# Patient Record
Sex: FEMALE | Race: WHITE | HISPANIC OR LATINO | Employment: FULL TIME | URBAN - METROPOLITAN AREA
[De-identification: names, ages, dates, MRNs, and addresses within clinical notes are randomized per-mention and may not be internally consistent; named-entity substitution may affect disease eponyms.]

---

## 2017-01-02 DIAGNOSIS — F41.9 ANXIETY: Primary | ICD-10-CM

## 2017-01-02 RX ORDER — CITALOPRAM HYDROBROMIDE 20 MG/1
20 TABLET ORAL DAILY
Qty: 90 TABLET | Refills: 0 | Status: SHIPPED | OUTPATIENT
Start: 2017-01-02 | End: 2017-04-05

## 2017-01-02 NOTE — TELEPHONE ENCOUNTER
One month approved, needs to be seen soon, letter sent to make appt  Prescription approved per [facility] Refill Protocol  Maria E Lake RN BS

## 2017-01-02 NOTE — Clinical Note
[facility]  [address] [address]  Phone: [phone]    01/02/2017    Zee Kyle  [address] [address]      Dear Zee:     We recently received a call from your pharmacy requesting a refill of your medication.    A review of your chart indicates that an appointment is required with your provider for 6 month follow up required. Please call the clinic at [phone] to schedule your appointment.    We have authorized one refill of your medication to allow time for you to schedule your appointment.    Taking care of your health is important to us, and ongoing visits with your provider are vital to your care.  We look forward to seeing you in the near future.          Sincerely,      Susan Haase, APRN CNP/Maria E SUAREZ RN

## 2017-01-02 NOTE — TELEPHONE ENCOUNTER
Citalopram 20 mg tab      Last Written Prescription Date: 07/01/16  Last Fill Quantity: 90, # refills: 1  Last Office Visit with G primary care provider:  07/01/16 Susan Haase        Last PHQ-9 score on record=   PHQ-9 SCORE 7/1/2016   Total Score 0

## 2017-04-05 ENCOUNTER — OFFICE VISIT (OUTPATIENT)
Dept: FAMILY MEDICINE | Facility: CLINIC | Age: 42
End: 2017-04-05
Payer: COMMERCIAL

## 2017-04-05 VITALS
HEIGHT: 67 IN | WEIGHT: 248 LBS | DIASTOLIC BLOOD PRESSURE: 80 MMHG | HEART RATE: 94 BPM | OXYGEN SATURATION: 96 % | TEMPERATURE: 98.1 F | RESPIRATION RATE: 16 BRPM | BODY MASS INDEX: 38.92 KG/M2 | SYSTOLIC BLOOD PRESSURE: 130 MMHG

## 2017-04-05 DIAGNOSIS — B00.9 HSV-2 INFECTION: ICD-10-CM

## 2017-04-05 DIAGNOSIS — E55.9 VITAMIN D DEFICIENCY: ICD-10-CM

## 2017-04-05 DIAGNOSIS — F41.9 ANXIETY: ICD-10-CM

## 2017-04-05 DIAGNOSIS — Z13.6 CARDIOVASCULAR SCREENING; LDL GOAL LESS THAN 160: ICD-10-CM

## 2017-04-05 DIAGNOSIS — F33.1 MAJOR DEPRESSIVE DISORDER, RECURRENT EPISODE, MODERATE (H): Primary | ICD-10-CM

## 2017-04-05 DIAGNOSIS — E03.9 HYPOTHYROIDISM, UNSPECIFIED TYPE: ICD-10-CM

## 2017-04-05 PROCEDURE — 99213 OFFICE O/P EST LOW 20 MIN: CPT | Performed by: NURSE PRACTITIONER

## 2017-04-05 RX ORDER — CITALOPRAM HYDROBROMIDE 20 MG/1
20 TABLET ORAL DAILY
Qty: 90 TABLET | Refills: 3 | Status: SHIPPED | OUTPATIENT
Start: 2017-04-05 | End: 2017-11-09

## 2017-04-05 RX ORDER — VALACYCLOVIR HYDROCHLORIDE 500 MG/1
500 TABLET, FILM COATED ORAL DAILY
Qty: 30 TABLET | Refills: 3 | Status: SHIPPED | OUTPATIENT
Start: 2017-04-05 | End: 2018-12-26

## 2017-04-05 ASSESSMENT — ANXIETY QUESTIONNAIRES
3. WORRYING TOO MUCH ABOUT DIFFERENT THINGS: NOT AT ALL
6. BECOMING EASILY ANNOYED OR IRRITABLE: NOT AT ALL
5. BEING SO RESTLESS THAT IT IS HARD TO SIT STILL: NOT AT ALL
GAD7 TOTAL SCORE: 2
1. FEELING NERVOUS, ANXIOUS, OR ON EDGE: NOT AT ALL
2. NOT BEING ABLE TO STOP OR CONTROL WORRYING: NOT AT ALL
7. FEELING AFRAID AS IF SOMETHING AWFUL MIGHT HAPPEN: NOT AT ALL

## 2017-04-05 ASSESSMENT — PATIENT HEALTH QUESTIONNAIRE - PHQ9: 5. POOR APPETITE OR OVEREATING: MORE THAN HALF THE DAYS

## 2017-04-05 NOTE — PROGRESS NOTES
"  SUBJECTIVE:                                                    Zee Kyle is a 41 year old female who presents to clinic today for the following health issues:    Depression and Anxiety Follow-Up    Status since last visit: No change    Other associated symptoms:None    Complicating factors:     Significant life event: No     Current substance abuse: None    PHQ-9 SCORE 12/28/2015 7/1/2016 4/5/2017   Total Score 0 0 2     HENRI-7 SCORE 11/24/2015 7/1/2016 4/5/2017   Total Score 16 0 2        PHQ-9  English      PHQ-9   Any Language     GAD7       Amount of exercise or physical activity: none    Problems taking medications regularly: No    Medication side effects: none    Diet: regular (no restrictions)    Zee as been taking 20MG citalopram daily since prescribed at last visit in 7/2016. 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 Medication is helping and enabling her to accomplish these things. Otherwise doing very well.   PHQ-9: 2  HENRI-7: 2    Dysuria -- She experienced sudden onset of dysuria w/ frequency 3 days ago. She was seen in urgent care and urine sample was negative for UTI. She decided to start taking valacyclovir for herpes virus \"flare up\" even though her symptoms felt different than when that has occurred in past. States that her symptoms have greatly improved since then. Denies vaginal discharge.    GYN -- Reports daily bladder leakage since giving birth to second child. Problem is severe and has worsened especially over the past year as she has gained back a lot of weight. She is planning to schedule OB appt soon to address issue.    Problem list and histories reviewed & adjusted, as indicated.  Additional history: as documented    Patient Active Problem List   Diagnosis     CARDIOVASCULAR SCREENING; LDL GOAL LESS THAN 160     Presbycusis     " Hypothyroidism     Major depressive disorder, recurrent episode, moderate (H)     Anxiety     Past Surgical History:   Procedure Laterality Date     PE TUBES       TONSILLECTOMY       TYMPANOPLASTY, RT/LT      Tympanoplasty RT/LT       Social History   Substance Use Topics     Smoking status: Former Smoker     Quit date: 2/10/2003     Smokeless tobacco: Never Used      Comment: quit 2002     Alcohol use 0.0 oz/week     0 Standard drinks or equivalent per week      Comment: occas     Family History   Problem Relation Age of Onset     Hypertension Father          Current Outpatient Prescriptions   Medication Sig Dispense Refill     citalopram (CELEXA) 20 MG tablet Take 1 tablet (20 mg) by mouth daily 90 tablet 3     valACYclovir (VALTREX) 500 MG tablet Take 1 tablet (500 mg) by mouth daily 30 tablet 3     norgestimate-ethinyl estradiol (ORTHO-CYCLEN, SPRINTEC) 0.25-35 MG-MCG per tablet Take 1 tablet by mouth daily 56 tablet 0     fluticasone (FLONASE) 50 MCG/ACT nasal spray Spray 1-2 sprays into both nostrils daily 16 g 3     CALCIUM PO        [DISCONTINUED] citalopram (CELEXA) 20 MG tablet Take 1 tablet (20 mg) by mouth daily 90 tablet 0     No Known Allergies    Reviewed and updated as needed this visit by clinical staff  Tobacco  Allergies  Med Hx  Surg Hx  Fam Hx  Soc Hx      Reviewed and updated as needed this visit by Provider    ROS:  Constitutional, HEENT, cardiovascular, pulmonary, GI, , musculoskeletal, neuro, skin, endocrine and psych systems are negative, except as otherwise noted.    This document serves as a record of the services and decisions personally performed and made by Susan Haase, CNP. It was created on her behalf by Sera López, a trained medical scribe. The creation of this document is based the provider's statements to the medical scribe.  Sera López April 5, 2017 3:21 PM   OBJECTIVE:                                                    /80 (BP Location: Left arm,  "Patient Position: Chair, Cuff Size: Adult Large)  Pulse 94  Temp 98.1  F (36.7  C) (Oral)  Resp 16  Ht 1.702 m (5' 7\")  Wt 112.5 kg (248 lb)  SpO2 96%  BMI 38.84 kg/m2  Body mass index is 38.84 kg/(m^2).  GENERAL: healthy, alert and no distress, obese  HENT: ear canals and TM's normal, nose and mouth without ulcers or lesions  NECK: no adenopathy, no asymmetry, masses, or scars and thyroid normal to palpation  RESP: lungs clear to auscultation - no rales, rhonchi or wheezes  CV: regular rate and rhythm, normal S1 S2, no S3 or S4, no murmur, click or rub, no peripheral edema   NEURO: Normal strength and tone, mentation intact and speech normal  PSYCH: mentation appears normal, affect normal/bright    Diagnostic Test Results: none     ASSESSMENT/PLAN:                                                    Zee was seen today for recheck medication and urinary problem.    Diagnoses and all orders for this visit:    Major depressive disorder, recurrent episode, moderate (H):  Well controlled.  -     DEPRESSION ACTION PLAN (DAP)  -     citalopram (CELEXA) 20 MG tablet; Take 1 tablet (20 mg) by mouth daily  -     CBC with platelets differential; Future  -     Comprehensive metabolic panel; Future    Anxiety  -     DEPRESSION ACTION PLAN (DAP)  -     citalopram (CELEXA) 20 MG tablet; Take 1 tablet (20 mg) by mouth daily    HSV-2 infection  -     valACYclovir (VALTREX) 500 MG tablet; Take 1 tablet (500 mg) by mouth daily    CARDIOVASCULAR SCREENING; LDL GOAL LESS THAN 160  -     Lipid panel reflex to direct LDL; Future    Hypothyroidism, unspecified type  -     TSH with free T4 reflex; Future    Other orders  -     Cancel: *UA reflex to Microscopic and Culture ([facility] (except [place] and [place])      Follow up in 1 year, sooner if symptoms worsen or do not improve.    The information in this document, created by the medical scribe for me, accurately reflects the services I personally " performed and the decisions made by me. I have reviewed and approved this document for accuracy.   Susan Haase, CNP Susan Haase, APRN CNP  [facility]

## 2017-04-05 NOTE — MR AVS SNAPSHOT
"              After Visit Summary   4/5/2017    Zee Kyle    MRN: [id number]           Patient Information     Date Of Birth          1975        Visit Information        Provider Department      4/5/2017 3:00 PM Haase, Susan Rachele, APRN CNP [facility]        Today's Diagnoses     HSV-2 infection    -  1    Anxiety           Follow-ups after your visit        Follow-up notes from your care team     Return in about 1 year (around 4/5/2018).      Who to contact     If you have questions or need follow up information about today's clinic visit or your schedule please contact [facility] directly at [phone].  Normal or non-critical lab and imaging results will be communicated to you by MyChart, letter or phone within 4 business days after the clinic has received the results. If you do not hear from us within 7 days, please contact the clinic through SmartRxhart or phone. If you have a critical or abnormal lab result, we will notify you by phone as soon as possible.  Submit refill requests through [employer] or call your pharmacy and they will forward the refill request to us. Please allow 3 business days for your refill to be completed.          Additional Information About Your Visit        MyChart Information     Plastio gives you secure access to your electronic health record. If you see a primary care provider, you can also send messages to your care team and make appointments. If you have questions, please call your primary care clinic.  If you do not have a primary care provider, please call [phone] and they will assist you.        Care EveryWhere ID     This is your Care EveryWhere ID. This could be used by other organizations to access your [facility] medical records  [id number]        Your Vitals Were     Pulse Temperature Respirations Height Pulse Oximetry BMI (Body Mass Index)    94 98.1  F (36.7  C) (Oral) 16 5' 7\" (1.702 m) 96% 38.84 kg/m2       Blood " Pressure from Last 3 Encounters:   04/05/17 130/80   07/01/16 110/62   05/13/16 110/72    Weight from Last 3 Encounters:   04/05/17 248 lb (112.5 kg)   07/01/16 183 lb (83 kg)   05/13/16 176 lb (79.8 kg)              We Performed the Following     DEPRESSION ACTION PLAN (DAP)          Today's Medication Changes          These changes are accurate as of: 4/5/17  3:29 PM.  If you have any questions, ask your nurse or doctor.               Start taking these medicines.        Dose/Directions    valACYclovir 500 MG tablet   Commonly known as:  VALTREX   Used for:  HSV-2 infection   Started by:  Haase, Susan Rachele, APRN CNP        Dose:  500 mg   Take 1 tablet (500 mg) by mouth daily   Quantity:  30 tablet   Refills:  3            Where to get your medicines      These medications were sent to [facility] [address] [place] [address] 42  AT [address] [facility] [address]     Phone:  [phone]     citalopram 20 MG tablet    valACYclovir 500 MG tablet                Primary Care Provider Office Phone # Fax #    Susan Rachele Haase, APRN [phone]       [facility] [id number] [place] [address]        Thank you!     Thank you for choosing [facility]  for your care. Our goal is always to provide you with excellent care. Hearing back from our patients is one way we can continue to improve our services. Please take a few minutes to complete the written survey that you may receive in the mail after your visit with us. Thank you!             Your Updated Medication List - Protect others around you: Learn how to safely use, store and throw away your medicines at www.disposemymeds.org.          This list is accurate as of: 4/5/17  3:29 PM.  Always use your most recent med list.                   Brand Name Dispense Instructions for use    CALCIUM PO          citalopram 20 MG tablet    celeXA    90  tablet    Take 1 tablet (20 mg) by mouth daily       fluticasone 50 MCG/ACT spray    FLONASE    16 g    Spray 1-2 sprays into both nostrils daily       norgestimate-ethinyl estradiol 0.25-35 MG-MCG per tablet    ORTHO-CYCLEN, SPRINTEC    56 tablet    Take 1 tablet by mouth daily       valACYclovir 500 MG tablet    VALTREX    30 tablet    Take 1 tablet (500 mg) by mouth daily

## 2017-04-05 NOTE — Clinical Note
Please abstract the following data from this visit with this patient into the appropriate field in Epic:  Pap smear done on this date: 10/1/2015 (approximately), by this group: [facility] Dr. Schumacher, results were normal.

## 2017-04-05 NOTE — NURSING NOTE
"Chief Complaint   Patient presents with     Recheck Medication     Urinary Problem       Initial /80 (BP Location: Left arm, Patient Position: Chair, Cuff Size: Adult Large)  Pulse 94  Temp 98.1  F (36.7  C) (Oral)  Resp 16  Ht 5' 7\" (1.702 m)  Wt 248 lb (112.5 kg)  SpO2 96%  BMI 38.84 kg/m2 Estimated body mass index is 38.84 kg/(m^2) as calculated from the following:    Height as of this encounter: 5' 7\" (1.702 m).    Weight as of this encounter: 248 lb (112.5 kg).  Medication Reconciliation: complete   Bobbi Bergman CMA      "

## 2017-04-06 RX ORDER — ERGOCALCIFEROL 1.25 MG/1
50000 CAPSULE, LIQUID FILLED ORAL
Qty: 12 CAPSULE | Refills: 0 | Status: SHIPPED | OUTPATIENT
Start: 2017-04-06 | End: 2017-06-23

## 2017-04-06 ASSESSMENT — ANXIETY QUESTIONNAIRES: GAD7 TOTAL SCORE: 2

## 2017-04-06 ASSESSMENT — PATIENT HEALTH QUESTIONNAIRE - PHQ9: SUM OF ALL RESPONSES TO PHQ QUESTIONS 1-9: 2

## 2017-06-30 DIAGNOSIS — E55.9 VITAMIN D DEFICIENCY: ICD-10-CM

## 2017-07-05 RX ORDER — ERGOCALCIFEROL 1.25 MG/1
CAPSULE, LIQUID FILLED ORAL
Qty: 12 CAPSULE | Refills: 0 | OUTPATIENT
Start: 2017-07-05

## 2017-11-09 ENCOUNTER — OFFICE VISIT (OUTPATIENT)
Dept: FAMILY MEDICINE | Facility: CLINIC | Age: 42
End: 2017-11-09
Payer: COMMERCIAL

## 2017-11-09 VITALS
TEMPERATURE: 97.8 F | RESPIRATION RATE: 14 BRPM | HEART RATE: 94 BPM | OXYGEN SATURATION: 98 % | BODY MASS INDEX: 38.06 KG/M2 | SYSTOLIC BLOOD PRESSURE: 120 MMHG | DIASTOLIC BLOOD PRESSURE: 76 MMHG | WEIGHT: 243 LBS

## 2017-11-09 DIAGNOSIS — F33.1 MAJOR DEPRESSIVE DISORDER, RECURRENT EPISODE, MODERATE (H): ICD-10-CM

## 2017-11-09 DIAGNOSIS — F41.9 ANXIETY: ICD-10-CM

## 2017-11-09 DIAGNOSIS — R22.31 LUMP ON FINGER, RIGHT: Primary | ICD-10-CM

## 2017-11-09 DIAGNOSIS — Z23 NEED FOR PROPHYLACTIC VACCINATION AND INOCULATION AGAINST INFLUENZA: ICD-10-CM

## 2017-11-09 DIAGNOSIS — R22.31 LUMP OF SKIN OF RIGHT UPPER EXTREMITY: ICD-10-CM

## 2017-11-09 PROCEDURE — 90471 IMMUNIZATION ADMIN: CPT | Performed by: NURSE PRACTITIONER

## 2017-11-09 PROCEDURE — 90686 IIV4 VACC NO PRSV 0.5 ML IM: CPT | Performed by: NURSE PRACTITIONER

## 2017-11-09 PROCEDURE — 99213 OFFICE O/P EST LOW 20 MIN: CPT | Mod: 25 | Performed by: NURSE PRACTITIONER

## 2017-11-09 RX ORDER — CITALOPRAM HYDROBROMIDE 20 MG/1
20 TABLET ORAL DAILY
Qty: 90 TABLET | Refills: 3 | Status: SHIPPED | OUTPATIENT
Start: 2017-11-09 | End: 2018-07-10 | Stop reason: ALTCHOICE

## 2017-11-09 ASSESSMENT — PATIENT HEALTH QUESTIONNAIRE - PHQ9
5. POOR APPETITE OR OVEREATING: SEVERAL DAYS
SUM OF ALL RESPONSES TO PHQ QUESTIONS 1-9: 1

## 2017-11-09 ASSESSMENT — ANXIETY QUESTIONNAIRES
3. WORRYING TOO MUCH ABOUT DIFFERENT THINGS: SEVERAL DAYS
6. BECOMING EASILY ANNOYED OR IRRITABLE: NOT AT ALL
2. NOT BEING ABLE TO STOP OR CONTROL WORRYING: SEVERAL DAYS
5. BEING SO RESTLESS THAT IT IS HARD TO SIT STILL: NOT AT ALL
GAD7 TOTAL SCORE: 4
IF YOU CHECKED OFF ANY PROBLEMS ON THIS QUESTIONNAIRE, HOW DIFFICULT HAVE THESE PROBLEMS MADE IT FOR YOU TO DO YOUR WORK, TAKE CARE OF THINGS AT HOME, OR GET ALONG WITH OTHER PEOPLE: NOT DIFFICULT AT ALL
1. FEELING NERVOUS, ANXIOUS, OR ON EDGE: SEVERAL DAYS
7. FEELING AFRAID AS IF SOMETHING AWFUL MIGHT HAPPEN: NOT AT ALL

## 2017-11-09 NOTE — PROGRESS NOTES

## 2017-11-09 NOTE — MR AVS SNAPSHOT
After Visit Summary   11/9/2017    Zee Kyle    MRN: [id number]           Patient Information     Date Of Birth          1975        Visit Information        Provider Department      11/9/2017 3:45 PM Haase, Susan Rachele, APRN CNP [facility]        Today's Diagnoses     Lump on finger, right    -  1    Lump of skin of right upper extremity           Follow-ups after your visit        Follow-up notes from your care team     Return in about 6 months (around 5/9/2018).      Future tests that were ordered for you today     Open Future Orders        Priority Expected Expires Ordered    US Extremity Non Vascular Right Routine  11/9/2018 11/9/2017            Who to contact     If you have questions or need follow up information about today's clinic visit or your schedule please contact [facility] directly at [phone].  Normal or non-critical lab and imaging results will be communicated to you by v2 Ratingshart, letter or phone within 4 business days after the clinic has received the results. If you do not hear from us within 7 days, please contact the clinic through v2 Ratingshart or phone. If you have a critical or abnormal lab result, we will notify you by phone as soon as possible.  Submit refill requests through [employer] or call your pharmacy and they will forward the refill request to us. Please allow 3 business days for your refill to be completed.          Additional Information About Your Visit        MyChart Information     [employer] gives you secure access to your electronic health record. If you see a primary care provider, you can also send messages to your care team and make appointments. If you have questions, please call your primary care clinic.  If you do not have a primary care provider, please call [phone] and they will assist you.        Care EveryWhere ID     This is your Care EveryWhere ID. This could be used by other organizations to access  your [place] medical records  [id number]        Your Vitals Were     Pulse Temperature Respirations Pulse Oximetry BMI (Body Mass Index)       94 97.8  F (36.6  C) (Oral) 14 98% 38.06 kg/m2        Blood Pressure from Last 3 Encounters:   11/09/17 120/76   04/05/17 130/80   07/01/16 110/62    Weight from Last 3 Encounters:   11/09/17 243 lb (110.2 kg)   04/05/17 248 lb (112.5 kg)   07/01/16 183 lb (83 kg)               Primary Care Provider Office Phone # Fax #    Susan Rachele Haase, APRN -[phone] [address] [facility] [address]        Equal Access to Services     DARYL CALZADA : Paul mooneyo [place], waaxda luqadaha, qaybta kaalmada adeegyada, kevan yao . So [facility] [phone].    ATENCIÓN: Si habla español, tiene a arndt disposición servicios gratuitos de asistencia lingüística. Llame al [phone].    We comply with applicable federal civil rights laws and [place] laws. We do not discriminate on the basis of race, color, national origin, age, disability, sex, sexual orientation, or gender identity.            Thank you!     Thank you for choosing [facility]  for your care. Our goal is always to provide you with excellent care. Hearing back from our patients is one way we can continue to improve our services. Please take a few minutes to complete the written survey that you may receive in the mail after your visit with us. Thank you!             Your Updated Medication List - Protect others around you: Learn how to safely use, store and throw away your medicines at www.disposemymeds.org.          This list is accurate as of: 11/9/17  4:11 PM.  Always use your most recent med list.                   Brand Name Dispense Instructions for use Diagnosis    CALCIUM PO           citalopram 20 MG tablet    celeXA    90 tablet    Take 1 tablet (20 mg) by mouth daily    Anxiety, Major depressive disorder, recurrent episode,  moderate (H)       fluticasone 50 MCG/ACT spray    FLONASE    16 g    Spray 1-2 sprays into both nostrils daily    Sensation of fullness in left ear       valACYclovir 500 MG tablet    VALTREX    30 tablet    Take 1 tablet (500 mg) by mouth daily    HSV-2 infection

## 2017-11-09 NOTE — PROGRESS NOTES
SUBJECTIVE:   Zee Kyle is a 42 year old female who presents to clinic today for the following health issues:    Derm problem      Duration: 3 months    Description  Location: R index finger and R elbow  Itching: no    Intensity:  No pain    Accompanying signs and symptoms: None    History (similar episodes/previous evaluation): None    Precipitating or alleviating factors:  New exposures:  None  Recent travel: no      Therapies tried and outcome: none    Zee has a mass on her right second finger and another by her right elbow. She felt both started about 3 months ago, thought she had a bug bite since both sites were itchy.  Since that time has not had any pruritis.  Also denies redness, warmth or drainage.      Anxiety/Depression:Taking citalopram 20 mg every day as prescribed.    PHQ9 score: 1  GAD7 score: 4      Problem list and histories reviewed & adjusted, as indicated.  Additional history: as documented    Reviewed and updated as needed this visit by clinical staff     Reviewed and updated as needed this visit by Provider       ROS:  Constitutional,  musculoskeletal, neuro, skin, and psych systems are negative, except as otherwise noted.    This document serves as a record of the services and decisions personally performed and made by Susan Haase, CNP. It was created on her behalf by Homero Rubi, a trained medical scribe. The creation of this document is based the provider's statements to the medical scribe.  Homero Rubi November 9, 2017 4:07 PM      OBJECTIVE:   /76 (BP Location: Right arm, Patient Position: Chair, Cuff Size: Adult Large)  Pulse 94  Temp 97.8  F (36.6  C) (Oral)  Resp 14  Wt 110.2 kg (243 lb)  SpO2 98%  BMI 38.06 kg/m2  Body mass index is 38.06 kg/(m^2).  GENERAL: healthy, alert and no distress  SKIN: right index finger with a mass of the  proximal phalanx , 1cm x 1 cm, nontender, mobile.   Right upper arm, proximal to right elbow,  2.5cm x 1cm non-symmetric, non  tender, mobile mass.   PSYCH: mentation appears normal, affect normal/bright    ASSESSMENT/PLAN:   Zee was seen today for derm problem and flu shot.    Diagnoses and all orders for this visit:    Lump on finger, right and Lump of skin of right upper extremity;  Likely a lipoma but will obtain US for further evaluation.  -     US Extremity Non Vascular Right; Future    Major depressive disorder, recurrent episode, moderate (H) and Anxiety:  Well controlled.   -     citalopram (CELEXA) 20 MG tablet; Take 1 tablet (20 mg) by mouth daily    Need for prophylactic vaccination and inoculation against influenza  -     FLU VAC, SPLIT VIRUS IM > 3 YO (QUADRIVALENT) [61947]    Follow up after ultrasound to discuss results and follow up plan.   The information in this document, created by the medical scribe for me, accurately reflects the services I personally performed and the decisions made by me. I have reviewed and approved this document for accuracy.   Susan Haase, CNP Susan Haase, APRN CNP  [facility]

## 2017-11-10 ASSESSMENT — ANXIETY QUESTIONNAIRES: GAD7 TOTAL SCORE: 4

## 2017-11-13 ENCOUNTER — HOSPITAL ENCOUNTER (OUTPATIENT)
Dept: ULTRASOUND IMAGING | Facility: CLINIC | Age: 42
Discharge: HOME | End: 2017-11-13
Attending: NURSE PRACTITIONER | Admitting: NURSE PRACTITIONER
Payer: COMMERCIAL

## 2017-11-13 DIAGNOSIS — R22.31 LUMP ON FINGER, RIGHT: ICD-10-CM

## 2017-11-13 DIAGNOSIS — R22.31 LUMP OF SKIN OF RIGHT UPPER EXTREMITY: ICD-10-CM

## 2017-11-13 PROCEDURE — 76882 US LMTD JT/FCL EVL NVASC XTR: CPT | Mod: RT

## 2017-11-15 NOTE — PROGRESS NOTES
Nathan Koch,  Both of the lumps are fluid filled, if they become red or painful please return for a clinic visit otherwise treatment is not necessary.  Please let me know if you have any questions.  Sincerely,  Susan Haase, CNP

## 2018-02-08 ENCOUNTER — HOSPITAL ENCOUNTER (OUTPATIENT)
Dept: MAMMOGRAPHY | Facility: CLINIC | Age: 43
Discharge: HOME | End: 2018-02-08
Attending: NURSE PRACTITIONER | Admitting: NURSE PRACTITIONER
Payer: COMMERCIAL

## 2018-02-08 ENCOUNTER — HOSPITAL ENCOUNTER (OUTPATIENT)
Dept: MAMMOGRAPHY | Facility: CLINIC | Age: 43
End: 2018-02-08
Attending: NURSE PRACTITIONER
Payer: COMMERCIAL

## 2018-02-08 DIAGNOSIS — N63.20 LEFT BREAST MASS: ICD-10-CM

## 2018-02-08 PROCEDURE — 76642 ULTRASOUND BREAST LIMITED: CPT | Mod: LT

## 2018-02-08 PROCEDURE — 77066 DX MAMMO INCL CAD BI: CPT

## 2018-07-02 DIAGNOSIS — F41.9 ANXIETY: ICD-10-CM

## 2018-07-02 DIAGNOSIS — F33.1 MAJOR DEPRESSIVE DISORDER, RECURRENT EPISODE, MODERATE (H): ICD-10-CM

## 2018-07-02 NOTE — TELEPHONE ENCOUNTER
"Requested Prescriptions   Pending Prescriptions Disp Refills     citalopram (CELEXA) 20 MG tablet [Pharmacy Med Name: CITALOPRAM 20MG TABLETS]  Last Written Prescription Date:  11/9/2017  Last Fill Quantity: 90 tablet,  # refills: 3   Last office visit: 11/9/2017 with prescribing provider:  Haase    90 tablet 0     Sig: TAKE 1 TABLET(20 MG) BY MOUTH DAILY    SSRIs Protocol Failed    7/2/2018 10:06 AM  PHQ-9 SCORE 7/1/2016 4/5/2017 11/9/2017   Total Score 0 2 1     HENRI-7 SCORE 7/1/2016 4/5/2017 11/9/2017   Total Score 0 2 4          Failed - PHQ-9 score less than 5 in past 6 months    Please review last PHQ-9 score.   PHQ-9 SCORE 7/1/2016 4/5/2017 11/9/2017   Total Score 0 2 1          Failed - Recent (6 mo) or future (30 days) visit within the authorizing provider's specialty    Patient had office visit in the last 6 months or has a visit in the next 30 days with authorizing provider or within the authorizing provider's specialty.  See \"Patient Info\" tab in inbasket, or \"Choose Columns\" in Meds & Orders section of the refill encounter.           Passed - Patient is age 18 or older       Passed - No active pregnancy on record       Passed - No positive pregnancy test in last 12 months          "

## 2018-07-03 RX ORDER — CITALOPRAM HYDROBROMIDE 20 MG/1
TABLET ORAL
Qty: 90 TABLET | Refills: 0
Start: 2018-07-03

## 2018-07-03 NOTE — TELEPHONE ENCOUNTER
Return in about 6 months (around 5/9/2018).     Call out to pt, scheduled appt  Has refill available    Maria E Lake RN, BS  Clinical Nurse Triage.

## 2018-07-05 ENCOUNTER — TRANSFERRED RECORDS (OUTPATIENT)
Dept: HEALTH INFORMATION MANAGEMENT | Facility: CLINIC | Age: 43
End: 2018-07-05

## 2018-07-10 ENCOUNTER — OFFICE VISIT (OUTPATIENT)
Dept: FAMILY MEDICINE | Facility: CLINIC | Age: 43
End: 2018-07-10
Payer: COMMERCIAL

## 2018-07-10 VITALS
HEIGHT: 67 IN | SYSTOLIC BLOOD PRESSURE: 120 MMHG | WEIGHT: 256 LBS | BODY MASS INDEX: 40.18 KG/M2 | DIASTOLIC BLOOD PRESSURE: 78 MMHG | RESPIRATION RATE: 14 BRPM | TEMPERATURE: 97.9 F | HEART RATE: 90 BPM | OXYGEN SATURATION: 99 %

## 2018-07-10 DIAGNOSIS — F33.1 MAJOR DEPRESSIVE DISORDER, RECURRENT EPISODE, MODERATE (H): Primary | ICD-10-CM

## 2018-07-10 DIAGNOSIS — E66.01 MORBID OBESITY (H): ICD-10-CM

## 2018-07-10 DIAGNOSIS — F41.9 ANXIETY: ICD-10-CM

## 2018-07-10 PROCEDURE — 99214 OFFICE O/P EST MOD 30 MIN: CPT | Performed by: NURSE PRACTITIONER

## 2018-07-10 RX ORDER — VENLAFAXINE HYDROCHLORIDE 37.5 MG/1
37.5 CAPSULE, EXTENDED RELEASE ORAL DAILY
Qty: 30 CAPSULE | Refills: 3 | Status: SHIPPED | OUTPATIENT
Start: 2018-07-10 | End: 2018-09-14

## 2018-07-10 ASSESSMENT — PATIENT HEALTH QUESTIONNAIRE - PHQ9
SUM OF ALL RESPONSES TO PHQ QUESTIONS 1-9: 12
10. IF YOU CHECKED OFF ANY PROBLEMS, HOW DIFFICULT HAVE THESE PROBLEMS MADE IT FOR YOU TO DO YOUR WORK, TAKE CARE OF THINGS AT HOME, OR GET ALONG WITH OTHER PEOPLE: NOT DIFFICULT AT ALL
SUM OF ALL RESPONSES TO PHQ QUESTIONS 1-9: 12

## 2018-07-10 ASSESSMENT — ANXIETY QUESTIONNAIRES
GAD7 TOTAL SCORE: 4
7. FEELING AFRAID AS IF SOMETHING AWFUL MIGHT HAPPEN: NOT AT ALL
GAD7 TOTAL SCORE: 4
1. FEELING NERVOUS, ANXIOUS, OR ON EDGE: NOT AT ALL
6. BECOMING EASILY ANNOYED OR IRRITABLE: SEVERAL DAYS
4. TROUBLE RELAXING: SEVERAL DAYS
GAD7 TOTAL SCORE: 4
3. WORRYING TOO MUCH ABOUT DIFFERENT THINGS: NOT AT ALL
2. NOT BEING ABLE TO STOP OR CONTROL WORRYING: NOT AT ALL
5. BEING SO RESTLESS THAT IT IS HARD TO SIT STILL: MORE THAN HALF THE DAYS
7. FEELING AFRAID AS IF SOMETHING AWFUL MIGHT HAPPEN: NOT AT ALL

## 2018-07-10 NOTE — MR AVS SNAPSHOT
After Visit Summary   7/10/2018    Zee Kyle    MRN: [id number]           Patient Information     Date Of Birth          1975        Visit Information        Provider Department      7/10/2018 10:15 AM Haase, Susan Rachele, APRN CNP [facility]        Today's Diagnoses     Mild major depression (H)    -  1    Morbid obesity (H)           Follow-ups after your visit        Additional Services     BARIATRIC ADULT REFERRAL       Your provider has referred you to: [place]: [facility] Shakeel Paz [phone];   [email or web address]    Please be aware that coverage of these services is subject to the terms and limitations of your health insurance plan.  Call member services at your health plan with any benefit or coverage questions.      Please bring the following with you to your appointment:      (1) List of current medications   (2) This referral request   (3) Any documents/labs given to you for this referral                  Follow-up notes from your care team     Return in about 6 months (around 1/10/2019) for Physical Exam.      Who to contact     If you have questions or need follow up information about today's clinic visit or your schedule please contact [facility] directly at [phone].  Normal or non-critical lab and imaging results will be communicated to you by MyChart, letter or phone within 4 business days after the clinic has received the results. If you do not hear from us within 7 days, please contact the clinic through MyChart or phone. If you have a critical or abnormal lab result, we will notify you by phone as soon as possible.  Submit refill requests through [employer] or call your pharmacy and they will forward the refill request to us. Please allow 3 business days for your refill to be completed.          Additional  "Information About Your Visit        [employer] Information     [employer] gives you secure access to your electronic health record. If you see a primary care provider, you can also send messages to your care team and make appointments. If you have questions, please call your primary care clinic.  If you do not have a primary care provider, please call [phone] and they will assist you.        Care EveryWhere ID     This is your Care EveryWhere ID. This could be used by other organizations to access your [place] medical records  [id number]        Your Vitals Were     Pulse Temperature Respirations Height Pulse Oximetry BMI (Body Mass Index)    90 97.9  F (36.6  C) (Oral) 14 5' 7\" (1.702 m) 99% 40.1 kg/m2       Blood Pressure from Last 3 Encounters:   07/10/18 120/78   11/09/17 120/76   04/05/17 130/80    Weight from Last 3 Encounters:   07/10/18 256 lb (116.1 kg)   11/09/17 243 lb (110.2 kg)   04/05/17 248 lb (112.5 kg)              We Performed the Following     BARIATRIC ADULT REFERRAL     DEPRESSION ACTION PLAN (DAP)          Today's Medication Changes          These changes are accurate as of 7/10/18 10:44 AM.  If you have any questions, ask your nurse or doctor.               Start taking these medicines.        Dose/Directions    venlafaxine 37.5 MG 24 hr capsule   Commonly known as:  EFFEXOR-XR   Used for:  Mild major depression (H)   Started by:  Haase, Susan Rachele, APRN CNP        Dose:  37.5 mg   Take 1 capsule (37.5 mg) by mouth daily   Quantity:  30 capsule   Refills:  3         Stop taking these medicines if you haven't already. Please contact your care team if you have questions.     CALCIUM PO   Stopped by:  Haase, Susan Rachele, APRN CNP           fluticasone 50 MCG/ACT spray   Commonly known as:  FLONASE   Stopped by:  Haase, Susan Rachele, APRN CNP                Where to get your medicines      These medications were sent to [facility] [address] [address] [address]     Phone:  [phone]     venlafaxine 37.5 MG 24 hr capsule                Primary Care Provider Office Phone # Fax #    Susan Rachele Haase, APRN [phone] [address]  [facility] [address]        Equal Access to Services     DARYL CALZADA : Hadii aad ku hadasho Soomaali, waaxda luqadaha, qaybta kaalmada adeegyada, waxay idiin hayaan adeeg kharash la'nachon ah. So [facility] [phone].    ATENCIÓN: Si josue randolph, tiene a arndt disposición servicios gratuitos de asistencia lingüística. Llame al [phone].    We comply with applicable federal civil rights laws and [place] laws. We do not discriminate on the basis of race, color, national origin, age, disability, sex, sexual orientation, or gender identity.            Thank you!     Thank you for choosing [facility]  for your care. Our goal is always to provide you with excellent care. Hearing back from our patients is one way we can continue to improve our services. Please take a few minutes to complete the written survey that you may receive in the mail after your visit with us. Thank you!             Your Updated Medication List - Protect others around you: Learn how to safely use, store and throw away your medicines at www.disposemymeds.org.          This list is accurate as of 7/10/18 10:44 AM.  Always use your most recent med list.                   Brand Name Dispense Instructions for use Diagnosis    citalopram 20 MG tablet    celeXA    90 tablet    Take 1 tablet (20 mg) by mouth daily    Anxiety, Major depressive disorder, recurrent episode, moderate (H)       valACYclovir 500 MG tablet    VALTREX    30 tablet    Take 1 tablet (500 mg) by mouth daily    HSV-2 infection       venlafaxine 37.5 MG 24 hr capsule    EFFEXOR-XR    30 capsule    Take 1 capsule (37.5 mg) by mouth daily    Mild major depression (H)

## 2018-07-10 NOTE — PROGRESS NOTES
SUBJECTIVE:   Zee Kyle is a 42 year old female who presents to clinic today for the following health issues:    History of Present Illness     Depression & Anxiety Follow-up:     Depression/Anxiety:  Depression & Anxiety    Status since last visit::  Improved    Other associated symptoms of depression and anxiety::  YES    Significant life event::  No    Current substance use::  None       Today's PHQ-9         PHQ-9 Total Score:     (P) 12   PHQ-9 Q9 Suicidal ideation:   (P) Not at all   Thoughts of suicide or self harm:      Self-harm Plan:        Self-harm Action:          Safety concerns for self or others:       HENRI-7 Total Score: (P) 4    Diet:  Regular (no restrictions)  Frequency of exercise:  None  Taking medications regularly:  Yes  Medication side effects:  None  Additional concerns today:  YES    Taking citalopram 20 mg every day as prescribed, does not feel that medication has helped depression or anxiety.  Today PHQ 9 score of 12, denies thoughts of harming self or others. HENRI 7 score of 4.  Feels that increase in anxiety is related to weight gain.      Obesity:  Has gained  80 lbs in the past 2 years, was at a low of 169 in 2015.  Feels terrible about weight gain, contributes to depression and lack of exercise.      Sleep apnea: using CPAP, has helped with daytime drowsiness.     Problem list and histories reviewed & adjusted, as indicated.  Additional history: as documented        Patient Active Problem List   Diagnosis     CARDIOVASCULAR SCREENING; LDL GOAL LESS THAN 160     Presbycusis     Hypothyroidism     Major depressive disorder, recurrent episode, moderate (H)     Anxiety     Past Surgical History:   Procedure Laterality Date     PE TUBES       TONSILLECTOMY       TYMPANOPLASTY, RT/LT      Tympanoplasty RT/LT       Social History   Substance Use Topics     Smoking status: Former Smoker     Quit date: 2/10/2003     Smokeless tobacco: Never Used      Comment: quit 2002     Alcohol  "use 0.0 oz/week     0 Standard drinks or equivalent per week      Comment: occas     Family History   Problem Relation Age of Onset     Hypertension Father          Current Outpatient Prescriptions   Medication Sig Dispense Refill     citalopram (CELEXA) 20 MG tablet Take 1 tablet (20 mg) by mouth daily 90 tablet 3     valACYclovir (VALTREX) 500 MG tablet Take 1 tablet (500 mg) by mouth daily 30 tablet 3       ROS:  CONSTITUTIONAL: NEGATIVE for fever, chills, change in weight  RESP: NEGATIVE for significant cough or SOB  CV: NEGATIVE for chest pain, palpitations or peripheral edema  PSYCHIATRIC: see HPI    OBJECTIVE:     /78 (BP Location: Right arm, Patient Position: Chair, Cuff Size: Adult Large)  Pulse 90  Temp 97.9  F (36.6  C) (Oral)  Resp 14  Ht 5' 7\" (1.702 m)  Wt 256 lb (116.1 kg)  SpO2 99%  BMI 40.1 kg/m2  Body mass index is 40.1 kg/(m^2).   GENERAL: healthy, alert and no distress  NECK: no adenopathy, no asymmetry, masses, or scars and thyroid normal to palpation  RESP: lungs clear to auscultation - no rales, rhonchi or wheezes  CV: regular rate and rhythm, normal S1 S2, no S3 or S4, no murmur, click or rub, no peripheral edema and   PSYCH: mentation appears normal, affect tearful at times.    ASSESSMENT:   See below    PLAN:   Diagnoses and all orders for this visit:    Major depressive disorder, recurrent episode, moderate (H) and anxiety:  Discontinue Celexa, will start on Effexor XR.Discontinue Celexa, will start on   Discussed risks and benefits of medication, need to take on a daily basis, will take at least 2-4 weeks to notice decrease in depression or anxiety, if symptoms of depression worsen stop taking medication and notify the clinic.  Is aware of emergency resources.  Encouraged increased in exercise and counseling.   -     DEPRESSION ACTION PLAN (DAP)  -     venlafaxine (EFFEXOR-XR) 37.5 MG 24 hr capsule; Take 1 capsule (37.5 mg) by mouth daily    Morbid obesity (H); is very " frustrated with weight gain, will refer to weight loss center to discuss weight loss options.   -     BARIATRIC ADULT REFERRAL    Follow up in 6 weeks for medication check, sooner as needed, may complete e visit or phone visit.      Susan Haase, APRN [facility]

## 2018-07-11 ASSESSMENT — PATIENT HEALTH QUESTIONNAIRE - PHQ9: SUM OF ALL RESPONSES TO PHQ QUESTIONS 1-9: 12

## 2018-07-11 ASSESSMENT — ANXIETY QUESTIONNAIRES: GAD7 TOTAL SCORE: 4

## 2018-07-18 ENCOUNTER — OFFICE VISIT (OUTPATIENT)
Dept: SURGERY | Facility: CLINIC | Age: 43
End: 2018-07-18
Payer: COMMERCIAL

## 2018-07-18 VITALS
HEART RATE: 101 BPM | OXYGEN SATURATION: 97 % | WEIGHT: 258.4 LBS | HEIGHT: 67 IN | DIASTOLIC BLOOD PRESSURE: 87 MMHG | TEMPERATURE: 98 F | BODY MASS INDEX: 40.56 KG/M2 | RESPIRATION RATE: 12 BRPM | SYSTOLIC BLOOD PRESSURE: 139 MMHG

## 2018-07-18 DIAGNOSIS — F33.1 MAJOR DEPRESSIVE DISORDER, RECURRENT EPISODE, MODERATE (H): ICD-10-CM

## 2018-07-18 DIAGNOSIS — G47.33 OSA (OBSTRUCTIVE SLEEP APNEA): ICD-10-CM

## 2018-07-18 DIAGNOSIS — R60.0 LOWER EXTREMITY EDEMA: ICD-10-CM

## 2018-07-18 DIAGNOSIS — E66.01 MORBID OBESITY WITH BMI OF 40.0-44.9, ADULT (H): Primary | ICD-10-CM

## 2018-07-18 DIAGNOSIS — L30.4 INTERTRIGO: ICD-10-CM

## 2018-07-18 PROCEDURE — 99203 OFFICE O/P NEW LOW 30 MIN: CPT | Performed by: PHYSICIAN ASSISTANT

## 2018-07-18 NOTE — MR AVS SNAPSHOT
After Visit Summary   7/18/2018    Zee Kyle    MRN: [id number]           Patient Information     Date Of Birth          1975        Visit Information        Provider Department      7/18/2018 9:00 AM Renzo Covarrubias PA-C [facility]      Today's Diagnoses     Morbid obesity with BMI of 40.0-44.9, adult (H)    -  1    CLARK (obstructive sleep apnea)        Major depressive disorder, recurrent episode, moderate (H)        Intertrigo        Lower extremity edema           Follow-ups after your visit        Who to contact     If you have questions or need follow up information about today's clinic visit or your schedule please contact [facility] directly at [phone].  Normal or non-critical lab and imaging results will be communicated to you by MyChart, letter or phone within 4 business days after the clinic has received the results. If you do not hear from us within 7 days, please contact the clinic through MyChart or phone. If you have a critical or abnormal lab result, we will notify you by phone as soon as possible.  Submit refill requests through [employer] or call your pharmacy and they will forward the refill request to us. Please allow 3 business days for your refill to be completed.          Additional Information About Your Visit        MyChart Information     [employer] gives you secure access to your electronic health record. If you see a primary care provider, you can also send messages to your care team and make appointments. If you have questions, please call your primary care clinic.  If you do not have a primary care provider, please call [phone] and they will assist you.        Care EveryWhere ID     This is your Care EveryWhere ID. This could be used by other organizations to access your [place] medical records  [id number]        Your Vitals Were      "Pulse Temperature Respirations Height Pulse Oximetry BMI (Body Mass Index)    101 98  F (36.7  C) (Oral) 12 5' 7\" (1.702 m) 97% 40.47 kg/m2       Blood Pressure from Last 3 Encounters:   07/18/18 139/87   07/10/18 120/78   11/09/17 120/76    Weight from Last 3 Encounters:   07/18/18 258 lb 6.4 oz (117.2 kg)   07/10/18 256 lb (116.1 kg)   11/09/17 243 lb (110.2 kg)              Today, you had the following     No orders found for display       Primary Care Provider Office Phone # Fax #    Susan Rachele Haase, APRN -[phone] [address] [facility] [address]        Equal Access to Services     DARYL CALZADA : Hadii milan mooneyo [place], waaxda luqadaha, qaybta kaalmada adeyaneyasusan, kevan yao . So [facility] [phone].    ATENCIÓN: Si habla español, tiene a arndt disposición servicios gratuitos de asistencia lingüística. Stephanie al [phone].    We comply with applicable federal civil rights laws and [place] laws. We do not discriminate on the basis of race, color, national origin, age, disability, sex, sexual orientation, or gender identity.            Thank you!     Thank you for choosing [facility]  for your care. Our goal is always to provide you with excellent care. Hearing back from our patients is one way we can continue to improve our services. Please take a few minutes to complete the written survey that you may receive in the mail after your visit with us. Thank you!             Your Updated Medication List - Protect others around you: Learn how to safely use, store and throw away your medicines at www.disposemymeds.org.          This list is accurate as of 7/18/18  2:08 PM.  Always use your most recent med list.                   Brand Name Dispense Instructions for use Diagnosis    valACYclovir 500 MG tablet    VALTREX    30 tablet    Take 1 tablet (500 mg) by mouth daily    HSV-2 infection       venlafaxine 37.5 MG " 24 hr capsule    EFFEXOR-XR    30 capsule    Take 1 capsule (37.5 mg) by mouth daily    Major depressive disorder, recurrent episode, moderate (H), Anxiety

## 2018-07-18 NOTE — PROGRESS NOTES
"    New Bariatric Surgery Consultation Note    RE: Zee Kyle  MR#: [id number]  : 1975      Referring provider:       2018   Who referred you? Dr. Susan Haase       Chief Complaint/Reason for visit: evaluation for possible weight loss surgery    Dear Haase, Delores Kimble, GARRETT CNP (General),    I had the pleasure of seeing your patient, Zee Kyle, to evaluate her obesity and consider her for possible weight loss surgery. As you know, Zee Kyle is 42 year old.  She has a height of 5' 7\", a weight of 258 lbs 6.4 oz, and calculated Body mass index is 40.47 kg/(m^2).        HISTORY OF PRESENT ILLNESS:  Weight Loss History Reviewed with Patient 2018   How long have you been overweight? Following one or more pregnancies   What is the most that you have ever weighed? 255   What is the most weight you have lost? 65   I have tried the following methods to lose weight Watching portions or calories, Exercise, Weight Watchers, OTC Medications   I have tried the following weight loss medications? (Check all that apply) None   Have you ever had weight loss surgery? No       CO-MORBIDITIES OF OBESITY INCLUDE:     2018   I have the following co-morbidities associated with obesity: Sleep Apnea, Infertility, Weight Bearing Joint Pain, Stress Incontinence   Do you use a CPAP? No     Has CLARK diagnosed in the past year.  Uses CPAP nightly.   PAST MEDICAL HISTORY:  Past Medical History:   Diagnosis Date     Anxiety Ongoing since 18     Depressive disorder on going since 18     Earache or other ear, nose, or throat complaint 12    Tonsillectomy      Esophageal reflux     Had stopped once I got my sleep apnea mask      Herpes simplex without mention of complication      Hypertension     While Pregnant     Hypothyroidism 2012     Presbyacusis     Presbycusis     STD (sexually transmitted disease)     Herpes     Thyroid disease 2012     Urinary incontinence     Bladder sling " surgery      There were 2 suicidal attempts in high school by trying to OD.  Nothing since high school. In and out of therapy since high school.  Saw a therapist up until 8 months ago.  It was getting too expensive to continue.  Feels like in a good mental place currently.    CPAP has corrected all heartburn.  Was on prilosec before she got CPAP    PAST SURGICAL HISTORY:  Past Surgical History:   Procedure Laterality Date     ENT SURGERY       PE TUBES       TONSILLECTOMY       TYMPANOPLASTY, RT/LT      Tympanoplasty RT/LT     No personal or family history of blood clots or anesthesia concerns    FAMILY HISTORY:   Family History   Problem Relation Age of Onset     Hypertension Father       08     Mother was a life time smoker.  Had PAD.      SOCIAL HISTORY:   Social History Questions Reviewed With Patient 2018   Which best describes your employment status (select all that apply) I work full-time, I work days   If you work, what is your occupation?    Which best describes your marital status:    Do you have children? Yes   Who do you have in your support network that can be available to help you for the first 2 weeks after surgery? , friend Casi, Friend Nasrin   Who can you count on for support throughout your weight loss surgery journey? , Nasrin, Casi, Michaela   Can you afford 3 meals a day?  Yes   Can you afford 50-60 dollars a month for vitamins? No       HABITS:     2018   How often do you drink alcohol? Monthly or less   If you do drink alcohol, how many drinks might you have in a day? (one drink = 5 oz. wine, 1 can/bottle of beer, 1 shot liquor) 1 or 2   Do you currently use any of the following Nicotine products? No   Have you ever used any of the following nicotine products? Cigarettes   If you previously used any of these products, what year did you quit?    Have you or are you currently using street drugs or prescription strength medication for  which you do not have a prescription for? No       PSYCHOLOGICAL HISTORY:   Psychological History Reviewed With Patient 7/16/2018   Have you ever attempted suicide? More than 10 years ago.   Have you had thoughts of suicide in the past year? Yes   Have you ever been hospitalized for mental illness or a suicide attempt? More than 10 years ago.   Do you have a history of chronic pain? No   Have you ever been diagnosed with fibromyalgia? No   Are you currently being treated for any of the following? (select all that apply) Depression, Anxiety       ROS:     7/16/2018   Skin:  Skin fold rashes (groin or other folds), Leg swelling   HEENT: None of these   Musculoskeletal: Swelling of legs   Cardiovascular: None of the above   Pulmonary: Shortness of breath with activity, Snoring, Awaken from sleep to catch your breath, People have told me I stop breathing while asleep, Excessively sleep during the day   Gastrointestinal: Reflux, Diarrhea, Difficulty swallowing (food gets stuck)   Genitourinary: None of the above   Hematological: None of the above   Neurological: None of the above   Female only: Excessive menstrual bleeding, Regular menstrual cycles     Very heavy periods    EATING BEHAVIORS:     7/16/2018   Have you or anyone else thought that you had an eating disorder? Yes   If you answered yes to the previous eating disorder question, select the types that apply from this list: Binge Eating   Do you currently binge eat (eat a large amount of food in a short time)? Yes   Are you an emotional eater? Yes   Do you get up to eat after falling asleep? No     Once every 3 months will binge when has a particularly hard day.  Will go in kitchen to see what she can eat.  On almost a daily basis she emotionally eats.  There is guilt everyday with this.  Eats until she is uncomfortably full.        EXERCISE:     7/16/2018   How often do you exercise? Never   What keeps you from being more active?  I should be more active but I  "just have not gotten around to it, Shortness of breath, Too tired, Worried people will look at me       MEDICATIONS:  Current Outpatient Prescriptions   Medication     valACYclovir (VALTREX) 500 MG tablet     venlafaxine (EFFEXOR-XR) 37.5 MG 24 hr capsule     No current facility-administered medications for this visit.        ALLERGIES:  No Known Allergies    LABS/IMAGING/MEDICAL RECORDS REVIEW: N/A    PHYSICAL EXAM:  /87 (BP Location: Right arm, Patient Position: Sitting, Cuff Size: Adult Large)  Pulse 101  Temp 98  F (36.7  C) (Oral)  Resp 12  Ht 5' 7\" (1.702 m)  Wt 258 lb 6.4 oz (117.2 kg)  SpO2 97%  BMI 40.47 kg/m2        Spent 26 minutes with patient.  Discussed her binge and emotional eating.  At this time she is being referred out for an E/D evaluation.  Let her know will stop the process to surgery right now.  Once she has evaluation it is expected that she will complete any recommended treatment.   If at that time she is still interested can contact clinic for review.      Sincerely,    Renzo Covarrubias PA-C    I spent a total of 26 minutes face to face with Zee during today's office visit. Over 50% of this time was spent counseling the patient and/or coordinating care.          "

## 2018-08-29 ENCOUNTER — TRANSFERRED RECORDS (OUTPATIENT)
Dept: HEALTH INFORMATION MANAGEMENT | Facility: CLINIC | Age: 43
End: 2018-08-29

## 2018-09-14 DIAGNOSIS — F41.9 ANXIETY: ICD-10-CM

## 2018-09-14 DIAGNOSIS — F33.1 MAJOR DEPRESSIVE DISORDER, RECURRENT EPISODE, MODERATE (H): ICD-10-CM

## 2018-09-14 NOTE — TELEPHONE ENCOUNTER
"Requested Prescriptions   Pending Prescriptions Disp Refills     venlafaxine (EFFEXOR-XR) 37.5 MG 24 hr capsule [Pharmacy Med Name: VENLAFAXINE ER 37.5MG CAPSULES] 90 capsule 3    Last Written Prescription Date:  7/10/18  Last Fill Quantity: 30,  # refills: 3   Last Office Visit: 7/10/2018   Future Office Visit:      Sig: TAKE 1 CAPSULE(37.5 MG) BY MOUTH DAILY    Serotonin-Norepinephrine Reuptake Inhibitors  Failed    9/14/2018  5:28 AM       Failed - PHQ-9 score of less than 5 in past 6 months    PHQ-9 SCORE 4/5/2017 11/9/2017 7/10/2018   Total Score MyChart - - 12 (Moderate depression)   Total Score 2 1 12     HENRI-7 SCORE 4/5/2017 11/9/2017 7/10/2018   Total Score - - 4 (minimal anxiety)   Total Score 2 4 4          Failed - Normal serum creatinine on file in past 12 months    No lab results found.         Passed - Blood pressure under 140/90 in past 12 months    BP Readings from Last 3 Encounters:   07/18/18 139/87   07/10/18 120/78   11/09/17 120/76                Passed - Patient is age 18 or older       Passed - No active pregnancy on record       Passed - No positive pregnancy test in past 12 months       Passed - Recent (6 mo) or future (30 days) visit within the authorizing provider's specialty    Patient had office visit in the last 6 months or has a visit in the next 30 days with authorizing provider or within the authorizing provider's specialty.  See \"Patient Info\" tab in inbasket, or \"Choose Columns\" in Meds & Orders section of the refill encounter.              "

## 2018-09-17 RX ORDER — VENLAFAXINE HYDROCHLORIDE 37.5 MG/1
CAPSULE, EXTENDED RELEASE ORAL
Status: SHIPPED
Start: 2018-09-17 | End: 2018-12-26

## 2018-09-17 NOTE — TELEPHONE ENCOUNTER
90 day request, denied, new med, needs f/u 6 week appointment, sent pharmacy message  Prescription approved per [facility] Refill Protocol.  Angella Philip, RN, BSN

## 2018-11-14 ENCOUNTER — TELEPHONE (OUTPATIENT)
Dept: FAMILY MEDICINE | Facility: CLINIC | Age: 43
End: 2018-11-14

## 2018-11-14 NOTE — TELEPHONE ENCOUNTER
Panel Management Review      Patient has the following on her problem list:     Depression / Dysthymia review    Measure:  Needs PHQ-9 score of 4 or less during index window.  Administer PHQ-9 and if score is 5 or more, send encounter to provider for next steps.    5 - 7 month window range: 11/10/2018 - 3/10/2019    PHQ-9 SCORE 4/5/2017 11/9/2017 7/10/2018   Total Score MyChart - - 12 (Moderate depression)   Total Score 2 1 12       If PHQ-9 recheck is 5 or more, route to provider for next steps.    Patient is due for:  PHQ9      Composite cancer screening  Chart review shows that this patient is due/due soon for the following Pap Smear  Summary:    Patient is due/failing the following:   PAP and PHQ9    Action needed:   Patient needs office visit for physical and pap also complete PHQ-9.    Type of outreach:    routed to panel pool for outreach    Questions for provider review:    None                                                                                                                                    Bobbi Bergman       Chart routed to Care Team .

## 2018-11-15 NOTE — TELEPHONE ENCOUNTER
Type of outreach:  Phone, left message for patient to call back.     Marissa Farley MA on 11/15/2018 at 3:34 PM

## 2018-11-29 NOTE — TELEPHONE ENCOUNTER
Type of outreach:  Phone, spoke to patient.  Patient scheduled for a physical and pap with Delores CURRY on Dec 26 at 9 am.    Marissa Farley MA on 11/29/2018 at 3:30 PM

## 2018-12-03 ENCOUNTER — HEALTH MAINTENANCE LETTER (OUTPATIENT)
Age: 43
End: 2018-12-03

## 2018-12-26 ENCOUNTER — OFFICE VISIT (OUTPATIENT)
Dept: FAMILY MEDICINE | Facility: CLINIC | Age: 43
End: 2018-12-26
Payer: COMMERCIAL

## 2018-12-26 VITALS
RESPIRATION RATE: 14 BRPM | HEART RATE: 90 BPM | HEIGHT: 67 IN | DIASTOLIC BLOOD PRESSURE: 80 MMHG | WEIGHT: 267 LBS | BODY MASS INDEX: 41.91 KG/M2 | TEMPERATURE: 97.8 F | OXYGEN SATURATION: 97 % | SYSTOLIC BLOOD PRESSURE: 130 MMHG

## 2018-12-26 DIAGNOSIS — F33.1 MAJOR DEPRESSIVE DISORDER, RECURRENT EPISODE, MODERATE (H): ICD-10-CM

## 2018-12-26 DIAGNOSIS — N63.20 LEFT BREAST MASS: ICD-10-CM

## 2018-12-26 DIAGNOSIS — E66.01 MORBID OBESITY WITH BMI OF 40.0-44.9, ADULT (H): ICD-10-CM

## 2018-12-26 DIAGNOSIS — F41.9 ANXIETY: ICD-10-CM

## 2018-12-26 DIAGNOSIS — B00.9 HSV-2 INFECTION: ICD-10-CM

## 2018-12-26 DIAGNOSIS — Z23 NEED FOR PROPHYLACTIC VACCINATION AND INOCULATION AGAINST INFLUENZA: ICD-10-CM

## 2018-12-26 DIAGNOSIS — Z00.00 ROUTINE GENERAL MEDICAL EXAMINATION AT A HEALTH CARE FACILITY: Primary | ICD-10-CM

## 2018-12-26 LAB
ALBUMIN SERPL-MCNC: 3.5 G/DL (ref 3.4–5)
ALP SERPL-CCNC: 79 U/L (ref 40–150)
ALT SERPL W P-5'-P-CCNC: 44 U/L (ref 0–50)
ANION GAP SERPL CALCULATED.3IONS-SCNC: 6 MMOL/L (ref 3–14)
AST SERPL W P-5'-P-CCNC: 24 U/L (ref 0–45)
BASOPHILS # BLD AUTO: 0.1 10E9/L (ref 0–0.2)
BASOPHILS NFR BLD AUTO: 0.8 %
BILIRUB SERPL-MCNC: 0.2 MG/DL (ref 0.2–1.3)
BUN SERPL-MCNC: 11 MG/DL (ref 7–30)
CALCIUM SERPL-MCNC: 9.2 MG/DL (ref 8.5–10.1)
CHLORIDE SERPL-SCNC: 107 MMOL/L (ref 94–109)
CHOLEST SERPL-MCNC: 146 MG/DL
CO2 SERPL-SCNC: 25 MMOL/L (ref 20–32)
CREAT SERPL-MCNC: 0.8 MG/DL (ref 0.52–1.04)
DIFFERENTIAL METHOD BLD: ABNORMAL
EOSINOPHIL # BLD AUTO: 0.5 10E9/L (ref 0–0.7)
EOSINOPHIL NFR BLD AUTO: 6.1 %
ERYTHROCYTE [DISTWIDTH] IN BLOOD BY AUTOMATED COUNT: 17.9 % (ref 10–15)
GFR SERPL CREATININE-BSD FRML MDRD: >90 ML/MIN/{1.73_M2}
GLUCOSE SERPL-MCNC: 94 MG/DL (ref 70–99)
HCT VFR BLD AUTO: 33.3 % (ref 35–47)
HDLC SERPL-MCNC: 24 MG/DL
HGB BLD-MCNC: 9.7 G/DL (ref 11.7–15.7)
LDLC SERPL CALC-MCNC: 98 MG/DL
LYMPHOCYTES # BLD AUTO: 2.3 10E9/L (ref 0.8–5.3)
LYMPHOCYTES NFR BLD AUTO: 25.6 %
MCH RBC QN AUTO: 20.1 PG (ref 26.5–33)
MCHC RBC AUTO-ENTMCNC: 29.1 G/DL (ref 31.5–36.5)
MCV RBC AUTO: 69 FL (ref 78–100)
MONOCYTES # BLD AUTO: 0.7 10E9/L (ref 0–1.3)
MONOCYTES NFR BLD AUTO: 8.2 %
NEUTROPHILS # BLD AUTO: 5.3 10E9/L (ref 1.6–8.3)
NEUTROPHILS NFR BLD AUTO: 59.3 %
NONHDLC SERPL-MCNC: 122 MG/DL
PLATELET # BLD AUTO: 453 10E9/L (ref 150–450)
POTASSIUM SERPL-SCNC: 3.9 MMOL/L (ref 3.4–5.3)
PROT SERPL-MCNC: 7.5 G/DL (ref 6.8–8.8)
RBC # BLD AUTO: 4.82 10E12/L (ref 3.8–5.2)
SODIUM SERPL-SCNC: 138 MMOL/L (ref 133–144)
TRIGL SERPL-MCNC: 119 MG/DL
WBC # BLD AUTO: 8.9 10E9/L (ref 4–11)

## 2018-12-26 PROCEDURE — 85025 COMPLETE CBC W/AUTO DIFF WBC: CPT | Performed by: NURSE PRACTITIONER

## 2018-12-26 PROCEDURE — 36415 COLL VENOUS BLD VENIPUNCTURE: CPT | Performed by: NURSE PRACTITIONER

## 2018-12-26 PROCEDURE — G0145 SCR C/V CYTO,THINLAYER,RESCR: HCPCS | Performed by: NURSE PRACTITIONER

## 2018-12-26 PROCEDURE — 80061 LIPID PANEL: CPT | Performed by: NURSE PRACTITIONER

## 2018-12-26 PROCEDURE — 80053 COMPREHEN METABOLIC PANEL: CPT | Performed by: NURSE PRACTITIONER

## 2018-12-26 PROCEDURE — 90686 IIV4 VACC NO PRSV 0.5 ML IM: CPT | Performed by: NURSE PRACTITIONER

## 2018-12-26 PROCEDURE — 99396 PREV VISIT EST AGE 40-64: CPT | Mod: 25 | Performed by: NURSE PRACTITIONER

## 2018-12-26 PROCEDURE — 90471 IMMUNIZATION ADMIN: CPT | Performed by: NURSE PRACTITIONER

## 2018-12-26 PROCEDURE — 87624 HPV HI-RISK TYP POOLED RSLT: CPT | Performed by: NURSE PRACTITIONER

## 2018-12-26 RX ORDER — VALACYCLOVIR HYDROCHLORIDE 500 MG/1
500 TABLET, FILM COATED ORAL DAILY
Qty: 30 TABLET | Refills: 3 | Status: ON HOLD | OUTPATIENT
Start: 2018-12-26 | End: 2019-10-16

## 2018-12-26 RX ORDER — VENLAFAXINE HYDROCHLORIDE 37.5 MG/1
37.5 CAPSULE, EXTENDED RELEASE ORAL DAILY
Qty: 90 CAPSULE | Refills: 3 | Status: SHIPPED | OUTPATIENT
Start: 2018-12-26 | End: 2019-04-02

## 2018-12-26 ASSESSMENT — ENCOUNTER SYMPTOMS
DIARRHEA: 0
HEADACHES: 0
SHORTNESS OF BREATH: 0
HEMATURIA: 0
JOINT SWELLING: 0
PARESTHESIAS: 0
ABDOMINAL PAIN: 0
WEAKNESS: 0
FREQUENCY: 0
MYALGIAS: 0
ARTHRALGIAS: 0
HEMATOCHEZIA: 0
DYSURIA: 0
COUGH: 0
BREAST MASS: 1
PALPITATIONS: 0
SORE THROAT: 0
HEARTBURN: 0
DIZZINESS: 0
FEVER: 0
EYE PAIN: 1
CONSTIPATION: 0
NERVOUS/ANXIOUS: 0
CHILLS: 0

## 2018-12-26 ASSESSMENT — PATIENT HEALTH QUESTIONNAIRE - PHQ9
SUM OF ALL RESPONSES TO PHQ QUESTIONS 1-9: 2
5. POOR APPETITE OR OVEREATING: SEVERAL DAYS

## 2018-12-26 ASSESSMENT — ANXIETY QUESTIONNAIRES
6. BECOMING EASILY ANNOYED OR IRRITABLE: SEVERAL DAYS
GAD7 TOTAL SCORE: 3
3. WORRYING TOO MUCH ABOUT DIFFERENT THINGS: NOT AT ALL
7. FEELING AFRAID AS IF SOMETHING AWFUL MIGHT HAPPEN: NOT AT ALL
5. BEING SO RESTLESS THAT IT IS HARD TO SIT STILL: NOT AT ALL
2. NOT BEING ABLE TO STOP OR CONTROL WORRYING: NOT AT ALL
1. FEELING NERVOUS, ANXIOUS, OR ON EDGE: SEVERAL DAYS

## 2018-12-26 ASSESSMENT — MIFFLIN-ST. JEOR: SCORE: 1908.73

## 2018-12-26 NOTE — PROGRESS NOTES
SUBJECTIVE:   CC: Zee Kyle is an 43 year old woman who presents for preventive health visit.     Physical   Annual:     Getting at least 3 servings of Calcium per day:  NO    Bi-annual eye exam:  NO    Dental care twice a year:  Yes    Sleep apnea or symptoms of sleep apnea:  Sleep apnea    Diet:  Regular (no restrictions)    Frequency of exercise:  None    Taking medications regularly:  Yes    Medication side effects:  None    Additional concerns today:  Yes    PHQ-2 Total Score: 0    Skin: Skin tags located underneath breasts, armpits bilaterally, and near vaginal area. Desires removal.      Obesity:  Began efforts to obtain bariatric surgery. Completed psychological evaluation, she fell into a category where she has to wait. Diagnosed with eating disorder, where she has extremes of healthy eating vs binge eating. Her goal is to overcome extremes before getting surgery. Attending therapy at [facility] twice per week and seeing a dietician.     Depression/Anxiety: well controlled on Effexor XR 37.5 mg every day.     Cervical cancer screening: Last pap completed at [facility] 10/2015, normal. Periods are monthly, normal. Denies vaginal discharge.     Breast cancer screening: Last mammo 02/2018, normal. Left breast nodule fluctuates depending on stress, diet., has not enlarged since last imaging in 2/2018.     Today's PHQ-2 Score:   PHQ-2 ( 1999 Pf[employer]) 12/26/2018   Q1: Little interest or pleasure in doing things 0   Q2: Feeling down, depressed or hopeless 0   PHQ-2 Score 0   Q1: Little interest or pleasure in doing things Not at all   Q2: Feeling down, depressed or hopeless Not at all   PHQ-2 Score 0       Abuse: Current or Past(Physical, Sexual or Emotional)- no  Do you feel safe in your environment? Yes    Social History     Tobacco Use     Smoking status: Former Smoker     Types: Cigarettes     Last attempt to quit: 2/10/2003     Years since quitting: 15.8     Smokeless tobacco: Never Used      Tobacco comment: quit    Substance Use Topics     Alcohol use: Yes     Alcohol/week: 0.0 oz     Comment: occas     Alcohol Use 2018   If you drink alcohol do you typically have greater than 3 drinks per day OR greater than 7 drinks per week? No   No flowsheet data found.    Reviewed orders with patient.  Reviewed health maintenance and updated orders accordingly - Yes  Patient Active Problem List   Diagnosis     CARDIOVASCULAR SCREENING; LDL GOAL LESS THAN 160     Presbycusis     Hypothyroidism     Major depressive disorder, recurrent episode, moderate (H)     Anxiety     Morbid obesity with BMI of 40.0-44.9, adult (H)     CLARK (obstructive sleep apnea)     Intertrigo     Lower extremity edema     Past Surgical History:   Procedure Laterality Date     ENT SURGERY       PE TUBES       TONSILLECTOMY       TYMPANOPLASTY, RT/LT      Tympanoplasty RT/LT       Social History     Tobacco Use     Smoking status: Former Smoker     Types: Cigarettes     Last attempt to quit: 2/10/2003     Years since quitting: 15.8     Smokeless tobacco: Never Used     Tobacco comment: quit    Substance Use Topics     Alcohol use: Yes     Alcohol/week: 0.0 oz     Comment: occas     Family History   Problem Relation Age of Onset     Hypertension Father          10         Current Outpatient Medications   Medication Sig Dispense Refill     valACYclovir (VALTREX) 500 MG tablet Take 1 tablet (500 mg) by mouth daily 30 tablet 3     venlafaxine (EFFEXOR-XR) 37.5 MG 24 hr capsule TAKE 1 CAPSULE(37.5 MG) BY MOUTH DAILY       No Known Allergies    Mammo discussed, view HPI.    Pertinent mammograms are reviewed under the imaging tab.  History of abnormal Pap smear: NO - age 30-65 PAP every 3 years with negative HPV co-testing recommended     Reviewed and updated as needed this visit by clinical staff         Reviewed and updated as needed this visit by Provider        Past Medical History:   Diagnosis Date     Anxiety Ongoing  since 18     Depressive disorder on going since 18     Earache or other ear, nose, or throat complaint 12    Tonsillectomy 7/02     Esophageal reflux     Had stopped once I got my sleep apnea mask 5/18     Herpes simplex without mention of complication      Hypertension     While Pregnant     Hypothyroidism 8/7/2012     Presbyacusis     Presbycusis     STD (sexually transmitted disease)     Herpes     Thyroid disease 8/7/2012     Urinary incontinence     Bladder sling surgery 7/17      Past Surgical History:   Procedure Laterality Date     ENT SURGERY       PE TUBES       TONSILLECTOMY       TYMPANOPLASTY, RT/LT      Tympanoplasty RT/LT       Review of Systems   Constitutional: Negative for chills and fever.   HENT: Negative for congestion, ear pain, hearing loss and sore throat.    Eyes: Positive for pain. Negative for visual disturbance.   Respiratory: Negative for cough and shortness of breath.    Cardiovascular: Negative for chest pain, palpitations and peripheral edema.   Gastrointestinal: Negative for abdominal pain, constipation, diarrhea, heartburn and hematochezia.   Breasts:  Positive for breast mass. Negative for tenderness and discharge.   Genitourinary: Negative for dysuria, frequency, genital sores, hematuria, pelvic pain, urgency, vaginal bleeding and vaginal discharge.   Musculoskeletal: Negative for arthralgias, joint swelling and myalgias.   Skin: Negative for rash.   Neurological: Negative for dizziness, weakness, headaches and paresthesias.   Psychiatric/Behavioral: Negative for mood changes. The patient is not nervous/anxious.      This document serves as a record of the services and decisions personally performed and made by Susan Haase, CNP. It was created on her behalf by Alcira Contreras, a trained medical scribe. The creation of this document is based on the provider's statements to the medical scribe.  Alcira Contreras 9:17 AM December 26, 2018   OBJECTIVE:   /80 (BP Location: Right arm,  "Patient Position: Chair, Cuff Size: Adult Large)   Pulse 90   Temp 97.8  F (36.6  C) (Oral)   Resp 14   Ht 1.702 m (5' 7\")   Wt 121.1 kg (267 lb)   LMP 12/15/2018 (Exact Date)   SpO2 97%   BMI 41.82 kg/m    Physical Exam  GENERAL: healthy, alert and no distress  EYES: Eyes grossly normal to inspection, PERRL and conjunctivae and sclerae normal  HENT: ear canals and TM's normal, nose and mouth without ulcers or lesions  NECK: no adenopathy, no asymmetry, masses, or scars and thyroid normal to palpation  RESP: lungs clear to auscultation - no rales, rhonchi or wheezes  BREAST:Right breast: No tenderness or nipple discharge and no palpable mass or axillary masses or adenopathy. Left breast at 3 o'clock 2 cm x 2 cm nodule, mobile   CV: regular rate and rhythm, normal S1 S2, no S3 or S4, no murmur, click or rub, no peripheral edema and peripheral pulses strong  ABDOMEN: soft, nontender, no hepatosplenomegaly, no masses and bowel sounds normal   (female): normal female external genitalia, normal urethral meatus, vaginal mucosa pink, moist, well rugated, and normal cervix/adnexa/uterus without masses or discharge  MS: no gross musculoskeletal defects noted, no edema  SKIN: no suspicious lesions or rashes. Numerous skin tags under breasts, armpits, and groin    NEURO: Normal strength and tone, mentation intact and speech normal  PSYCH: mentation appears normal, affect normal/bright    ASSESSMENT/PLAN:   Zee was seen today for physical and flu shot.    Diagnoses and all orders for this visit:    Routine general medical examination at a health care facility  -     CBC with platelets differential  -     Comprehensive metabolic panel  -     Lipid panel reflex to direct LDL Fasting  Morbid Obesity: continues counseling at [facility] for eating issues, plans to pursue bariatric surgery when ready.     Major depressive disorder, recurrent episode, moderate (H) Anxiety: PHQ 9 score of 2, HENRI 7 score of 3. Symptoms " "well controlled on Effexor-XR 37.5 mg every day. Refill given.  -     venlafaxine (EFFEXOR-XR) 37.5 MG 24 hr capsule; Take 1 capsule (37.5 mg) by mouth daily    Left breast mass:  Decrease in size since last exam, continue annual mammograms, next due in 2019.    HSV-2 infection: Has yet to have break out. Previous prescription , refill given.   -     valACYclovir (VALTREX) 500 MG tablet; Take 1 tablet (500 mg) by mouth daily    Need for prophylactic vaccination and inoculation against influenza  -     FLU VACCINE, SPLIT VIRUS, IM (QUADRIVALENT) [79037]- >3 YRS  -     Vaccine Administration, Initial [70442]    COUNSELING:  Reviewed preventive health counseling, as reflected in patient instructions       Regular exercise       Healthy diet/nutrition       Immunizations    Vaccinated for: Influenza         Safe sex practices/STD prevention    BP Readings from Last 1 Encounters:   18 139/87     Estimated body mass index is 41.82 kg/m  as calculated from the following:    Height as of this encounter: 1.702 m (5' 7\").    Weight as of this encounter: 121.1 kg (267 lb).    Weight management plan: Discussed healthy diet and exercise guidelines     reports that she quit smoking about 15 years ago. Her smoking use included cigarettes. she has never used smokeless tobacco.    Counseling Resources:  ATP IV Guidelines  Pooled Cohorts Equation Calculator  Breast Cancer Risk Calculator  FRAX Risk Assessment  ICSI Preventive Guidelines  Dietary Guidelines for Americans,   USDA's MyPlate  ASA Prophylaxis  Lung CA Screening    Follow up visit in 6 months, sooner if wanting skin tags removed.   The information in this document, created by the medical scribe for me, accurately reflects the services I personally performed and the decisions made by me. I have reviewed and approved this document for accuracy prior to leaving the patient care area.  2018 9:31 AM  Susan Haase, APRN [facility] [place] " [facility]    Injectable Influenza Immunization Documentation    1.  Is the person to be vaccinated sick today?   No    2. Does the person to be vaccinated have an allergy to a component   of the vaccine?   No  Egg Allergy Algorithm Link    3. Has the person to be vaccinated ever had a serious reaction   to influenza vaccine in the past?   No    4. Has the person to be vaccinated ever had Guillain-Barré syndrome?   No    Form completed by Bobbi Bergman

## 2018-12-27 ASSESSMENT — ANXIETY QUESTIONNAIRES: GAD7 TOTAL SCORE: 3

## 2018-12-27 NOTE — RESULT ENCOUNTER NOTE
Nathan Koch,  Your lab results are as below:  1)  Hemoglobin is low at 9.7, please call the clinic and we can discuss this result further, it is not clear why this is low.    2)  Cholesterol is normal at 146,  your LDL (bad cholesterol) is normal,  your HDL (good cholesterol) is quite low at 24. Continue to follow a low cholesterol diet and we will recheck this in 1 year.  3)  Glucose is normal at 94 (normal fasting is <100).    If you have any questions do not hesitate to call the clinic to discuss the results with me further.     Sincerely,    Susan Haase, CNP

## 2018-12-28 LAB
COPATH REPORT: NORMAL
PAP: NORMAL

## 2018-12-31 LAB
FINAL DIAGNOSIS: NORMAL
HPV HR 12 DNA CVX QL NAA+PROBE: NEGATIVE
HPV16 DNA SPEC QL NAA+PROBE: NEGATIVE
HPV18 DNA SPEC QL NAA+PROBE: NEGATIVE
SPECIMEN DESCRIPTION: NORMAL
SPECIMEN SOURCE CVX/VAG CYTO: NORMAL

## 2019-02-14 ENCOUNTER — OFFICE VISIT (OUTPATIENT)
Dept: FAMILY MEDICINE | Facility: CLINIC | Age: 44
End: 2019-02-14
Payer: COMMERCIAL

## 2019-02-14 VITALS
RESPIRATION RATE: 16 BRPM | DIASTOLIC BLOOD PRESSURE: 88 MMHG | WEIGHT: 273 LBS | SYSTOLIC BLOOD PRESSURE: 130 MMHG | HEIGHT: 67 IN | HEART RATE: 81 BPM | BODY MASS INDEX: 42.85 KG/M2 | OXYGEN SATURATION: 99 % | TEMPERATURE: 97.7 F

## 2019-02-14 DIAGNOSIS — L91.8 SKIN TAG: Primary | ICD-10-CM

## 2019-02-14 DIAGNOSIS — L57.0 AK (ACTINIC KERATOSIS): ICD-10-CM

## 2019-02-14 DIAGNOSIS — D22.9 ATYPICAL NEVUS: ICD-10-CM

## 2019-02-14 PROCEDURE — 11106 INCAL BX SKN SINGLE LES: CPT | Performed by: NURSE PRACTITIONER

## 2019-02-14 PROCEDURE — 88305 TISSUE EXAM BY PATHOLOGIST: CPT | Performed by: NURSE PRACTITIONER

## 2019-02-14 PROCEDURE — 17000 DESTRUCT PREMALG LESION: CPT | Mod: 59 | Performed by: NURSE PRACTITIONER

## 2019-02-14 PROCEDURE — 11200 RMVL SKIN TAGS UP TO&INC 15: CPT | Mod: 59 | Performed by: NURSE PRACTITIONER

## 2019-02-14 ASSESSMENT — MIFFLIN-ST. JEOR: SCORE: 1935.95

## 2019-02-14 NOTE — PROGRESS NOTES
SUBJECTIVE:   Zee Kyle is a 43 year old female who presents to clinic today for the following health issues:      Skin Tags-removal     Patient complains of skin tags underneath breasts and L inner thigh. They itch and cause her discomfort. Skin tags removed under R armpit. Lower central back skin tag removed. One skin tag removed from L inner thigh.     Reports she has moles she would like removed since they itch and cause her discomfort. Mole under R breast, 2 moles under L breast. 3 moles over Mid-upper L back, center back and R back. One other mole removed from lower back. One of the moles on her back had bled recently.       Problem list and histories reviewed & adjusted, as indicated.  Additional history: as documented    Patient Active Problem List   Diagnosis     CARDIOVASCULAR SCREENING; LDL GOAL LESS THAN 160     Presbycusis     Hypothyroidism     Major depressive disorder, recurrent episode, moderate (H)     Anxiety     Morbid obesity with BMI of 40.0-44.9, adult (H)     CLARK (obstructive sleep apnea)     Intertrigo     Lower extremity edema     Past Surgical History:   Procedure Laterality Date     ENT SURGERY       PE TUBES       TONSILLECTOMY       TYMPANOPLASTY, RT/LT      Tympanoplasty RT/LT       Social History     Tobacco Use     Smoking status: Former Smoker     Types: Cigarettes     Last attempt to quit: 2/10/2003     Years since quittin.0     Smokeless tobacco: Never Used     Tobacco comment: quit    Substance Use Topics     Alcohol use: Yes     Alcohol/week: 0.0 oz     Comment: occas     Family History   Problem Relation Age of Onset     Hypertension Father          08         Current Outpatient Medications   Medication Sig Dispense Refill     valACYclovir (VALTREX) 500 MG tablet Take 1 tablet (500 mg) by mouth daily 30 tablet 3     venlafaxine (EFFEXOR-XR) 37.5 MG 24 hr capsule Take 1 capsule (37.5 mg) by mouth daily 90 capsule 3     No Known Allergies    Reviewed  "and updated as needed this visit by clinical staff  Tobacco  Allergies  Meds  Med Hx  Surg Hx  Fam Hx  Soc Hx      Reviewed and updated as needed this visit by Provider         ROS:  Constitutional, skin,  and psych systems are negative, except as otherwise noted.    OBJECTIVE:     /88 (BP Location: Left arm, Patient Position: Chair, Cuff Size: Adult Large)   Pulse 81   Temp 97.7  F (36.5  C) (Oral)   Resp 16   Ht 1.702 m (5' 7\")   Wt 123.8 kg (273 lb)   SpO2 99%   BMI 42.76 kg/m    Body mass index is 42.76 kg/m .  Moles:  L upper back, right upper back, center of upper back, center of lower back and under L breast.   Skin tag:  Flesh colored skin tag under R armpit, lower central back and inner L thigh      ASSESSMENT/PLAN:     Zee was seen today for skin tags.    Diagnoses and all orders for this visit:    Skin tag  -     REMOVAL OF SKIN TAGS, FIRST 15  SKIN TAGS:  R armpit, lower central back and inner L thigh    Procedure explained including risks and benefits, verbal consent given, cleansed with alcohol, each skin tag injected with 0.2 cc of 1% lidocaine with epi, using a curved scissor skin tags removed, bleeding controlled with gauze, covered with band aide.     AK (actinic keratosis):  L upper back, right upper back, center of upper back, center of lower back and under L breast.   -    -     Surgical pathology exam  -      INCISIONAL BIOPSY OF SKIN, FIRST LESION   DESTRUCT PREMALIGNANT LESION, FIRST    Mole Removal  Procedure explains including risks and benefits,  written consent obtained. Area was cleaned with betadine and each mole was injected with 0.2cc of 1% lidocaine with epi. Using a shave biopsy, moles were removed 1 mole on upper L back was biopsied and sent to path pending results. Bleeding was controlled with gauze and electrocautery. Patient was instructed she could shower as normal tomorrow. Signs of infection discussed, patient to contact me if these occur.       " Follow up in 6/2019, sooner as needed.     The information in this document, created by the medical scribe for me, accurately reflects the services I personally performed and the decisions made by me. I have reviewed and approved this document for accuracy prior to leaving the patient care area  Susan Haase, APRN [facility]

## 2019-02-18 LAB — COPATH REPORT: NORMAL

## 2019-02-19 ENCOUNTER — HOSPITAL ENCOUNTER (OUTPATIENT)
Dept: MAMMOGRAPHY | Facility: CLINIC | Age: 44
Discharge: HOME | End: 2019-02-19
Attending: NURSE PRACTITIONER | Admitting: NURSE PRACTITIONER
Payer: COMMERCIAL

## 2019-02-19 DIAGNOSIS — Z12.31 VISIT FOR SCREENING MAMMOGRAM: ICD-10-CM

## 2019-02-19 PROCEDURE — 77063 BREAST TOMOSYNTHESIS BI: CPT

## 2019-02-19 NOTE — RESULT ENCOUNTER NOTE
Nathan Koch,  The shave biopsy shows keratosis which is a benign (not cancer) lesion.  Sincerely,     Susan Haase, CNP

## 2019-02-21 ENCOUNTER — HOSPITAL ENCOUNTER (OUTPATIENT)
Dept: ULTRASOUND IMAGING | Facility: CLINIC | Age: 44
Discharge: HOME | End: 2019-02-21
Attending: NURSE PRACTITIONER | Admitting: NURSE PRACTITIONER
Payer: COMMERCIAL

## 2019-02-21 DIAGNOSIS — R92.8 ABNORMAL MAMMOGRAM: ICD-10-CM

## 2019-02-21 PROCEDURE — 76642 ULTRASOUND BREAST LIMITED: CPT | Mod: RT

## 2019-02-26 ENCOUNTER — HOSPITAL ENCOUNTER (OUTPATIENT)
Dept: ULTRASOUND IMAGING | Facility: CLINIC | Age: 44
Setting detail: NUCLEAR MEDICINE
Discharge: HOME | End: 2019-02-26
Attending: NURSE PRACTITIONER | Admitting: NURSE PRACTITIONER
Payer: COMMERCIAL

## 2019-02-26 ENCOUNTER — HOSPITAL ENCOUNTER (OUTPATIENT)
Dept: ULTRASOUND IMAGING | Facility: CLINIC | Age: 44
Discharge: HOME | End: 2019-02-26
Attending: NURSE PRACTITIONER | Admitting: NURSE PRACTITIONER
Payer: COMMERCIAL

## 2019-02-26 ENCOUNTER — HOSPITAL ENCOUNTER (OUTPATIENT)
Dept: MAMMOGRAPHY | Facility: CLINIC | Age: 44
End: 2019-02-26
Attending: NURSE PRACTITIONER
Payer: COMMERCIAL

## 2019-02-26 DIAGNOSIS — R92.8 ABNORMAL MAMMOGRAM: ICD-10-CM

## 2019-02-26 PROCEDURE — 88305 TISSUE EXAM BY PATHOLOGIST: CPT | Mod: 26 | Performed by: NURSE PRACTITIONER

## 2019-02-26 PROCEDURE — 25000125 ZZHC RX 250: Performed by: RADIOLOGY

## 2019-02-26 PROCEDURE — 88305 TISSUE EXAM BY PATHOLOGIST: CPT | Performed by: NURSE PRACTITIONER

## 2019-02-26 PROCEDURE — 40000986 MA POST PROCEDURE RIGHT

## 2019-02-26 PROCEDURE — 19084 BX BREAST ADD LESION US IMAG: CPT | Mod: RT

## 2019-02-26 PROCEDURE — 27210206 US BREAST BIOPSY CORE NEEDLE RIGHT

## 2019-02-26 RX ADMIN — LIDOCAINE HYDROCHLORIDE 10 ML: 10 INJECTION, SOLUTION EPIDURAL; INFILTRATION; INTRACAUDAL; PERINEURAL at 14:15

## 2019-02-26 NOTE — DISCHARGE INSTRUCTIONS
Page 1 of 1  For informational purposes only. Not to replace the advice of your health care provider. Copyright   2010 [facility]. All rights reserved. 5 CUPS and some sugar [id number] - REV 02/16.  After Your Breast Biopsy   Bleeding or bruising  Slight bruising is normal. If you bleed through the bandage, put direct pressure on the breast for 10 minutes.   If the breast begins to swell, or you have a lot of bleeding after 10 minutes of pressure, call the doctor who ordered your exam. Or, go to the emergency room.   Bandages  Keep your bandage in place until tomorrow morning. Do not get it wet.   If you have small pieces of tape on the skin, leave them in place. They will fall off on their own, or you can remove them after 5 days.   Activity  You may shower the morning after the exam. No heavy activity (lifting, vacuuming) on the day of your exam. You may go back to normal activity the next day, unless you had a lot of bleeding or pain.  Discomfort  You may take Tylenol (acetaminophen) today for pain. Tomorrow, you may take an anti-inflammatory medicine (aspirin, ibuprofen, Motrin, Aleve, Advil), unless your doctor tells you not to.  Wear your bra overnight to support the breast. You may also use an ice pack: Place it over the area for 15-20 minutes several times a day.  Infection  Infection is rare. Symptoms include fever, redness, increasing pain and fluid draining from the biopsy site. If you have any of these symptoms, please call the doctor who ordered your exam.  Results  Results may take up to 5 business days. A nurse or doctor from the Breast Center will call with your results. We will also send the results to the doctor who ordered your biopsy.  If you have not heard your results in 5 days, please call the Breast Center.   Other instructions  ______________________________________________________________________________________________________________________________  Call your doctor if:    You have  bleeding that lasts more than 10 minutes.    You have pain that cannot be controlled.   You have signs of infection (fever, redness, drainage or other signs).   You have not received your results within 5 days.    Please call the Breast Center nurse navigator at [phone] if you have questions or concerns about your biopsy.

## 2019-02-27 ENCOUNTER — TELEPHONE (OUTPATIENT)
Dept: MAMMOGRAPHY | Facility: CLINIC | Age: 44
End: 2019-02-27

## 2019-02-27 LAB — COPATH REPORT: NORMAL

## 2019-02-27 NOTE — TELEPHONE ENCOUNTER
Pathology report reviewed with breast radiologist John. 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 Recommended follow up is routine screening.   Patient states no problems with biopsy site. Questions were answered and my phone number given if she has further questions or concerns.

## 2019-03-15 ENCOUNTER — TELEPHONE (OUTPATIENT)
Dept: SURGERY | Facility: CLINIC | Age: 44
End: 2019-03-15

## 2019-03-15 NOTE — TELEPHONE ENCOUNTER
Reason for call:  Other   Patient called regarding (reason for call): call back  Additional comments: Patient would like a call back to discuss the next steps she is supposed to take in the bariatric program. She was first seen in July, and needed to be treated psychologically for a few things. She just got the go ahead to proceed with the bariatric program, and wants to know if she needs to start all over, or where to  and continue from here.     Phone number to reach patient:  Home number on file [phone] (home)    Best Time:  Anytime    Can we leave a detailed message on this number?  YES

## 2019-03-18 ENCOUNTER — MEDICAL CORRESPONDENCE (OUTPATIENT)
Dept: HEALTH INFORMATION MANAGEMENT | Facility: CLINIC | Age: 44
End: 2019-03-18

## 2019-03-18 ENCOUNTER — TRANSFERRED RECORDS (OUTPATIENT)
Dept: HEALTH INFORMATION MANAGEMENT | Facility: CLINIC | Age: 44
End: 2019-03-18

## 2019-03-18 NOTE — TELEPHONE ENCOUNTER
Called patient and let her know we need records of her clearance. Once received, records will be given to the provider to review and determine if patient will be able to move forward with our program. Patient will have records faxed to my attention.

## 2019-03-28 ENCOUNTER — OFFICE VISIT (OUTPATIENT)
Dept: SURGERY | Facility: CLINIC | Age: 44
End: 2019-03-28
Payer: COMMERCIAL

## 2019-03-28 VITALS
BODY MASS INDEX: 42.46 KG/M2 | OXYGEN SATURATION: 100 % | DIASTOLIC BLOOD PRESSURE: 88 MMHG | SYSTOLIC BLOOD PRESSURE: 138 MMHG | WEIGHT: 270.5 LBS | HEIGHT: 67 IN | HEART RATE: 105 BPM

## 2019-03-28 DIAGNOSIS — E66.01 MORBID OBESITY WITH BMI OF 40.0-44.9, ADULT (H): Primary | ICD-10-CM

## 2019-03-28 PROCEDURE — 99215 OFFICE O/P EST HI 40 MIN: CPT | Performed by: SURGERY

## 2019-03-28 ASSESSMENT — MIFFLIN-ST. JEOR: SCORE: 1924.61

## 2019-04-02 DIAGNOSIS — F41.9 ANXIETY: ICD-10-CM

## 2019-04-02 DIAGNOSIS — F33.1 MAJOR DEPRESSIVE DISORDER, RECURRENT EPISODE, MODERATE (H): ICD-10-CM

## 2019-04-02 RX ORDER — VENLAFAXINE HYDROCHLORIDE 37.5 MG/1
CAPSULE, EXTENDED RELEASE ORAL
Qty: 90 CAPSULE | Refills: 2 | Status: SHIPPED | OUTPATIENT
Start: 2019-04-02 | End: 2019-12-26

## 2019-04-02 NOTE — TELEPHONE ENCOUNTER
Please update refills~one year sent on 12/26/18, has refills on file  Resent  Prescription approved per [facility] Refill Protocol.  Angella Philip, RN, BSN

## 2019-04-02 NOTE — TELEPHONE ENCOUNTER
"Last Written Prescription Date:  12/26/18  Last Fill Quantity: 90 capsule,  # refills: 3   Last office visit: 2/14/2019 with prescribing provider:  Haase   Future Office Visit:      [facility] Follow-up to PHQ 11/9/2017 7/10/2018 12/26/2018   PHQ-9 9. Suicide Ideation past 2 weeks Not at all Not at all Not at all       Requested Prescriptions   Pending Prescriptions Disp Refills     venlafaxine (EFFEXOR-XR) 37.5 MG 24 hr capsule [Pharmacy Med Name: VENLAFAXINE ER 37.5MG CAPSULES] 90 capsule 0     Sig: TAKE 1 TABLET BY MOUTH DAILY    Serotonin-Norepinephrine Reuptake Inhibitors  Passed - 4/2/2019  3:15 AM       Passed - Blood pressure under 140/90 in past 12 months    BP Readings from Last 3 Encounters:   03/28/19 138/88   02/14/19 130/88   12/26/18 130/80                Passed - PHQ-9 score of less than 5 in past 6 months    Please review last PHQ-9 score.          Passed - Medication is active on med list       Passed - Patient is age 18 or older       Passed - No active pregnancy on record       Passed - Normal serum creatinine on file in past 12 months    Recent Labs   Lab Test 12/26/18  0952   CR 0.80            Passed - No positive pregnancy test in past 12 months       Passed - Recent (6 mo) or future (30 days) visit within the authorizing provider's specialty    Patient had office visit in the last 6 months or has a visit in the next 30 days with authorizing provider or within the authorizing provider's specialty.  See \"Patient Info\" tab in inbasket, or \"Choose Columns\" in Meds & Orders section of the refill encounter.              "

## 2019-04-09 NOTE — PROGRESS NOTES
"Dear Dr. Haase, Delores Kimble,      Referring provider:       7/16/2018   Who referred you? Dr. Susan Haase     I was asked to see the patient regarding obesity by the referring provider above.    I had the pleasure of meeting with your patient Zee Kyle in our weight loss surgery office.  This patient is a 43 year old female who has been undergoing our thorough preoperative screening process in anticipation of potential bariatric surgery.    At initial evaluation we recorded Zee Kyle's Initial BMI: 40.47 and Initial Weight: 117.2 kg (258 lb 6.4 oz).  The patient has been unsuccessful with other methods of permanent weight loss and suffers from multiple weight related medical conditions.  Due to lack of success in achieving weight loss through other methods, she is interested in undergoing bariatric surgery.    PREVIOUS WEIGHT LOSS ATTEMPTS:     7/16/2018   I have tried the following methods to lose weight Watching portions or calories, Exercise, Weight Watchers, OTC Medications       CO-MORBIDITIES OF OBESITY INCLUDE:     7/16/2018   I have the following co-morbidities associated with obesity: Sleep Apnea, Infertility, Weight Bearing Joint Pain, Stress Incontinence   Do you use a CPAP? No       VITALS:  /88   Pulse 105   Ht 1.702 m (5' 7\")   Wt 122.7 kg (270 lb 8 oz)   SpO2 100%   BMI 42.37 kg/m      PE:  GENERAL: Alert and oriented x3. NAD  HEENT exam: Sclerae not icteric. Hearing good. Head normocephalic and atraumatic.   CARDIOVASCULAR: No JVD  RESPIRATORY: Breathing unlabored  GASTROINTESTINAL: Obese  LOWER EXTREMITIES: no deformities  MUSCULOSKELETAL: Normal gait, Moves all 4 extremities equal and strong  NEUROLOGIC: no gross defect  SKIN: warm and dry to touch     In summary, she is the process of completing medical evaluation, dietitian evaluation, as well as psychologic screening and treatment. The patient appears well directed to be an appropriate candidate for bariatric " surgery.    In the office today, I discussed the laparoscopic gastric sleeve surgery.  Risks, benefits and anticipated outcomes were outlined including the risk of death, staple line leak (1-2%), PE, DVT, ulcer, worsening GERD, N/V, stricture, hernia, wound infection, weight regain, and vitamin deficiencies. This patient has a good chance of sustaining permanent weight loss due to this procedure.  This should also allow improvement if not resolution of his/her weight related medical conditions.    At present we are going to help support her in any medico/psychological/dietary needs so when ready we can submit to insurance company for preauthorization.  Pending prior authorization, I anticipate a surgical date in the near future.  We will keep you updated on any progress.  If you have questions regarding care please feel free to contact me.  Total time spent in the clinic was 60 minutes with greater than 50% in face-to-face consultation.        Sincerely,    Cesar Lee    Please route or send letter to:  Primary Care Provider (PCP) and Referring Provider

## 2019-04-16 ENCOUNTER — OFFICE VISIT (OUTPATIENT)
Dept: SURGERY | Facility: CLINIC | Age: 44
End: 2019-04-16
Payer: COMMERCIAL

## 2019-04-16 VITALS
HEIGHT: 67 IN | HEART RATE: 103 BPM | DIASTOLIC BLOOD PRESSURE: 82 MMHG | WEIGHT: 272.9 LBS | SYSTOLIC BLOOD PRESSURE: 140 MMHG | OXYGEN SATURATION: 97 % | BODY MASS INDEX: 42.83 KG/M2

## 2019-04-16 VITALS — BODY MASS INDEX: 42.83 KG/M2 | HEIGHT: 67 IN | WEIGHT: 272.9 LBS

## 2019-04-16 DIAGNOSIS — Z13.29 SCREENING FOR ENDOCRINE, NUTRITIONAL, METABOLIC AND IMMUNITY DISORDER: ICD-10-CM

## 2019-04-16 DIAGNOSIS — Z13.228 SCREENING FOR ENDOCRINE, NUTRITIONAL, METABOLIC AND IMMUNITY DISORDER: ICD-10-CM

## 2019-04-16 DIAGNOSIS — Z13.21 SCREENING FOR ENDOCRINE, NUTRITIONAL, METABOLIC AND IMMUNITY DISORDER: ICD-10-CM

## 2019-04-16 DIAGNOSIS — Z13.0 SCREENING FOR IRON DEFICIENCY ANEMIA: ICD-10-CM

## 2019-04-16 DIAGNOSIS — G47.33 OSA (OBSTRUCTIVE SLEEP APNEA): ICD-10-CM

## 2019-04-16 DIAGNOSIS — E66.01 MORBID OBESITY (H): Primary | ICD-10-CM

## 2019-04-16 DIAGNOSIS — E78.6 LOW HDL (UNDER 40): ICD-10-CM

## 2019-04-16 DIAGNOSIS — Z13.0 SCREENING FOR ENDOCRINE, NUTRITIONAL, METABOLIC AND IMMUNITY DISORDER: ICD-10-CM

## 2019-04-16 DIAGNOSIS — D64.9 ANEMIA, UNSPECIFIED TYPE: ICD-10-CM

## 2019-04-16 DIAGNOSIS — E66.01 MORBID OBESITY (H): ICD-10-CM

## 2019-04-16 DIAGNOSIS — R03.0 ELEVATED BLOOD PRESSURE READING: ICD-10-CM

## 2019-04-16 PROCEDURE — 97802 MEDICAL NUTRITION INDIV IN: CPT | Performed by: DIETITIAN, REGISTERED

## 2019-04-16 PROCEDURE — 99215 OFFICE O/P EST HI 40 MIN: CPT | Performed by: PHYSICIAN ASSISTANT

## 2019-04-16 ASSESSMENT — MIFFLIN-ST. JEOR
SCORE: 1935.5
SCORE: 1935.5

## 2019-04-16 NOTE — PATIENT INSTRUCTIONS
Please refer to your task list created today at your appointment.  Will recheck blood pressure at next months visit if still elevated will have you follow up with your primary care provider.  Make appointment to return to clinic in 1 month to see the dietician AND Tammi Cedillo PA-C

## 2019-04-16 NOTE — PROGRESS NOTES
"New Bariatric Nutrition Consultation Note    Reason For Visit: Nutrition Assessment    Zee Kyle is a 43 year old presenting today for new bariatric nutrition consult.  Pt is interested in laparoscopic (undecided).  Patient is accompanied by self.    Support System Reviewed With Patient 4/16/2019   Who do you have in your support network that can be available to help you for the first 2 weeks after surgery? , Neighboor, In-laws and 4 girl friends   Who can you count on for support throughout your weight loss surgery journey? , [place]ighb[place], In-laws and 4 girl friends, Dr. Jasmin guillen       ANTHROPOMETRICS:  Estimated body mass index is 42.74 kg/m  as calculated from the following:    Height as of this encounter: 1.702 m (5' 7\").    Weight as of this encounter: 123.8 kg (272 lb 14.4 oz).    Required weight loss goal pre-op: 5 lbs from initial consult weight (goal weight 267.9 lbs or less before surgery)       4/16/2019   I have tried the following methods to lose weight Watching portions or calories, Exercise, Weight Watchers, Atkins type diet (low carb/high protein), Pre packaged meals ex: Nutrisystem, Slimfast       Weight Loss Questions Reviewed With Patient 4/16/2019   How long have you been overweight? Following one or more pregnancies         NUTRITION HISTORY:  Recall Diet Questions Reviewed With Patient 4/16/2019   Describe what you typically consume for breakfast (typical or most recent): Eggs, or protein shake   Describe what you typically consume for lunch (typical or most recent): Grilled Chicken breast with Vegitable or grilled Chicken Breast in a salad   Describe what you typically consume for supper (typical or most recent): Burgers, Tacos, Pizza, Brats, A pintrest meal   Describe what you typically consume as snacks (typical or most recent): Apple with Peanutbutter   How many ounces of water, or other low calorie drinks, do you drink daily (8 oz=1 glass)? 64 oz or " more   How many ounces of caffeine (coffee, tea, pop) do you drink daily (8 oz=1 glass)? 8 oz   How many ounces of carbonated (pop, beer, sparkling water) drinks do you drinky daily (8 oz=1 glass)? 8 oz   How many ounces of juice, pop, sweet tea, sports drinks, protein drinks, other sweetened drinks, do you drink daily (8 oz=1 glass)? 16 oz   How often do you drink alcohol? Monthly or less   If you do drink alcohol, how many drinks might you have in a day? (one drink = 5 oz. wine, 1 can/bottle of beer, 1 shot liquor) 1 or 2       Eating Habits 4/16/2019   Do you have any dietary restrictions? Yes   Do you currently binge eat (eat a large amount of food in a short time)? No   Are you an emotional eater? Yes   Do you get up to eat after falling asleep? No   What foods do you crave? Pizza, Asian food       ADDITIONAL INFORMATION:  Pt has been considering surgery for the last couple years, attempted to start process last summer but was referred to [facility] for treatment of disordered eating. Has clearance to start surgical process but will continue regular f/u with therapist and RD from treatment program. Pt lactose intolerant.     Dining Out History Reviewed With Patient 4/16/2019   How often do you dine out? A couple of times a week.   Where do you dine out? (select all that apply) sit-down restaurants, fast food chains, take out   What types of food do you order when you dine out? Trying to order foods that focus on Protien, not 100% good but trying       Physical Activity Reviewed With Patient 4/16/2019   How often do you exercise? 1 to 2 times per week   What is the duration of your exercise (in minutes)? 20 Minutes   What types of exercise do you do? walking   What keeps you from being more active?  Pain, Shortness of breath, Lack of Time, Too tired, Worried people will look at me       NUTRITION DIAGNOSIS:  Obesity r/t long history of self-monitoring deficit and excessive energy intake aeb BMI >30  kg/m2.    INTERVENTION:  Intervention Provided/Education Provided on post-op diet guidelines, vitamins/minerals essential post-operatively, GI anatomy of bariatric surgeries, ways to help prepare for post-op diet guidelines pre-operatively, portion/calorie-control, mindful eating.  Provided pt with list of goals RD contact information.      Questions Reviewed With Patient 4/16/2019   How ready are you to make changes regarding your weight? Number 1 = Not ready at all to make changes up to 10 = very ready. 10   How confident are you that you can change? 1 = Not confident that you will be successful making changes up to 10 = very confident. 10       Patient Understanding: good  Expected Compliance: good    GOALS:  Begin taking multivitamin, calcium and vitamin D   Separate fluids and meals by 30 minutes  Chew to applesauce consistency  Eliminate carbonated beverages    Follow-Up:   Recommend 2-3 follow up visits to assist with lifestyle changes or per insurance.      Time spent with patient: 60 minutes.    Ofelia Weiss RD, LD  Clinical Dietitian   MAURICE LOOMIS      Physical Exam

## 2019-04-16 NOTE — LETTER
Zee Kyle  April 16, 2019        Bariatric Task List      Required Weight loss:    Lose 5 lbs prior to surgery.  Goal Weight: 267.9 lbs  Tasks:  Have preoperative laboratory tests drawn.     Psychological Evaluation with MMPI and clearance for weight loss surgery.    Achieve clearance from dietitian to see surgeon.    HP phone calls    Treatment for anemia and heavy menses

## 2019-04-16 NOTE — PROGRESS NOTES
"New Bariatric Surgery Consultation Note    2019    RE: Zee Kyle  MR#: [id number]  : 1975      Referring provider:       2019   Who referred you? Susan Haase       Chief Complaint/Reason for visit: evaluation for possible weight loss surgery    Dear Haase, Delores Kimble, GARRETT CNP (General),    I had the pleasure of seeing your patient, Zee Kyle, to evaluate her obesity and consider her for possible weight loss surgery. As you know, Zee Kyle is 43 year old.  She has a height of 5' 7\", a weight of 272 lbs 14.4 oz, and calculated Body mass index is 42.74 kg/m .     She was first seen in our clinic by my partner Renzo Covarrubias PA-C last August.  At that time she was referred to [facility] for assessment and treatment before starting the baraitric process.     HISTORY OF PRESENT ILLNESS:  Weight Loss History Reviewed with Patient 2019   How long have you been overweight? Following one or more pregnancies   What is the most that you have ever weighed? 275   What is the most weight you have lost? 65   I have tried the following methods to lose weight Watching portions or calories, Exercise, Weight Watchers, Atkins type diet (low carb/high protein), Pre packaged meals ex: Nutrisystem, Slimfast   I have tried the following weight loss medications? (Check all that apply) -   Have you ever had weight loss surgery? No       CO-MORBIDITIES OF OBESITY INCLUDE:     2019   I have the following co-morbidities associated with obesity: High Blood Pressure, Sleep Apnea, Infertility, Weight Bearing Joint Pain   Do you use a CPAP? Yes     Pt underwent bariatric psychological assessment on 18.  Pt has been seeing Jasmin Drake PsyD and a dietician, Casi,  on a biweekly basis at [facility] working on demonstrating a healthy relationship with food.   During this time she had a health scare and some family issues that came up where she previously would have turned " to food for comfort.  She has been able to work towards healthier coping mechanisms set forth as goals in her assessment.  Due to her strong improvements,  Dr. Vidal PsyD is supportive of the patient having bariatric surgery. She will continue seeing the patient biweekly through the weight loss process.       PAST MEDICAL HISTORY:  Past Medical History:   Diagnosis Date     Anxiety Ongoing since      Depressive disorder on going since      Earache or other ear, nose, or throat complaint 10    Tonsillectomy      Esophageal reflux     Had stopped once I got my sleep apnea mask      Herpes simplex without mention of complication      Hypertension     While Pregnant     Presbyacusis     Presbycusis     STD (sexually transmitted disease)     Herpes     Urinary incontinence     Bladder sling surgery        PAST SURGICAL HISTORY: No previous bleeding, anesthesia, or nausea concerns with surgery.    Past Surgical History:   Procedure Laterality Date     ENT SURGERY x 10        PE TUBES       TONSILLECTOMY       TYMPANOPLASTY, RT/LT       Tympanoplasty RT/LT   Bladder sling surgery     FAMILY HISTORY:   Family History   Problem Relation Age of Onset     Hypertension Father          08     Obesity Father      Cerebrovascular Disease Mother      Dementia Paternal Grandmother      Coronary Artery Disease Paternal Grandfather          SOCIAL HISTORY:   Social History Questions Reviewed With Patient 2019   Which best describes your employment status (select all that apply) I work full-time   If you work, what is your occupation?    Which best describes your marital status:    Do you have children? Yes, 2 kids 12 year old son-Jose, and 5 year old- César   Who do you have in your support network that can be available to help you for the first 2 weeks after surgery? , Neighbor, In-laws and 4 girl friends   Who can you count on for support throughout your  weight loss surgery journey? , Neighbor, In-laws and 4 girl friends, therapist, Dr. Jasmin Pastrana   Can you afford 3 meals a day?  Yes   Can you afford 50-60 dollars a month for vitamins? Yes       HABITS:     4/16/2019   How often do you drink alcohol? Monthly or less   If you do drink alcohol, how many drinks might you have in a day? (one drink = 5 oz. wine, 1 can/bottle of beer, 1 shot liquor) 1 or 2   Do you currently use any of the following Nicotine products? -   Have you ever used any of the following nicotine products? Cigarettes   If you previously used any of these products, what year did you quit? 2003   Have you or are you currently using street drugs or prescription strength medication for which you do not have a prescription for? No   Do you have a history of chemical dependency (alcohol or drug abuse)? No       PSYCHOLOGICAL HISTORY:   Psychological History Reviewed With Patient 4/16/2019   Have you ever attempted suicide? More than 10 years ago.   Have you had thoughts of suicide in the past year? No   Have you ever been hospitalized for mental illness or a suicide attempt? More than 10 years ago.   Do you have a history of chronic pain? No   Have you ever been diagnosed with fibromyalgia? No   Are you currently being treated for any of the following? (select all that apply) Depression, Anxiety   Are you currently seeing a therapist or counselor?  Yes   Are you currently seeing a psychiatrist? Yes     Mother is not supportive of pt. And her weight.  Pt has worked on boundaries and forgiveness.  Has been able to learn to not use food as a coping mechanism.        ROS:     4/16/2019   Skin:  Skin fold rashes, under breasts, pannus.     HEENT: None of these   Musculoskeletal: Joint Pain, Back pain, Limited mobility, Swelling of legs   Cardiovascular: Shortness of breath with activity   Pulmonary: Shortness of breath with activity, Snoring, Awaken from sleep to catch your breath, People  "have told me I stop breathing while asleep   Gastrointestinal: Diarrhea   Genitourinary: Stress incontinence   Hematological: None of the above   Neurological: None of the above   Female only: Excessive menstrual bleeding, Regular menstrual cycles. Has menses for 5-6 days.  Ultra Tampon soaked for 3-4 days and has to use back up pad.         EATING BEHAVIORS:     4/16/2019   Have you or anyone else thought that you had an eating disorder? Yes   If you answered yes to the previous eating disorder question, select the types that apply from this list: Other   If you answered \"Other\" to the type of eating disorder question above, please describe what it is: Diet Mentality   Do you currently binge eat (eat a large amount of food in a short time)? No   Are you an emotional eater? Yes   Do you get up to eat after falling asleep? No   Pt has a Diet mentality.   Pt likes plans, is very goal oriented, People love her for her achievements. Although good in carrer can be clinton in her relationship with food.     EXERCISE:     4/16/2019   How often do you exercise? 1 to 2 times per week   What is the duration of your exercise (in minutes)? 20 Minutes   What types of exercise do you do? walking   What keeps you from being more active?  Pain, Shortness of breath, Lack of Time, Too tired, Worried people will look at me       MEDICATIONS:  Current Outpatient Medications   Medication Sig Dispense Refill     valACYclovir (VALTREX) 500 MG tablet Take 1 tablet (500 mg) by mouth daily 30 tablet 3     venlafaxine (EFFEXOR-XR) 37.5 MG 24 hr capsule TAKE 1 TABLET BY MOUTH DAILY 90 capsule 2       ALLERGIES:  No Known Allergies    LABS/IMAGING/MEDICAL RECORDS REVIEW:   Component      Latest Ref Rng & Units 12/26/2018   WBC      4.0 - 11.0 10e9/L 8.9   RBC Count      3.8 - 5.2 10e12/L 4.82   Hemoglobin      11.7 - 15.7 g/dL 9.7 (L)   Hematocrit      35.0 - 47.0 % 33.3 (L)   MCV      78 - 100 fl 69 (L)   MCH      26.5 - 33.0 pg 20.1 (L) " "  MCHC      31.5 - 36.5 g/dL 29.1 (L)   RDW      10.0 - 15.0 % 17.9 (H)   Platelet Count      150 - 450 10e9/L 453 (H)   % Neutrophils      % 59.3   % Lymphocytes      % 25.6   % Monocytes      % 8.2   % Eosinophils      % 6.1   % Basophils      % 0.8   Absolute Neutrophil      1.6 - 8.3 10e9/L 5.3   Absolute Lymphocytes      0.8 - 5.3 10e9/L 2.3   Absolute Monocytes      0.0 - 1.3 10e9/L 0.7   Absolute Eosinophils      0.0 - 0.7 10e9/L 0.5   Absolute Basophils      0.0 - 0.2 10e9/L 0.1   Diff Method       Automated Method   Sodium      133 - 144 mmol/L 138   Potassium      3.4 - 5.3 mmol/L 3.9   Chloride      94 - 109 mmol/L 107   Carbon Dioxide      20 - 32 mmol/L 25   Anion Gap      3 - 14 mmol/L 6   Glucose      70 - 99 mg/dL 94   Urea Nitrogen      7 - 30 mg/dL 11   Creatinine      0.52 - 1.04 mg/dL 0.80   GFR Estimate      >60 mL/min/1.73:m2 >90   GFR Estimate If Black      >60 mL/min/1.73:m2 >90   Calcium      8.5 - 10.1 mg/dL 9.2   Bilirubin Total      0.2 - 1.3 mg/dL 0.2   Albumin      3.4 - 5.0 g/dL 3.5   Protein Total      6.8 - 8.8 g/dL 7.5   Alkaline Phosphatase      40 - 150 U/L 79   ALT      0 - 50 U/L 44   AST      0 - 45 U/L 24   Cholesterol      <200 mg/dL 146   Triglycerides      <150 mg/dL 119   HDL Cholesterol      >49 mg/dL 24 (L)   LDL Cholesterol Calculated      <100 mg/dL 98   Non HDL Cholesterol      <130 mg/dL 122     PHYSICAL EXAM:  /82   Pulse 103   Ht 5' 7\" (1.702 m)   Wt 272 lb 14.4 oz (123.8 kg)   SpO2 97%   BMI 42.74 kg/m    GENERAL:  Good development and normal affect in no acute distress. Patient is alert and orientated x 3 and answers all questions appropriately.  HEENT: Head is normocephalic, nontraumatic. Pupils equal and round without conjunctival injection. Neck is supple without lymphadenopathy, thyroidmegaly, or mass. Trachea midline. Dentition WNL.   CARDIOVASCULAR:  Regular rate and rhythm without murmurs, rubs, or gallops.  RESPIRATORY: Lungs are clear to " auscultation bilaterally with good breath sounds.  GASTROINTESTINAL: Abdomen is obese, nondistended, soft, nontender, without organomegaly or masses. No bruits.  LOWER EXTREMITIES: No LE edema bilaterally, no cyanosis, ulceration, or chronic venous stasis noted.  MUSCULOSKELETAL:  Moves all 4 extremities equal and strong. Has a normal gait.   NEUROLOGIC: Cranial nerves II-XII grossly intact.  SKIN: No intertriginous irritation or rash.     In summary, Zee Kyle has Class III obesity with a body mass index of Body mass index is 42.74 kg/m . kg/m2 and the comorbidities stated above. She completed an informational seminar and is a candidate for bariatric surgery.  She will have to complete the following pre-requisites:    Lose 5 lbs prior to surgery.  Goal Weight: 267.9 lbs    Have preoperative laboratory tests drawn.     Psychological Evaluation with MMPI and clearance for weight loss surgery.    Achieve clearance from dietitian to see surgeon.    HP phone calls    Treatment for anemia and heavy menses     Once the patient has completed the requirements in the above tasklist and there are no further recommendations, pt will see the surgeon for consultation for bariatric surgery.  Pt to see me in follow up next month to review bariatric education regarding the gastric sleeve and/or gastric bypass surgery.  We will recheck her blood pressure at that visit, if still high I will have her return to her PCP for follow up.     Patient verbalizes understanding of the process to surgery and the post operative schedule.  All questions were answered.  A goal sheet and support group handout were given to the patient.  Patient contract was signed.       Sincerely,       Tammi Cedillo PA-C    I spent a total of 50 minutes face to face with the patient during today's office visit. Over 50% of this time was spent counseling the patient and/or coordinating care.

## 2019-04-16 NOTE — Clinical Note
Thank you for referring this patient to our bariatric clinic for an initial evaluation.  I look forward to working with you during this process.  Here is a copy of my visit.  Sincerely,Tammi Cedillo PA-C

## 2019-04-19 ENCOUNTER — TELEPHONE (OUTPATIENT)
Dept: SURGERY | Facility: CLINIC | Age: 44
End: 2019-04-19

## 2019-04-19 NOTE — TELEPHONE ENCOUNTER
Pt called back to dietician about a vitamin question.  She had forgot/lost her schedule for timing on her vitamins given to her by her dietician.  Asks for Ofelia to call her back to clarify.    Tamika Lux RN on 4/19/2019 at 11:22 AM

## 2019-04-19 NOTE — PROGRESS NOTES
L/M to call back and discuss pre-op vitamin/mineral regimen. Also sent [employer] message.     Ofelia Weiss RD, LD  Clinical Dietitian

## 2019-05-09 DIAGNOSIS — Z13.0 SCREENING FOR IRON DEFICIENCY ANEMIA: ICD-10-CM

## 2019-05-09 DIAGNOSIS — Z13.29 SCREENING FOR ENDOCRINE, NUTRITIONAL, METABOLIC AND IMMUNITY DISORDER: ICD-10-CM

## 2019-05-09 DIAGNOSIS — R03.0 ELEVATED BLOOD PRESSURE READING: ICD-10-CM

## 2019-05-09 DIAGNOSIS — Z13.0 SCREENING FOR ENDOCRINE, NUTRITIONAL, METABOLIC AND IMMUNITY DISORDER: ICD-10-CM

## 2019-05-09 DIAGNOSIS — D64.9 ANEMIA, UNSPECIFIED TYPE: ICD-10-CM

## 2019-05-09 DIAGNOSIS — Z13.21 SCREENING FOR ENDOCRINE, NUTRITIONAL, METABOLIC AND IMMUNITY DISORDER: ICD-10-CM

## 2019-05-09 DIAGNOSIS — Z13.228 SCREENING FOR ENDOCRINE, NUTRITIONAL, METABOLIC AND IMMUNITY DISORDER: ICD-10-CM

## 2019-05-09 DIAGNOSIS — E66.01 MORBID OBESITY (H): ICD-10-CM

## 2019-05-09 DIAGNOSIS — G47.33 OSA (OBSTRUCTIVE SLEEP APNEA): ICD-10-CM

## 2019-05-09 LAB
ERYTHROCYTE [DISTWIDTH] IN BLOOD BY AUTOMATED COUNT: 21.9 % (ref 10–15)
HBA1C MFR BLD: 5.3 % (ref 0–5.6)
HCT VFR BLD AUTO: 34.8 % (ref 35–47)
HGB BLD-MCNC: 10.5 G/DL (ref 11.7–15.7)
MCH RBC QN AUTO: 20.9 PG (ref 26.5–33)
MCHC RBC AUTO-ENTMCNC: 30.2 G/DL (ref 31.5–36.5)
MCV RBC AUTO: 69 FL (ref 78–100)
PLATELET # BLD AUTO: 445 10E9/L (ref 150–450)
RBC # BLD AUTO: 5.03 10E12/L (ref 3.8–5.2)
WBC # BLD AUTO: 11.2 10E9/L (ref 4–11)

## 2019-05-09 PROCEDURE — 85027 COMPLETE CBC AUTOMATED: CPT | Performed by: PHYSICIAN ASSISTANT

## 2019-05-09 PROCEDURE — 82728 ASSAY OF FERRITIN: CPT | Performed by: PHYSICIAN ASSISTANT

## 2019-05-09 PROCEDURE — 80053 COMPREHEN METABOLIC PANEL: CPT | Performed by: PHYSICIAN ASSISTANT

## 2019-05-09 PROCEDURE — 83036 HEMOGLOBIN GLYCOSYLATED A1C: CPT | Performed by: PHYSICIAN ASSISTANT

## 2019-05-09 PROCEDURE — 83550 IRON BINDING TEST: CPT | Performed by: PHYSICIAN ASSISTANT

## 2019-05-09 PROCEDURE — 82306 VITAMIN D 25 HYDROXY: CPT | Performed by: PHYSICIAN ASSISTANT

## 2019-05-09 PROCEDURE — 83540 ASSAY OF IRON: CPT | Performed by: PHYSICIAN ASSISTANT

## 2019-05-09 PROCEDURE — 36415 COLL VENOUS BLD VENIPUNCTURE: CPT | Performed by: PHYSICIAN ASSISTANT

## 2019-05-10 LAB
ALBUMIN SERPL-MCNC: 3.7 G/DL (ref 3.4–5)
ALP SERPL-CCNC: 102 U/L (ref 40–150)
ALT SERPL W P-5'-P-CCNC: 44 U/L (ref 0–50)
ANION GAP SERPL CALCULATED.3IONS-SCNC: 6 MMOL/L (ref 3–14)
AST SERPL W P-5'-P-CCNC: 27 U/L (ref 0–45)
BILIRUB SERPL-MCNC: 0.2 MG/DL (ref 0.2–1.3)
BUN SERPL-MCNC: 15 MG/DL (ref 7–30)
CALCIUM SERPL-MCNC: 9.4 MG/DL (ref 8.5–10.1)
CHLORIDE SERPL-SCNC: 107 MMOL/L (ref 94–109)
CO2 SERPL-SCNC: 25 MMOL/L (ref 20–32)
CREAT SERPL-MCNC: 0.88 MG/DL (ref 0.52–1.04)
FERRITIN SERPL-MCNC: 14 NG/ML (ref 12–150)
GFR SERPL CREATININE-BSD FRML MDRD: 82 ML/MIN/{1.73_M2}
GLUCOSE SERPL-MCNC: 106 MG/DL (ref 70–99)
IRON SATN MFR SERPL: 4 % (ref 15–46)
IRON SERPL-MCNC: 19 UG/DL (ref 35–180)
POTASSIUM SERPL-SCNC: 3.7 MMOL/L (ref 3.4–5.3)
PROT SERPL-MCNC: 7.9 G/DL (ref 6.8–8.8)
SODIUM SERPL-SCNC: 138 MMOL/L (ref 133–144)
TIBC SERPL-MCNC: 446 UG/DL (ref 240–430)

## 2019-05-11 LAB — DEPRECATED CALCIDIOL+CALCIFEROL SERPL-MC: 30 UG/L (ref 20–75)

## 2019-05-14 DIAGNOSIS — D50.0 IRON DEFICIENCY ANEMIA DUE TO CHRONIC BLOOD LOSS: Primary | ICD-10-CM

## 2019-05-14 RX ORDER — FERROUS SULFATE 325(65) MG
325 TABLET ORAL
Qty: 90 TABLET | Refills: 3 | Status: ON HOLD | OUTPATIENT
Start: 2019-05-14 | End: 2019-10-16

## 2019-05-17 ENCOUNTER — OFFICE VISIT (OUTPATIENT)
Dept: SURGERY | Facility: CLINIC | Age: 44
End: 2019-05-17
Payer: COMMERCIAL

## 2019-05-17 VITALS — BODY MASS INDEX: 42.38 KG/M2 | HEIGHT: 67 IN | WEIGHT: 270 LBS

## 2019-05-17 VITALS
SYSTOLIC BLOOD PRESSURE: 140 MMHG | OXYGEN SATURATION: 97 % | DIASTOLIC BLOOD PRESSURE: 90 MMHG | HEART RATE: 94 BPM | HEIGHT: 67 IN | WEIGHT: 270 LBS | BODY MASS INDEX: 42.38 KG/M2

## 2019-05-17 DIAGNOSIS — I10 ESSENTIAL HYPERTENSION: ICD-10-CM

## 2019-05-17 DIAGNOSIS — D50.0 IRON DEFICIENCY ANEMIA DUE TO CHRONIC BLOOD LOSS: ICD-10-CM

## 2019-05-17 DIAGNOSIS — E66.01 MORBID OBESITY (H): Primary | ICD-10-CM

## 2019-05-17 DIAGNOSIS — E66.01 MORBID OBESITY (H): ICD-10-CM

## 2019-05-17 DIAGNOSIS — Z71.89 ENCOUNTER FOR PRE-BARIATRIC SURGERY COUNSELING AND EDUCATION: ICD-10-CM

## 2019-05-17 DIAGNOSIS — N92.0 MENORRHAGIA WITH REGULAR CYCLE: ICD-10-CM

## 2019-05-17 PROCEDURE — 99214 OFFICE O/P EST MOD 30 MIN: CPT | Performed by: PHYSICIAN ASSISTANT

## 2019-05-17 PROCEDURE — 97803 MED NUTRITION INDIV SUBSEQ: CPT | Performed by: DIETITIAN, REGISTERED

## 2019-05-17 ASSESSMENT — MIFFLIN-ST. JEOR
SCORE: 1922.34
SCORE: 1922.34

## 2019-05-17 NOTE — PROGRESS NOTES
"PRE SURGICAL WEIGHT LOSS NUTRITION APPOINTMENT    Zee Kyle  1975  female  [id number]  43 year old    ASSESSMENT    Desired Surgical Procedure: gastric bypass    REASON FOR VISIT:  Zee Kyle is a 43 year old year old female presents today for a pre-surgical weight loss follow-up appointment. Patient accompanied by self.    DIAGNOSIS:  Weight Status Obesity Grade III BMI >40    ANTHROPOMETRICS:  Height: 170.2 cm (5' 7\")  Initial Weight: 272.9 lbs     Weight last visit: 272.9 lbs    Current weight: 122.5 kg (270 lb)  BMI: 42.29 kg/(m^2).    VITAMINS AND MINERALS:  1 Multivitamin with Minerals (HS)  600 mg Calcium with Vitamin D (AM and lunch)  5000 International units Vitamin D      NUTRITION HISTORY:  Breakfast: (within 1h of waking) 3 eggs + onions + deli ham + cheese   Lunch: imitation crab salad (corn, black beans, cabbage, dressing) + 2 tortillas  Supper: out to eat on Fridays: Fridays or [employer] (sliders and fries)  grilled burgers or brats   Snacks: apple with peanut butter, beef jerky or pretzels with peanut butter (upon arriving home from work)    Fluids Consumed: Water (100oz)  Eating slower: Yes  Chewing foods thoroughly: Sometimes  Take 20-30 minutes to consume each meal: Sometimes  Fluids and meals separate by at least 30 minutes: Usually  Carbonation: none  Caffeine: none  Additional Information: Pt spoke with boss about allowing more time for her lunch break. Discussed reducing restaurant food. Pt with many positive changes the last month and consistently being mindful of pre-op guidelines.     PHYSICAL ACTIVITY:  Type: got new puppy - walking  Frequency: 7 (days per week)  Duration: ~9000-15836 steps/day    DIAGNOSIS:  Previous Nutrition Diagnosis: Obesity related to long history of self- monitoring deficit and excessive energy intake evidenced by BMI of 42.74 kg/m2  No change, modified below    Previous goals:   Begin taking multivitamin, calcium and vitamin D - met  Separate " fluids and meals by 30 minutes - improving  Chew to applesauce consistency - improving  Eliminate carbonated beverages - met      Current Nutrition Diagnosis: Obesity related to long history of self-monitoring deficit and excessive energy intake as evidenced by BMI of 42.29 kg/m2.    INTERVENTION:  Nutrition Prescription: Recommended energy/nutrient modification.    GOALS:  Continue to practice all pre-op guidelines  Add more fruits and vegetables to meals  Reduce restaurant outings    Intervention:  - Discussed progress towards previous goals.  - Reinforced importance of making behavior changes in preparation for bariatric surgery.   - Assessed learning needs and learning preferences       NUTRITION MONITORING AND EVALUATION:  Anticipated compliance: good  Patient demonstrated good understanding.     Follow up: Continue to monitor patient closely regarding weight loss and diet.  # of visits needed: 1  Cleared by RD: No     TIME SPENT WITH PATIENT: 25 minutes    Ofelia Weiss RD, LD  Clinical Dietitian

## 2019-05-17 NOTE — PROGRESS NOTES
"Bariatric Education Visit    CC: Obesity    HPI: I had the pleasure of seeing Zee in the office today to go over bariatric education.  I saw the patient last month to evaluate obesity and consider her for possible weight loss surgery.    Since her last visit she has started her pre-op vitamins. She completed her labs.  She has iron deficiency anemia.  I prescribed an iron supplement which she takes with orange juice in the morning with her calcium. She will follow up with her primary care provider to discuss options to control her heavy menstrual flow.      BARIATRIC METRICS:    Current Weight: 270 lb (122.5 kg)  Body mass index is 42.29 kg/m .   Wt change since last visit (lbs): -2  Cumulative weight loss (lbs): 2.9    PE:  /90   Pulse 94   Ht 5' 7\" (1.702 m)   Wt 270 lb (122.5 kg)   SpO2 97%   BMI 42.29 kg/m    GENERAL Pt in NAD.  HEART: No JVD  LUNGS: Breathing unlabored.  MUSC: Gait normal  NEURO: Alert and oriented x3.     Assessment:  Morbid Obesity  Bariatric Education  Iron deficiency Anemia  Menorrhagia  HTN    Plan:  Reviewed tasklist    Lose 5 lbs prior to surgery.  Goal Weight: 267.9 lbs- pending     Have preoperative laboratory tests drawn. - completed    Psychological Evaluation with MMPI and clearance for weight loss surgery.-completed    Achieve clearance from dietitian to see surgeon.-pending     HP phone calls-started     Treatment for anemia and heavy menses (discussed BCP vs IUD.  Pt considering IUD.  Will see OB/GYN or PCP regarding this.)    Added today: Follow up with Primary Care provider regarding elevated blood pressure.     Today in the office we discussed gastric sleeve and bypass surgery.  Preoperative, perioperative, and postoperative processes, management, and follow up were addressed.  Risks and benefits were outlined including the risk of death, PE, DVT, ulcer, N/V, stricture, hernia, wound infection, worsening GERD with sleeve, weight regain, and vitamin " deficiencies. I emphasized exercise and activity along with appropriate food choice as the main foundation for weight loss with surgery providing surgical reinforcement of this.  A goal sheet and support group handout were given to the patient. Patient contract was signed.     Once the patient has completed their task list and there are no further recommendations, they will see the surgeon for consultation for bariatric surgery.  All questions were answered. Patient verbalizes understanding of the process to surgery and expectations for the postoperative period including the need for lifelong lifestyle changes, vitamin supplementation, and laboratory monitoring.      This was a 27 minute visit with greater than 50% spent on counseling and care coordination.

## 2019-06-17 ENCOUNTER — OFFICE VISIT (OUTPATIENT)
Dept: SURGERY | Facility: CLINIC | Age: 44
End: 2019-06-17
Payer: COMMERCIAL

## 2019-06-17 VITALS — HEIGHT: 67 IN | BODY MASS INDEX: 42.71 KG/M2 | WEIGHT: 272.1 LBS

## 2019-06-17 DIAGNOSIS — E66.01 MORBID OBESITY (H): ICD-10-CM

## 2019-06-17 PROCEDURE — 97803 MED NUTRITION INDIV SUBSEQ: CPT | Performed by: DIETITIAN, REGISTERED

## 2019-06-17 ASSESSMENT — MIFFLIN-ST. JEOR: SCORE: 1931.87

## 2019-06-17 NOTE — PROGRESS NOTES
"PRE SURGICAL WEIGHT LOSS NUTRITION APPOINTMENT    Zee Kyle  1975  female  [id number]  43 year old    ASSESSMENT    Desired Surgical Procedure: gastric bypass    REASON FOR VISIT:  Zee Kyle is a 43 year old year old female presents today for a pre-surgical weight loss follow-up appointment. Patient accompanied by self.    DIAGNOSIS:  Weight Status Obesity Grade III BMI >40    ANTHROPOMETRICS:  Height: 170.2 cm (5' 7\")  Initial Weight: 272.9 lbs     Weight last visit: 270 lbs    Current weight: 123.4 kg (272 lb 1.6 oz)  BMI: 42.62 kg/(m^2).    VITAMINS AND MINERALS:  1 Multivitamin with Minerals (HS)  600 mg Calcium with Vitamin D (AM and lunch)  5000 International units Vitamin D      NUTRITION HISTORY:  Breakfast: 3 eggs + onions + pepper + cheese + fruit   Lunch: chicken caesar salads  chicken salad sandwich on croissant + fruit   Supper: brats  burgers + salsa + vegetable  salmon + potatoes + asparagus  Snacks: none   Fluids Consumed: Water (64oz)  Eating slower: Yes  Chewing foods thoroughly: Yes  Take 20-30 minutes to consume each meal: Yes  Fluids and meals separate by at least 30 minutes: Yes/Usually   Carbonation: none  Caffeine: none  Additional Information: Pt states she was successfully losing weight initially but then had several celebratory meals/events over the last couple of weeks. Provided pre-op WL diet as well as appropriate caloric intake.     PHYSICAL ACTIVITY:  Type: walking   Frequency: 5-7 (days per week)  Duration: 30 (minutes)     DIAGNOSIS:  Previous Nutrition Diagnosis: Obesity related to long history of self- monitoring deficit and excessive energy intake evidenced by BMI of 42.29 kg/m2  No change, modified below    Previous goals:   Continue to practice all pre-op guidelines - met  Add more fruits and vegetables to meals - met  Reduce restaurant outings - met      Current Nutrition Diagnosis: Obesity related to long history of self-monitoring deficit and " excessive energy intake as evidenced by BMI of 42.62 kg/m2.    INTERVENTION:  Nutrition Prescription: Recommended energy/nutrient modification.    GOALS:  Consistently separate fluids and meals by 30 minutes  Follow pre-op WL diet OR aim for [id number] kcals  Continue to follow all pre-op guidelines    Intervention:  - Discussed progress towards previous goals.  - Reinforced importance of making behavior changes in preparation for bariatric surgery.   - Assessed learning needs and learning preferences       NUTRITION MONITORING AND EVALUATION:  Anticipated compliance: fair-good  Patient demonstrated good understanding.     Follow up: Continue to monitor patient closely regarding weight loss and diet.  # of visits needed: 1  Cleared by RD: No     TIME SPENT WITH PATIENT: 20 minutes    Ofelia Weiss RD, LD  Clinical Dietitian

## 2019-07-11 ENCOUNTER — OFFICE VISIT (OUTPATIENT)
Dept: FAMILY MEDICINE | Facility: CLINIC | Age: 44
End: 2019-07-11
Payer: COMMERCIAL

## 2019-07-11 VITALS
BODY MASS INDEX: 42.06 KG/M2 | DIASTOLIC BLOOD PRESSURE: 92 MMHG | TEMPERATURE: 97.6 F | HEIGHT: 67 IN | HEART RATE: 116 BPM | SYSTOLIC BLOOD PRESSURE: 158 MMHG | WEIGHT: 268 LBS | RESPIRATION RATE: 18 BRPM | OXYGEN SATURATION: 99 %

## 2019-07-11 DIAGNOSIS — Z30.8 ENCOUNTER FOR OTHER CONTRACEPTIVE MANAGEMENT: ICD-10-CM

## 2019-07-11 DIAGNOSIS — F33.1 MAJOR DEPRESSIVE DISORDER, RECURRENT EPISODE, MODERATE (H): ICD-10-CM

## 2019-07-11 DIAGNOSIS — I10 BENIGN ESSENTIAL HYPERTENSION: ICD-10-CM

## 2019-07-11 DIAGNOSIS — N92.0 MENORRHAGIA WITH REGULAR CYCLE: Primary | ICD-10-CM

## 2019-07-11 PROCEDURE — 99213 OFFICE O/P EST LOW 20 MIN: CPT | Performed by: NURSE PRACTITIONER

## 2019-07-11 RX ORDER — LISINOPRIL 5 MG/1
5 TABLET ORAL DAILY
Qty: 30 TABLET | Refills: 1 | Status: SHIPPED | OUTPATIENT
Start: 2019-07-11 | End: 2019-07-11

## 2019-07-11 ASSESSMENT — ANXIETY QUESTIONNAIRES
7. FEELING AFRAID AS IF SOMETHING AWFUL MIGHT HAPPEN: NOT AT ALL
1. FEELING NERVOUS, ANXIOUS, OR ON EDGE: NOT AT ALL
2. NOT BEING ABLE TO STOP OR CONTROL WORRYING: NOT AT ALL
4. TROUBLE RELAXING: SEVERAL DAYS
7. FEELING AFRAID AS IF SOMETHING AWFUL MIGHT HAPPEN: NOT AT ALL
3. WORRYING TOO MUCH ABOUT DIFFERENT THINGS: NOT AT ALL
6. BECOMING EASILY ANNOYED OR IRRITABLE: NOT AT ALL
GAD7 TOTAL SCORE: 1
5. BEING SO RESTLESS THAT IT IS HARD TO SIT STILL: NOT AT ALL
GAD7 TOTAL SCORE: 1
GAD7 TOTAL SCORE: 1

## 2019-07-11 ASSESSMENT — PATIENT HEALTH QUESTIONNAIRE - PHQ9
SUM OF ALL RESPONSES TO PHQ QUESTIONS 1-9: 5
10. IF YOU CHECKED OFF ANY PROBLEMS, HOW DIFFICULT HAVE THESE PROBLEMS MADE IT FOR YOU TO DO YOUR WORK, TAKE CARE OF THINGS AT HOME, OR GET ALONG WITH OTHER PEOPLE: NOT DIFFICULT AT ALL
SUM OF ALL RESPONSES TO PHQ QUESTIONS 1-9: 5

## 2019-07-11 ASSESSMENT — MIFFLIN-ST. JEOR: SCORE: 1913.27

## 2019-07-11 NOTE — PROGRESS NOTES
Subjective  Answers for HPI/ROS submitted by the patient on 2019   If you checked off any problems, how difficult have these problems made it for you to do your work, take care of things at home, or get along with other people?: Not difficult at all  PHQ9 TOTAL SCORE: 5  HENRI 7 TOTAL SCORE: 1      Zee Kyle is a 43 year old female who presents to clinic today for the following health issues:    HPI   Concern - Discuss Birth control options and anemia/B/P  Onset:     Description:   Discuss Hypertension due to anemia possibly and discuss B/C    Intensity:     Progression of Symptoms:      Accompanying Signs & Symptoms:      Previous history of similar problem:       Precipitating factors:   Worsened by:     Alleviating factors:  Improved by:   Periods are every 32 days, lasting 5-6 days, changing an ultra tampon every hour with leakage for 3-4 days.  Does not want to be on OCP's.      Elevated BP:  Has had several appointment with elevated BP. Denies headache, chest pain, vision disturbance.    BP range from 142//99.      Depression:  Well controlled, PHQ 9 of 5.  Taking Effexor XR 37.5 mg as prescribed.  Patient Active Problem List   Diagnosis     CARDIOVASCULAR SCREENING; LDL GOAL LESS THAN 160     Presbycusis     Hypothyroidism     Major depressive disorder, recurrent episode, moderate (H)     Anxiety     Morbid obesity (H)     CLARK (obstructive sleep apnea)     Intertrigo     Lower extremity edema     Low HDL (under 40)     Past Surgical History:   Procedure Laterality Date     ENT SURGERY       PE TUBES       TONSILLECTOMY       TYMPANOPLASTY, RT/LT      Tympanoplasty RT/LT       Social History     Tobacco Use     Smoking status: Former Smoker     Types: Cigarettes     Last attempt to quit: 2/10/2003     Years since quittin.4     Smokeless tobacco: Never Used     Tobacco comment: quit    Substance Use Topics     Alcohol use: Yes     Alcohol/week: 0.0 oz     Comment: occas     Family  "History   Problem Relation Age of Onset     Hypertension Father          08     Obesity Father      Cerebrovascular Disease Mother      Dementia Paternal Grandmother      Coronary Artery Disease Paternal Grandfather          Current Outpatient Medications   Medication Sig Dispense Refill     lisinopril (PRINIVIL/ZESTRIL) 5 MG tablet Take 1 tablet (5 mg) by mouth daily 30 tablet 1     ferrous sulfate (FEROSUL) 325 (65 Fe) MG tablet Take 1 tablet (325 mg) by mouth daily (with breakfast) 90 tablet 3     valACYclovir (VALTREX) 500 MG tablet Take 1 tablet (500 mg) by mouth daily 30 tablet 3     venlafaxine (EFFEXOR-XR) 37.5 MG 24 hr capsule TAKE 1 TABLET BY MOUTH DAILY 90 capsule 2     BP Readings from Last 3 Encounters:   19 169/89   19 140/90   19 140/82    Wt Readings from Last 3 Encounters:   19 121.6 kg (268 lb)   19 123.4 kg (272 lb 1.6 oz)   19 122.5 kg (270 lb)      Reviewed and updated as needed this visit by Provider         Review of Systems   ROS COMP: CONSTITUTIONAL: NEGATIVE for fever, chills, change in weight  RESP: NEGATIVE for significant cough or SOB  CV: NEGATIVE for chest pain, palpitations or peripheral edema  : see HPI  NEURO: NEGATIVE for weakness, dizziness or paresthesias  PSYCHIATRIC: NEGATIVE for changes in mood or affect      Objective    /89 (BP Location: Right arm, Patient Position: Chair, Cuff Size: Adult Large)   Pulse 116   Temp 97.6  F (36.4  C) (Oral)   Resp 18   Ht 1.702 m (5' 7\")   Wt 121.6 kg (268 lb)   LMP 2019   SpO2 99%   Breastfeeding? No   BMI 41.97 kg/m    There is no height or weight on file to calculate BMI.  Physical Exam   GENERAL: healthy, alert and no distress  NECK: no adenopathy, no asymmetry, masses, or scars and thyroid normal to palpation  RESP: lungs clear to auscultation - no rales, rhonchi or wheezes  CV: regular rate and rhythm, normal S1 S2, no S3 or S4, no murmur, click or rub, no peripheral " "edema   PSYCH: mentation appears normal, affect normal/bright            Assessment & Plan   Assessment      Plan  1. Menorrhagia with regular cycle:  On ferrous sulfate for anemia.  Interested in Mirena IUD      2. Encounter for other contraceptive management:    Discussed risks and benefits of mirena IUD, reviewed how the IUD is inserted, stressed the importance of taking ibuprofen 600 mg prior to the appt, best to have IUD inserted when menses is present, plan on not working the rest of the day after IUD inserted. No sexual activity 7 days prior to IUD insertion    3. Benign essential hypertension: new diagnosis:  Elevated BP on 3-4 previous visits, will start on lisinopril 5 mg every day, follow up in 1 week.   - lisinopril (PRINIVIL/ZESTRIL) 5 MG tablet; Take 1 tablet (5 mg) by mouth daily  Dispense: 30 tablet; Refill: 1    4. Major depressive disorder, recurrent episode, moderate (H): PHQ 9 of 5, well controlled on Effexor 37.5 mg every day.    BMI:   Estimated body mass index is 41.97 kg/m  as calculated from the following:    Height as of this encounter: 1.702 m (5' 7\").    Weight as of this encounter: 121.6 kg (268 lb).   Weight management plan: Patient referred to endocrine and/or weight management specialty  FUTURE APPOINTMENTS:       - Follow-up visit in 1 week for IUD placement and BP check    Susan Haase, APRN [facility]"

## 2019-07-12 RX ORDER — LISINOPRIL 5 MG/1
TABLET ORAL
Qty: 90 TABLET | Refills: 0 | Status: SHIPPED | OUTPATIENT
Start: 2019-07-12 | End: 2019-08-16

## 2019-07-12 ASSESSMENT — ANXIETY QUESTIONNAIRES: GAD7 TOTAL SCORE: 1

## 2019-07-12 ASSESSMENT — PATIENT HEALTH QUESTIONNAIRE - PHQ9: SUM OF ALL RESPONSES TO PHQ QUESTIONS 1-9: 5

## 2019-07-12 NOTE — TELEPHONE ENCOUNTER
"Requested Prescriptions   Pending Prescriptions Disp Refills     lisinopril (PRINIVIL/ZESTRIL) 5 MG tablet [Pharmacy Med Name: LISINOPRIL 5MG TABLETS]  Last Written Prescription Date:  7/11/2019  Last Fill Quantity: 30 tablet,  # refills: 1   Last office visit: 7/11/2019 with prescribing provider:  Haase   Future Office Visit:     90 tablet 1     Sig: TAKE 1 TABLET(5 MG) BY MOUTH DAILY       ACE Inhibitors (Including Combos) Protocol Failed - 7/11/2019  5:57 PM        Failed - Blood pressure under 140/90 in past 12 months     BP Readings from Last 3 Encounters:   07/11/19 (!) 158/92   05/17/19 140/90   04/16/19 140/82                 Passed - Recent (12 mo) or future (30 days) visit within the authorizing provider's specialty     Patient had office visit in the last 12 months or has a visit in the next 30 days with authorizing provider or within the authorizing provider's specialty.  See \"Patient Info\" tab in inbasket, or \"Choose Columns\" in Meds & Orders section of the refill encounter.              Passed - Medication is active on med list        Passed - Patient is age 18 or older        Passed - No active pregnancy on record        Passed - Normal serum creatinine on file in past 12 months     Recent Labs   Lab Test 05/09/19  1531   CR 0.88             Passed - Normal serum potassium on file in past 12 months     Recent Labs   Lab Test 05/09/19  1531   POTASSIUM 3.7             Passed - No positive pregnancy test within past 12 months          "

## 2019-07-15 ENCOUNTER — OFFICE VISIT (OUTPATIENT)
Dept: SURGERY | Facility: CLINIC | Age: 44
End: 2019-07-15
Payer: COMMERCIAL

## 2019-07-15 VITALS — BODY MASS INDEX: 42 KG/M2 | HEIGHT: 67 IN | WEIGHT: 267.6 LBS

## 2019-07-15 DIAGNOSIS — E66.01 MORBID OBESITY (H): ICD-10-CM

## 2019-07-15 PROCEDURE — 97803 MED NUTRITION INDIV SUBSEQ: CPT | Performed by: DIETITIAN, REGISTERED

## 2019-07-15 ASSESSMENT — MIFFLIN-ST. JEOR: SCORE: 1911.46

## 2019-07-15 NOTE — PROGRESS NOTES
"PRE SURGICAL WEIGHT LOSS NUTRITION APPOINTMENT    Zee Kyle  1975  female  [id number]  43 year old    ASSESSMENT    Desired Surgical Procedure: gastric bypass    REASON FOR VISIT:  Zee Kyle is a 43 year old year old female presents today for a pre-surgical weight loss follow-up appointment. Patient accompanied by self.    DIAGNOSIS:  Weight Status Obesity Grade III BMI >40    ANTHROPOMETRICS:  Height: 170.2 cm (5' 7\")  Initial Weight: 272.9 lbs     Weight last visit: 272.1 lbs    Current weight: 121.4 kg (267 lb 9.6 oz)  BMI: 41.91 kg/(m^2).    VITAMINS AND MINERALS:  1 Multivitamin with Minerals (HS)  600 mg Calcium with Vitamin D (AM and lunch)  5000 International units Vitamin D  Ferrous Sulfate (65mg) w/OJ (HS)    NUTRITION HISTORY:  Breakfast: 3 eggs + apple with peanut butter   Lunch: deli meat (turkey, usually) + yogurt + fruit   Supper: grilled chicken breast (lettuce wrap)  salad if out at restaurant  tacos   Snacks: water   Fluids Consumed: Water (74oz/day)  Eating slower: Yes  Chewing foods thoroughly: Yes  Take 20-30 minutes to consume each meal: Yes  Fluids and meals separate by at least 30 minutes: Yes  Carbonation: none  Caffeine: none  Additional Information: Pt feeling confident in behavior changes. Reviewed post-op diet advancement and vitamin/mineral changes.     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 - starts tonight  Frequency: 5-7 (days per week)  Duration: 30 (minutes)     DIAGNOSIS:  Previous Nutrition Diagnosis: Obesity related to long history of self- monitoring deficit and excessive energy intake evidenced by BMI of 42.62 kg/m2  No change, modified below    Previous goals:   Consistently separate fluids and meals by 30 minutes - met  Follow pre-op WL diet OR aim for 5983-0204 kcals - met  Continue to follow all pre-op guidelines - met    Current Nutrition Diagnosis: Obesity related to long history of self-monitoring deficit and " excessive energy intake as evidenced by BMI of 41.91 kg/m2.    INTERVENTION:  Nutrition Prescription: Recommended energy/nutrient modification.    GOALS:  Continue to follow all pre-op guidelines  Take Iron supplement with 500mg Vitamin C instead of orange juice    Intervention:  - Discussed progress towards previous goals.  - Reinforced importance of making behavior changes in preparation for bariatric surgery.   - Assessed learning needs and learning preferences       NUTRITION MONITORING AND EVALUATION:  Anticipated compliance: good  Patient demonstrated good understanding.     Follow up: Continue to monitor patient closely regarding weight loss and diet.  # of visits needed: 0  Cleared by RD: Yes     TIME SPENT WITH PATIENT: 25 minutes    Ofelia Weiss RD, LD  Clinical Dietitian

## 2019-07-16 PROBLEM — I10 BENIGN ESSENTIAL HYPERTENSION: Status: ACTIVE | Noted: 2019-07-16

## 2019-07-23 ENCOUNTER — OFFICE VISIT (OUTPATIENT)
Dept: FAMILY MEDICINE | Facility: CLINIC | Age: 44
End: 2019-07-23
Payer: COMMERCIAL

## 2019-07-23 VITALS
SYSTOLIC BLOOD PRESSURE: 120 MMHG | BODY MASS INDEX: 42.44 KG/M2 | HEART RATE: 94 BPM | RESPIRATION RATE: 16 BRPM | DIASTOLIC BLOOD PRESSURE: 78 MMHG | TEMPERATURE: 98.2 F | WEIGHT: 271 LBS

## 2019-07-23 DIAGNOSIS — I10 BENIGN ESSENTIAL HYPERTENSION: ICD-10-CM

## 2019-07-23 DIAGNOSIS — Z30.430 ENCOUNTER FOR INSERTION OF INTRAUTERINE CONTRACEPTIVE DEVICE: Primary | ICD-10-CM

## 2019-07-23 LAB — HCG UR QL: NEGATIVE

## 2019-07-23 PROCEDURE — 99213 OFFICE O/P EST LOW 20 MIN: CPT | Mod: 25 | Performed by: NURSE PRACTITIONER

## 2019-07-23 PROCEDURE — 58300 INSERT INTRAUTERINE DEVICE: CPT | Performed by: NURSE PRACTITIONER

## 2019-07-23 PROCEDURE — 81025 URINE PREGNANCY TEST: CPT | Performed by: NURSE PRACTITIONER

## 2019-07-23 NOTE — PROGRESS NOTES
Subjective     Zee Kyle is a 43 year old female who presents to clinic today for the following health issues:    HPI   Hypertension; at last visit started on lisinopril 5 mg, BP today 120/78. Denies dizziness, chest pain, palpitations.     Contraception: is here for mirena iud insertion for menorrhagia and anemia.  Had an IUD many years ago.         Patient Active Problem List   Diagnosis     CARDIOVASCULAR SCREENING; LDL GOAL LESS THAN 160     Presbycusis     Hypothyroidism     Major depressive disorder, recurrent episode, moderate (H)     Anxiety     Morbid obesity (H)     CLARK (obstructive sleep apnea)     Intertrigo     Lower extremity edema     Low HDL (under 40)     Benign essential hypertension     Past Surgical History:   Procedure Laterality Date     ENT SURGERY       PE TUBES       TONSILLECTOMY       TYMPANOPLASTY, RT/LT      Tympanoplasty RT/LT       Social History     Tobacco Use     Smoking status: Former Smoker     Types: Cigarettes     Last attempt to quit: 2/10/2003     Years since quittin.4     Smokeless tobacco: Never Used     Tobacco comment: quit    Substance Use Topics     Alcohol use: Yes     Alcohol/week: 0.0 oz     Comment: occas     Family History   Problem Relation Age of Onset     Hypertension Father          08     Obesity Father      Cerebrovascular Disease Mother      Dementia Paternal Grandmother      Coronary Artery Disease Paternal Grandfather          BP Readings from Last 3 Encounters:   19 120/78   19 (!) 158/92   19 140/90    Wt Readings from Last 3 Encounters:   19 122.9 kg (271 lb)   07/15/19 121.4 kg (267 lb 9.6 oz)   19 121.6 kg (268 lb)                      Reviewed and updated as needed this visit by Provider         Review of Systems   ROS COMP: CONSTITUTIONAL: NEGATIVE for fever, chills, change in weight  RESP: NEGATIVE for significant cough or SOB  CV: NEGATIVE for chest pain, palpitations or peripheral edema  :  see HPI  PSYCHIATRIC: NEGATIVE for changes in mood or affect      Objective    /78 (BP Location: Right arm, Patient Position: Chair, Cuff Size: Adult Large)   Pulse 94   Temp 98.2  F (36.8  C) (Oral)   Resp 16   Wt 122.9 kg (271 lb)   LMP 06/28/2019   BMI 42.44 kg/m    There is no height or weight on file to calculate BMI.  Physical Exam   GENERAL: healthy, alert and no distress   (female): normal female external genitalia, normal urethral meatus, vaginal mucosa, normal cervix/adnexa/uterus without masses or discharge          Assessment & Plan   Assessment  Zee was seen today for contraception.    Diagnoses and all orders for this visit:    Encounter for insertion of intrauterine contraceptive device  -     levonorgestrel (MIRENA) 20 MCG/24HR IUD 20 mcg  -     INSERTION INTRAUTERINE DEVICE  -     HCG Qual, Urine ([id number])  -      levonorgestrel (MIRENA) 20 MCG/24HR IUD; 1 each (20 mcg) by Intrauterine route continuous    Counseling: Discussed potential side effects of Mirena, including unpredictable spotting and amenorrhea.  Patient aware to check for strings every month.    Consent: Affirmation of informed consent was signed and scanned into the medical record. Risks, benefits and alternatives were discussed including risk of infection, bleeding and small risk of uterine perforation.  Patient's questions were elicited and answered.     Labs: UPT negative    Technique:   Patient was premedicated with ibuprofen:   Yes  Uterine position was confirmed by bimanual exam: Yes   Using a sterile speculum and equipment, the cervix was examined.     The cervix was cleansed with Betadine prep. A tenaculum was applied to the cervix with tension to straighten the cervical canal.  The uterus was sounded to 7.5 cm.  A Mirena IUD was inserted in the usual fashion and strings trimmed 2 cm below the cervix.  EBL: minimal  Complications: No  Tolerance: Pt tolerated procedure well and was in stable condition.  She  was observed in the clinic for 15 min with post-procedure.     Follow up: Pt was instructed to call if fever, chills, heavy bleeding, severe cramping or foul smelling discharge. May take 600 mg ibuprofen QID prn for mild cramping.  She was advised to check the IUD strings monthly.  Recommended that she return in 1 month for IUD string check.      Benign essential hypertension:  /78, well controlled, BMP hemolyzed so cancelled, will check in 1 month.  -     Cancel: Basic metabolic panel  -     Basic metabolic panel  (Ca, Cl, CO2, Creat, Gluc, K, Na, BUN); Future    Return in about 4 weeks (around 8/20/2019).    Susan Haase, APRN [facility]

## 2019-08-13 ENCOUNTER — TELEPHONE (OUTPATIENT)
Dept: SURGERY | Facility: CLINIC | Age: 44
End: 2019-08-13

## 2019-08-13 NOTE — TELEPHONE ENCOUNTER
I called patient back and let her know that I will submit for insurance approval in the morning. Once I have approval I will call patient to schedule her surgery.

## 2019-08-16 ENCOUNTER — OFFICE VISIT (OUTPATIENT)
Dept: FAMILY MEDICINE | Facility: CLINIC | Age: 44
End: 2019-08-16
Payer: COMMERCIAL

## 2019-08-16 VITALS
TEMPERATURE: 97 F | HEART RATE: 94 BPM | HEIGHT: 67 IN | RESPIRATION RATE: 16 BRPM | WEIGHT: 273 LBS | OXYGEN SATURATION: 96 % | BODY MASS INDEX: 42.85 KG/M2 | DIASTOLIC BLOOD PRESSURE: 78 MMHG | SYSTOLIC BLOOD PRESSURE: 134 MMHG

## 2019-08-16 DIAGNOSIS — D50.0 IRON DEFICIENCY ANEMIA DUE TO CHRONIC BLOOD LOSS: ICD-10-CM

## 2019-08-16 DIAGNOSIS — Z30.431 IUD CHECK UP: ICD-10-CM

## 2019-08-16 DIAGNOSIS — I10 BENIGN ESSENTIAL HYPERTENSION: Primary | ICD-10-CM

## 2019-08-16 LAB
ERYTHROCYTE [DISTWIDTH] IN BLOOD BY AUTOMATED COUNT: 16.1 % (ref 10–15)
HCT VFR BLD AUTO: 44.5 % (ref 35–47)
HGB BLD-MCNC: 14.4 G/DL (ref 11.7–15.7)
IRON SATN MFR SERPL: 17 % (ref 15–46)
IRON SERPL-MCNC: 61 UG/DL (ref 35–180)
MCH RBC QN AUTO: 27.5 PG (ref 26.5–33)
MCHC RBC AUTO-ENTMCNC: 32.4 G/DL (ref 31.5–36.5)
MCV RBC AUTO: 85 FL (ref 78–100)
PLATELET # BLD AUTO: 322 10E9/L (ref 150–450)
RBC # BLD AUTO: 5.24 10E12/L (ref 3.8–5.2)
TIBC SERPL-MCNC: 363 UG/DL (ref 240–430)
WBC # BLD AUTO: 10.9 10E9/L (ref 4–11)

## 2019-08-16 PROCEDURE — 80048 BASIC METABOLIC PNL TOTAL CA: CPT | Performed by: NURSE PRACTITIONER

## 2019-08-16 PROCEDURE — 36415 COLL VENOUS BLD VENIPUNCTURE: CPT | Performed by: NURSE PRACTITIONER

## 2019-08-16 PROCEDURE — 85027 COMPLETE CBC AUTOMATED: CPT | Performed by: NURSE PRACTITIONER

## 2019-08-16 PROCEDURE — 82728 ASSAY OF FERRITIN: CPT | Performed by: NURSE PRACTITIONER

## 2019-08-16 PROCEDURE — 83550 IRON BINDING TEST: CPT | Performed by: NURSE PRACTITIONER

## 2019-08-16 PROCEDURE — 99213 OFFICE O/P EST LOW 20 MIN: CPT | Performed by: NURSE PRACTITIONER

## 2019-08-16 PROCEDURE — 83540 ASSAY OF IRON: CPT | Performed by: NURSE PRACTITIONER

## 2019-08-16 RX ORDER — LISINOPRIL 5 MG/1
5 TABLET ORAL DAILY
Qty: 90 TABLET | Refills: 1 | Status: SHIPPED | OUTPATIENT
Start: 2019-08-16 | End: 2019-12-26

## 2019-08-16 ASSESSMENT — MIFFLIN-ST. JEOR: SCORE: 1935.95

## 2019-08-16 NOTE — PROGRESS NOTES
Subjective     Zee Kyle is a 43 year old female who presents to clinic today for the following health issues:    History of Present Illness        Hypertension: She presents for follow up of hypertension.  She does not check blood pressure  regularly outside of the clinic. Outside blood pressures have been over 140/90. She does not follow a low salt diet.    /78.  Taking lisinopril 5 mg daily as prescribed.   Concern - IUD check  Pt says she is here for IUD Check she also states she is having light bleeding since the IUD placed  IUD bleeding since placement, changing 2 tampons per day. Denies cramping or pelvic pain.      Patient Active Problem List   Diagnosis     CARDIOVASCULAR SCREENING; LDL GOAL LESS THAN 160     Presbycusis     Hypothyroidism     Major depressive disorder, recurrent episode, moderate (H)     Anxiety     Morbid obesity (H)     CLARK (obstructive sleep apnea)     Intertrigo     Lower extremity edema     Low HDL (under 40)     Benign essential hypertension     Past Surgical History:   Procedure Laterality Date     ENT SURGERY       PE TUBES       TONSILLECTOMY       TYMPANOPLASTY, RT/LT      Tympanoplasty RT/LT       Social History     Tobacco Use     Smoking status: Former Smoker     Types: Cigarettes     Last attempt to quit: 2/10/2003     Years since quittin.5     Smokeless tobacco: Never Used     Tobacco comment: quit    Substance Use Topics     Alcohol use: Yes     Alcohol/week: 0.0 oz     Comment: occas     Family History   Problem Relation Age of Onset     Hypertension Father          08     Obesity Father      Cerebrovascular Disease Mother      Dementia Paternal Grandmother      Coronary Artery Disease Paternal Grandfather          Current Outpatient Medications   Medication Sig Dispense Refill     ferrous sulfate (FEROSUL) 325 (65 Fe) MG tablet Take 1 tablet (325 mg) by mouth daily (with breakfast) 90 tablet 3     levonorgestrel (MIRENA) 20 MCG/24HR IUD 1  "each (20 mcg) by Intrauterine route continuous       lisinopril (PRINIVIL/ZESTRIL) 5 MG tablet TAKE 1 TABLET(5 MG) BY MOUTH DAILY 90 tablet 0     valACYclovir (VALTREX) 500 MG tablet Take 1 tablet (500 mg) by mouth daily 30 tablet 3     venlafaxine (EFFEXOR-XR) 37.5 MG 24 hr capsule TAKE 1 TABLET BY MOUTH DAILY 90 capsule 2     BP Readings from Last 3 Encounters:   08/16/19 135/84   07/23/19 120/78   07/11/19 (!) 158/92    Wt Readings from Last 3 Encounters:   08/16/19 123.8 kg (273 lb)   07/23/19 122.9 kg (271 lb)   07/15/19 121.4 kg (267 lb 9.6 oz)        Reviewed and updated as needed this visit by Provider         Review of Systems   ROS COMP: CONSTITUTIONAL: NEGATIVE for fever, chills, change in weight  RESP: NEGATIVE for significant cough or SOB  CV: NEGATIVE for chest pain, palpitations or peripheral edema  : see HPI      Objective    /78   Pulse 94   Temp 97  F (36.1  C) (Oral)   Resp 16   Ht 1.702 m (5' 7\")   Wt 123.8 kg (273 lb)   LMP 07/23/2019 (Approximate)   SpO2 96%   BMI 42.76 kg/m    There is no height or weight on file to calculate BMI.  Physical Exam   GENERAL: healthy, alert and no distress  RESP: lungs clear to auscultation - no rales, rhonchi or wheezes  CV: regular rate and rhythm, normal S1 S2, no S3 or S4, no murmur, click or rub, no peripheral edema and peripheral pulses strong   (female): normal female external genitalia, normal urethral meatus, vaginal mucosa, normal cervix/adnexa/uterus without masses or discharge. Scant menstrual bleeding.  IUD strings visualized.         Assessment & Plan   Assessment      Plan  1. Benign essential hypertension:  /78, taking lisinopril daily. Will obtain BMP today to ensure stable electrolytes after starting med.     - Basic metabolic panel  - lisinopril (PRINIVIL/ZESTRIL) 5 MG tablet; Take 1 tablet (5 mg) by mouth daily  Dispense: 90 tablet; Refill: 1    2. IUD check up: strings visualized, reassured scant bleeding normal, " "should resolve, if it doesn't let me know will obtain US     3. Iron deficiency anemia due to chronic blood loss  - FERRITIN  - IRON AND IRON BINDING CAPACITY  - CBC with platelets    BMI:   Estimated body mass index is 42.76 kg/m  as calculated from the following:    Height as of this encounter: 1.702 m (5' 7\").    Weight as of this encounter: 123.8 kg (273 lb).         Return in about 6 months (around 2/16/2020) for Routine Visit.    Susan Haase, APRN [facility]            "

## 2019-08-17 LAB
ANION GAP SERPL CALCULATED.3IONS-SCNC: 5 MMOL/L (ref 3–14)
BUN SERPL-MCNC: 14 MG/DL (ref 7–30)
CALCIUM SERPL-MCNC: 9.1 MG/DL (ref 8.5–10.1)
CHLORIDE SERPL-SCNC: 112 MMOL/L (ref 94–109)
CO2 SERPL-SCNC: 24 MMOL/L (ref 20–32)
CREAT SERPL-MCNC: 0.72 MG/DL (ref 0.52–1.04)
FERRITIN SERPL-MCNC: 68 NG/ML (ref 12–150)
GFR SERPL CREATININE-BSD FRML MDRD: >90 ML/MIN/{1.73_M2}
GLUCOSE SERPL-MCNC: 111 MG/DL (ref 70–99)
POTASSIUM SERPL-SCNC: 3.9 MMOL/L (ref 3.4–5.3)
SODIUM SERPL-SCNC: 141 MMOL/L (ref 133–144)

## 2019-08-18 NOTE — RESULT ENCOUNTER NOTE
Nathan Koch,  Your electrolytes are normal, continue with your lisinopril as prescribed.  Sincerely,     Susan Haase, CNP

## 2019-09-18 ENCOUNTER — TELEPHONE (OUTPATIENT)
Dept: SURGERY | Facility: CLINIC | Age: 44
End: 2019-09-18

## 2019-09-18 NOTE — TELEPHONE ENCOUNTER
Nathan Gill,   This pt just called, as she wants to change her October surgery from Yazmin-en-Y to the Sleeve! I told her she'd probably hear from someone by tomorrow to discuss. Thanks!    [phone]  **OK to leave detailed VM**

## 2019-09-19 NOTE — TELEPHONE ENCOUNTER
I called patient back and left her a message that Dr Lee will call her to discuss the change in procedure. Then Dr Lee will let me know the decision they agreed on and then I will update procedure.

## 2019-09-20 ENCOUNTER — TELEPHONE (OUTPATIENT)
Dept: FAMILY MEDICINE | Facility: CLINIC | Age: 44
End: 2019-09-20

## 2019-09-20 NOTE — TELEPHONE ENCOUNTER
09/20/2019    Patient called stating she has a Gastric Sleeve Surgery on 10/15/2019. First available appt is 11/01/2019 for Haase. Patient stated she feels uncomfortable going to another provider and requested me to ask if there was any way Haase could slide her somewhere in her schedule before 10/15/2019? Please review and Advise. Patient ph:[phone]      Oliver Mack -Patient Representative

## 2019-09-23 NOTE — TELEPHONE ENCOUNTER
09/23/2019    Patient called back and I scheduled her for 10/07/2019 at 11 a.m in [facility] spot.        Olivre Mack -Patient Representative

## 2019-10-07 ENCOUNTER — OFFICE VISIT (OUTPATIENT)
Dept: FAMILY MEDICINE | Facility: CLINIC | Age: 44
End: 2019-10-07
Payer: COMMERCIAL

## 2019-10-07 VITALS
SYSTOLIC BLOOD PRESSURE: 129 MMHG | HEIGHT: 67 IN | WEIGHT: 267.2 LBS | HEART RATE: 86 BPM | OXYGEN SATURATION: 96 % | BODY MASS INDEX: 41.94 KG/M2 | TEMPERATURE: 98.1 F | RESPIRATION RATE: 18 BRPM | DIASTOLIC BLOOD PRESSURE: 84 MMHG

## 2019-10-07 DIAGNOSIS — Z23 NEED FOR PROPHYLACTIC VACCINATION AND INOCULATION AGAINST INFLUENZA: ICD-10-CM

## 2019-10-07 DIAGNOSIS — I10 BENIGN ESSENTIAL HYPERTENSION: ICD-10-CM

## 2019-10-07 DIAGNOSIS — G47.33 OSA (OBSTRUCTIVE SLEEP APNEA): ICD-10-CM

## 2019-10-07 DIAGNOSIS — Z01.818 PREOP GENERAL PHYSICAL EXAM: Primary | ICD-10-CM

## 2019-10-07 LAB
BASOPHILS # BLD AUTO: 0.1 10E9/L (ref 0–0.2)
BASOPHILS NFR BLD AUTO: 0.4 %
DIFFERENTIAL METHOD BLD: ABNORMAL
EOSINOPHIL # BLD AUTO: 0.4 10E9/L (ref 0–0.7)
EOSINOPHIL NFR BLD AUTO: 3.7 %
ERYTHROCYTE [DISTWIDTH] IN BLOOD BY AUTOMATED COUNT: 14.2 % (ref 10–15)
HCT VFR BLD AUTO: 44.2 % (ref 35–47)
HGB BLD-MCNC: 14.6 G/DL (ref 11.7–15.7)
LYMPHOCYTES # BLD AUTO: 2.7 10E9/L (ref 0.8–5.3)
LYMPHOCYTES NFR BLD AUTO: 23 %
MCH RBC QN AUTO: 29 PG (ref 26.5–33)
MCHC RBC AUTO-ENTMCNC: 33 G/DL (ref 31.5–36.5)
MCV RBC AUTO: 88 FL (ref 78–100)
MONOCYTES # BLD AUTO: 0.9 10E9/L (ref 0–1.3)
MONOCYTES NFR BLD AUTO: 7.5 %
NEUTROPHILS # BLD AUTO: 7.7 10E9/L (ref 1.6–8.3)
NEUTROPHILS NFR BLD AUTO: 65.4 %
PLATELET # BLD AUTO: 333 10E9/L (ref 150–450)
RBC # BLD AUTO: 5.04 10E12/L (ref 3.8–5.2)
WBC # BLD AUTO: 11.8 10E9/L (ref 4–11)

## 2019-10-07 PROCEDURE — 90471 IMMUNIZATION ADMIN: CPT | Performed by: NURSE PRACTITIONER

## 2019-10-07 PROCEDURE — 90686 IIV4 VACC NO PRSV 0.5 ML IM: CPT | Performed by: NURSE PRACTITIONER

## 2019-10-07 PROCEDURE — 99214 OFFICE O/P EST MOD 30 MIN: CPT | Mod: 25 | Performed by: NURSE PRACTITIONER

## 2019-10-07 PROCEDURE — 85025 COMPLETE CBC W/AUTO DIFF WBC: CPT | Performed by: NURSE PRACTITIONER

## 2019-10-07 PROCEDURE — 93000 ELECTROCARDIOGRAM COMPLETE: CPT | Performed by: NURSE PRACTITIONER

## 2019-10-07 PROCEDURE — 80053 COMPREHEN METABOLIC PANEL: CPT | Performed by: NURSE PRACTITIONER

## 2019-10-07 PROCEDURE — 36415 COLL VENOUS BLD VENIPUNCTURE: CPT | Performed by: NURSE PRACTITIONER

## 2019-10-07 ASSESSMENT — MIFFLIN-ST. JEOR: SCORE: 1909.64

## 2019-10-07 NOTE — PROGRESS NOTES
[facility]  [address] [facility] [address]  [phone]  Dept: [phone]    PRE-OP EVALUATION:  Today's date: 10/7/2019    Zee Kyle (: 1975) presents for pre-operative evaluation assessment as requested by Dr. Lee.  She requires evaluation and anesthesia risk assessment prior to undergoing surgery/procedure for treatment of LAPAROSCOPIC GASTRIC SLEEVE, POSSIBLE LAPAROSCOPIC CHOLECYSTECTOMY .    Fax number for surgical facility: [phone]  Primary Physician: Haase, Susan Rachele  Type of Anesthesia Anticipated: General    Patient has a Health Care Directive or Living Will:  YES Living will    Preop Questions 10/7/2019   Who is doing your surgery? Dr. Lee   What are you having done? Gastric sleeve   Date of Surgery/Procedure: 10/15/2019   Facility or Hospital where procedure/surgery will be performed: [facility]   1.  Do you have a history of Heart attack, stroke, stent, coronary bypass surgery, or other heart surgery? No   2.  Do you ever have any pain or discomfort in your chest? No   3.  Do you have a history of  Heart Failure? No   4.   Are you troubled by shortness of breath when:  walking on a level surface, or up a slight hill, or at night? No   5.  Do you currently have a cold, bronchitis or other respiratory infection? No   6.  Do you have a cough, shortness of breath, or wheezing? No   7.  Do you sometimes get pains in the calves of your legs when you walk? No   8. Do you or anyone in your family have previous history of blood clots? No   9.  Do you or does anyone in your family have a serious bleeding problem such as prolonged bleeding following surgeries or cuts? No   10. Have you ever had problems with anemia or been told to take iron pills? YES - currently.   11. Have you had any abnormal blood loss such as black, tarry or bloody stools, or abnormal vaginal bleeding? No   12. Have you ever had a blood transfusion? No   13.  Have you or any of your relatives ever had problems with anesthesia? No   14. Do you have sleep apnea, excessive snoring or daytime drowsiness? YES - CPAP   15. Do you have any prosthetic heart valves? No   16. Do you have prosthetic joints? No   17. Is there any chance that you may be pregnant? No       HPI:     HPI related to upcoming procedure: Will be having a gastric sleeve completed for morbid obesity.     Hypertension;  Taking lisinopril 5 mg daily, BP well controlled 129/84.    Sleep apnea:  Uses CPAP nightly.     MEDICAL HISTORY:     Patient Active Problem List    Diagnosis Date Noted     Benign essential hypertension 07/16/2019     Priority: Medium     Low HDL (under 40) 04/16/2019     Priority: Medium     CLARK (obstructive sleep apnea) 07/18/2018     Priority: Medium     Intertrigo 07/18/2018     Priority: Medium     Lower extremity edema 07/18/2018     Priority: Medium     Morbid obesity (H) 07/10/2018     Priority: Medium     Major depressive disorder, recurrent episode, moderate (H) 11/24/2015     Priority: Medium     Anxiety 11/24/2015     Priority: Medium     Hypothyroidism 08/07/2012     Priority: Medium     Presbycusis 02/10/2011     Priority: Medium     CARDIOVASCULAR SCREENING; LDL GOAL LESS THAN 160 10/31/2010     Priority: Medium      Past Medical History:   Diagnosis Date     Anxiety Ongoing since 18     Depressive disorder on going since 18     Earache or other ear, nose, or throat complaint 10    Tonsillectomy 7/02     Esophageal reflux     Had stopped once I got my sleep apnea mask 5/18     Herpes simplex without mention of complication      Hypertension     While Pregnant     Hypothyroidism 8/7/2012     Presbyacusis     Presbycusis     STD (sexually transmitted disease)     Herpes     Urinary incontinence     Bladder sling surgery 7/17     Past Surgical History:   Procedure Laterality Date     ENT SURGERY       PE TUBES       TONSILLECTOMY       TYMPANOPLASTY, RT/LT      Tympanoplasty RT/LT      Current Outpatient Medications   Medication Sig Dispense Refill     ferrous sulfate (FEROSUL) 325 (65 Fe) MG tablet Take 1 tablet (325 mg) by mouth daily (with breakfast) 90 tablet 3     levonorgestrel (MIRENA) 20 MCG/24HR IUD 1 each (20 mcg) by Intrauterine route continuous       lisinopril (PRINIVIL/ZESTRIL) 5 MG tablet Take 1 tablet (5 mg) by mouth daily 90 tablet 1     valACYclovir (VALTREX) 500 MG tablet Take 1 tablet (500 mg) by mouth daily 30 tablet 3     venlafaxine (EFFEXOR-XR) 37.5 MG 24 hr capsule TAKE 1 TABLET BY MOUTH DAILY 90 capsule 2     OTC products: no recent use of OTC ASA, NSAIDS or Steroids    No Known Allergies   Latex Allergy: NO    Social History     Tobacco Use     Smoking status: Former Smoker     Types: Cigarettes     Last attempt to quit: 2/10/2003     Years since quittin.6     Smokeless tobacco: Never Used     Tobacco comment: quit    Substance Use Topics     Alcohol use: Yes     Alcohol/week: 0.0 standard drinks     Comment: occas     History   Drug Use No       REVIEW OF SYSTEMS:   CONSTITUTIONAL: NEGATIVE for fever, chills, change in weight  INTEGUMENTARY/SKIN: NEGATIVE for worrisome rashes, moles or lesions  EYES: NEGATIVE for vision changes or irritation  ENT/MOUTH: NEGATIVE for ear, mouth and throat problems  RESP: NEGATIVE for significant cough or SOB  CV: NEGATIVE for chest pain, palpitations or peripheral edema  GI: NEGATIVE for nausea, abdominal pain, heartburn, or change in bowel habits  : NEGATIVE for frequency, dysuria, or hematuria  MUSCULOSKELETAL: NEGATIVE for significant arthralgias or myalgia  NEURO: NEGATIVE for weakness, dizziness or paresthesias  ENDOCRINE: NEGATIVE for temperature intolerance, skin/hair changes  HEME: NEGATIVE for bleeding problems  PSYCHIATRIC: NEGATIVE for changes in mood or affect    EXAM:   /84 (BP Location: Right arm, Patient Position: Sitting, Cuff Size: Adult Large)   Pulse 86   Temp 98.1  F (36.7  C) (Oral)   Resp 18   " Ht 1.702 m (5' 7\")   Wt 121.2 kg (267 lb 3.2 oz)   LMP 07/07/2019   SpO2 96%   Breastfeeding? No   BMI 41.85 kg/m      GENERAL APPEARANCE: healthy, alert and no distress     HENT: ear canals and TM's normal and nose and mouth without ulcers or lesions     NECK: no adenopathy, no asymmetry, masses, or scars and thyroid normal to palpation     RESP: lungs clear to auscultation - no rales, rhonchi or wheezes     CV: regular rates and rhythm, normal S1 S2, no S3 or S4 and no murmur, click or rub     ABDOMEN:  soft, nontender, no HSM or masses and bowel sounds normal     MS: extremities normal- no gross deformities noted, no evidence of inflammation in joints, FROM in all extremities.     SKIN: no suspicious lesions or rashes     NEURO: Normal strength and tone, sensory exam grossly normal, mentation intact and speech normal     PSYCH: mentation appears normal. and affect normal/bright     LYMPHATICS: No cervical adenopathy    DIAGNOSTICS:   EKG: appears normal, NSR, normal axis, normal intervals, no acute ST/T changes c/w ischemia, no LVH by voltage criteria, unchanged from previous tracings  HGB:  14.6     IMPRESSION:   Reason for surgery/procedure: morbid obesity  Diagnosis/reason for consult: evaluation and anesthesia risk assessment prior to undergoing surgery/    The proposed surgical procedure is considered INTERMEDIATE risk.    REVISED CARDIAC RISK INDEX  The patient has the following serious cardiovascular risks for perioperative complications such as (MI, PE, VFib and 3  AV Block):  No serious cardiac risks  INTERPRETATION: 0 risks: Class I (very low risk - 0.4% complication rate)    The patient has the following additional risks for perioperative complications:    Morbid obesity      RECOMMENDATIONS:   Zee was seen today for pre-op exam and imm/inj.    Diagnoses and all orders for this visit:    Preop general physical exam  -     EKG 12-lead complete w/read - Clinics  -     CBC with platelets " differential  -     Comprehensive metabolic panel    Benign essential hypertension:  BP well controlled.     CLARK (obstructive sleep apnea): will bring CPAP with to hospital    Need for prophylactic vaccination and inoculation against influenza  -     INFLUENZA VACCINE IM > 6 MONTHS VALENT IIV4 [94406]  -     Vaccine Administration, Initial [53297]  Approval given to proceed with proposed procedure, without further diagnostic evaluation  Is aware of NPO orders and need to refrain from taking NSAIDS, Aspirin prior to surgery.    Signed Electronically by: Susan Haase, APRN CNP    Copy of this evaluation report is provided to requesting physician.    [facility] Preop Guidelines    Revised Cardiac Risk Index

## 2019-10-08 LAB
ALBUMIN SERPL-MCNC: 3.9 G/DL (ref 3.4–5)
ALP SERPL-CCNC: 71 U/L (ref 40–150)
ALT SERPL W P-5'-P-CCNC: 79 U/L (ref 0–50)
ANION GAP SERPL CALCULATED.3IONS-SCNC: 10 MMOL/L (ref 3–14)
AST SERPL W P-5'-P-CCNC: 38 U/L (ref 0–45)
BILIRUB SERPL-MCNC: 0.3 MG/DL (ref 0.2–1.3)
BUN SERPL-MCNC: 16 MG/DL (ref 7–30)
CALCIUM SERPL-MCNC: 9.3 MG/DL (ref 8.5–10.1)
CHLORIDE SERPL-SCNC: 108 MMOL/L (ref 94–109)
CO2 SERPL-SCNC: 22 MMOL/L (ref 20–32)
CREAT SERPL-MCNC: 0.68 MG/DL (ref 0.52–1.04)
GFR SERPL CREATININE-BSD FRML MDRD: >90 ML/MIN/{1.73_M2}
GLUCOSE SERPL-MCNC: 86 MG/DL (ref 70–99)
POTASSIUM SERPL-SCNC: 4.1 MMOL/L (ref 3.4–5.3)
PROT SERPL-MCNC: 7.7 G/DL (ref 6.8–8.8)
SODIUM SERPL-SCNC: 138 MMOL/L (ref 133–144)

## 2019-10-08 NOTE — RESULT ENCOUNTER NOTE
Nathan Koch,  Your glucose is normal at 86, your ALT (liver function is slightly elevated at 79).  I would suggest we check this again in about 6 months.   Sincerely,  Susan Haase, CNP

## 2019-10-10 ENCOUNTER — ALLIED HEALTH/NURSE VISIT (OUTPATIENT)
Dept: SURGERY | Facility: CLINIC | Age: 44
End: 2019-10-10
Payer: COMMERCIAL

## 2019-10-10 DIAGNOSIS — E66.01 MORBID OBESITY (H): Primary | ICD-10-CM

## 2019-10-10 PROCEDURE — 99207 ZZC NO CHARGE NURSE ONLY: CPT

## 2019-10-10 NOTE — PROGRESS NOTES
Bariatric pre op class completed.  Class power point and patient checklist information gone over with patient.  Patient verbalized understanding of tasks needed to complete before surgery.  Patient also verbalized understanding of the power point and patient checklist information.  All questions were answered.  Quiz was completed.  Patient was advised to call if further questions.    Tamika Lux RN on 10/10/2019 at 2:06 PM

## 2019-10-14 DIAGNOSIS — I10 BENIGN ESSENTIAL HYPERTENSION: ICD-10-CM

## 2019-10-14 NOTE — TELEPHONE ENCOUNTER
"Requested Prescriptions   Pending Prescriptions Disp Refills     lisinopril (PRINIVIL/ZESTRIL) 5 MG tablet [Pharmacy Med Name: LISINOPRIL 5MG TABLETS]  Last Written Prescription Date:  8/16/2019  Last Fill Quantity: 90 tablet,  # refills: 1   Last office visit: 10/7/2019 with prescribing provider:  Haase   Future Office Visit:     90 tablet 0     Sig: TAKE 1 TABLET(5 MG) BY MOUTH DAILY       ACE Inhibitors (Including Combos) Protocol Passed - 10/14/2019  4:49 PM        Passed - Blood pressure under 140/90 in past 12 months     BP Readings from Last 3 Encounters:   10/07/19 129/84   08/16/19 134/78   07/23/19 120/78                 Passed - Recent (12 mo) or future (30 days) visit within the authorizing provider's specialty     Patient has had an office visit with the authorizing provider or a provider within the authorizing providers department within the previous 12 mos or has a future within next 30 days. See \"Patient Info\" tab in inbasket, or \"Choose Columns\" in Meds & Orders section of the refill encounter.              Passed - Medication is active on med list        Passed - Patient is age 18 or older        Passed - No active pregnancy on record        Passed - Normal serum creatinine on file in past 12 months     Recent Labs   Lab Test 10/07/19  1152   CR 0.68             Passed - Normal serum potassium on file in past 12 months     Recent Labs   Lab Test 10/07/19  1152   POTASSIUM 4.1             Passed - No positive pregnancy test within past 12 months          "

## 2019-10-15 ENCOUNTER — ANESTHESIA EVENT (OUTPATIENT)
Dept: SURGERY | Facility: CLINIC | Age: 44
DRG: 621 | End: 2019-10-15
Payer: COMMERCIAL

## 2019-10-15 ENCOUNTER — HOSPITAL ENCOUNTER (INPATIENT)
Facility: CLINIC | Age: 44
LOS: 2 days | Discharge: HOME | DRG: 621 | End: 2019-10-17
Attending: SURGERY | Admitting: SURGERY
Payer: COMMERCIAL

## 2019-10-15 ENCOUNTER — ANESTHESIA (OUTPATIENT)
Dept: SURGERY | Facility: CLINIC | Age: 44
DRG: 621 | End: 2019-10-15
Payer: COMMERCIAL

## 2019-10-15 ENCOUNTER — APPOINTMENT (OUTPATIENT)
Dept: SURGERY | Facility: PHYSICIAN GROUP | Age: 44
End: 2019-10-15
Payer: COMMERCIAL

## 2019-10-15 DIAGNOSIS — E66.01 MORBID OBESITY WITH BMI OF 40.0-44.9, ADULT (H): Primary | ICD-10-CM

## 2019-10-15 LAB
HCG UR QL: NEGATIVE
POTASSIUM SERPL-SCNC: 3.6 MMOL/L (ref 3.4–5.3)

## 2019-10-15 PROCEDURE — 43775 LAP SLEEVE GASTRECTOMY: CPT | Mod: AS | Performed by: PHYSICIAN ASSISTANT

## 2019-10-15 PROCEDURE — 25000566 ZZH SEVOFLURANE, EA 15 MIN: Performed by: SURGERY

## 2019-10-15 PROCEDURE — 88305 TISSUE EXAM BY PATHOLOGIST: CPT | Mod: 26 | Performed by: SURGERY

## 2019-10-15 PROCEDURE — 25800030 ZZH RX IP 258 OP 636: Performed by: ANESTHESIOLOGY

## 2019-10-15 PROCEDURE — 36000093 ZZH SURGERY LEVEL 4 1ST 30 MIN: Performed by: SURGERY

## 2019-10-15 PROCEDURE — 25000125 ZZHC RX 250: Performed by: PHYSICIAN ASSISTANT

## 2019-10-15 PROCEDURE — 36415 COLL VENOUS BLD VENIPUNCTURE: CPT | Performed by: ANESTHESIOLOGY

## 2019-10-15 PROCEDURE — 25800030 ZZH RX IP 258 OP 636: Performed by: NURSE ANESTHETIST, CERTIFIED REGISTERED

## 2019-10-15 PROCEDURE — 25000128 H RX IP 250 OP 636: Performed by: SURGERY

## 2019-10-15 PROCEDURE — 71000012 ZZH RECOVERY PHASE 1 LEVEL 1 FIRST HR: Performed by: SURGERY

## 2019-10-15 PROCEDURE — 25000128 H RX IP 250 OP 636: Performed by: PHYSICIAN ASSISTANT

## 2019-10-15 PROCEDURE — 40000170 ZZH STATISTIC PRE-PROCEDURE ASSESSMENT II: Performed by: SURGERY

## 2019-10-15 PROCEDURE — 25800025 ZZH RX 258: Performed by: SURGERY

## 2019-10-15 PROCEDURE — 27210794 ZZH OR GENERAL SUPPLY STERILE: Performed by: SURGERY

## 2019-10-15 PROCEDURE — 84132 ASSAY OF SERUM POTASSIUM: CPT | Performed by: ANESTHESIOLOGY

## 2019-10-15 PROCEDURE — 81025 URINE PREGNANCY TEST: CPT | Performed by: ANESTHESIOLOGY

## 2019-10-15 PROCEDURE — 25800030 ZZH RX IP 258 OP 636: Performed by: SURGERY

## 2019-10-15 PROCEDURE — 25000125 ZZHC RX 250: Performed by: SURGERY

## 2019-10-15 PROCEDURE — 25000128 H RX IP 250 OP 636: Performed by: NURSE ANESTHETIST, CERTIFIED REGISTERED

## 2019-10-15 PROCEDURE — 36000063 ZZH SURGERY LEVEL 4 EA 15 ADDTL MIN: Performed by: SURGERY

## 2019-10-15 PROCEDURE — 25000125 ZZHC RX 250: Performed by: NURSE ANESTHETIST, CERTIFIED REGISTERED

## 2019-10-15 PROCEDURE — 12000000 ZZH R&B MED SURG/OB

## 2019-10-15 PROCEDURE — 37000009 ZZH ANESTHESIA TECHNICAL FEE, EACH ADDTL 15 MIN: Performed by: SURGERY

## 2019-10-15 PROCEDURE — 25800030 ZZH RX IP 258 OP 636: Performed by: PHYSICIAN ASSISTANT

## 2019-10-15 PROCEDURE — 71000013 ZZH RECOVERY PHASE 1 LEVEL 1 EA ADDTL HR: Performed by: SURGERY

## 2019-10-15 PROCEDURE — 0DB64Z3 EXCISION OF STOMACH, PERCUTANEOUS ENDOSCOPIC APPROACH, VERTICAL: ICD-10-PCS | Performed by: SURGERY

## 2019-10-15 PROCEDURE — 37000008 ZZH ANESTHESIA TECHNICAL FEE, 1ST 30 MIN: Performed by: SURGERY

## 2019-10-15 PROCEDURE — 88305 TISSUE EXAM BY PATHOLOGIST: CPT | Performed by: SURGERY

## 2019-10-15 PROCEDURE — 27211024 ZZHC OR SUPPLY OTHER OPNP: Performed by: SURGERY

## 2019-10-15 PROCEDURE — 43775 LAP SLEEVE GASTRECTOMY: CPT | Performed by: SURGERY

## 2019-10-15 RX ORDER — SODIUM CHLORIDE, SODIUM LACTATE, POTASSIUM CHLORIDE, CALCIUM CHLORIDE 600; 310; 30; 20 MG/100ML; MG/100ML; MG/100ML; MG/100ML
INJECTION, SOLUTION INTRAVENOUS CONTINUOUS
Status: DISCONTINUED | OUTPATIENT
Start: 2019-10-15 | End: 2019-10-17 | Stop reason: HOSPADM

## 2019-10-15 RX ORDER — DIPHENHYDRAMINE HYDROCHLORIDE 50 MG/ML
25 INJECTION INTRAMUSCULAR; INTRAVENOUS EVERY 6 HOURS PRN
Status: DISCONTINUED | OUTPATIENT
Start: 2019-10-15 | End: 2019-10-15

## 2019-10-15 RX ORDER — FENTANYL CITRATE 50 UG/ML
25-50 INJECTION, SOLUTION INTRAMUSCULAR; INTRAVENOUS
Status: DISCONTINUED | OUTPATIENT
Start: 2019-10-15 | End: 2019-10-15 | Stop reason: HOSPADM

## 2019-10-15 RX ORDER — SODIUM CHLORIDE, SODIUM LACTATE, POTASSIUM CHLORIDE, CALCIUM CHLORIDE 600; 310; 30; 20 MG/100ML; MG/100ML; MG/100ML; MG/100ML
INJECTION, SOLUTION INTRAVENOUS CONTINUOUS
Status: DISCONTINUED | OUTPATIENT
Start: 2019-10-15 | End: 2019-10-15 | Stop reason: HOSPADM

## 2019-10-15 RX ORDER — DEXAMETHASONE SODIUM PHOSPHATE 4 MG/ML
INJECTION, SOLUTION INTRA-ARTICULAR; INTRALESIONAL; INTRAMUSCULAR; INTRAVENOUS; SOFT TISSUE PRN
Status: DISCONTINUED | OUTPATIENT
Start: 2019-10-15 | End: 2019-10-15

## 2019-10-15 RX ORDER — HYDROMORPHONE HYDROCHLORIDE 1 MG/ML
.3-.5 INJECTION, SOLUTION INTRAMUSCULAR; INTRAVENOUS; SUBCUTANEOUS
Status: DISCONTINUED | OUTPATIENT
Start: 2019-10-15 | End: 2019-10-17 | Stop reason: HOSPADM

## 2019-10-15 RX ORDER — DIAZEPAM 2 MG
2 TABLET ORAL 3 TIMES DAILY PRN
Status: DISCONTINUED | OUTPATIENT
Start: 2019-10-16 | End: 2019-10-17 | Stop reason: HOSPADM

## 2019-10-15 RX ORDER — OXYCODONE HYDROCHLORIDE 5 MG/1
5-10 TABLET ORAL
Status: DISCONTINUED | OUTPATIENT
Start: 2019-10-15 | End: 2019-10-17 | Stop reason: HOSPADM

## 2019-10-15 RX ORDER — VECURONIUM BROMIDE 1 MG/ML
INJECTION, POWDER, LYOPHILIZED, FOR SOLUTION INTRAVENOUS PRN
Status: DISCONTINUED | OUTPATIENT
Start: 2019-10-15 | End: 2019-10-15

## 2019-10-15 RX ORDER — NALOXONE HYDROCHLORIDE 0.4 MG/ML
.1-.4 INJECTION, SOLUTION INTRAMUSCULAR; INTRAVENOUS; SUBCUTANEOUS
Status: DISCONTINUED | OUTPATIENT
Start: 2019-10-15 | End: 2019-10-15

## 2019-10-15 RX ORDER — FENTANYL CITRATE 50 UG/ML
INJECTION, SOLUTION INTRAMUSCULAR; INTRAVENOUS PRN
Status: DISCONTINUED | OUTPATIENT
Start: 2019-10-15 | End: 2019-10-15

## 2019-10-15 RX ORDER — ONDANSETRON 4 MG/1
4 TABLET, ORALLY DISINTEGRATING ORAL EVERY 6 HOURS PRN
Status: DISCONTINUED | OUTPATIENT
Start: 2019-10-15 | End: 2019-10-16

## 2019-10-15 RX ORDER — METOPROLOL TARTRATE 1 MG/ML
5 INJECTION, SOLUTION INTRAVENOUS EVERY 6 HOURS PRN
Status: DISCONTINUED | OUTPATIENT
Start: 2019-10-15 | End: 2019-10-17 | Stop reason: HOSPADM

## 2019-10-15 RX ORDER — DIPHENHYDRAMINE HCL 25 MG
25 CAPSULE ORAL EVERY 6 HOURS PRN
Status: DISCONTINUED | OUTPATIENT
Start: 2019-10-15 | End: 2019-10-17 | Stop reason: HOSPADM

## 2019-10-15 RX ORDER — ONDANSETRON 2 MG/ML
4 INJECTION INTRAMUSCULAR; INTRAVENOUS EVERY 6 HOURS PRN
Status: DISCONTINUED | OUTPATIENT
Start: 2019-10-15 | End: 2019-10-16

## 2019-10-15 RX ORDER — LIDOCAINE HYDROCHLORIDE 20 MG/ML
INJECTION, SOLUTION INFILTRATION; PERINEURAL PRN
Status: DISCONTINUED | OUTPATIENT
Start: 2019-10-15 | End: 2019-10-15

## 2019-10-15 RX ORDER — DIPHENHYDRAMINE HYDROCHLORIDE 50 MG/ML
25 INJECTION INTRAMUSCULAR; INTRAVENOUS EVERY 6 HOURS PRN
Status: DISCONTINUED | OUTPATIENT
Start: 2019-10-15 | End: 2019-10-17 | Stop reason: HOSPADM

## 2019-10-15 RX ORDER — SIMETHICONE 20 MG/.3ML
100 EMULSION ORAL 4 TIMES DAILY PRN
Status: DISCONTINUED | OUTPATIENT
Start: 2019-10-15 | End: 2019-10-17 | Stop reason: HOSPADM

## 2019-10-15 RX ORDER — CEFAZOLIN SODIUM IN 0.9 % NACL 3 G/100 ML
3 INTRAVENOUS SOLUTION, PIGGYBACK (ML) INTRAVENOUS
Status: COMPLETED | OUTPATIENT
Start: 2019-10-15 | End: 2019-10-15

## 2019-10-15 RX ORDER — ACETAMINOPHEN 325 MG/1
650 TABLET ORAL EVERY 4 HOURS PRN
Status: DISCONTINUED | OUTPATIENT
Start: 2019-10-15 | End: 2019-10-17 | Stop reason: HOSPADM

## 2019-10-15 RX ORDER — NALOXONE HYDROCHLORIDE 0.4 MG/ML
.1-.4 INJECTION, SOLUTION INTRAMUSCULAR; INTRAVENOUS; SUBCUTANEOUS
Status: DISCONTINUED | OUTPATIENT
Start: 2019-10-15 | End: 2019-10-17 | Stop reason: HOSPADM

## 2019-10-15 RX ORDER — PROCHLORPERAZINE MALEATE 10 MG
10 TABLET ORAL EVERY 6 HOURS PRN
Status: DISCONTINUED | OUTPATIENT
Start: 2019-10-15 | End: 2019-10-17 | Stop reason: HOSPADM

## 2019-10-15 RX ORDER — HEPARIN SODIUM 5000 [USP'U]/.5ML
5000 INJECTION, SOLUTION INTRAVENOUS; SUBCUTANEOUS
Status: COMPLETED | OUTPATIENT
Start: 2019-10-15 | End: 2019-10-15

## 2019-10-15 RX ORDER — ONDANSETRON 2 MG/ML
4 INJECTION INTRAMUSCULAR; INTRAVENOUS EVERY 30 MIN PRN
Status: DISCONTINUED | OUTPATIENT
Start: 2019-10-15 | End: 2019-10-15 | Stop reason: HOSPADM

## 2019-10-15 RX ORDER — LISINOPRIL 5 MG/1
TABLET ORAL
Qty: 90 TABLET | Refills: 0 | OUTPATIENT
Start: 2019-10-15

## 2019-10-15 RX ORDER — ONDANSETRON 2 MG/ML
INJECTION INTRAMUSCULAR; INTRAVENOUS PRN
Status: DISCONTINUED | OUTPATIENT
Start: 2019-10-15 | End: 2019-10-15

## 2019-10-15 RX ORDER — METOPROLOL TARTRATE 1 MG/ML
1-2 INJECTION, SOLUTION INTRAVENOUS EVERY 5 MIN PRN
Status: DISCONTINUED | OUTPATIENT
Start: 2019-10-15 | End: 2019-10-15 | Stop reason: HOSPADM

## 2019-10-15 RX ORDER — DIPHENHYDRAMINE HCL 25 MG
25 CAPSULE ORAL EVERY 6 HOURS PRN
Status: DISCONTINUED | OUTPATIENT
Start: 2019-10-15 | End: 2019-10-15

## 2019-10-15 RX ORDER — LIDOCAINE 40 MG/G
CREAM TOPICAL
Status: DISCONTINUED | OUTPATIENT
Start: 2019-10-15 | End: 2019-10-17 | Stop reason: HOSPADM

## 2019-10-15 RX ORDER — CEFAZOLIN SODIUM 2 G/100ML
2 INJECTION, SOLUTION INTRAVENOUS EVERY 8 HOURS
Status: COMPLETED | OUTPATIENT
Start: 2019-10-15 | End: 2019-10-15

## 2019-10-15 RX ORDER — PROPOFOL 10 MG/ML
INJECTION, EMULSION INTRAVENOUS PRN
Status: DISCONTINUED | OUTPATIENT
Start: 2019-10-15 | End: 2019-10-15

## 2019-10-15 RX ORDER — SCOLOPAMINE TRANSDERMAL SYSTEM 1 MG/1
1 PATCH, EXTENDED RELEASE TRANSDERMAL
Status: DISCONTINUED | OUTPATIENT
Start: 2019-10-15 | End: 2019-10-17 | Stop reason: HOSPADM

## 2019-10-15 RX ORDER — ONDANSETRON 4 MG/1
4 TABLET, ORALLY DISINTEGRATING ORAL EVERY 30 MIN PRN
Status: DISCONTINUED | OUTPATIENT
Start: 2019-10-15 | End: 2019-10-15 | Stop reason: HOSPADM

## 2019-10-15 RX ORDER — METOCLOPRAMIDE HYDROCHLORIDE 5 MG/ML
10 INJECTION INTRAMUSCULAR; INTRAVENOUS EVERY 6 HOURS PRN
Status: DISCONTINUED | OUTPATIENT
Start: 2019-10-15 | End: 2019-10-17 | Stop reason: HOSPADM

## 2019-10-15 RX ORDER — HYDROMORPHONE HYDROCHLORIDE 1 MG/ML
.3-.5 INJECTION, SOLUTION INTRAMUSCULAR; INTRAVENOUS; SUBCUTANEOUS EVERY 5 MIN PRN
Status: DISCONTINUED | OUTPATIENT
Start: 2019-10-15 | End: 2019-10-15 | Stop reason: HOSPADM

## 2019-10-15 RX ADMIN — Medication 3 G: at 07:55

## 2019-10-15 RX ADMIN — ONDANSETRON 4 MG: 2 INJECTION INTRAMUSCULAR; INTRAVENOUS at 12:14

## 2019-10-15 RX ADMIN — CEFAZOLIN SODIUM 2 G: 2 INJECTION, SOLUTION INTRAVENOUS at 14:40

## 2019-10-15 RX ADMIN — PROCHLORPERAZINE EDISYLATE 10 MG: 5 INJECTION, SOLUTION INTRAMUSCULAR; INTRAVENOUS at 14:15

## 2019-10-15 RX ADMIN — ROCURONIUM BROMIDE 50 MG: 10 INJECTION INTRAVENOUS at 07:57

## 2019-10-15 RX ADMIN — SCOPALAMINE 1 PATCH: 1 PATCH, EXTENDED RELEASE TRANSDERMAL at 11:58

## 2019-10-15 RX ADMIN — FENTANYL CITRATE 100 MCG: 50 INJECTION, SOLUTION INTRAMUSCULAR; INTRAVENOUS at 07:47

## 2019-10-15 RX ADMIN — LIDOCAINE HYDROCHLORIDE 80 MG: 20 INJECTION, SOLUTION INFILTRATION; PERINEURAL at 07:47

## 2019-10-15 RX ADMIN — ONDANSETRON 4 MG: 2 INJECTION INTRAMUSCULAR; INTRAVENOUS at 11:00

## 2019-10-15 RX ADMIN — DEXAMETHASONE SODIUM PHOSPHATE 4 MG: 4 INJECTION, SOLUTION INTRA-ARTICULAR; INTRALESIONAL; INTRAMUSCULAR; INTRAVENOUS; SOFT TISSUE at 08:13

## 2019-10-15 RX ADMIN — FAMOTIDINE 20 MG: 10 INJECTION, SOLUTION INTRAVENOUS at 11:59

## 2019-10-15 RX ADMIN — HYDROMORPHONE HYDROCHLORIDE 0.5 MG: 1 INJECTION, SOLUTION INTRAMUSCULAR; INTRAVENOUS; SUBCUTANEOUS at 21:20

## 2019-10-15 RX ADMIN — DEXMEDETOMIDINE HYDROCHLORIDE 0.4 MCG/KG/HR: 100 INJECTION, SOLUTION INTRAVENOUS at 08:07

## 2019-10-15 RX ADMIN — HYDROMORPHONE HYDROCHLORIDE 0.5 MG: 1 INJECTION, SOLUTION INTRAMUSCULAR; INTRAVENOUS; SUBCUTANEOUS at 17:07

## 2019-10-15 RX ADMIN — SODIUM CHLORIDE, POTASSIUM CHLORIDE, SODIUM LACTATE AND CALCIUM CHLORIDE: 600; 310; 30; 20 INJECTION, SOLUTION INTRAVENOUS at 09:02

## 2019-10-15 RX ADMIN — HEPARIN SODIUM 5000 UNITS: 10000 INJECTION, SOLUTION INTRAVENOUS; SUBCUTANEOUS at 08:04

## 2019-10-15 RX ADMIN — PHENYLEPHRINE HYDROCHLORIDE 50 MCG: 10 INJECTION INTRAVENOUS at 08:49

## 2019-10-15 RX ADMIN — METOPROLOL TARTRATE 5 MG: 5 INJECTION INTRAVENOUS at 13:39

## 2019-10-15 RX ADMIN — PROPOFOL 250 MG: 10 INJECTION, EMULSION INTRAVENOUS at 07:47

## 2019-10-15 RX ADMIN — CEFAZOLIN SODIUM 2 G: 2 INJECTION, SOLUTION INTRAVENOUS at 22:22

## 2019-10-15 RX ADMIN — SODIUM CHLORIDE, POTASSIUM CHLORIDE, SODIUM LACTATE AND CALCIUM CHLORIDE: 600; 310; 30; 20 INJECTION, SOLUTION INTRAVENOUS at 16:03

## 2019-10-15 RX ADMIN — VECURONIUM BROMIDE 2 MG: 1 INJECTION, POWDER, LYOPHILIZED, FOR SOLUTION INTRAVENOUS at 08:45

## 2019-10-15 RX ADMIN — ONDANSETRON 4 MG: 2 INJECTION INTRAMUSCULAR; INTRAVENOUS at 09:21

## 2019-10-15 RX ADMIN — SUCCINYLCHOLINE CHLORIDE 160 MG: 20 INJECTION, SOLUTION INTRAMUSCULAR; INTRAVENOUS; PARENTERAL at 07:47

## 2019-10-15 RX ADMIN — VECURONIUM BROMIDE 1 MG: 1 INJECTION, POWDER, LYOPHILIZED, FOR SOLUTION INTRAVENOUS at 09:10

## 2019-10-15 RX ADMIN — SUGAMMADEX 200 MG: 100 INJECTION, SOLUTION INTRAVENOUS at 09:25

## 2019-10-15 RX ADMIN — MIDAZOLAM 2 MG: 1 INJECTION INTRAMUSCULAR; INTRAVENOUS at 07:40

## 2019-10-15 RX ADMIN — SODIUM CHLORIDE, POTASSIUM CHLORIDE, SODIUM LACTATE AND CALCIUM CHLORIDE: 600; 310; 30; 20 INJECTION, SOLUTION INTRAVENOUS at 06:16

## 2019-10-15 RX ADMIN — SODIUM CHLORIDE, POTASSIUM CHLORIDE, SODIUM LACTATE AND CALCIUM CHLORIDE: 600; 310; 30; 20 INJECTION, SOLUTION INTRAVENOUS at 11:59

## 2019-10-15 RX ADMIN — ONDANSETRON 4 MG: 2 INJECTION INTRAMUSCULAR; INTRAVENOUS at 21:26

## 2019-10-15 RX ADMIN — HYDROMORPHONE HYDROCHLORIDE 0.5 MG: 1 INJECTION, SOLUTION INTRAMUSCULAR; INTRAVENOUS; SUBCUTANEOUS at 12:07

## 2019-10-15 RX ADMIN — FENTANYL CITRATE 100 MCG: 50 INJECTION, SOLUTION INTRAMUSCULAR; INTRAVENOUS at 08:12

## 2019-10-15 ASSESSMENT — ACTIVITIES OF DAILY LIVING (ADL)
DRESS: 1-->ASSISTIVE EQUIPMENT
RETIRED_EATING: 1-->ASSISTIVE EQUIPMENT
AMBULATION: 1-->ASSISTIVE EQUIPMENT
BATHING: 1-->ASSISTIVE EQUIPMENT
SWALLOWING: 2-->DIFFICULTY SWALLOWING LIQUIDS
ADLS_ACUITY_SCORE: 18
COGNITION: 1 - ATTENTION OR MEMORY DEFICITS
TOILETING: 1-->ASSISTIVE EQUIPMENT
ADLS_ACUITY_SCORE: 18
FALL_HISTORY_WITHIN_LAST_SIX_MONTHS: NO
TRANSFERRING: 1-->ASSISTIVE EQUIPMENT
ADLS_ACUITY_SCORE: 19
RETIRED_COMMUNICATION: 2-->DIFFICULTY UNDERSTANDING (NOT RELATED TO LANGUAGE BARRIER)

## 2019-10-15 ASSESSMENT — ENCOUNTER SYMPTOMS: DYSRHYTHMIAS: 0

## 2019-10-15 ASSESSMENT — MIFFLIN-ST. JEOR: SCORE: 1895.13

## 2019-10-15 NOTE — OR NURSING
Notified Dr. Rosa Hawthorne's PA who is in OR with Dr. Lee that patient vomited 20 ml dark red fluid. No new orders.

## 2019-10-15 NOTE — ANESTHESIA POSTPROCEDURE EVALUATION
Patient: Zee Kyle    Procedure(s):  LAPAROSCOPIC GASTRIC SLEEVE    Diagnosis:MORBID OBESITY  Diagnosis Additional Information: No value filed.    Anesthesia Type:  General, ETT, RSI    Note:  Anesthesia Post Evaluation    Patient location during evaluation: PACU  Patient participation: Able to fully participate in evaluation  Level of consciousness: sleepy but conscious and responsive to verbal stimuli  Pain management: adequate  Airway patency: patent  Cardiovascular status: acceptable and hemodynamically stable  Respiratory status: acceptable and unassisted  Hydration status: acceptable  PONV: none     Anesthetic complications: None          Last vitals:  Vitals:    10/15/19 1201 10/15/19 1207 10/15/19 1300   BP: (!) 152/90 (!) 164/95 (!) 158/88   Pulse:      Resp: 20 20 16   Temp: 36.7  C (98.1  F)     SpO2: 97%  96%         Electronically Signed By: Everett Amezquita MD  October 15, 2019  2:12 PM

## 2019-10-15 NOTE — PLAN OF CARE
VSS. BB given once for HTN. A/O. SBA. abdo lap sites CDI. -bs/gas. Dilaudid given. Emesis brown x2, zofran & compazine given. NPO ice. Voiding fine. CPAP @ hs.

## 2019-10-15 NOTE — ANESTHESIA CARE TRANSFER NOTE
Patient: Zee Kyle    Procedure(s):  LAPAROSCOPIC GASTRIC SLEEVE    Diagnosis: MORBID OBESITY  Diagnosis Additional Information: No value filed.    Anesthesia Type:   General, ETT, RSI     Note:  Airway :Face Mask  Patient transferred to:PACU  Comments: Transferred to PACU, spontaneous respirations, 10L oxygen via facemask.  All monitors and alarms on and functioning, VSS.  Patient awake, comfortable.  Report to PACU RN.Handoff Report: Identifed the Patient, Identified the Reponsible Provider, Reviewed the pertinent medical history, Discussed the surgical course, Reviewed Intra-OP anesthesia mangement and issues during anesthesia, Set expectations for post-procedure period and Allowed opportunity for questions and acknowledgement of understanding      Vitals: (Last set prior to Anesthesia Care Transfer)    CRNA VITALS  10/15/2019 0912 - 10/15/2019 0949      10/15/2019             Pulse:  78    SpO2:  98 %                Electronically Signed By: GARRETT Martinez CRNA  October 15, 2019  9:49 AM

## 2019-10-15 NOTE — ANESTHESIA PREPROCEDURE EVALUATION
Anesthesia Pre-Procedure Evaluation    Patient: Zee Kyle   MRN: [id number] : 1975          Preoperative Diagnosis: MORBID OBESITY    Procedure(s):  LAPAROSCOPIC GASTRIC SLEEVE, POSSIBLE LAPAROSCOPIC CHOLECYSTECTOMY    Past Medical History:   Diagnosis Date     Anxiety Ongoing since 18     Depressive disorder on going since 18     Earache or other ear, nose, or throat complaint 10    Tonsillectomy      Esophageal reflux     Had stopped once I got my sleep apnea mask      Herpes simplex without mention of complication      Hypertension     While Pregnant     Hypothyroidism 2012     Presbyacusis     Presbycusis     Sleep apnea      STD (sexually transmitted disease)     Herpes     Urinary incontinence     Bladder sling surgery      Past Surgical History:   Procedure Laterality Date     ENT SURGERY       GENITOURINARY SURGERY      bladder sling     PE TUBES       TONSILLECTOMY       TYMPANOPLASTY, RT/LT      Tympanoplasty RT/LT       Anesthesia Evaluation     .             ROS/MED HX    ENT/Pulmonary:     (+)sleep apnea, uses CPAP , . .    Neurologic:       Cardiovascular:     (+) hypertension----. : . . . :. .      (-) CHF and arrhythmias   METS/Exercise Tolerance:  >4 METS   Hematologic:         Musculoskeletal:         GI/Hepatic:     (+) GERD Asymptomatic on medication,       Renal/Genitourinary:         Endo:     (+) thyroid problem hypothyroidism, Obesity, .      Psychiatric:     (+) psychiatric history anxiety and depression      Infectious Disease:         Malignancy:         Other: Comment: HSV                         Physical Exam  Normal systems: dental    Airway   Mallampati: III  TM distance: >3 FB  Neck ROM: full    Dental     Cardiovascular   Rhythm and rate: regular and normal      Pulmonary    breath sounds clear to auscultation            Lab Results   Component Value Date    WBC 11.8 (H) 10/07/2019    HGB 14.6 10/07/2019    HCT 44.2 10/07/2019      "10/07/2019     10/07/2019    POTASSIUM 4.1 10/07/2019    CHLORIDE 108 10/07/2019    CO2 22 10/07/2019    BUN 16 10/07/2019    CR 0.68 10/07/2019    GLC 86 10/07/2019    HOLLY 9.3 10/07/2019    ALBUMIN 3.9 10/07/2019    PROTTOTAL 7.7 10/07/2019    ALT 79 (H) 10/07/2019    AST 38 10/07/2019    ALKPHOS 71 10/07/2019    BILITOTAL 0.3 10/07/2019    TSH 1.64 08/01/2012    HCG Negative 10/15/2019       Preop Vitals  BP Readings from Last 3 Encounters:   10/15/19 131/75   10/07/19 129/84   08/16/19 134/78    Pulse Readings from Last 3 Encounters:   10/15/19 77   10/07/19 86   08/16/19 94      Resp Readings from Last 3 Encounters:   10/15/19 16   10/07/19 18   08/16/19 16    SpO2 Readings from Last 3 Encounters:   10/15/19 95%   10/07/19 96%   08/16/19 96%      Temp Readings from Last 1 Encounters:   10/15/19 36.9  C (98.5  F)    Ht Readings from Last 1 Encounters:   10/15/19 1.702 m (5' 7\")      Wt Readings from Last 1 Encounters:   10/15/19 119.7 kg (264 lb)    Estimated body mass index is 41.35 kg/m  as calculated from the following:    Height as of this encounter: 1.702 m (5' 7\").    Weight as of this encounter: 119.7 kg (264 lb).       Anesthesia Plan      History & Physical Review  History and physical reviewed and following examination; no interval change.    ASA Status:  3 .    NPO Status:  > 8 hours    Plan for General, ETT and RSI with Intravenous and Propofol induction. Maintenance will be Balanced.    PONV prophylaxis:  Ondansetron (or other 5HT-3)  Additional equipment: Videolaryngoscope precedex gtt  Succinylcholine for RSI      Postoperative Care  Postoperative pain management:  IV analgesics.      Consents  Anesthetic plan, risks, benefits and alternatives discussed with:  Patient..                 Everett Amezquita MD  "

## 2019-10-15 NOTE — PROGRESS NOTES
Admission medication history interview status for the 10/15/2019  admission is complete. See EPIC admission navigator for prior to admission medications     Medication history source reliability:Good    Medication history interview source(s):Patient    Medication history resources (including written lists, pill bottles, clinic record):None    Primary pharmacy.[facility], [place]    Additional medication history information not noted on PTA med list :None    Time spent in this activity: 15 minutes    Prior to Admission medications    Medication Sig Last Dose Taking? Auth Provider   ferrous sulfate (FEROSUL) 325 (65 Fe) MG tablet Take 1 tablet (325 mg) by mouth daily (with breakfast) 10/13/2019 at 2000 Yes Tammi Cedillo PA-C   levonorgestrel (MIRENA) 20 MCG/24HR IUD 1 each (20 mcg) by Intrauterine route continuous Inplace Yes Haase, Susan Rachele, APRN CNP   lisinopril (PRINIVIL/ZESTRIL) 5 MG tablet Take 1 tablet (5 mg) by mouth daily 10/14/2019 at 0600 Yes Haase, Susan Rachele, APRN CNP   valACYclovir (VALTREX) 500 MG tablet Take 1 tablet (500 mg) by mouth daily Over 1 year ago at prn Yes Haase, Susan Rachele, APRN CNP   venlafaxine (EFFEXOR-XR) 37.5 MG 24 hr capsule TAKE 1 TABLET BY MOUTH DAILY 10/14/2019 at 0600 Yes Haase, Susan Rachele, APRN CNP

## 2019-10-15 NOTE — OP NOTE
Surgeon: Cesar Lee MD.  1st Assistant: Christoph Aguilar PA-C, The physicians assistant was medically necessary for their expertise in camera management, suctioning, suturing, and retraction.    PREOPERATIVE DIAGNOSES:   1. Morbid obesity.   Indication for operation:  Body mass index is 41.35 kg/m .  Zee Kyle has been previously unsuccessful with medical treatment for obesity and some of her comorbidities are directly related to obesity.    Past Medical History:   Diagnosis Date     Anxiety Ongoing since 18     Depressive disorder on going since 18     Earache or other ear, nose, or throat complaint 12    Tonsillectomy 7/02     Esophageal reflux     Had stopped once I got my sleep apnea mask 5/18     Herpes simplex without mention of complication      Hypertension     While Pregnant     Hypothyroidism 8/7/2012     Presbyacusis     Presbycusis     Sleep apnea      STD (sexually transmitted disease)     Herpes     Urinary incontinence     Bladder sling surgery 7/17       POSTOPERATIVE DIAGNOSES:   1. Morbid obesity.   OPERATIVE PROCEDURE: Laparoscopic sleeve gastrectomy for morbid obesity.   ANESTHESIA: General.   ESTIMATED BLOOD LOSS: Less than 5 mL.   OPERATIVE PROCEDURE: After induction of general endotracheal anesthesia, Zee Kyle abdomen was prepped and draped in the usual sterile fashion. With a direct visualization trocar in the left upper quadrant, the abdomen was entered and pneumoperitoneum was established. Initial survey of the abdomen showed a normal appearance to the liver and omentum. A 15 mm trocar was placed in the right mid abdomen, a 10 mm trocar was placed in the left mid abdomen, and a 5 mm trocar was placed in the left flank. A Tanvi retractor was placed through a subxiphoid incision and used to elevate the left lobe of the liver anterior and to the right.  We then took down the angle of His with electrocautery and blunt dissection. This was followed by evaluation of the  pylorus and using cautery to anum the stomach 6 cm proximal to the pylorus. We then took down the short gastric vessels with LigaSure device all the way from our anum 6 cm proximal to the pylorus up to the GE junction. Once the stomach was mobilized, we ensured no posterior adhesions to the pancreas were inhibiting adequate positioning of the stomach. At this point, a 36-Albanian orogastric tube was advanced into the patient's stomach and down into the duodenum and utilized to decompress any remaining stomach content and to gauge the size of our sleeve gastrectomy. We start firing Endo-ASIYA staplers which had incorporated buttressing material from the 6 cm anum, proximal to the pylorus all the way up to the GE junction. At first we utilized black loads, later on purple loads. We ensured no twisting of the sleeve, good size of the sleeve and hemostatic and secure apposition of staples. Once the resection was completed, the amputated stomach was removed from the patient's abdomen and sent to pathology. We then occluded the stomach with the blunt grasper just prior to the pylorus and serially dilated and evacuated the sleeve with saline, air and methylene blue containing solution showing no evidence of hemorrhage, nor air leakage, nor fluid leakage. Orogastric tube was removed and we then further secured the staple line from top to bottom utilizing Evicel material while at the same time adhering the adjacent omentum to the staple line. Gallbladder evaluated and found to be normal.  The abdomen was surveyed 1 more time no evidence of injuries noted. The 15 and 12 mm trocars in the right abdomen and left abdomen respectively were removed under direct visualization without evidence of bleeding and closed with Linden-Elio device using an 0 Vicryl suture. The remaining trocars were removed under direct visualization without evidence of bleeding. Pneumoperitoneum was released and skin was approximated in all port sites with  4-0 Monocryl. Steri-Strips and sterile dressing applied. No immediate complications. Sponge and needle count reported correct.

## 2019-10-15 NOTE — PHARMACY-CONSULT NOTE
Bariatric Consult:  LAPAROSCOPIC GASTRIC SLEEVE    Medications evaluated as requested per Bariatric Consult.     No medication changes were needed based on the Bariatric Medication Management Policy.    The pharmacist will continue to follow as new medications are ordered.    Melissa Reina, DavidD, BCPS

## 2019-10-16 LAB
ANION GAP SERPL CALCULATED.3IONS-SCNC: 2 MMOL/L (ref 3–14)
CHLORIDE SERPL-SCNC: 109 MMOL/L (ref 94–109)
CO2 SERPL-SCNC: 27 MMOL/L (ref 20–32)
GLUCOSE BLDC GLUCOMTR-MCNC: 101 MG/DL (ref 70–99)
HGB BLD-MCNC: 14.2 G/DL (ref 11.7–15.7)
POTASSIUM SERPL-SCNC: 3.7 MMOL/L (ref 3.4–5.3)
SODIUM SERPL-SCNC: 138 MMOL/L (ref 133–144)

## 2019-10-16 PROCEDURE — 00000146 ZZHCL STATISTIC GLUCOSE BY METER IP

## 2019-10-16 PROCEDURE — 85018 HEMOGLOBIN: CPT | Performed by: PHYSICIAN ASSISTANT

## 2019-10-16 PROCEDURE — 36415 COLL VENOUS BLD VENIPUNCTURE: CPT | Performed by: PHYSICIAN ASSISTANT

## 2019-10-16 PROCEDURE — 25000125 ZZHC RX 250: Performed by: PHYSICIAN ASSISTANT

## 2019-10-16 PROCEDURE — 12000000 ZZH R&B MED SURG/OB

## 2019-10-16 PROCEDURE — 25000128 H RX IP 250 OP 636: Performed by: PHYSICIAN ASSISTANT

## 2019-10-16 PROCEDURE — 25800030 ZZH RX IP 258 OP 636: Performed by: PHYSICIAN ASSISTANT

## 2019-10-16 PROCEDURE — 80051 ELECTROLYTE PANEL: CPT | Performed by: PHYSICIAN ASSISTANT

## 2019-10-16 PROCEDURE — 25000132 ZZH RX MED GY IP 250 OP 250 PS 637: Performed by: SURGERY

## 2019-10-16 RX ORDER — OXYCODONE HYDROCHLORIDE 5 MG/1
5 TABLET ORAL
Qty: 15 TABLET | Refills: 0 | Status: SHIPPED | OUTPATIENT
Start: 2019-10-16 | End: 2019-10-29

## 2019-10-16 RX ORDER — ONDANSETRON 2 MG/ML
4-8 INJECTION INTRAMUSCULAR; INTRAVENOUS EVERY 6 HOURS PRN
Status: DISCONTINUED | OUTPATIENT
Start: 2019-10-16 | End: 2019-10-17 | Stop reason: HOSPADM

## 2019-10-16 RX ORDER — ONDANSETRON 4 MG/1
4-8 TABLET, ORALLY DISINTEGRATING ORAL EVERY 6 HOURS PRN
Status: DISCONTINUED | OUTPATIENT
Start: 2019-10-16 | End: 2019-10-17 | Stop reason: HOSPADM

## 2019-10-16 RX ORDER — KETOROLAC TROMETHAMINE 30 MG/ML
30 INJECTION, SOLUTION INTRAMUSCULAR; INTRAVENOUS EVERY 6 HOURS PRN
Status: DISCONTINUED | OUTPATIENT
Start: 2019-10-16 | End: 2019-10-17 | Stop reason: HOSPADM

## 2019-10-16 RX ORDER — CYCLOBENZAPRINE HCL 10 MG
10 TABLET ORAL 3 TIMES DAILY PRN
Qty: 15 TABLET | Refills: 0 | Status: SHIPPED | OUTPATIENT
Start: 2019-10-16 | End: 2019-10-29

## 2019-10-16 RX ORDER — METOCLOPRAMIDE 5 MG/1
10 TABLET ORAL
Status: DISCONTINUED | OUTPATIENT
Start: 2019-10-16 | End: 2019-10-16

## 2019-10-16 RX ORDER — DIAZEPAM 10 MG/2ML
2.5-5 INJECTION, SOLUTION INTRAMUSCULAR; INTRAVENOUS EVERY 8 HOURS PRN
Status: DISCONTINUED | OUTPATIENT
Start: 2019-10-16 | End: 2019-10-17 | Stop reason: HOSPADM

## 2019-10-16 RX ORDER — ONDANSETRON 4 MG/1
4-8 TABLET, ORALLY DISINTEGRATING ORAL EVERY 6 HOURS PRN
Qty: 15 TABLET | Refills: 0 | Status: SHIPPED | OUTPATIENT
Start: 2019-10-16 | End: 2019-12-26

## 2019-10-16 RX ADMIN — METOPROLOL TARTRATE 12.5 MG: 25 TABLET, FILM COATED ORAL at 21:15

## 2019-10-16 RX ADMIN — FAMOTIDINE 20 MG: 10 INJECTION, SOLUTION INTRAVENOUS at 11:09

## 2019-10-16 RX ADMIN — HYDROMORPHONE HYDROCHLORIDE 0.5 MG: 1 INJECTION, SOLUTION INTRAMUSCULAR; INTRAVENOUS; SUBCUTANEOUS at 08:30

## 2019-10-16 RX ADMIN — FAMOTIDINE 20 MG: 10 INJECTION, SOLUTION INTRAVENOUS at 00:33

## 2019-10-16 RX ADMIN — HYDROMORPHONE HYDROCHLORIDE 0.5 MG: 1 INJECTION, SOLUTION INTRAMUSCULAR; INTRAVENOUS; SUBCUTANEOUS at 02:06

## 2019-10-16 RX ADMIN — SODIUM CHLORIDE, POTASSIUM CHLORIDE, SODIUM LACTATE AND CALCIUM CHLORIDE: 600; 310; 30; 20 INJECTION, SOLUTION INTRAVENOUS at 16:17

## 2019-10-16 RX ADMIN — FAMOTIDINE 20 MG: 10 INJECTION, SOLUTION INTRAVENOUS at 23:49

## 2019-10-16 RX ADMIN — SODIUM CHLORIDE, POTASSIUM CHLORIDE, SODIUM LACTATE AND CALCIUM CHLORIDE: 600; 310; 30; 20 INJECTION, SOLUTION INTRAVENOUS at 00:35

## 2019-10-16 RX ADMIN — ONDANSETRON 4 MG: 2 INJECTION INTRAMUSCULAR; INTRAVENOUS at 02:00

## 2019-10-16 RX ADMIN — ENOXAPARIN SODIUM 40 MG: 40 INJECTION SUBCUTANEOUS at 21:15

## 2019-10-16 RX ADMIN — KETOROLAC TROMETHAMINE 30 MG: 30 INJECTION, SOLUTION INTRAMUSCULAR at 23:58

## 2019-10-16 RX ADMIN — ONDANSETRON 4 MG: 2 INJECTION INTRAMUSCULAR; INTRAVENOUS at 08:19

## 2019-10-16 RX ADMIN — SODIUM CHLORIDE, POTASSIUM CHLORIDE, SODIUM LACTATE AND CALCIUM CHLORIDE: 600; 310; 30; 20 INJECTION, SOLUTION INTRAVENOUS at 08:29

## 2019-10-16 RX ADMIN — ENOXAPARIN SODIUM 40 MG: 40 INJECTION SUBCUTANEOUS at 08:19

## 2019-10-16 RX ADMIN — KETOROLAC TROMETHAMINE 30 MG: 30 INJECTION, SOLUTION INTRAMUSCULAR at 16:11

## 2019-10-16 ASSESSMENT — ACTIVITIES OF DAILY LIVING (ADL)
ADLS_ACUITY_SCORE: 18

## 2019-10-16 NOTE — PROGRESS NOTES
"[facility]  Bariatric Surgery Progress Note    Admission Date: 10/15/2019  10/16/2019         Assessment and Plan:   Zee Kyle is a 43 year old female S/P Procedure(s):  LAPAROSCOPIC GASTRIC SLEEVE, 1 Day Post-Op.    - Optimize pain management without narcotics  - Scopo patch, zofran, compazine prn for nausea.  - Start water as tolerated ---> clears and PO meds  - Pepcid IV --> zantac tomorrow  - Hgb stable, start Lovenox  - Appreciate Pharmacy and Nutrition assistance  - Encourage ambulation and IS  - Plan for discharge to home tomorrow if meeting criteria             Interval History:   Pain controlled, nausea after dilaudid  Voiding regularly  Ambulating  CPAP                    Physical Exam:   Blood pressure (!) 146/83, pulse 74, temperature 98  F (36.7  C), temperature source Oral, resp. rate 16, height 1.702 m (5' 7\"), weight 119.7 kg (264 lb), SpO2 97 %, not currently breastfeeding.  Temperature Temp  Av.3  F (36.8  C)  Min: 97.9  F (36.6  C)  Max: 98.8  F (37.1  C)   I/O last 3 completed shifts:  In: 3535.83 [I.V.:3535.83]  Out: 65 [Emesis/NG output:60; Blood:5]  Constitutional:  Awake, alert, oriented, and in no apparent distress.   Lungs: No increased work of breathing, clear to auscultation bilaterally, no crackles or wheezing.   Cardiovascular: Regular rate and rhythm, and no murmur noted.   Abdomen: Soft, appropriately tender at incisions. Binder present.    Wounds: Clean, dry, and intact. No erythema or drainage.    Extremities: No edema or calf tenderness. +SCDs.          Data:     Results for orders placed or performed during the hospital encounter of 10/15/19 (from the past 24 hour(s))   Glucose by meter   Result Value Ref Range    Glucose 101 (H) 70 - 99 mg/dL   Hemoglobin   Result Value Ref Range    Hemoglobin 14.2 11.7 - 15.7 g/dL   Electrolyte panel   Result Value Ref Range    Sodium 138 133 - 144 mmol/L    Potassium 3.7 3.4 - 5.3 mmol/L    Chloride 109 94 - 109 " mmol/L    Carbon Dioxide 27 20 - 32 mmol/L    Anion Gap 2 (L) 3 - 14 mmol/L       Christoph Aguilar PA-C  Office: [phone]  Pager: [phone]

## 2019-10-16 NOTE — DISCHARGE INSTRUCTIONS
After Bariatric Surgery  [facility]  Note: Before you go home, ask your nurse to order your pain medicine from the pharmacy. Be sure you have your medicine with you when you leave.  How much fluid should I drink?    Drink at least 1 ounce of fluid every 15 minutes (1/2 cup per hour) during the day.     Carry a water bottle with you. Drink from it often.    Keep track of how much fluid you drink in a day.    Adjustable stomach band: Do not overeat or drink too much. This can cause vomiting (throwing up), stretch your stomach, or make your stomach slip up over your band.    Remember:  - Do not use straws, chew gum or suck on hard candies. They may cause painful gas.   - No cold drinks.  - No coffee, soda pop or drinks with caffeine. These may cause stomach pain.  - No alcohol. It is bad for your liver and will cause stomach pain. It also adds a lot of calories.  What can I do for pain control?                You had major abdominal surgery that involves all layers of your abdominal muscles. Pain is expected, even as far out as 6-8 weeks postop.  Moving, sneezing, coughing, and          breathing will cause pain because these activities use your abdominal muscles.                 You can take the liquid pain medicine as prescribed. You can also try to wean off from it as soon as you feel comfortable.  Do not drive while you are taking pain medicine. This is dangerous.                You may take liquid Tylenol (acetaminophen) for pain in place of the prescribed pain medicine. Do not take more than 3000 mg in a 24 hour period.                You may also apply ice or heat to the affected area(s).  Just remember to wrap the ice in something and limit icing sessions to 20 minutes. Excessive icing can irritate the skin or cause tissue damage.              You can apply heat with a hot, wet towel or heating pad. Just like cold therapy, limit heat application to 20 minutes. Never sleep with a heating pad  on. It could cause severe burns to your skin.    Do not take NSAID's (ibuprofen, Motrin, Advil, Aleve, Naproxen). They will increase you risk for bleeding or getting an ulcer.    If you have any of the following pain issues, we would like to talk to you:  - pain that does not improve with rest  - pain that gets worse and worse  - pain that is not controlled by your pain medicine  - a sudden severe increase in pain  -   If your doctor prescribes an antacid, take it as ordered. Take it for 3 months to prevent ulcers.  -   If you took an antacid before you had surgery, keep taking it for 3 months after surgery. This will help prevent ulcers.  - Take any other medicines that your doctor tells you to take.  - Wear your binder to support your belly muscles.  Please call the clinic at [phone] for any concerns      How much rest do I need?  Get plenty of rest the first few days after surgery. Balance rest and activity.  Do not nap more than one hour during the day. Set a timer to wake yourself up, if needed. Too much sleep will keep you from drinking enough fluid during the day.  What should I know about my incisions (cuts)?  - Change your bandages as needed or if they get wet.  - Call your doctor if you have any of these signs of infection:   - Redness around the site.   - Drainage that smells bad.   - Fever of 101  F (38.3  C) or higher when taken under the tongue.  Will my urine or bowel movements change?   You might not have a bowel movement for several days after surgery. Your first bowel movements will likely be liquid.   Gastric bypass or sleeve gastrectomy: You may also notice old blood or a darker color in your stools (bowel movements).   Your urine should be clear to light yellow. This shows that you are drinking enough fluid. You should urinate (pass water) at least 2 to 3 times during the day. If not, call us.  What kind of activity is safe?  For 4 weeks after surgery:     Walk for a short time every  day.    Do not jog or run.    No weight lifting or belly exercises.  No swimming, baths or hot tubs until your cuts are healed (scabs are gone). You may shower.  No outside activity in hot, humid weather until you can drink 48 to 64 ounces of fluid in 24 hours. If you sweat a lot, your body may lose too much water.   The first month after surgery, do not take any trips where you must sit for a long time. You could get a blood clot in your legs.  Call the clinic at [phone] if:    Your pain medicine is not working.    You do not urinate (pass water) 2 to 3 times per day.    You have any signs of infection.    You have belly pain that gets worse and worse.    You have swollen legs with pain behind the knee or calf.    You have chest pain or feel very short of breath.    You have any questions or concerns.    You have a sudden severe increase in heart rate.    You have vomiting that gets worse and worse.  When should I go back to the clinic?  Time Gastric bypass or sleeve gastrectomy Adjustable gastric band   Week 1 See the physician or physician assistant (PA). See the nurse and physical therapist.   Week 2 See the nurse and dietitian. See the nurse and dietitian.   Week 4 Have a nutrition consult with the dietitian. See the PA and dietitian.   Week 6  Go for the first adjustment (fill) of your stomach band.    For the first year (or until your BMI is less than 30) Go to the clinic each month to see if you need a band adjustment (fill).

## 2019-10-16 NOTE — CONSULTS
"NUTRITION EDUCATION    REASON FOR ASSESSMENT:  Bariatric Surgery Consult    CURRENT DIET:  Bariatric clear liquid    NUTRITION HISTORY:  Patient worked with clinical dietitian in [facility] Loss Clinic prior to surgery to assist with lifestyle modification.    ANTHROPOMETRICS:   Ht: 67\"  Wt: 119.7 kg  BMI: 41.35 kg/m2  IBW: 61.4 kg  %IBW: 195%    ASSESSED NUTRITION NEEDS:  Energy Needs: 8887-7287 kcal  (11 - 14 kcal/kg ABW)                      Protein Needs: 61-92 g (1 - 1.5 gm/kg IBW)  Fluid Needs: 4446-8947 mL (1mL/kcal/ABW)    Know patient will not meet assessed needs due to the restrictive nature of surgery.    NUTRITION DIAGNOSIS:   Food- and nutrition related knowledge deficit related to bariatric surgery as evidence by sleeve gastrectomy surgery on 10/15/19.    INTERVENTIONS:    Nutrition Prescription:    Recommended patient follow bariatric diet advancement for sleeve gastrectomy    Implementation:    Nutrition Education (Content):  a) Reviewed bariatric diet guidelines  b) Provided handouts:  Your stage 2 diet/ low-fat full liquids and Vitamin and Mineral Supplements After Weight Loss Surgery    Nutrition Education (Application):  c) Discussed current eating habits and recommended alternative food choices  d) Patient verbalizes understanding of diet by listing appropriate foods for home    Anticipate good compliance    Diet Education - refer to Education Flowsheet    Goals:    Patient will follow bariatric diet advancement schedule    Patient will follow post-operative vitamin mineral schedule      Follow Up:    Patient to follow up in 2 weeks with RD in Weight Loss Clinic    Provided RD contact information for future questions  Phu Unger RD, LD  [facility]  [phone]          "

## 2019-10-16 NOTE — PLAN OF CARE
A&O x 4. hypertensive, otherwise VSS. Assist of 1 with gait belt. NPO except for ice chips over night, starting clear diet tomorrow. PIV infusing. 5 lap sites CDI. Pain managed with prn dilaudid. Pt nauseous intermittently, emesis x1, managed with zofran. Voiding spontaneously. No BM, no gas, BS hypoactive. Continue plan of care.

## 2019-10-16 NOTE — PLAN OF CARE
: VSS, incision CDI w/ abd binder in place. Hypoactive bowel sound, no flatus. NPO/ice chips. Pain decreased w/ prn iv dilaudid. Nausea resolved with zofran. Up to bathroom with gait & SBA. Home CPAP on, tolerating well.

## 2019-10-17 VITALS
TEMPERATURE: 98.4 F | HEART RATE: 69 BPM | SYSTOLIC BLOOD PRESSURE: 137 MMHG | DIASTOLIC BLOOD PRESSURE: 81 MMHG | HEIGHT: 67 IN | OXYGEN SATURATION: 97 % | BODY MASS INDEX: 41.44 KG/M2 | RESPIRATION RATE: 18 BRPM | WEIGHT: 264 LBS

## 2019-10-17 LAB — GLUCOSE BLDC GLUCOMTR-MCNC: 86 MG/DL (ref 70–99)

## 2019-10-17 PROCEDURE — 25000132 ZZH RX MED GY IP 250 OP 250 PS 637: Performed by: PHYSICIAN ASSISTANT

## 2019-10-17 PROCEDURE — 25000128 H RX IP 250 OP 636: Performed by: PHYSICIAN ASSISTANT

## 2019-10-17 PROCEDURE — 25000132 ZZH RX MED GY IP 250 OP 250 PS 637: Performed by: SURGERY

## 2019-10-17 PROCEDURE — 00000146 ZZHCL STATISTIC GLUCOSE BY METER IP

## 2019-10-17 PROCEDURE — 25800030 ZZH RX IP 258 OP 636: Performed by: PHYSICIAN ASSISTANT

## 2019-10-17 RX ADMIN — METOPROLOL TARTRATE 12.5 MG: 25 TABLET, FILM COATED ORAL at 08:16

## 2019-10-17 RX ADMIN — ENOXAPARIN SODIUM 40 MG: 40 INJECTION SUBCUTANEOUS at 08:17

## 2019-10-17 RX ADMIN — SODIUM CHLORIDE, POTASSIUM CHLORIDE, SODIUM LACTATE AND CALCIUM CHLORIDE: 600; 310; 30; 20 INJECTION, SOLUTION INTRAVENOUS at 01:10

## 2019-10-17 RX ADMIN — ACETAMINOPHEN 650 MG: 325 TABLET, FILM COATED ORAL at 10:23

## 2019-10-17 RX ADMIN — RANITIDINE 150 MG: 150 TABLET ORAL at 08:16

## 2019-10-17 ASSESSMENT — ACTIVITIES OF DAILY LIVING (ADL)
ADLS_ACUITY_SCORE: 18

## 2019-10-17 NOTE — PLAN OF CARE
A&Ox 4. VSS ex hypertensive at times. SBA. Zofran given once for nausea, tolerating clears. Dilaudid and Toradol for pain. 5 lap sites CDI. Voiding adequately. BS hypoactive, no flatus. LS clear. Ambulating halls.

## 2019-10-17 NOTE — PROGRESS NOTES
A&Ox4. AVSS. LS clear. BS active, passing flatus. Incisions CDI. Pain controlled with tylenol. Abdominal binder on. Reviewed all medications and discharge instructions with pt and . All questions about medications and discharge instructions answered. IV removed. All belongings sent with pt. Pt sent home with oxycodone. Pt discharged home by .

## 2019-10-17 NOTE — PLAN OF CARE
A&Ox4 AVSS. Independent in room. Bariatric clear liquid diet, tolerating well. Lap sites CDI. Pain managed with PRN Toradol. BS Hypoactive, passing gas. Denies nausea. Will continue to monitor.

## 2019-10-18 ENCOUNTER — TELEPHONE (OUTPATIENT)
Dept: SURGERY | Facility: CLINIC | Age: 44
End: 2019-10-18

## 2019-10-18 ENCOUNTER — TELEPHONE (OUTPATIENT)
Dept: FAMILY MEDICINE | Facility: CLINIC | Age: 44
End: 2019-10-18

## 2019-10-18 NOTE — TELEPHONE ENCOUNTER
Consulted Dr. Lee re: patient concern about painful hot incision, lack of relief from oxycodone, and low grade temp.  Sent photo for review.    Dr. Lee called back.  States he is not currently concerned about complication based on pt report and photo.    Plan of care:  -increase coughing and deep breathing  -increase walking  -may massage painful incision or continue with ice pack  -not concerned about appearance of incision, skin has normal tone  -pt to monitor temperature, call if fever 101 or greater.  Call if pain worsens or stops responding at all to oxycodone, incision becomes red, swollen, or has white/yellow/green drainage, or if she feels worse or has any other concerns.    Advice:     Pt not taking tylenol yet. Encouraged pt to try 1000 mg tylenol every 8 hours scheduled for better pain control.  1 week followup appointment verified.   Use of incentive spirometer reinforced.  Call clinic with any questions or concerns.  Reviewed that clinic staff are available on call during nights and weekends for postoperative concerns.  Number sent to [facility] at pt request.    Called pt back and reviewed plan of care and ongoing advice as above.  No further questions or concerns right now.    Tamika Lux RN on 10/18/2019 at 1:04 PM

## 2019-10-18 NOTE — TELEPHONE ENCOUNTER
Surgery Date: 10/15/19  Surgery Type: gastric sleeve  Surgeon: Rosa    Fluid Intake: 60 oz of water yesterday, 48 oz since discharge.  Working toward that today without any difficulty.    Pain Assessment:    Pain level: pain is 2/10 around most incisions, R lower incision states pain 7/10.  Has always been quite painful, worst to that incision.  States no bleeding, redness, swelling.  Does feel hot to touch and warmer than other incisions.  Denies any known irritation from bending or moving.  Oxycodone helps some, but it does make her sleepy.  Still has a moderate amount of pain to that incision even with oxycodone.  Tried ice pack and just took it off; not very much relief from that either.    Instructed pt to check her temperature now and take a photo of her painful incision and send via [facility] for review.  Will consult surgeon re: this concern and call or message patient back about this.    Diet: full liquid tolerating well (jello, popsicles)    Nausea: no    Vomiting: no    Fever: no  Incisions: intact, see above note re: right lower incision    BMs:  Last BM: no BM postop; +flatus, no bloating or pain. Informed pt this is normal with + flatus and no bloating/pain. Instructed to use diluted prune juice if feeling bloated.     Urinary:  Denies s/s    Sleeping/Rest: reports sleeping really well    Activity: able to walk to bathroom and around home.  Less walking than she would like r/t significant pain to R lower incision.    Incentive Spirometer: is doing regularly.  Not sure of volume.  Resting right now, not able to check.     Other symptoms (CP/SOB/dizzy/rapid HR): denies    Will call or message pt back about her incision concern.    Tamika Lux RN on 10/18/2019 at 12:12 PM

## 2019-10-21 ENCOUNTER — HOSPITAL PATHOLOGY (OUTPATIENT)
Dept: OTHER | Facility: CLINIC | Age: 44
End: 2019-10-21

## 2019-10-22 ENCOUNTER — OFFICE VISIT (OUTPATIENT)
Dept: SURGERY | Facility: CLINIC | Age: 44
End: 2019-10-22
Payer: COMMERCIAL

## 2019-10-22 VITALS — DIASTOLIC BLOOD PRESSURE: 100 MMHG | HEART RATE: 98 BPM | SYSTOLIC BLOOD PRESSURE: 142 MMHG

## 2019-10-22 VITALS
WEIGHT: 247.3 LBS | BODY MASS INDEX: 38.73 KG/M2 | RESPIRATION RATE: 20 BRPM | TEMPERATURE: 97.4 F | SYSTOLIC BLOOD PRESSURE: 153 MMHG | DIASTOLIC BLOOD PRESSURE: 103 MMHG | HEART RATE: 101 BPM

## 2019-10-22 DIAGNOSIS — Z98.84 BARIATRIC SURGERY STATUS: Primary | ICD-10-CM

## 2019-10-22 DIAGNOSIS — E66.9 OBESITY (BMI 30-39.9): ICD-10-CM

## 2019-10-22 DIAGNOSIS — I10 BENIGN ESSENTIAL HYPERTENSION: ICD-10-CM

## 2019-10-22 DIAGNOSIS — E66.1 CLASS 2 DRUG-INDUCED OBESITY WITH SERIOUS COMORBIDITY AND BODY MASS INDEX (BMI) OF 38.0 TO 38.9 IN ADULT: ICD-10-CM

## 2019-10-22 DIAGNOSIS — E66.812 CLASS 2 DRUG-INDUCED OBESITY WITH SERIOUS COMORBIDITY AND BODY MASS INDEX (BMI) OF 38.0 TO 38.9 IN ADULT: ICD-10-CM

## 2019-10-22 DIAGNOSIS — K91.2 POSTSURGICAL MALABSORPTION: ICD-10-CM

## 2019-10-22 DIAGNOSIS — G47.33 OSA (OBSTRUCTIVE SLEEP APNEA): ICD-10-CM

## 2019-10-22 PROCEDURE — 99024 POSTOP FOLLOW-UP VISIT: CPT | Performed by: PHYSICIAN ASSISTANT

## 2019-10-22 NOTE — DISCHARGE SUMMARY
[facility] Discharge Summary    Zee Kyle MRN# [id number]   Age: 43 year old YOB: 1975     Date of Admission:  10/15/2019  Date of Discharge:  10/17/2019 11:43 AM  Admitting Provider:  Cesar Lee MD  Discharge Provider:  Christoph Aguilar PA-C  Discharging Service: Bariatric Surgery     Primary Provider: Haase, Susan Rachele  Primary Care Physician Phone Number: [phone]          Admission Diagnoses:   Principle Diagnosis: Morbid Obesity  Secondary Diagnoses: Urinary stress incontinence, HTN, depression, Sleep Apnea associated with obesity          Discharge Diagnosis:     Morbid obesity          Procedures:   Procedure(s): Laparoscopic Sleeve Gastrectomy          Brief History of Illness:     Zee Kyle is a 43 year old-year-old female who underwent preoperative screening and thorough evaluation at the [facility] prior to bariatric surgery.  Surgical options were discussed. All questions were answered and she wished to proceed.          Hospital Course:     eZe Kyle's hospital course was unremarkable.  She recovered as anticipated and experienced no post-operative complications. Over the two day period she advanced per protocol. On the date of discharge, the patient was tolerating a bariatric full liquid diet, had adequate pain control on oral medications, was ambulating independently and remain afebrile thus medically appropriate for discharge to home  She verbalized understanding of all discharge instructions and felt comfortable with the discharge plan.  She was asked to call with any further questions or concerns.           Consultations:     Dietician                                              Discharge Medications:     Discharge Medication List as of 10/17/2019 11:23 AM      START taking these medications    Details   cyclobenzaprine (FLEXERIL) 10 MG tablet Take 1 tablet (10 mg) by mouth 3 times daily as needed  Patient understands condition, prognosis and need for follow up care. "for muscle spasms, Disp-15 tablet, R-0, E-Prescribe      ondansetron (ZOFRAN-ODT) 4 MG ODT tab Take 1-2 tablets (4-8 mg) by mouth every 6 hours as needed for nausea or vomiting, Disp-15 tablet, R-0, E-Prescribe      oxyCODONE (ROXICODONE) 5 MG tablet Take 1 tablet (5 mg) by mouth every 3 hours as needed, Disp-15 tablet, R-0, E-Prescribe      ranitidine (ZANTAC) 150 MG tablet Take 1 tablet (150 mg) by mouth 2 times daily, Disp-60 tablet, R-2, E-Prescribe         CONTINUE these medications which have NOT CHANGED    Details   levonorgestrel (MIRENA) 20 MCG/24HR IUD 1 each (20 mcg) by Intrauterine route continuousNo Print Out      venlafaxine (EFFEXOR-XR) 37.5 MG 24 hr capsule TAKE 1 TABLET BY MOUTH DAILY, Disp-90 capsule, R-2, E-PrescribePlease update refills~one year sent on 12/26/18, has refills on file      lisinopril (PRINIVIL/ZESTRIL) 5 MG tablet Take 1 tablet (5 mg) by mouth daily, Disp-90 tablet, R-1, E-Prescribe         STOP taking these medications       ferrous sulfate (FEROSUL) 325 (65 Fe) MG tablet Comments:   Reason for Stopping:         valACYclovir (VALTREX) 500 MG tablet Comments:   Reason for Stopping:                    Condition on Discharge:      Discharge condition: Stable   Discharge vitals: Blood pressure 137/81, pulse 69, temperature 98.4  F (36.9  C), temperature source Oral, resp. rate 18, height 1.702 m (5' 7\"), weight 119.7 kg (264 lb), SpO2 97 %, not currently breastfeeding.           Discharge Disposition:     Discharged to home          Discharge Instructions and Follow-Up:        Zee Kyle was asked to follow up with the [facility] within 1 week.  She will return in 2 weeks for further counseling with a Registered Dietician.    Follow-up Appointments     Follow-up and recommended labs and tests       Follow up at the [facility] as scheduled.           After Care Instructions     Activity      Avoid lifting over 20 lbs and strenuous physical activity for 3 " weeks.  Ok to shower, but avoid submersing the incision for 2 weeks from surgery.  May apply ice to operative site as needed for comfort; alternating 10 minutes on/off.         Diet      Continue recommendations as directed by dietician         Discharge Instructions      Follow up at the [facility] next week as scheduled and dietician the following week.  Call [phone] with any questions.  Do not consume alcohol or drive while you are on pain medications.  Avoid NSAIDs (such as ibuprofen, naproxen, or aspirin).  You may hold your vitamins/supplements if you are having nausea, try to resume your chewable vitamin if you are able to.  You may take a chewable calcium in place of the tablet for 1-2 months.  Continue to ambulate and use your incentive spirometer at home.    Blood pressure management plan: If you feel lightheaded or dizzy especially with  position changes, you may have low blood pressure.  Drink fluids and hold your BP med. Check your blood pressure if you are able and call your PCP to adjust your meds.                     Christoph Aguilar PA-C  Office [phone]

## 2019-10-22 NOTE — Clinical Note
Hi.  This 1 week PO bariatric surgery patient stopped her BP meds day before surgery.  It has remained quite elevated.  Will have her contact your office today about this.  Gloria

## 2019-10-22 NOTE — PATIENT INSTRUCTIONS
Hypertension - Need to contact primary today regarding blood pressure  START recommended antacid medication for the next 3 months.   Strive to drink 64 oz of fluid daily.- Stressed the importance of getting in at least 50+ oz of fluid today.  She feels she can do this and will contact clinic if she does not  Strive to move hourly while awake to decrease risk of developing a blood clot.  Continue to do deep breathing exercises twice daily or use your spirometer.  Follow up with your primary care provider to discuss obesity related conditions by one month post op.   Start recommended postoperative vitamins within in the first 6 weeks.  Advance diet per Dietitian at your 2 week appointment.   Make follow up appointment to meet with psychologist at 1 and 3 months post operatively.   Wear binder to support abdominal muscles and gradually wean off over the next few weeks.   Return to clinic for 2 wk post op appointment and bring post op vitamins along.  Call with questions or concerns at any time.

## 2019-10-22 NOTE — PROGRESS NOTES
Patient seen by provider in clinic who completed assessment.      Tamika Lux RN on 10/22/2019 at 2:39 PM

## 2019-10-22 NOTE — PROGRESS NOTES
[facility]   1 Week Surgical Follow-Up     PCP:  Haase, Susan Rachele    DOS: 10/15/19  Surgeon: AYANNA  Surgery Type: Sleeve  HISTORY OF PRESENT ILLNESS:  Zee Kyle returns today for her follow-up appointment status post bariatric surgery.  She is currently using Tylenol 0564-9408 mg prn for pain medication.  Refill Needed: No.  Patient's Pain Scale: 2. The pain is located right upper side abdomen.  She had a concern about a wound infection but much better now.  No fever No chills No redness.  Patient's current daily fluid intake in oz is: 48 oz at first, now 32-40; getting busy.  Patient has started postoperative Vitamins: Yes(some).  Today has only gotten in about 20 oz of fluid.  Using CPAP  Feeling good mentally  Sees Jasmin Cent[facility] at [facility].  Sees her every other week.  Is seeing dietitian at same place every 2 weeks.  Is in a body image group every Thursday.   Activity:   Activity: walking 30 min total/day  REVIEW OF SYSTEMS:  GI:    Nausea: No  Vomiting: No  Diarrhea: Yes(looser stools, not diarrhea; normal in color; 1-2/day)  Constipation: No  Last BM: today  Dysphagia: Yes(multivitamin stuck Sat, break in half)  GERD: No - has not been taking zantac    CV/Pulmonary:   Chest Pain: No  Dizzy: No  Light Headed: No  SOB: No - Was using IS but now doing deep breathing  Endo:  Diabetes: No  :    Contraception: Mirena  Dysuria: No  Vascular:    LE Edema: No  PHYSICAL EXAMINATION:    BP (!) 153/103 (BP Location: Left arm, Patient Position: Sitting, Cuff Size: Adult Regular)   Pulse 101   Temp 97.4  F (36.3  C) (Oral)   Resp 20   Wt 247 lb 4.8 oz (112.2 kg)   BMI 38.73 kg/m       Pulse range was .  Medium around 101.  Patient has only had about 20 oz of fluid today  Recheck BP: 142/100    GENERAL: No acute distress. Alert and oriented time 3.  HEART: Regular rate and rhythm.  LUNGS:  Clear to auscultation bilaterally.  ABDOMEN: Soft, incisions clean,dry, and intact.  Tenderness WNL for post op.  EXTREMITIES: No lower extremity edema bilaterally. No calf swelling or tenderness. Negative raz's  SKIN: No rashes.  PSYCHOLOGICAL: Stable. Pleasant      ASSESSMENT:    1. S/P bariatric surgery.  2. Post surgical malabsorption  3. Hypertension    PLAN:   Hypertension - Need to contact primary today regarding blood pressure  START recommended antacid medication for the next 3 months.   Strive to drink 64 oz of fluid daily. - Stressed the importance of getting in at least 50+ oz of fluid today.  She feels she can do this and will contact clinic if she does not  Strive to move hourly while awake to decrease risk of developing a blood clot.  Continue to do deep breathing exercises twice daily or use your spirometer.  Follow up with your primary care provider to discuss obesity related conditions by one month post op.   Start recommended postoperative vitamins within in the first 6 weeks.  Advance diet per Dietitian at your 2 week appointment.   Make follow up appointment to meet with psychologist at 1 and 3 months post operatively.   Wear binder to support abdominal muscles and gradually wean off over the next few weeks.   Return to clinic for 2 wk post op appointment and bring post op vitamins along.  Call with questions or concerns at any time.

## 2019-10-23 LAB — COPATH REPORT: NORMAL

## 2019-10-24 LAB — COPATH REPORT: NORMAL

## 2019-10-29 ENCOUNTER — OFFICE VISIT (OUTPATIENT)
Dept: SURGERY | Facility: CLINIC | Age: 44
End: 2019-10-29
Payer: COMMERCIAL

## 2019-10-29 VITALS
DIASTOLIC BLOOD PRESSURE: 81 MMHG | BODY MASS INDEX: 37.95 KG/M2 | WEIGHT: 242.3 LBS | TEMPERATURE: 98 F | RESPIRATION RATE: 20 BRPM | HEART RATE: 84 BPM | SYSTOLIC BLOOD PRESSURE: 139 MMHG | OXYGEN SATURATION: 97 %

## 2019-10-29 DIAGNOSIS — Z98.84 BARIATRIC SURGERY STATUS: ICD-10-CM

## 2019-10-29 DIAGNOSIS — K91.2 POSTSURGICAL MALABSORPTION: ICD-10-CM

## 2019-10-29 DIAGNOSIS — E66.9 OBESITY (BMI 30-39.9): Primary | ICD-10-CM

## 2019-10-29 PROCEDURE — 97803 MED NUTRITION INDIV SUBSEQ: CPT | Performed by: DIETITIAN, REGISTERED

## 2019-10-29 PROCEDURE — 99207 ZZC NO CHARGE NURSE ONLY: CPT

## 2019-10-29 RX ORDER — MULTIVIT WITH IRON,MINERALS
2 TABLET,CHEWABLE ORAL DAILY
COMMUNITY

## 2019-10-29 RX ORDER — ACETAMINOPHEN 160 MG
1 TABLET,DISINTEGRATING ORAL DAILY
COMMUNITY
End: 2020-07-03

## 2019-10-29 NOTE — PROGRESS NOTES
"BARIATRIC PROGRESS NOTE - 2 Week Post Op  DATE OF VISIT: October 29, 2019    Zee Kyle  1975  female  [id number]  43 year old    ASSESSMENT:    REASON FOR VISIT:  Zee Kyle is a 43 year old year old female presents today for a 2 Week Post Op nutrition follow-up appointment. Patient is accompanied by self      DIAGNOSIS:  Status: post gastric sleeve surgery.   Obesity Grade II BMI 35-39.9    ANTHROPOMETRICS:  Height:  67\"  Initial weight: 272.9 lb  Current Weight:  242.3 lb  BMI: 37.9  kg/(m^2).    VITAMINS AND MINERALS:   2 Multivitamin with Minerals- morning  600 mg Calcium With Vitamin D BID-morning/ afternoon  2000 International units Vitamin D  Plans to start vitamin B-12 injection  1 Vitron C- bed time  1 vitamin B complex (100 mg thiamine)    NUTRITION HISTORY:  Tolerating diet: Bariatric full liquid diet  Fluids/water intake: 64 ounces/day  Small bites: Yes  Portion size: 1/2 cup or less  20-30 minute meals: Yes  Breakfast: protein drink-G[employer] with 25 g protein  Lunch: 1/4 cup butter nut squash soup, 1/4 cup greek yogurt  Dinner: 1/4 cup sugar free gelatin or 1/4 cup pudding  Snacks: sugar free popsicle  Nausea: denies  Vomiting: denies  Constipation: denies  Additional Information: Pt. is scheduled to go back to work () next week. Looking forward to adding in pureed foods.      PHYSICAL ACTIVITY:  Type: walking  Frequency: 7 days a week.  Duration: 60 minutes.    DIAGNOSIS:     Current Nutrition Diagnosis: Altered gastrointestinal function related to alternation in gastrointestinal structure as evidenced by history of gastric sleeve.     INTERVENTION  Nutrition Prescription: Recommended bariatric puree diet.    Goals:  Take calcium 2 hours away from multivitamin/ minerals and Vitron C  Take vitamin B complex (100 mg thiamine) 1X per week  Start puree diet at day 14 post op.  Continue MVI, calcium with vitamin D, vitamin B12, vitamin D, vitamin B complex, and Vitron " C  Aim for 60 to 90 grams protein.  Continue 48 to 64 oz of fluid per day.    Implementation:  Take calcium 2 hours away from multivitamin/ minerals and Vitron C  Discussed transition to puree diet.  Emphasized importance of adequate protein.  Reviewed required vitamins and mineral supplements.  Verbalizes good understanding of surgery diet guidelines.  Assessed learning needs and learning preferences.      NUTRITION MONITORING AND EVALUATION:   Monitor diet tolerance and weight loss.      Anticipated Compliance: good  Follow Up: Continue to monitor patient closely regarding weight loss and diet.  At 4 weeks Post Op    TIME SPENT WITH PATIENT: 20 minutes.  Phu Unger RD, LD  [facility]  [phone]

## 2019-10-29 NOTE — PROGRESS NOTES
[facility]  2 Week Surgical Follow-Up     Current PCP:    HISTORY OF PRESENT ILLNESS:  The patient returns today for her follow-up appointment status post gastric sleeve  She is accompanied by self.   She is drinking 64+ oz of fluid daily, including water and water/protein powder.  Pain:    She is currently using Tylenol for pain medication. She rates her pain at a 2/10  on the pain scale. The pain is located to right sided incision.   Activity:  The patient is considered a fall risk:  No. Interventions to secure the patient s safety are in place.  What are you doing for physical activity?  walking  How many days per week?  7  How many minutes per day?  60  Review of Systems:  GI:    Nausea occurs never.            Vomiting occurs never.     GERD occurs never.  Patient is currently taking Zantac 150 mg BID as ordered.          Diarrhea occurs never.  Patient's last bowel movement was yesterday and was slightly loose.          Constipation occurs never.           CV/Pulmonary:   Dizziness occurs never.     Shortness of Breath occurs never.  Chest pain occurs never.    Vascular:    Leg swelling none.     Shana's Negative     :  Form of birth control is IUD     .    PHYSICAL EXAMINATION:    VITALS:  See Epic for VS.  LUNGS:  clear to auscultation  HEART:  Apical pulse strong, regular.  ABDOMEN:  Abdomen soft, slightly tender only to right sided incision. BS normal.  Pt reports hardness above R sided incision. Noted incision is slightly hard to palpation.  No s/s of infection.  Bruising surrounding incision.  Discussed possibility of scar tissue, bruising contributing to this.  FLAVIA Covarrubias examined incision at pt request; no concerns.  PA informed pt this is scar tissue.  Recommended heat around incision, thermacare patches without NSAIDs 1 inch from incision for comfort.  INCISIONS:  dry and intact.  Steristrips removed.  Adhesive remover given to patient.    MEDICATIONS/ALLERGIES REVIEWED:   Yes    ASSESSMENT:    1.  2 weeks status post gastric sleeve surgery.    PLAN:    Make follow up appointment to meet with psychologist and 1 month post operatively.   Follow up with your primary care provider to obesity related conditions by one month post op.   Advance diet per Dietitian at your 2 week appointment.   Start recommended postoperative vitamins within in the first 6 weeks per Dietitian  Strive to drink 64 oz of fluid daily.  Wear binder to support abdominal muscles and gradually wean off over the next few weeks.   Continue to do deep breathing exercises twice daily or use your spirometer.   Return to clinic postop week 4.  Bring vitamin bottles.  Call with questions or concerns at any time.    INTERVENTIONS:       Patient exercise/activity restrictions reviewed; exercise handout given.  Exercise plan postop is walking during children's after school activities.  Reviewed when patient can return to bathing and swimming.  Reviewed patient vitamin timing.  Instructed patient to take calcium carbonate in divided doses with meals, to take calcium separate from multivitamin, and to take both multivitamins together.  Reviewed signs/symptoms of DVT with patient.  Patient return to work date is undetermine.  Reviewed FMLA.  No further forms needed at this time.    No further needs or questions at this time.  Patient agrees to call clinic with any questions or concerns prior to next appointment.    Tamika Lux RN on 10/29/2019 at 10:48 AM

## 2019-10-29 NOTE — PROGRESS NOTES
Completed vitamin B-12 subcutaneous injection teaching with patient.  Reviewed medication, dose, route, and injection procedure.  Pt demonstrated ability to prepare injection with safe technique with sample supplies.      Questions answered.  Pt requests shorter needle for her use.  Tamika Lux RN on 10/29/2019 at 11:06 AM

## 2019-10-30 RX ORDER — CYANOCOBALAMIN 1000 UG/ML
1 INJECTION, SOLUTION INTRAMUSCULAR; SUBCUTANEOUS
Qty: 3 ML | Refills: 1 | Status: ON HOLD | OUTPATIENT
Start: 2019-10-30 | End: 2022-07-12

## 2019-10-30 NOTE — PROGRESS NOTES
Was asked to see patient and evaluate her right lower incision.    Her abdomen was soft.  Slightly tender over the right lower incision.  The incision was closed.  No redness, No warmth, No drainage.  There was No fluctuance noted but a harder area was noted just under the incision. Presumably scar tissue as this was noted under most of her other incisions as well.  Overall her post surgical abdomen was WNL with no visible concerns.  So her pain is most likely muscular.  Advised heat and tylenol along with continued restrictions.  She is to contact clinic if not better or worse.  Patient verbalized understanding and was in agreement with plan.       Renzo Covarrubias MS, PA-C

## 2019-11-08 ENCOUNTER — TELEPHONE (OUTPATIENT)
Dept: SURGERY | Facility: CLINIC | Age: 44
End: 2019-11-08

## 2019-11-08 NOTE — TELEPHONE ENCOUNTER
"Patient had gastric sleeve 10/15/19.  She is approximately 3 weeks PO.    Patient states she is on pureed diet.  States she is doing well with purees.    Patient plans to go to a hotel this weekend.  The hotel has a hot tub and a pool.    1. Patient states she plans to make her meals in her hotel room.    2. She is wondering if she can go in the hot tub or pool.  States her Incisions \"look good\" - she still has scabs on the bottom left incision that is very small - like tip of a pen and also the right lower one is still scabbing and she can see a suture.    Recommended that patient doesn't soak the area that still has a scab - okay to dangle her feet and still enjoy the water and warmth.    3. Patient also states that her stools have been green to black - is having them every 3-4 days and getting more formed.  Patient states she has been taking 2 multivits with iron and an additional iron daily.  She is wondering if this is NL.  Informed patient that this is very NL since she is taking the iron.    4. Patient is requesting a RTW note stating it is safe for her to RTW 11/5/19. She has already had a note sent but needs to state she was safe to return on the 5th.    Informed patient this writer would get that note done.  Patient would like it faxed to [phone].    Letter written for provider to sign.  Will be faxed later today.  Jaelyn Bhatti, MS, RD, RN        "

## 2019-11-08 NOTE — LETTER
[facility]          [address]  [address]  [place] [address]                                         Tel:  [phone]  Fax: [phone]    2019      Regarding:    Name: Zee Kyle    Address: [id number] [place] [id number]    YOB: 1975      To Whom it may concern;     Patient had major abdominal surgery October 15 th, 2019. She was deemed able to return to work in a safe manner 2019. Zee had/has lifting restrictions of no lifting more then 20 lbs until . Those restrictions will  .    If there are questions please feel free to contact me at [phone].       Sincerely,         FLAVIA Kingston/rogers

## 2019-11-08 NOTE — TELEPHONE ENCOUNTER
RTW note faxed per pt request as listed in note below.  Fax receipt verified via RightFax.  Tamika Lux RN on 11/8/2019 at 12:11 PM

## 2019-11-18 ENCOUNTER — OFFICE VISIT (OUTPATIENT)
Dept: SURGERY | Facility: CLINIC | Age: 44
End: 2019-11-18
Payer: COMMERCIAL

## 2019-11-18 VITALS — WEIGHT: 230.1 LBS | BODY MASS INDEX: 36.04 KG/M2

## 2019-11-18 PROCEDURE — 97803 MED NUTRITION INDIV SUBSEQ: CPT | Performed by: DIETITIAN, REGISTERED

## 2019-11-18 NOTE — PROGRESS NOTES
"BARIATRIC PROGRESS NOTE - 4 Week Post Op  DATE OF VISIT: November 18, 2019    Zee Kyle  1975  female  [id number]  44 year old    ASSESSMENT:    REASON FOR VISIT:  Zee Kyle is a 44 year old year old female presents today for a 4 Week Post Op nutrition follow-up appointment. Patient is accompanied by self      DIAGNOSIS:  Status: post gastric sleeve surgery.  Obesity Grade II BMI 35-39.9    ANTHROPOMETRICS:  Height: 5'7\"    Initial weight: 272.9   Current Weight: 104.4 kg (230 lb 1.6 oz)  BMI: 36.04 kg/(m^2).    VITAMINS AND MINERALS:   2 Multivitamin with Minerals- morning  600 mg Calcium With Vitamin D BID-morning/ afternoon-2 hours apart   5000 International units Vitamin D-morning   B-12 injection-1 time per month   1 Vitron C- bed time  Ferrous sulfate at night   1 vitamin B complex (100 mg thiamine)-1 time per week     NUTRITION HISTORY:  Tolerating diet: Bariatric pureed diet  Fluids/water intake: 64 ounces/day  Small bites: Yes  Portion size: 1/4 cup   20-30 minute meals: Yes  Fluids and meals  by 30 minutes: Yes  Chew foods thoroughly: Yes  Breakfast: scrambled egg (1)  Lunch: pureed meatballs   Dinner: canned chicken 1/2 avocado 2 T cottage cheese ()  Snacks: cottage cheese with pureed peaches and pears   Nausea: none  Vomiting: none  Constipation: none  Additional Information: Patient brought in vitamins for RD to review.  Patient taking correct vitamins.  Patient bought portable  that she used at restaurant to puree food (pureed salmon).  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  Patient has set scale boundaries with dietitian at [facility] to only weigh self with provider.  Patient plans to continue working bi-weekly with RD at [employer].  Patient does not have scale in her home.        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   No change    Previous Goals:  Take calcium 2 hours away from multivitamin/ minerals and Vitron C-met  Take vitamin B complex (100 mg thiamine) 1X per week-met  Start puree diet at day 14 post op-met  Continue MVI, calcium with vitamin D, vitamin B12 (just got B12 shots), vitamin D, vitamin B complex, and Vitron C-met  Aim for 60 to 90 grams protein-improving   Continue 48 to 64 oz of fluid per day-met    Current Nutrition Diagnosis: Altered gastrointestinal function related to alternation in gastrointestinal structure as evidenced by history of gastric sleeve.     INTERVENTION  Nutrition Prescription: Recommended bariatric soft diet.    Goals:  Start soft diet at day 28 post op.  Continue MVI, calcium with vitamin D, vitamin B12, vitamin D, B complex and iron with vitamin C.  Aim for 60 to 90 grams protein.  Continue 48 to 64 oz of fluid per day.    Implementation:  Discussed transition to soft diet.  Emphasized importance of adequate protein.  Reviewed required vitamins and mineral supplements.  Verbalizes good understanding of surgery diet guidelines.  Assessed learning needs and learning preferences.      NUTRITION MONITORING AND EVALUATION:   Monitor diet tolerance and weight loss.      Anticipated Compliance: fair-good  Follow Up: Continue to monitor patient closely regarding weight loss and diet.  At 3 months Post Op    TIME SPENT WITH PATIENT: 30 minutes    Ganesh He, RD, LD  [facility]  [phone] (office phone)

## 2019-12-26 ENCOUNTER — OFFICE VISIT (OUTPATIENT)
Dept: FAMILY MEDICINE | Facility: CLINIC | Age: 44
End: 2019-12-26
Payer: COMMERCIAL

## 2019-12-26 VITALS
DIASTOLIC BLOOD PRESSURE: 77 MMHG | TEMPERATURE: 97.9 F | HEIGHT: 67 IN | OXYGEN SATURATION: 96 % | SYSTOLIC BLOOD PRESSURE: 112 MMHG | HEART RATE: 71 BPM | BODY MASS INDEX: 34.18 KG/M2 | RESPIRATION RATE: 16 BRPM | WEIGHT: 217.8 LBS

## 2019-12-26 DIAGNOSIS — F41.9 ANXIETY: ICD-10-CM

## 2019-12-26 DIAGNOSIS — F33.1 MAJOR DEPRESSIVE DISORDER, RECURRENT EPISODE, MODERATE (H): ICD-10-CM

## 2019-12-26 DIAGNOSIS — Z98.84 STATUS POST BARIATRIC SURGERY: ICD-10-CM

## 2019-12-26 DIAGNOSIS — D50.9 IRON DEFICIENCY ANEMIA, UNSPECIFIED IRON DEFICIENCY ANEMIA TYPE: ICD-10-CM

## 2019-12-26 DIAGNOSIS — I10 BENIGN ESSENTIAL HYPERTENSION: Primary | ICD-10-CM

## 2019-12-26 PROCEDURE — 99214 OFFICE O/P EST MOD 30 MIN: CPT | Performed by: NURSE PRACTITIONER

## 2019-12-26 RX ORDER — VENLAFAXINE HYDROCHLORIDE 37.5 MG/1
37.5 CAPSULE, EXTENDED RELEASE ORAL DAILY
Qty: 90 CAPSULE | Refills: 1 | Status: SHIPPED | OUTPATIENT
Start: 2019-12-26 | End: 2019-12-30

## 2019-12-26 RX ORDER — NORETHINDRONE ACETATE AND ETHINYL ESTRADIOL .02; 1 MG/1; MG/1
TABLET ORAL
COMMUNITY
Start: 2019-12-23 | End: 2020-09-01

## 2019-12-26 RX ORDER — FERROUS SULFATE 325(65) MG
TABLET ORAL
Status: ON HOLD | COMMUNITY
Start: 2019-12-21 | End: 2022-07-12

## 2019-12-26 ASSESSMENT — ANXIETY QUESTIONNAIRES
2. NOT BEING ABLE TO STOP OR CONTROL WORRYING: SEVERAL DAYS
GAD7 TOTAL SCORE: 4
4. TROUBLE RELAXING: SEVERAL DAYS
3. WORRYING TOO MUCH ABOUT DIFFERENT THINGS: NOT AT ALL
GAD7 TOTAL SCORE: 4
1. FEELING NERVOUS, ANXIOUS, OR ON EDGE: SEVERAL DAYS
GAD7 TOTAL SCORE: 4
5. BEING SO RESTLESS THAT IT IS HARD TO SIT STILL: SEVERAL DAYS
7. FEELING AFRAID AS IF SOMETHING AWFUL MIGHT HAPPEN: NOT AT ALL
6. BECOMING EASILY ANNOYED OR IRRITABLE: NOT AT ALL
7. FEELING AFRAID AS IF SOMETHING AWFUL MIGHT HAPPEN: NOT AT ALL

## 2019-12-26 ASSESSMENT — PATIENT HEALTH QUESTIONNAIRE - PHQ9
10. IF YOU CHECKED OFF ANY PROBLEMS, HOW DIFFICULT HAVE THESE PROBLEMS MADE IT FOR YOU TO DO YOUR WORK, TAKE CARE OF THINGS AT HOME, OR GET ALONG WITH OTHER PEOPLE: NOT DIFFICULT AT ALL
SUM OF ALL RESPONSES TO PHQ QUESTIONS 1-9: 0
SUM OF ALL RESPONSES TO PHQ QUESTIONS 1-9: 0

## 2019-12-26 ASSESSMENT — MIFFLIN-ST. JEOR: SCORE: 1680.56

## 2019-12-26 NOTE — PROGRESS NOTES
Subjective     Zee Kyle is a 44 year old female who presents to clinic today for the following health issues:    History of Present Illness        She eats 2-3 servings of fruits and vegetables daily.She consumes 0 sweetened beverage(s) daily.She is missing 1 dose(s) of medications per week.  She is not taking prescribed medications regularly due to remembering to take.     Hypertension:  Taking lisinopril 5 mg every day, does not check BP outside of the clinic.     Post gastric sleeve on 10/15/2019.  Is currently on stage 4 diet, not able to eat bread/rice raw vegetables.  Weight pre surgery was 264, weight today 217.  So in 2 month has had a 50 lb weight loss.     Anemia:  Is taking ferrous sulfate and supplements as prescribed. Continues to have   Vaginal bleeding despite having the Mirena IUD, has had bleeding since 7/2019, is being followed by GYN,  working with GYN, will start a new packet of OCP this Sunday. Changing 1 tampon every 12 hours.  Discussing ablation or hysterectomy as future options.     Depression/Anxiety: PHQ 9 of 0, HENRI 7 score of 4.  Taking Effexor 37.5 mg daily as prescribed.     Patient Active Problem List   Diagnosis     CARDIOVASCULAR SCREENING; LDL GOAL LESS THAN 160     Presbycusis     Hypothyroidism     Major depressive disorder, recurrent episode, moderate (H)     Anxiety     Morbid obesity (H)     CLARK (obstructive sleep apnea)     Intertrigo     Lower extremity edema     Low HDL (under 40)     Benign essential hypertension     Morbid obesity with BMI of 40.0-44.9, adult (H)     Past Surgical History:   Procedure Laterality Date     ENT SURGERY       GENITOURINARY SURGERY  2017    bladder sling     LAPAROSCOPIC GASTRIC SLEEVE N/A 10/15/2019    Procedure: LAPAROSCOPIC GASTRIC SLEEVE;  Surgeon: Cesar Lee MD;  Location: [facility] OR     PE TUBES       TONSILLECTOMY       TYMPANOPLASTY, RT/LT      Tympanoplasty RT/LT       Social History     Tobacco Use     Smoking status: Former  "Smoker     Packs/day: 1.00     Years: 10.00     Pack years: 10.00     Types: Cigarettes     Start date: 1996     Last attempt to quit: 2/10/2003     Years since quittin.8     Smokeless tobacco: Never Used     Tobacco comment: Glad to be done   Substance Use Topics     Alcohol use: Yes     Alcohol/week: 0.0 standard drinks     Comment: occas     Family History   Problem Relation Age of Onset     Hypertension Father          08     Obesity Father      Cerebrovascular Disease Mother      Dementia Paternal Grandmother      Coronary Artery Disease Paternal Grandfather          Current Outpatient Medications   Medication Sig Dispense Refill     calcium carbonate 600 mg-vitamin D 400 units (CALTRATE) 600-400 MG-UNIT per tablet Take 1 tablet by mouth 2 times daily       childrens multivitamin w/iron (FLINTSTONES COMPLETE) chewable tablet Take 2 chew tab by mouth daily       Cholecalciferol (VITAMIN D3) 50 MCG (2000 UT) CAPS Take 1 tablet by mouth daily       cyanocobalamin (CYANOCOBALAMIN) 1000 MCG/ML injection Inject 1 mL (1,000 mcg) Subcutaneous every 30 days 3 mL 1     ferrous fumarate 65 mg, [place]. FE,-Vitamin C 125 mg (VITRON C)  MG TABS tablet Take 1 tablet by mouth daily       levonorgestrel (MIRENA) 20 MCG/24HR IUD 1 each (20 mcg) by Intrauterine route continuous       ondansetron (ZOFRAN-ODT) 4 MG ODT tab Take 1-2 tablets (4-8 mg) by mouth every 6 hours as needed for nausea or vomiting (Patient not taking: Reported on 10/22/2019) 15 tablet 0     ranitidine (ZANTAC) 150 MG tablet Take 1 tablet (150 mg) by mouth 2 times daily 60 tablet 2     Syringe/Needle, Disp, 25G X 5/8\" 5 ML MISC 1 each every 30 days 3 each 1     venlafaxine (EFFEXOR-XR) 37.5 MG 24 hr capsule TAKE 1 TABLET BY MOUTH DAILY 90 capsule 2     vitamin B complex with vitamin C (VITAMIN  B COMPLEX) tablet Take 1 tablet by mouth daily       BP Readings from Last 3 Encounters:   19 112/77   10/29/19 139/81   10/22/19 (!) " "153/103    Wt Readings from Last 3 Encounters:   12/26/19 98.8 kg (217 lb 12.8 oz)   11/18/19 104.4 kg (230 lb 1.6 oz)   10/29/19 109.9 kg (242 lb 4.8 oz)      Reviewed and updated as needed this visit by Provider         Review of Systems   ROS COMP: CONSTITUTIONAL: NEGATIVE for fever, chills, change in weight  RESP: NEGATIVE for significant cough or SOB  CV: NEGATIVE for chest pain, palpitations or peripheral   :  See HPI  PSYCHIATRIC: NEGATIVE for changes in mood or affect      Objective    /77 (BP Location: Right arm, Patient Position: Chair, Cuff Size: Adult Large)   Pulse 71   Temp 97.9  F (36.6  C) (Oral)   Resp 16   Ht 1.702 m (5' 7\")   Wt 98.8 kg (217 lb 12.8 oz)   SpO2 96%   BMI 34.11 kg/m    Body mass index is 34.11 kg/m .  Physical Exam   GENERAL: healthy, alert and no distress  RESP: lungs clear to auscultation - no rales, rhonchi or wheezes  CV: regular rate and rhythm, normal S1 S2  PSYCH: mentation appears normal, affect normal/bright          Assessment & Plan   Assessment  Zee was seen today for surgical follow up.    Diagnoses and all orders for this visit:    Benign essential hypertension:  /77, will discontinue lisinopril.Will be seen by surgeon in 4 weeks, will  Have BP checked again at that time.  If BP is elevated will start back on lisinopril at 2.5 mg or 5 mg.     Status post bariatric surgery: doing great, weight loss of 50 lbs.    Iron deficiency anemia, unspecified iron deficiency anemia type: continue on ferrous sulfate until menorrhagia is controlled.   -     Iron and iron binding capacity; Future  -     Ferritin; Future  -     CBC with platelets differential; Future    Major depressive disorder, recurrent episode, moderate (H): PHQ 9 of 0, HENRI 7 of 4, refill of venlafaxine.  -     venlafaxine (EFFEXOR-XR) 37.5 MG 24 hr capsule; Take 1 capsule (37.5 mg) by mouth daily    Anxiety  -     venlafaxine (EFFEXOR-XR) 37.5 MG 24 hr capsule; Take 1 capsule (37.5 mg) by " mouth daily      Follow up in 6 months, sooner as needed.    Susan Haase, APRN [facility]        Answers for HPI/ROS submitted by the patient on 12/26/2019   Chronic problems general questions HPI Form  If you checked off any problems, how difficult have these problems made it for you to do your work, take care of things at home, or get along with other people?: Not difficult at all  PHQ9 TOTAL SCORE: 0  HENRI 7 TOTAL SCORE: 4

## 2019-12-27 ASSESSMENT — PATIENT HEALTH QUESTIONNAIRE - PHQ9: SUM OF ALL RESPONSES TO PHQ QUESTIONS 1-9: 0

## 2019-12-27 ASSESSMENT — ANXIETY QUESTIONNAIRES: GAD7 TOTAL SCORE: 4

## 2020-01-03 ENCOUNTER — TELEPHONE (OUTPATIENT)
Dept: SURGERY | Facility: CLINIC | Age: 45
End: 2020-01-03

## 2020-01-03 DIAGNOSIS — Z98.84 BARIATRIC SURGERY STATUS: Primary | ICD-10-CM

## 2020-01-03 NOTE — TELEPHONE ENCOUNTER
Consulted ENRIKE Cedillo PA-C re: med.  VORB for omeprazole 20 mg po daily x 1 month.  See orders.  Sent to pt preferred pharmacy.    Paged Dr. Lee re: pt concern about pathology report.  Dr. Lee states to tell pt that she does not have cancer and that she will need to see GI for consult and have EGD at about 6 months postop for follow up.  RN will review and confirm details in clinic with Dr. Lee next week.    Called pt back to update her on response.  Reviewed medication with her.  Message given from MD as above.  Questions answered.  Will call pt back next week with details of care plan.  Pt states ok to leave detailed VM re: care plan.    No further needs at this time.  Tamika Lux RN on 1/3/2020 at 12:39 PM

## 2020-01-03 NOTE — TELEPHONE ENCOUNTER
Pt called in today about her questions.    1)  Pharmacy can't refill zantac as they are out. Pt has been trying to reach JENNY Aguilar re: this for a couple weeks; has been out for a couple weeks.  Needs replacement sent to [facility] in [place] on [address].    Denies stomach pain, NV or any other symptoms.  Discussed with pt that it is best practice to continue taking the stomach medication for the first three months to make sure her staple line heals.  Encouraged pt to start taking this as soon as it is filled for her.    2)  Pt also asks about her two pathology reports from her sleeve surgery from October 2019.  States they were released to her [facility] without any explanation to her about what they mean or what to do.  States she has been worried about this and reached out to friends with medical training to help her but isn't sure about what this means.   Reviewed path reports, will consult MD re: interpretation and call pt back today.    Tamika Lux RN on 1/3/2020 at 11:28 AM

## 2020-01-10 ENCOUNTER — DOCUMENTATION ONLY (OUTPATIENT)
Dept: SURGERY | Facility: CLINIC | Age: 45
End: 2020-01-10

## 2020-01-10 NOTE — PROGRESS NOTES
Consulted Dr. Lee re: patient plan for followup on gastric polyps.  Plan per surgeon as follows:    -patient needs GI consult. GI provider to review path report (below) and decide about whether pt needs EGD and/or colonoscopy.          -IF GI RECOMMENDS EGD, PATIENT SHOULD NOT HAVE THIS UNTIL 6 MONTHS POSTOP FROM HER GASTRIC SLEEVE.    --      Mamadou CASTAÑEDA to see patient in clinic for routine follow up on 1/20/2020.  PA states she will review with patient and refer her then.  Tamika Lux RN on 1/10/2020 at 10:45 AM

## 2020-01-13 NOTE — TELEPHONE ENCOUNTER
Per dr Krzysztof Taylor pt needs to repeat 24 hr urine Reason for call:  Other   Patient called regarding (reason for call): call back  Additional comments: Patient would like a call back to discuss where she is in the process of getting her surgery and possibly scheduling. She was told at the last RD appointment 3-4 weeks ago she would be recieving a call to move forward, but has not.     Phone number to reach patient:  Home number on file [phone] (home)    Best Time:  Anytime     Can we leave a detailed message on this number?  YES

## 2020-01-20 ENCOUNTER — OFFICE VISIT (OUTPATIENT)
Dept: SURGERY | Facility: CLINIC | Age: 45
End: 2020-01-20
Payer: COMMERCIAL

## 2020-01-20 ENCOUNTER — HOSPITAL ENCOUNTER (OUTPATIENT)
Dept: LAB | Facility: CLINIC | Age: 45
Discharge: HOME | End: 2020-01-20
Admitting: PHYSICIAN ASSISTANT
Payer: COMMERCIAL

## 2020-01-20 VITALS
SYSTOLIC BLOOD PRESSURE: 120 MMHG | WEIGHT: 212.7 LBS | BODY MASS INDEX: 33.38 KG/M2 | HEIGHT: 67 IN | OXYGEN SATURATION: 96 % | HEART RATE: 75 BPM | DIASTOLIC BLOOD PRESSURE: 90 MMHG

## 2020-01-20 VITALS — WEIGHT: 212.7 LBS | HEIGHT: 67 IN | BODY MASS INDEX: 33.38 KG/M2

## 2020-01-20 DIAGNOSIS — Z98.84 BARIATRIC SURGERY STATUS: Primary | ICD-10-CM

## 2020-01-20 DIAGNOSIS — E67.3 HIGH VITAMIN D LEVEL: Primary | ICD-10-CM

## 2020-01-20 DIAGNOSIS — E66.09 CLASS 1 OBESITY DUE TO EXCESS CALORIES WITH SERIOUS COMORBIDITY AND BODY MASS INDEX (BMI) OF 33.0 TO 33.9 IN ADULT: ICD-10-CM

## 2020-01-20 DIAGNOSIS — E66.01 MORBID OBESITY WITH BMI OF 40.0-44.9, ADULT (H): ICD-10-CM

## 2020-01-20 DIAGNOSIS — Z98.84 BARIATRIC SURGERY STATUS: ICD-10-CM

## 2020-01-20 DIAGNOSIS — E66.01 MORBID OBESITY (H): ICD-10-CM

## 2020-01-20 DIAGNOSIS — E66.811 CLASS 1 OBESITY DUE TO EXCESS CALORIES WITH SERIOUS COMORBIDITY AND BODY MASS INDEX (BMI) OF 33.0 TO 33.9 IN ADULT: ICD-10-CM

## 2020-01-20 DIAGNOSIS — K91.2 POSTSURGICAL MALABSORPTION: ICD-10-CM

## 2020-01-20 DIAGNOSIS — M54.6 ACUTE BILATERAL THORACIC BACK PAIN: ICD-10-CM

## 2020-01-20 DIAGNOSIS — K31.7 FUNDIC GLAND POLYPOSIS OF STOMACH: ICD-10-CM

## 2020-01-20 DIAGNOSIS — I10 BENIGN ESSENTIAL HYPERTENSION: ICD-10-CM

## 2020-01-20 LAB
DEPRECATED CALCIDIOL+CALCIFEROL SERPL-MC: 126 UG/L (ref 20–75)
FERRITIN SERPL-MCNC: 84 NG/ML (ref 12–150)
IRON SATN MFR SERPL: 16 % (ref 15–46)
IRON SERPL-MCNC: 54 UG/DL (ref 35–180)
PTH-INTACT SERPL-MCNC: 22 PG/ML (ref 12–64)
TIBC SERPL-MCNC: 341 UG/DL (ref 240–430)
VIT B12 SERPL-MCNC: 1262 PG/ML (ref 193–986)

## 2020-01-20 PROCEDURE — 99214 OFFICE O/P EST MOD 30 MIN: CPT | Performed by: PHYSICIAN ASSISTANT

## 2020-01-20 PROCEDURE — 36415 COLL VENOUS BLD VENIPUNCTURE: CPT | Performed by: PHYSICIAN ASSISTANT

## 2020-01-20 PROCEDURE — 83540 ASSAY OF IRON: CPT | Performed by: PHYSICIAN ASSISTANT

## 2020-01-20 PROCEDURE — 82306 VITAMIN D 25 HYDROXY: CPT | Performed by: PHYSICIAN ASSISTANT

## 2020-01-20 PROCEDURE — 83970 ASSAY OF PARATHORMONE: CPT | Performed by: PHYSICIAN ASSISTANT

## 2020-01-20 PROCEDURE — 82607 VITAMIN B-12: CPT | Performed by: PHYSICIAN ASSISTANT

## 2020-01-20 PROCEDURE — 83550 IRON BINDING TEST: CPT | Performed by: PHYSICIAN ASSISTANT

## 2020-01-20 PROCEDURE — 97803 MED NUTRITION INDIV SUBSEQ: CPT | Performed by: DIETITIAN, REGISTERED

## 2020-01-20 PROCEDURE — 82728 ASSAY OF FERRITIN: CPT | Performed by: PHYSICIAN ASSISTANT

## 2020-01-20 ASSESSMENT — MIFFLIN-ST. JEOR
SCORE: 1657.43
SCORE: 1657.43

## 2020-01-20 NOTE — PROGRESS NOTES
"NUTRITION POST OP APPOINTMENT  DATE OF VISIT: January 20, 2020    Zee Kyle  1975  female  [id number]  44 year old     ASSESSMENT:    REASON FOR VISIT:  Zee is a 44 year old year old female presents today for 3 month PO nutrition follow-up appointment. Patient is accompanied by self.    DIAGNOSIS:  Status post gastric sleeve surgery.  Obesity Obesity Grade I BMI 30-34.9     ANTHROPOMETRICS:  Initial Weight: 272.9 lbs    Height: 170.2 cm (5' 7\")  Current Weight: 96.5 kg (212 lb 11.2 oz)   BMI: 33.31 kg/(m^2).    VITAMINS AND MINERALS: (reviewed by FLAVIA)  2 Complete multivitamins with minerals (at different times than calcium)   4000 International Units of Vitamin D daily  1200 mg of Calcium daily in divided doses  1000 mcg of Vitamin B12 inj monthly  1 Iron/Vit C. Daily    NUTRITION HISTORY:  Breakfast: 1.5 eggs  Lunch: chicken breast + vegetables  Supper: meat (meatballs, salmon, roast) + vegetables   Snacks: rare; protein bar  Fluids consumed: Water (60-80oz)  Consuming liquid calories: No  Protein intake: 60-80 grams/day  Tolerate regular texture food: Yes  Any foods not tolerated details: Yes  If any food not tolerated: peppermint bark chocolate, pop    Portion size: 1/2-1 cup  Take 20-30 minutes to consume each meal: Yes   Eat protein foods first: Yes  Fluids and meals separate by at least 30 minutes: Yes  Chew foods thoroughly: Yes  Tolerating diet: Yes  Drinking high protein supplements: No  Consuming snacks per day: occasional   Additional Information: Pt feeling well and tolerating diet. Reviewed rationale behind including multiple food groups at each meal.       PHYSICAL ACTIVITY:  Type: walking at lunch   Frequency (days per week): 5  Duration (min): 45    DIAGNOSIS:  Previous Nutrition Diagnosis: Altered gastrointestinal function related to alteration in gastrointestinal structure as evidenced by history of gastric sleeve surgery.- no change    Previous goals:  Start soft diet at day 28 " post op. - met  Continue MVI, calcium with vitamin D, vitamin B12, vitamin D, B complex and iron with vitamin C. - met  Aim for 60 to 90 grams protein. - met  Continue 48 to 64 oz of fluid per day. - met    Current Nutrition Diagnosis: Altered gastrointestinal function related to alteration in gastrointestinal structure as evidenced by history of gastric sleeve surgery.    INTERVENTION:   Nutrition Prescription: Eat 3 meals a day at regular intervals. Consume 60-90 grams of protein daily. Follow post-surgical vitamin and mineral protocol.  Assessed learning needs and learning preferences.    GOALS:  Have 3 food groups per meal  Replace protein bar with a protein drink  Continue to practice all post-op guidelines    Follow-Up:   Recommend standard post-op follow up visits to assist with lifestyle changes or per insurance.  Implementation: Discussed progress toward previous goals; reinforced importance of following bariatric lifestyle changes.    NUTRITION MONITORING AND EVALUATION:  Anticipated compliance: good  Verbalized good understanding.    Follow up: Patient to follow up in 3 months, at 6 months post-op    TIME SPENT WITH PATIENT:  20 minutes    Ofelia Weiss RD, LD  Clinical Dietitian

## 2020-01-20 NOTE — PROGRESS NOTES
BARIATRIC FOLLOW UP VISIT     January 20, 2020       HISTORY OF PRESENT ILLNESS: Pt returns today for her follow-up appointment status post laparoscopic gastric sleeve. Pt has had some back pain since surgery in the thoracic region.   Used to have pain in low back but now it has moved to upper back.  Has been taking the salon pas patches at night but still wakes up from the pain.      BARIATRIC METRICS:  Current Weight: 96.5 kg (212 lb 11.2 oz)  Body mass index is 33.31 kg/m .   Cumulative weight loss (lbs): 45.7    Patient is taking the following bariatric postoperative vitamins:  2 Complete multivitamins with minerals (at different times than calcium)   4000 International Units of Vitamin D daily  1200 mg of Calcium daily in divided doses  1000 mcg of Vitamin B12 inj monthly  1 Iron/Vit C. Daily  B complex 1x weekly (100mg thiamine)    Pt is exercising by walking.  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         OBESITY RELATED CONDITIONS:  High Blood Pressure-has been off medication since seeing PCP.    Sleep Apnea- using CPAP Averaging 5 Apnea/hour  Weight Bearing Joint Pain-Present     SOCIAL HISTORY:  Pt denies smoking.  Pt denies alcohol use.  Avoids NSAIDS.      REVIEW OF SYSTEMS:     GI:  Nausea-none  Vomiting-none  Diarrhea-none  Constipation-none  Dysphagia-none  Abdominal Pain-none  Heartburn-none     SKIN:  Intertriginous irritation-none       PSYCH:  Depression-stable. IS doing biweekly therapy.     :  Currently has Mirena IUD and oral Birth control.   Has been bleeding since it was placed in summer.      LABS/IMAGING/MEDICAL RECORDS REVIEW:   LABS:  Vitamin D Deficiency screening   Date Value Ref Range Status   01/20/2020 126 (H) 20 - 75 ug/L Final     Comment:     Season, race, dietary intake, and treatment affect the concentration of   25-hydroxy-Vitamin D. Values may decrease during winter months and increase   during summer months. Values 20-29 ug/L may  "indicate Vitamin D insufficiency   and values <20 ug/L may indicate Vitamin D deficiency.  Vitamin D determination is routinely performed by an immunoassay specific for   25 hydroxyvitamin D3.  If an individual is on vitamin D2 (ergocalciferol)   supplementation, please specify 25 OH vitamin D2 and D3 level determination by   LCMSMS test [id number].       Parathyroid Hormone Intact   Date Value Ref Range Status   01/20/2020 22 12 - 64 pg/mL Final     Vitamin B12   Date Value Ref Range Status   01/20/2020 1,262 (H) 193 - 986 pg/mL Final     Hemoglobin   Date Value Ref Range Status   10/16/2019 14.2 11.7 - 15.7 g/dL Final     Ferritin   Date Value Ref Range Status   01/20/2020 84 12 - 150 ng/mL Final     Iron   Date Value Ref Range Status   01/20/2020 54 35 - 180 ug/dL Final     Iron Binding Cap   Date Value Ref Range Status   01/20/2020 341 240 - 430 ug/dL Final     Iron Saturation Index   Date Value Ref Range Status   01/20/2020 16 15 - 46 % Final   08/16/2019 17 15 - 46 % Final   05/09/2019 4 (L) 15 - 46 % Final         PHYSICAL EXAMINATION:   BP (!) 120/90 (BP Location: Left arm, Patient Position: Chair, Cuff Size: Adult Regular)   Pulse 75   Ht 5' 7\" (1.702 m)   Wt 212 lb 11.2 oz (96.5 kg)   SpO2 96%   BMI 33.31 kg/m     GENERAL: Alert and oriented x3. NAD  HEART: No murmurs, rubs or gallops, Regular rate and rhythm  LUNGS: Breathing unlabored, Lung sounds clear to auscultation bilaterally  ABDOMEN: soft; nontender; nondistended, incision well healed. No hernia  EXTREMITIES: No LE edema bilaterally, Gait normal  SKIN: No intertriginous irritation or rash    ASSESSMENT AND PLAN:      1. Bariatric surgery status  3 months status laparoscopic gastric sleeve    2. Class 1 obesity due to excess calories with serious comorbidity and body mass index (BMI) of 33.0 to 33.9 in adult  Obesity  Body mass index is 33.31 kg/m .    3. Postsurgical malabsorption  Continue taking recommended post-op vitamins. Only need to take " Vitron C or Ferrous sulfate with vitamin C 250-500 mg.  Not both.     Labs ordered per protocol.  - Vitamin D Screen; Future  - Parathyroid Hormone Intact; Future  - Iron and Iron Binding Capacity; Future  - Ferritin; Future  - Vitamin B12; Future    4. Fundic gland polyposis of stomach  Reviewed results of Pathology of remaining stomach.  Multiple fundic polyps with high grade dysplasia found s/p gastric sleeve 10/15/2019.  Ordered GI consult to review and  rcommend surveillance with EGD with/without colonoscopy and follow up care. Reviewed our recommendation that pt wait to schedule endoscopy until 6 months post op (4/15/2020).    Ordered  - GASTROENTEROLOGY ADULT REF CONSULT ONLY    5. Acute bilateral thoracic back pain  Ordered  - PHYSICAL THERAPY REFERRAL; Future     6. HTN  Monitor BP and Follow up with PCP if diastolic continues to be high.     Follow up: Return to clinic in 3 months.      I spent a total of 25 minutes face to face with Zee during today's office visit. Over 50% of this time was spent counseling the patient and/or coordinating care.

## 2020-01-20 NOTE — PATIENT INSTRUCTIONS
Add vitamin C 250-500 mg when taking Ferrous Sulfate.      Call to set up PT.    Call to set up GI referral.    Have follow up labs drawn.    Return to clinic in 3 months.        [place] to follow up with Primary Care provider regarding elevated blood pressure.

## 2020-01-21 NOTE — PROGRESS NOTES
Referral for [facility] Consult faxed to [facility] 1/20/[phone] and verified with Fax Right.  Jaelyn Bhatti MS, RD, RN

## 2020-02-06 ENCOUNTER — MYC MEDICAL ADVICE (OUTPATIENT)
Dept: FAMILY MEDICINE | Facility: CLINIC | Age: 45
End: 2020-02-06

## 2020-02-06 ENCOUNTER — MYC REFILL (OUTPATIENT)
Dept: SURGERY | Facility: CLINIC | Age: 45
End: 2020-02-06

## 2020-02-06 ENCOUNTER — THERAPY VISIT (OUTPATIENT)
Dept: PHYSICAL THERAPY | Facility: CLINIC | Age: 45
End: 2020-02-06
Attending: PHYSICIAN ASSISTANT
Payer: COMMERCIAL

## 2020-02-06 DIAGNOSIS — Z98.84 BARIATRIC SURGERY STATUS: ICD-10-CM

## 2020-02-06 DIAGNOSIS — K91.2 POSTSURGICAL MALABSORPTION: ICD-10-CM

## 2020-02-06 DIAGNOSIS — M54.6 ACUTE BILATERAL THORACIC BACK PAIN: ICD-10-CM

## 2020-02-06 PROCEDURE — 97162 PT EVAL MOD COMPLEX 30 MIN: CPT | Mod: GP | Performed by: PHYSICAL THERAPIST

## 2020-02-06 PROCEDURE — 97110 THERAPEUTIC EXERCISES: CPT | Mod: GP | Performed by: PHYSICAL THERAPIST

## 2020-02-06 ASSESSMENT — PATIENT HEALTH QUESTIONNAIRE - PHQ9
SUM OF ALL RESPONSES TO PHQ QUESTIONS 1-9: 6
SUM OF ALL RESPONSES TO PHQ QUESTIONS 1-9: 6

## 2020-02-06 NOTE — TELEPHONE ENCOUNTER
Called pharmacy re: refill.  Informed pt has 1 refill left; will fill for pt.  Tamika Lux RN on 2/6/2020 at 3:04 PM

## 2020-02-06 NOTE — PROGRESS NOTES
[facility]: Physical Therapy Initial Evaluation   Feb 6, 2020  Subjective:   Chief Complaint: mid back pain   Pain: pain in the thoracic region, near the bottom of the ribs, but not below the ribs. Right more than left.    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    Current Functional Status:   - lifting - had trouble lifting her kindergartener up to grab something off a shelf (~ 50Lbs)  - working out - previously had done turbokicks, would like to get back to it  Current HEP/exercise regimen: walking (35 minutes daily)  Transportation to Therapy: Independent with transportation  Live with Others: , 2 kids (5 and 13 years old), dog (65 Lbs),   Red Flags:   - Patient denies the following: Pain with Cough / Sneeze / Laughing ; Fever ; Weakness ; Numbness/Tingling ; Chest Pain ;   - Patient reports the following: Night Pain ;     Patient's goal(s): Be able to sleep  and sleep in.       Objective:    Seated Posture: mild-moderate forward head posture    Gait: WNL    Shoulder Screen: negative    Cervical Screen: negative    Lumbar AROM: (Major, Moderate, Minimal or Nil loss)  Movement Loss Lewis Mod Min Nil Pain   Flexion   x x Feels good   Extension   x x    Side Glide L    x    Side Glide R    x        Thoracic AROM: (Major, Moderate, Minimal or Nil loss)  Movement Loss Lewis Mod Min Nil Pain   Flexion   x x    Extension  x x     Rotation L   x     Rotation R  x      Side Bend L    x    Side Bend R    x      Thoracic Mobility/Spring Testing: stiffness of the lower thoracic spine.     Palpation: tenderness to palpation in the right lower thoracic paraspinals    Other Tests:   - poor lower rib mobility on the right during respiration. Improved with manual therapies.       Assessment/Plan:    Patient is a 44 year old female with thoracic complaints.    Patient has the following significant findings with corresponding treatment plan.                Referring Diagnosis: Acute bilateral thoracic back pain  Pain -  hot/cold therapy, manual therapy, splint/taping/bracing/orthotics, self management, education and home program  Decreased ROM/flexibility - manual therapy, therapeutic exercise, therapeutic activity and home program  Decreased joint mobility - manual therapy, therapeutic exercise, therapeutic activity and home program  Decreased function - therapeutic activities and home program  Impaired posture - neuro re-education, therapeutic activities and home program        Therapy Evaluation Codes:   1) History comprised of:   Personal factors that impact the plan of care:      None.    Comorbidity factors that impact the plan of care are:      Overweight and Recent Gastric Sleeve Surgery.     Medications impacting care: None.  2) Examination of Body Systems comprised of:   Body structures and functions that impact the plan of care:      Lumbar spine and Thoracic Spine.   Activity  limitations that impact the plan of care are:      Lifting, Working and Sleeping.  3) Clinical presentation characteristics are:   Evolving/Changing.  4) Decision-Making    Moderate complexity using standardized patient assessment instrument and/or measureable assessment of functional outcome.  Cumulative Therapy Evaluation is: Moderate complexity.    Previous and current functional limitations:  (See Goal Flow Sheet for this information)    Short term and Long term goals: (See Goal Flow Sheet for this information)     Communication ability:  Patient appears to be able to clearly communicate and understand verbal and written communication and follow directions correctly.  Treatment Explanation - The following has been discussed with the patient:   RX ordered/plan of care  Anticipated outcomes  Possible risks and side effects  This patient would benefit from PT intervention to resume normal activities.   Rehab potential is good.    Frequency:  1 X week, once daily  Duration:  for 4 weeks tapering to 2 X a month over 4 weeks  Discharge Plan:  Achieve all LTG.  Independent in home treatment program.  Reach maximal therapeutic benefit.    Please refer to the daily flowsheet for treatment today, total treatment time and time spent performing 1:1 timed codes.

## 2020-02-07 ASSESSMENT — PATIENT HEALTH QUESTIONNAIRE - PHQ9: SUM OF ALL RESPONSES TO PHQ QUESTIONS 1-9: 6

## 2020-02-14 ENCOUNTER — TRANSFERRED RECORDS (OUTPATIENT)
Dept: HEALTH INFORMATION MANAGEMENT | Facility: CLINIC | Age: 45
End: 2020-02-14

## 2020-02-14 ENCOUNTER — THERAPY VISIT (OUTPATIENT)
Dept: PHYSICAL THERAPY | Facility: CLINIC | Age: 45
End: 2020-02-14
Payer: COMMERCIAL

## 2020-02-14 DIAGNOSIS — M54.6 ACUTE BILATERAL THORACIC BACK PAIN: ICD-10-CM

## 2020-02-14 PROCEDURE — 97110 THERAPEUTIC EXERCISES: CPT | Mod: GP | Performed by: PHYSICAL THERAPIST

## 2020-02-14 PROCEDURE — 97140 MANUAL THERAPY 1/> REGIONS: CPT | Mod: GP | Performed by: PHYSICAL THERAPIST

## 2020-02-14 PROCEDURE — 97112 NEUROMUSCULAR REEDUCATION: CPT | Mod: GP | Performed by: PHYSICAL THERAPIST

## 2020-02-17 ENCOUNTER — TELEPHONE (OUTPATIENT)
Dept: SURGERY | Facility: CLINIC | Age: 45
End: 2020-02-17

## 2020-02-17 NOTE — TELEPHONE ENCOUNTER
Received MN dictation of 2/14/2020.  Patient to have EGD and colonoscopy in 2 months.  She is to have this done because she had high grade dysplasia with gastric polyp found with gastric sleeve 10/15/19.    Called patient to check when the above procedures will be done.  LM - when returns call ask dates of EGD and colonoscopy.  Jaelyn Bhatti, MS, RD, RN

## 2020-02-20 ENCOUNTER — THERAPY VISIT (OUTPATIENT)
Dept: PHYSICAL THERAPY | Facility: CLINIC | Age: 45
End: 2020-02-20
Attending: PHYSICIAN ASSISTANT
Payer: COMMERCIAL

## 2020-02-20 DIAGNOSIS — M54.6 ACUTE BILATERAL THORACIC BACK PAIN: ICD-10-CM

## 2020-02-20 PROCEDURE — 97140 MANUAL THERAPY 1/> REGIONS: CPT | Mod: GP | Performed by: PHYSICAL THERAPIST

## 2020-02-20 PROCEDURE — 97110 THERAPEUTIC EXERCISES: CPT | Mod: GP | Performed by: PHYSICAL THERAPIST

## 2020-02-20 PROCEDURE — 97112 NEUROMUSCULAR REEDUCATION: CPT | Mod: GP | Performed by: PHYSICAL THERAPIST

## 2020-02-23 ENCOUNTER — HEALTH MAINTENANCE LETTER (OUTPATIENT)
Age: 45
End: 2020-02-23

## 2020-03-03 ENCOUNTER — THERAPY VISIT (OUTPATIENT)
Dept: PHYSICAL THERAPY | Facility: CLINIC | Age: 45
End: 2020-03-03
Payer: COMMERCIAL

## 2020-03-03 DIAGNOSIS — M54.6 ACUTE BILATERAL THORACIC BACK PAIN: ICD-10-CM

## 2020-03-03 PROCEDURE — 97112 NEUROMUSCULAR REEDUCATION: CPT | Mod: GP | Performed by: PHYSICAL THERAPIST

## 2020-03-03 PROCEDURE — 97140 MANUAL THERAPY 1/> REGIONS: CPT | Mod: GP | Performed by: PHYSICAL THERAPIST

## 2020-03-03 PROCEDURE — 97110 THERAPEUTIC EXERCISES: CPT | Mod: GP | Performed by: PHYSICAL THERAPIST

## 2020-03-12 ENCOUNTER — THERAPY VISIT (OUTPATIENT)
Dept: PHYSICAL THERAPY | Facility: CLINIC | Age: 45
End: 2020-03-12
Payer: COMMERCIAL

## 2020-03-12 DIAGNOSIS — M54.6 ACUTE BILATERAL THORACIC BACK PAIN: ICD-10-CM

## 2020-03-12 PROCEDURE — 97110 THERAPEUTIC EXERCISES: CPT | Mod: GP | Performed by: PHYSICAL THERAPIST

## 2020-03-12 NOTE — Clinical Note
You're scheduled to see this patient upcoming. She has some back pain that does not seem to have improved with four visits of PT. She had a gastric sleeve procedure and I wanted to have her check in with a physician given the combination of a lack of improvement and the possible abdominal viscera referral. Let me know what you think.     --Lang

## 2020-03-12 NOTE — PROGRESS NOTES
PROGRESS  REPORT    Progress reporting period is from feb 6, 2020 to Mar 12, 2020.       SUBJECTIVE  Subjective changes noted by patient: Has been doing the band exercises adn the foam roller and separate days and has not had any issues. Patient states that her condition is virtually unchanged since starting therapy 4 visits ago.     Current pain level is unchanged  .     Initial Pain level: 8/10.   Changes in function:  None  Adverse reaction to treatment or activity: treatment - combination of foam rolling and exercises, activity - sleeping    OBJECTIVE  Changes noted in objective findings:  Some improvement in ROM without impact on symptoms    Thoracic AROM: (Major, Moderate, Minimal or Nil loss)  Movement Loss Lewis Mod Min Nil Pain   Flexion   x x    Extension   x     Rotation L   x     Rotation R   x     Side Bend L    x    Side Bend R    x        ASSESSMENT/PLAN  Updated problem list and treatment plan: Diagnosis 1:  Acute bilateral thoracic back pain  Pain -  hot/cold therapy, manual therapy, splint/taping/bracing/orthotics, self management, education and home program  Decreased ROM/flexibility - manual therapy, therapeutic exercise, therapeutic activity and home program  Decreased joint mobility - manual therapy, therapeutic exercise, therapeutic activity and home program  Decreased function - therapeutic activities and home program  Impaired posture - neuro re-education, therapeutic activities and home program     STG/LTGs have been met or progress has been made towards goals:  None  Assessment of Progress: The patient's condition is unchanged.  Self Management Plans:  Patient has been instructed in a home treatment program.  I have re-evaluated this patient and find that the nature, scope, duration and intensity of the therapy is appropriate for the medical condition of the patient.  Zee continues to require the following intervention to meet STG and LTG's:  TBD    Recommendations:  This patient would  benefit from further evaluation.  Patient has attended four visits in PT now, with today's visit being the fifth. No notable improvement. Recommend consultation with an orthopaedic specialist for additional workout and exploration of other treatment options that may be more effective, and to rule out abdominal visceral referral.     Please refer to the daily flowsheet for treatment today, total treatment time and time spent performing 1:1 timed codes.

## 2020-03-23 NOTE — PROGRESS NOTES
"Zee Kyle is a 44 year old female who is being evaluated via a billable telephone visit.      The patient has been notified of following:     \"This telephone visit will be conducted via a call between you and your physician/provider. We have found that certain health care needs can be provided without the need for a physical exam.  This service lets us provide the care you need with a short phone conversation.  If a prescription is necessary we can send it directly to your pharmacy.  If lab work is needed we can place an order for that and you can then stop by our lab to have the test done at a later time.    If during the course of the call the physician/provider feels a telephone visit is not appropriate, you will not be charged for this service.\"     Zee Kyle complains of    Chief Complaint   Patient presents with     RECHECK     return bariatric; PA/RD       I have reviewed and updated the patient's Past Medical History, Social History, Family History and Medication List.    ALLERGIES  Nsaids        BARIATRIC TELEPHONIC FOLLOW UP      March 23, 2020    HISTORY OF PRESENT ILLNESS: Pt presents today for her telephonic follow-up appointment status post laparoscopic gastric sleeve.     At her 3 month post op she was having some upper back pain. I ordered physical therapy. She attended 5 visits over a 6 week period with no notable improvement. They recommend consultation with an orthopaedic specialist for additional workout and exploration of other treatment options that may be more effective, and to rule out abdominal visceral referral.      Is aggravated in the morning after sleeping.       199 lbs 0 oz    Wt Readings from Last 4 Encounters:   03/24/20 199 lb (90.3 kg)   01/20/20 212 lb 11.2 oz (96.5 kg)   01/20/20 212 lb 11.2 oz (96.5 kg)   12/26/19 217 lb 12.8 oz (98.8 kg)       BARIATRIC METRICS:    Current Weight: 199 lb (90.3 kg)(pt reported)  Body mass index is 31.17 kg/m .   Wt change since " last visit (lbs): -13.7  Cumulative weight loss (lbs): 59.4    Patient is taking the following bariatric postoperative vitamins:  2 Complete multivitamins with minerals (at different times than calcium)   4000 International Units of Vitamin D daily (stopped after high lab at 3 months)  Caltrate 600 mg at noon and 3 pm.    1000 mcg of Vitamin B12 inj monthly  Vitron C  B complex 1x weekly (100 mg thiamine)     Pt is exercising by walking.  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    Will continue walking with the kids on week day.  (She has dogs and will be taking them as well.  The dogs pull so she is getting       OBESITY RELATED CONDITIONS:  High Blood Pressure-has been off medication since seeing PCP.    Sleep Apnea- using CPAP Averaging 5 Apnea/hour  Weight Bearing Joint Pain-Present     SOCIAL HISTORY:  Pt denies smoking.  Pt denies alcohol use.  Avoids NSAIDS.      REVIEW OF SYSTEMS:     GI:  Nausea-none  Vomiting-none  Diarrhea-none  Constipation-none  Dysphagia-none  Abdominal Pain-none  Heartburn-none     SKIN:  Intertriginous irritation-none        PSYCH:  Depression-stable. Is doing biweekly therapy.      :  Currently has Mirena IUD and oral Birth control.   Needs to renew Has been bleeding since it was placed in summer.        LABS/IMAGING/MEDICAL RECORDS REVIEW:   Vitamin D Deficiency screening   Date Value Ref Range Status   01/20/2020 126 (H) 20 - 75 ug/L Final     Comment:     Season, race, dietary intake, and treatment affect the concentration of   25-hydroxy-Vitamin D. Values may decrease during winter months and increase   during summer months. Values 20-29 ug/L may indicate Vitamin D insufficiency   and values <20 ug/L may indicate Vitamin D deficiency.  Vitamin D determination is routinely performed by an immunoassay specific for   25 hydroxyvitamin D3.  If an individual is on vitamin D2 (ergocalciferol)   supplementation, please specify 25 OH vitamin D2  "and D3 level determination by   LCMSMS test [id number].       Parathyroid Hormone Intact   Date Value Ref Range Status   01/20/2020 22 12 - 64 pg/mL Final     Vitamin B12   Date Value Ref Range Status   01/20/2020 1,262 (H) 193 - 986 pg/mL Final     Hemoglobin   Date Value Ref Range Status   10/16/2019 14.2 11.7 - 15.7 g/dL Final     Ferritin   Date Value Ref Range Status   01/20/2020 84 12 - 150 ng/mL Final     Iron   Date Value Ref Range Status   01/20/2020 54 35 - 180 ug/dL Final     Iron Binding Cap   Date Value Ref Range Status   01/20/2020 341 240 - 430 ug/dL Final     Iron Saturation Index   Date Value Ref Range Status   01/20/2020 16 15 - 46 % Final   08/16/2019 17 15 - 46 % Final   05/09/2019 4 (L) 15 - 46 % Final       PHYSICAL EXAMINATION:  Ht 5' 7\" (1.702 m)   Wt 199 lb (90.3 kg)   BMI 31.17 kg/m      GENERAL:   Pt sounds in NAD  Alert and Oriented      ASSESSMENT AND PLAN:       1. Bariatric surgery status  6 months status laparoscopic gastric sleeve     2. Class 1 obesity due to excess calories with serious comorbidity and body mass index (BMI) of 33.0 to 33.9 in adult  Obesity  Body mass index is 33.31 kg/m .     3. Postsurgical malabsorption  Continue taking recommended post-op vitamins but hold vitamin D for now.  Will check labs and update after seeing results.   Labs ordered per protocol.  - CBC; Future  - Vitamin D Screen; Future  - Parathyroid Hormone Intact; Future  - Iron and Iron Binding Capacity; Future  - Ferritin; Future    4. Fundic gland polyposis of stomach  Multiple fundic polyps with high grade dysplasia found s/p gastric sleeve 10/15/2019.  Ordered GI consult at 3 months post op.  Pt had consult and has endoscopy and colonoscopy scheduled for 4/21/2020.      5. Acute bilateral thoracic back pain  Pt attended PT for 5 visits.  She has referral to orthopedic surgeon but is currently on hold for initial visit due to Covid 19.  Is continuing abdominal strengthening exercises at home. Pain " is currently manageable with this.      6. Telogen Effluvium  Discussed patient information on telogen effluvium and discussed normal course of hair loss.      Follow up: Return to clinic in 6 months.       Phone call duration:  27 minutes    Tammi Cedillo PA-C

## 2020-03-24 ENCOUNTER — OFFICE VISIT (OUTPATIENT)
Dept: SURGERY | Facility: CLINIC | Age: 45
End: 2020-03-24
Payer: COMMERCIAL

## 2020-03-24 VITALS — WEIGHT: 199 LBS | HEIGHT: 67 IN | BODY MASS INDEX: 31.23 KG/M2

## 2020-03-24 VITALS — HEIGHT: 67 IN | WEIGHT: 199 LBS | BODY MASS INDEX: 31.23 KG/M2

## 2020-03-24 DIAGNOSIS — E66.01 MORBID OBESITY (H): ICD-10-CM

## 2020-03-24 DIAGNOSIS — Z98.84 BARIATRIC SURGERY STATUS: ICD-10-CM

## 2020-03-24 DIAGNOSIS — E66.811 CLASS 1 OBESITY DUE TO EXCESS CALORIES WITH SERIOUS COMORBIDITY AND BODY MASS INDEX (BMI) OF 31.0 TO 31.9 IN ADULT: Primary | ICD-10-CM

## 2020-03-24 DIAGNOSIS — E66.01 MORBID OBESITY WITH BMI OF 40.0-44.9, ADULT (H): ICD-10-CM

## 2020-03-24 DIAGNOSIS — L65.0 TELOGEN EFFLUVIUM: ICD-10-CM

## 2020-03-24 DIAGNOSIS — K91.2 POSTSURGICAL MALABSORPTION: ICD-10-CM

## 2020-03-24 DIAGNOSIS — E66.09 CLASS 1 OBESITY DUE TO EXCESS CALORIES WITH SERIOUS COMORBIDITY AND BODY MASS INDEX (BMI) OF 31.0 TO 31.9 IN ADULT: Primary | ICD-10-CM

## 2020-03-24 DIAGNOSIS — K31.7 FUNDIC GLAND POLYPOSIS OF STOMACH: ICD-10-CM

## 2020-03-24 DIAGNOSIS — M54.6 ACUTE BILATERAL THORACIC BACK PAIN: ICD-10-CM

## 2020-03-24 PROCEDURE — 99443 ZZC PHYSICIAN TELEPHONE EVALUATION 21-30 MIN: CPT | Performed by: PHYSICIAN ASSISTANT

## 2020-03-24 PROCEDURE — 97803 MED NUTRITION INDIV SUBSEQ: CPT | Mod: TEL | Performed by: DIETITIAN, REGISTERED

## 2020-03-24 ASSESSMENT — MIFFLIN-ST. JEOR
SCORE: 1595.29
SCORE: 1595.29

## 2020-03-24 NOTE — PROGRESS NOTES
"Zee Kyle is a 44 year old female who is being evaluated via a billable telephone visit.      The patient has been notified of following by MA:     \"This telephone visit will be conducted via a call between you and your physician/provider. We have found that certain health care needs can be provided without the need for a physical exam.  This service lets us provide the care you need with a short phone conversation.  If a prescription is necessary we can send it directly to your pharmacy.  If lab work is needed we can place an order for that and you can then stop by our lab to have the test done at a later time.    If during the course of the call the physician/provider feels a telephone visit is not appropriate, you will not be charged for this service.\"       NUTRITION POST OP APPOINTMENT  DATE OF VISIT: March 24, 2020    Zee Kyle  1975  female  [id number]  44 year old     ASSESSMENT:    REASON FOR VISIT:  Zee is a 44 year old year old female presents today for 6 month PO nutrition follow-up appointment.    DIAGNOSIS:  Status post gastric sleeve surgery.  Obesity Obesity Grade I BMI 30-34.9     ANTHROPOMETRICS:  Initial Weight: 279.9 lbs   Height: 170.2 cm (5' 7\")  Current Weight: 90.3 kg (199 lb)   BMI: 31.17 kg/(m^2).    VITAMINS AND MINERALS:  2 Multivitamin with Minerals (6am)  600 mg Calcium With Vitamin D BID (noon + 3pm)  Vitamin D held d/t elevated labs at 3m post-op  1000 mcg Vitamin B-12 injection - 1x/month  1 Vitron C (HS)  1 Vitamin B Complex (100mg Thiamine) - 1x/week    NUTRITION HISTORY:  Breakfast: 2 scrambled eggs +/- multigrain bread w/peanut butter   Lunch: leftovers   Supper: meatballs w/marinara sauce  shredded chicken + almonds + ingrid slaw (\"chinese chicken salad\")  steak (4oz) + potatoes + green beans   Snacks: none  Fluids consumed: Water/enhanced water (96oz)  Consuming liquid calories: No  Protein intake: 50-60 grams/day  Tolerate regular texture food: " Yes  Any foods not tolerated details: Yes  If any food not tolerated: milk, fried chicken   Portion size: 1 cup or more (1.5c, usually)  Take 20-30 minutes to consume each meal: Yes   Eat protein foods first: Yes  Fluids and meals separate by at least 30 minutes: Yes/usually   Chew foods thoroughly: Yes  Tolerating diet: Yes  Drinking high protein supplements: No  Consuming snacks per day: none  Additional Information: Pt feeling well and tolerating diet. Reviewed appropriate portions and recommended reduce intake to 1 cup per meal.       PHYSICAL ACTIVITY:  Type: walking/walking dog  Frequency (days per week): 5-7  Duration (min): 45    DIAGNOSIS:  Previous Nutrition Diagnosis: Altered gastrointestinal function related to alteration in gastrointestinal structure as evidenced by history of gastric sleeve surgery.- no change    Previous goals:  Have 3 food groups per meal - improving  Replace protein bar with a protein drink - not met/not using supplements  Continue to practice all post-op guidelines - met    Current Nutrition Diagnosis: Altered gastrointestinal function related to alteration in gastrointestinal structure as evidenced by history of gastric sleeve surgery.    INTERVENTION:   Nutrition Prescription: Eat 3 meals a day at regular intervals. Consume 60-90 grams of protein daily. Follow post-surgical vitamin and mineral protocol.  Assessed learning needs and learning preferences.    GOALS:  Limit meals to 1c in size  Have 3 food groups per meal   Try protein water between meals    Follow-Up:   Recommend standard post-op follow up visits to assist with lifestyle changes or per insurance.  Implementation: Discussed progress toward previous goals; reinforced importance of following bariatric lifestyle changes.    NUTRITION MONITORING AND EVALUATION:  Anticipated compliance: fair-good  Verbalized good understanding.    Follow up: Patient to follow up in 6 months, at 1y post-op    Phone call duration:  20  nayan Weiss RD, LD  Clinical Dietitian

## 2020-04-06 ENCOUNTER — E-VISIT (OUTPATIENT)
Dept: FAMILY MEDICINE | Facility: CLINIC | Age: 45
End: 2020-04-06
Payer: COMMERCIAL

## 2020-04-06 ENCOUNTER — MYC MEDICAL ADVICE (OUTPATIENT)
Dept: FAMILY MEDICINE | Facility: CLINIC | Age: 45
End: 2020-04-06

## 2020-04-06 DIAGNOSIS — L72.9 INFECTED CYST OF SKIN: Primary | ICD-10-CM

## 2020-04-06 DIAGNOSIS — L08.9 INFECTED CYST OF SKIN: Primary | ICD-10-CM

## 2020-04-06 PROCEDURE — 99421 OL DIG E/M SVC 5-10 MIN: CPT | Performed by: NURSE PRACTITIONER

## 2020-04-06 RX ORDER — CEPHALEXIN 500 MG/1
500 CAPSULE ORAL 4 TIMES DAILY
Qty: 28 CAPSULE | Refills: 0 | Status: SHIPPED | OUTPATIENT
Start: 2020-04-06 | End: 2020-04-13

## 2020-04-06 NOTE — TELEPHONE ENCOUNTER
Patient sent pictures of red, bump on back - advised Evisit, phone visit, oncare due to Covid 19 precautions in place     Umm Graves Registered Nurse   [facility]

## 2020-04-14 ENCOUNTER — TELEPHONE (OUTPATIENT)
Dept: SURGERY | Facility: CLINIC | Age: 45
End: 2020-04-14

## 2020-04-14 NOTE — TELEPHONE ENCOUNTER
Received [facility] dictation of 2/14/2020.  Patient to have EGD and colonoscopy in 2 months.  She is to have this done because she had high grade dysplasia with gastric polyp found with gastric sleeve 10/15/19.    Called [facility] to check if had above procedures done.  Per Rayo at [facility], patient is scheduled to have above procedures done 4/22/2020.  Will follow up after the procedure to get results.  Jealyn Bhatti, MS, RD, RN

## 2020-04-22 ENCOUNTER — TRANSFERRED RECORDS (OUTPATIENT)
Dept: HEALTH INFORMATION MANAGEMENT | Facility: CLINIC | Age: 45
End: 2020-04-22

## 2020-05-25 DIAGNOSIS — I10 BENIGN ESSENTIAL HYPERTENSION: ICD-10-CM

## 2020-05-26 RX ORDER — LISINOPRIL 5 MG/1
TABLET ORAL
Qty: 90 TABLET | Refills: 1 | Status: SHIPPED | OUTPATIENT
Start: 2020-05-26 | End: 2020-07-03

## 2020-05-26 NOTE — TELEPHONE ENCOUNTER
Routing refill request to provider for review/approval because:  Not active on medication list: Medication stopped by provider 12/26/2019 per chart review

## 2020-06-15 ENCOUNTER — HOSPITAL ENCOUNTER (OUTPATIENT)
Dept: MAMMOGRAPHY | Facility: CLINIC | Age: 45
Discharge: HOME | End: 2020-06-15
Attending: NURSE PRACTITIONER | Admitting: NURSE PRACTITIONER
Payer: COMMERCIAL

## 2020-06-15 DIAGNOSIS — Z98.84 BARIATRIC SURGERY STATUS: ICD-10-CM

## 2020-06-15 DIAGNOSIS — Z12.31 VISIT FOR SCREENING MAMMOGRAM: ICD-10-CM

## 2020-06-15 DIAGNOSIS — K91.2 POSTSURGICAL MALABSORPTION: ICD-10-CM

## 2020-06-15 LAB
ERYTHROCYTE [DISTWIDTH] IN BLOOD BY AUTOMATED COUNT: 12.9 % (ref 10–15)
HCT VFR BLD AUTO: 45.5 % (ref 35–47)
HGB BLD-MCNC: 15.2 G/DL (ref 11.7–15.7)
MCH RBC QN AUTO: 29.5 PG (ref 26.5–33)
MCHC RBC AUTO-ENTMCNC: 33.4 G/DL (ref 31.5–36.5)
MCV RBC AUTO: 88 FL (ref 78–100)
PLATELET # BLD AUTO: 338 10E9/L (ref 150–450)
PTH-INTACT SERPL-MCNC: 14 PG/ML (ref 18–80)
RBC # BLD AUTO: 5.16 10E12/L (ref 3.8–5.2)
WBC # BLD AUTO: 6.5 10E9/L (ref 4–11)

## 2020-06-15 PROCEDURE — 83970 ASSAY OF PARATHORMONE: CPT | Performed by: PHYSICIAN ASSISTANT

## 2020-06-15 PROCEDURE — 77063 BREAST TOMOSYNTHESIS BI: CPT

## 2020-06-15 PROCEDURE — 82306 VITAMIN D 25 HYDROXY: CPT | Performed by: PHYSICIAN ASSISTANT

## 2020-06-15 PROCEDURE — 36415 COLL VENOUS BLD VENIPUNCTURE: CPT | Performed by: PHYSICIAN ASSISTANT

## 2020-06-15 PROCEDURE — 85027 COMPLETE CBC AUTOMATED: CPT | Performed by: PHYSICIAN ASSISTANT

## 2020-06-16 LAB — DEPRECATED CALCIDIOL+CALCIFEROL SERPL-MC: 70 UG/L (ref 20–75)

## 2020-07-03 ENCOUNTER — VIRTUAL VISIT (OUTPATIENT)
Dept: FAMILY MEDICINE | Facility: CLINIC | Age: 45
End: 2020-07-03
Payer: COMMERCIAL

## 2020-07-03 DIAGNOSIS — F33.1 MAJOR DEPRESSIVE DISORDER, RECURRENT EPISODE, MODERATE (H): ICD-10-CM

## 2020-07-03 DIAGNOSIS — F41.9 ANXIETY: ICD-10-CM

## 2020-07-03 DIAGNOSIS — I10 BENIGN ESSENTIAL HYPERTENSION: Primary | ICD-10-CM

## 2020-07-03 PROBLEM — E66.01 MORBID OBESITY WITH BMI OF 40.0-44.9, ADULT (H): Status: RESOLVED | Noted: 2019-10-15 | Resolved: 2020-07-03

## 2020-07-03 PROBLEM — E66.01 MORBID OBESITY (H): Status: RESOLVED | Noted: 2018-07-10 | Resolved: 2020-07-03

## 2020-07-03 PROCEDURE — 99213 OFFICE O/P EST LOW 20 MIN: CPT | Mod: GT | Performed by: NURSE PRACTITIONER

## 2020-07-03 RX ORDER — VENLAFAXINE HYDROCHLORIDE 37.5 MG/1
37.5 CAPSULE, EXTENDED RELEASE ORAL DAILY
Qty: 90 CAPSULE | Refills: 1 | Status: CANCELLED | OUTPATIENT
Start: 2020-07-03

## 2020-07-03 RX ORDER — VENLAFAXINE HYDROCHLORIDE 37.5 MG/1
37.5 CAPSULE, EXTENDED RELEASE ORAL DAILY
Qty: 90 CAPSULE | Refills: 1 | Status: SHIPPED | OUTPATIENT
Start: 2020-07-03 | End: 2021-04-05

## 2020-07-03 RX ORDER — LISINOPRIL 5 MG/1
5 TABLET ORAL DAILY
Qty: 90 TABLET | Refills: 1 | Status: SHIPPED | OUTPATIENT
Start: 2020-07-03 | End: 2020-09-01

## 2020-07-03 ASSESSMENT — ANXIETY QUESTIONNAIRES
GAD7 TOTAL SCORE: 2
2. NOT BEING ABLE TO STOP OR CONTROL WORRYING: NOT AT ALL
6. BECOMING EASILY ANNOYED OR IRRITABLE: NOT AT ALL
3. WORRYING TOO MUCH ABOUT DIFFERENT THINGS: NOT AT ALL
5. BEING SO RESTLESS THAT IT IS HARD TO SIT STILL: NOT AT ALL
1. FEELING NERVOUS, ANXIOUS, OR ON EDGE: SEVERAL DAYS
IF YOU CHECKED OFF ANY PROBLEMS ON THIS QUESTIONNAIRE, HOW DIFFICULT HAVE THESE PROBLEMS MADE IT FOR YOU TO DO YOUR WORK, TAKE CARE OF THINGS AT HOME, OR GET ALONG WITH OTHER PEOPLE: NOT DIFFICULT AT ALL
7. FEELING AFRAID AS IF SOMETHING AWFUL MIGHT HAPPEN: NOT AT ALL

## 2020-07-03 ASSESSMENT — PATIENT HEALTH QUESTIONNAIRE - PHQ9
5. POOR APPETITE OR OVEREATING: SEVERAL DAYS
SUM OF ALL RESPONSES TO PHQ QUESTIONS 1-9: 2

## 2020-07-03 NOTE — PROGRESS NOTES
"Zee Kyle is a 44 year old female who is being evaluated via a billable video visit.      The patient has been notified of following:     \"This video visit will be conducted via a call between you and your physician/provider. We have found that certain health care needs can be provided without the need for an in-person physical exam.  This service lets us provide the care you need with a video conversation.  If a prescription is necessary we can send it directly to your pharmacy.  If lab work is needed we can place an order for that and you can then stop by our lab to have the test done at a later time.    Video visits are billed at different rates depending on your insurance coverage.  Please reach out to your insurance provider with any questions.    If during the course of the call the physician/provider feels a video visit is not appropriate, you will not be charged for this service.\"    Patient has given verbal consent for Video visit? Yes  How would you like to obtain your AVS? [facility]  Patient would like the video invitation sent by: Text to cell phone: [phone]  Will anyone else be joining your video visit? No  Subjective     Zee Kyle is a 44 year old female who presents today via video visit for the following health issues:    HPI  Hypertension Follow-up    Do you check your blood pressure regularly outside of the clinic? No     Are you following a low salt diet? No    Are your blood pressures ever more than 140 on the top number (systolic) OR more   than 90 on the bottom number (diastolic), for example 140/90? No    Depression and Anxiety Follow-Up    How are you doing with your depression since your last visit? Improved     How are you doing with your anxiety since your last visit?  Improved     Are you having other symptoms that might be associated with depression or anxiety? Yes:  lethargic     Have you had a significant life event? No     Do you have any concerns with your use of " alcohol or other drugs? No  Therapy every other week for 1 hour.  PHQ 9 of 2,  HENRI 7 of 2.    Gastric sleeve:  Had a gastric sleeve completed, weight now 192 lbs.    Menorrhagia:  Had breakthrough bleeding for 6 months on mirena, on OCP in addition, if continues will  Have IUD out and have ablation.   Social History     Tobacco Use     Smoking status: Former Smoker     Packs/day: 1.00     Years: 10.00     Pack years: 10.00     Types: Cigarettes     Start date: 1996     Last attempt to quit: 2/10/2003     Years since quittin.4     Smokeless tobacco: Never Used     Tobacco comment: Glad to be done   Substance Use Topics     Alcohol use: Yes     Alcohol/week: 0.0 standard drinks     Comment: occas     Drug use: No     PHQ 2019   PHQ-9 Total Score 5 0 6   Q9: Thoughts of better off dead/self-harm past 2 weeks Not at all Not at all Not at all     HENRI-7 SCORE 2018   Total Score - 1 (minimal anxiety) 4 (minimal anxiety)   Total Score 3 1 4     Last PHQ-9 7/3/2020   1.  Little interest or pleasure in doing things 0   2.  Feeling down, depressed, or hopeless 0   3.  Trouble falling or staying asleep, or sleeping too much 1   4.  Feeling tired or having little energy 1   5.  Poor appetite or overeating 0   6.  Feeling bad about yourself 0   7.  Trouble concentrating 0   8.  Moving slowly or restless 0   Q9: Thoughts of better off dead/self-harm past 2 weeks 0   PHQ-9 Total Score 2   Difficulty at work, home, or with people Not difficult at all     HENRI-7  7/3/2020   1. Feeling nervous, anxious, or on edge 1   2. Not being able to stop or control worrying 0   3. Worrying too much about different things 0   4. Trouble relaxing 1   5. Being so restless that it is hard to sit still 0   6. Becoming easily annoyed or irritable 0   7. Feeling afraid, as if something awful might happen 0   HENRI-7 Total Score 2   If you checked any problems, how difficult have they made it  for you to do your work, take care of things at home, or get along with other people? Not difficult at all     Video Start Time: 1530      Patient Active Problem List   Diagnosis     CARDIOVASCULAR SCREENING; LDL GOAL LESS THAN 160     Presbycusis     Hypothyroidism     Major depressive disorder, recurrent episode, moderate (H)     Anxiety     Morbid obesity (H)     CLARK (obstructive sleep apnea)     Intertrigo     Lower extremity edema     Low HDL (under 40)     Benign essential hypertension     Morbid obesity with BMI of 40.0-44.9, adult (H)     Acute bilateral thoracic back pain     Past Surgical History:   Procedure Laterality Date     ENT SURGERY       GENITOURINARY SURGERY  2017    bladder sling     LAPAROSCOPIC GASTRIC SLEEVE N/A 10/15/2019    Procedure: LAPAROSCOPIC GASTRIC SLEEVE;  Surgeon: Cesar Lee MD;  Location: [facility] OR     PE TUBES       TONSILLECTOMY       TYMPANOPLASTY, RT/LT      Tympanoplasty RT/LT       Social History     Tobacco Use     Smoking status: Former Smoker     Packs/day: 1.00     Years: 10.00     Pack years: 10.00     Types: Cigarettes     Start date: 1996     Last attempt to quit: 2/10/2003     Years since quittin.4     Smokeless tobacco: Never Used     Tobacco comment: Glad to be done   Substance Use Topics     Alcohol use: Yes     Alcohol/week: 0.0 standard drinks     Comment: occas     Family History   Problem Relation Age of Onset     Hypertension Father          08     Obesity Father      Cerebrovascular Disease Mother      Dementia Paternal Grandmother      Coronary Artery Disease Paternal Grandfather          Current Outpatient Medications   Medication Sig Dispense Refill     calcium carbonate 600 mg-vitamin D 400 units (CALTRATE) 600-400 MG-UNIT per tablet Take 1 tablet by mouth 2 times daily       childrens multivitamin w/iron (FLINTSTONES COMPLETE) chewable tablet Take 2 chew tab by mouth daily       Cholecalciferol (VITAMIN D3) 50 MCG ( UT) CAPS Take  "1 tablet by mouth daily       cyanocobalamin (CYANOCOBALAMIN) 1000 MCG/ML injection Inject 1 mL (1,000 mcg) Subcutaneous every 30 days 3 mL 1     FEROSUL 325 (65 Fe) MG tablet        ferrous fumarate 65 mg, [place]. FE,-Vitamin C 125 mg (VITRON C)  MG TABS tablet Take 1 tablet by mouth daily       levonorgestrel (MIRENA) 20 MCG/24HR IUD 1 each (20 mcg) by Intrauterine route continuous       lisinopril (ZESTRIL) 5 MG tablet TAKE 1 TABLET(5 MG) BY MOUTH DAILY 90 tablet 1     norethindrone-ethinyl estradiol (MICROGESTIN 1/20) 1-20 MG-MCG tablet        Syringe/Needle, Disp, 25G X 5/8\" 5 ML MISC 1 each every 30 days 3 each 1     venlafaxine (EFFEXOR-XR) 37.5 MG 24 hr capsule TAKE 1 CAPSULE(37.5 MG) BY MOUTH DAILY 90 capsule 1     vitamin B complex with vitamin C (VITAMIN  B COMPLEX) tablet Take 1 tablet by mouth daily       BP Readings from Last 3 Encounters:   01/20/20 (!) 120/90   12/26/19 112/77   10/29/19 139/81    Wt Readings from Last 3 Encounters:   03/24/20 90.3 kg (199 lb)   03/24/20 90.3 kg (199 lb)   01/20/20 96.5 kg (212 lb 11.2 oz)      Reviewed and updated as needed this visit by Provider         Review of Systems   CONSTITUTIONAL: NEGATIVE for fever, chills, change in weight  RESP: NEGATIVE for significant cough or SOB  CV: NEGATIVE for chest pain, palpitations or peripheral edema  PSYCHIATRIC: see HPI      Objective             Physical Exam     GENERAL: Healthy, alert and no distress  RESP: No audible wheeze, cough, or visible cyanosis.  No visible retractions or increased work of breathing.    SKIN: Visible skin clear. No significant rash, abnormal pigmentation or lesions.  NEURO: Cranial nerves grossly intact.  Mentation and speech appropriate for age.  PSYCH: Mentation appears normal, affect normal/bright, judgement and insight intact, normal speech and appearance well-groomed.              Assessment & Plan   Assessment  Zee was seen today for hypertension, anxiety and " "depression.    Diagnoses and all orders for this visit:    Benign essential hypertension: last /90, continues on lisinopril. Will order a home BP cuff to check BP at home if consistently< 140/90 may stop lisinopril, check every other week to make sure consistently stays <140/90.  -     lisinopril (ZESTRIL) 5 MG tablet; Take 1 tablet (5 mg) by mouth daily    Major depressive disorder, recurrent episode, moderate (H):  PHQ 9 of 2, refill.  -     venlafaxine (EFFEXOR-XR) 37.5 MG 24 hr capsule; Take 1 capsule (37.5 mg) by mouth daily    Anxiety:  HENRI 7 of 2, refill.  -     venlafaxine (EFFEXOR-XR) 37.5 MG 24 hr capsule; Take 1 capsule (37.5 mg) by mouth daily    BMI:   Estimated body mass index is 31.17 kg/m  as calculated from the following:    Height as of 3/24/20: 1.702 m (5' 7\").    Weight as of 3/24/20: 90.3 kg (199 lb). Commended for weight loss!!!    Return in about 6 months (around 1/3/2021) for Follow up in 6 months for depression.    Susan Haase, APRN CNP  [facility]          Video-Visit Details    Type of service:  Video Visit    Video End Time:3318    Originating Location (pt. Location): Home  Distant Location (provider location):  [facility]     Platform used for Video Visit: Tara    No follow-ups on file.     Susan Haase, CNP    "

## 2020-07-03 NOTE — TELEPHONE ENCOUNTER
Routing refill request to provider for review/approval because:  Labs out of range:  BP, PHQ  Patient needs to be seen because:  Patient due for visit    Care team please assist patient in scheduling appt.  PCP please advise on refill.    Lena DUDLEY RN, BSN

## 2020-07-03 NOTE — TELEPHONE ENCOUNTER
Requested Prescriptions   Pending Prescriptions Disp Refills     venlafaxine (EFFEXOR-XR) 37.5 MG 24 hr capsule 90 capsule 1     Sig: Take 1 capsule (37.5 mg) by mouth daily       There is no refill protocol information for this order        Last Written Prescription Date:  12/30/2019  Last Fill Quantity: 90,  # refills: 1   Last office visit: 12/26/2019 with prescribing provider:  Susan Haase   Future Office Visit:

## 2020-07-04 ASSESSMENT — ANXIETY QUESTIONNAIRES: GAD7 TOTAL SCORE: 2

## 2020-07-14 DIAGNOSIS — F41.9 ANXIETY: ICD-10-CM

## 2020-07-14 DIAGNOSIS — F33.1 MAJOR DEPRESSIVE DISORDER, RECURRENT EPISODE, MODERATE (H): ICD-10-CM

## 2020-07-14 NOTE — TELEPHONE ENCOUNTER
Routing refill request to provider for review/approval because:  Elevated BP    Génesis Mojica RN   [facility] -- Triage Nurse

## 2020-07-15 RX ORDER — VENLAFAXINE HYDROCHLORIDE 37.5 MG/1
CAPSULE, EXTENDED RELEASE ORAL
Qty: 90 CAPSULE | Refills: 1 | OUTPATIENT
Start: 2020-07-15

## 2020-07-15 NOTE — TELEPHONE ENCOUNTER
Called [facility] pharmacy in [place] on [address] to confirm rx had a refill and pharmacy staff informed RN pt already picked up prescription rx authorization is on file and request was an error.      Zofia Butterfield RN [facility]

## 2020-07-15 NOTE — TELEPHONE ENCOUNTER
Please call the pharmacy on this refill, I sent a refill for this med on 7/3/2020.  Thanks,  Susan Haase, CNP

## 2020-08-25 PROBLEM — M54.6 ACUTE BILATERAL THORACIC BACK PAIN: Status: RESOLVED | Noted: 2020-02-06 | Resolved: 2020-08-25

## 2020-08-25 NOTE — PROGRESS NOTES
Update as of August 25, 2020: Patient has failed to return to clinic. Current status unknown. This progress note shall serve as a discharge note.

## 2020-09-01 ENCOUNTER — OFFICE VISIT (OUTPATIENT)
Dept: FAMILY MEDICINE | Facility: CLINIC | Age: 45
End: 2020-09-01
Payer: COMMERCIAL

## 2020-09-01 VITALS
HEIGHT: 67 IN | HEART RATE: 71 BPM | DIASTOLIC BLOOD PRESSURE: 78 MMHG | WEIGHT: 200 LBS | RESPIRATION RATE: 18 BRPM | TEMPERATURE: 99.7 F | BODY MASS INDEX: 31.39 KG/M2 | SYSTOLIC BLOOD PRESSURE: 121 MMHG

## 2020-09-01 DIAGNOSIS — I10 BENIGN ESSENTIAL HYPERTENSION: ICD-10-CM

## 2020-09-01 DIAGNOSIS — M54.40 BACK PAIN OF LUMBAR REGION WITH SCIATICA: Primary | ICD-10-CM

## 2020-09-01 PROCEDURE — 99214 OFFICE O/P EST MOD 30 MIN: CPT | Performed by: NURSE PRACTITIONER

## 2020-09-01 RX ORDER — CYCLOBENZAPRINE HCL 5 MG
5 TABLET ORAL 2 TIMES DAILY PRN
Qty: 30 TABLET | Refills: 0 | Status: SHIPPED | OUTPATIENT
Start: 2020-09-01 | End: 2021-04-05 | Stop reason: DRUGHIGH

## 2020-09-01 RX ORDER — LISINOPRIL 5 MG/1
2.5 TABLET ORAL DAILY
Qty: 90 TABLET | Refills: 1 | Status: SHIPPED | OUTPATIENT
Start: 2020-09-01 | End: 2021-04-05

## 2020-09-01 ASSESSMENT — MIFFLIN-ST. JEOR: SCORE: 1599.82

## 2020-09-01 ASSESSMENT — ENCOUNTER SYMPTOMS: BACK PAIN: 1

## 2020-09-01 NOTE — PROGRESS NOTES
Subjective     Zee Kyle is a 44 year old female who presents to clinic today for the following health issues:    Back Pain      History of Present Illness        Back Pain:  She presents for follow up of back pain. Patient's back pain is a chronic problem.  Location of back pain:  Right lower back, left lower back, right middle of back, left middle of back, right buttock, left buttock, right hip and left hip  Description of back pain: cramping, sharp and shooting  Back pain spreads: right side of neck and left side of neck    Since patient first noticed back pain, pain is: gradually worsening  Does back pain interfere with her job:  No      She eats 4 or more servings of fruits and vegetables daily.She consumes 0 sweetened beverage(s) daily.She exercises with enough effort to increase her heart rate 9 or less minutes per day.  She exercises with enough effort to increase her heart rate 3 or less days per week. She is missing 2 dose(s) of medications per week.  She is not taking prescribed medications regularly due to remembering to take.         Back Pain  Onset/Duration: 1 year  Description:   Location of pain: bilateral lower and mid back  Character of pain: cramping and intermittent  Pain radiation: none and radiates into bilateral buttocks and up toward neck   New numbness or weakness in legs, not attributed to pain: no   Intensity: Currently 2/10, At its worst 7/10  Progression of Symptoms: intermittent  History:   Specific cause: none  Pain interferes with job: no  History of back problems: no prior back problems  Any previous MRI or X-rays: Yes- xray's at [facility].  Date 2018  Sees a specialist for back pain: No  Alleviating factors:   Improved by: massage    Precipitating factors:  Worsened by: Lying Flat   Therapies tried and outcome: chiropractor, heat, massage and Physical Therapy  Seen a chiropractor over a year ago did not have any relief of pain. Has also completed PT without decrease of  "pain.  Denies BLE numbness, tingling, weakness or bowel/bladder dysfunction.    HTN:  Decreased lisinopril to 2.5 mg daily, -130/70's.      {  Review of Systems   Musculoskeletal: Positive for back pain.      CONSTITUTIONAL: NEGATIVE for fever, chills, change in weight  RESP: NEGATIVE for significant cough or SOB  CV: NEGATIVE for chest pain, palpitations or peripheral edema  NEURO: NEGATIVE for weakness, dizziness or paresthesias      Objective    /78 (BP Location: Right arm, Patient Position: Chair, Cuff Size: Adult Large)   Pulse 71   Temp 99.7  F (37.6  C) (Tympanic)   Resp 18   Ht 1.702 m (5' 7\")   Wt 90.7 kg (200 lb)   Breastfeeding No   BMI 31.32 kg/m    Body mass index is 31.17 kg/m .  Physical Exam   GENERAL: healthy, alert and no distress  NECK: no adenopathy, no asymmetry, masses, or scars and thyroid normal to palpation  RESP: lungs clear to auscultation - no rales, rhonchi or wheezes  CV: regular rate and rhythm, normal S1 S2, no S3 or S4, no murmur, click or rub, no peripheral edema   MS: bilateral paraspinous lumbar tenderness to palpation, bilateral lower thoracic lumbar tenderness, no gross musculoskeletal defects noted, no edema. Walking with normal gait.  NEURO: BLE with normal strength, reflexes and tone.Able to walk on toes and heels. Straight leg raise negative.   PSYCH: mentation appears normal, affect normal/bright            Assessment & Plan     Back pain of lumbar region with sciatica:  Has undergone chiropractic care and physical therapy without decrease in pain.  Will prescribe flexeril to take at bedtime. Referral to orthopedics for further evaluation.    - Orthopedic [facility] Spine  Referral  - cyclobenzaprine (FLEXERIL) 5 MG tablet  Dispense: 30 tablet; Refill: 0    Benign essential hypertension:  /78  - lisinopril (ZESTRIL) 5 MG tablet  Dispense: 90 tablet; Refill: 1       BMI:   Estimated body mass index is 31.32 kg/m  as calculated from the following:    " "Height as of this encounter: 1.702 m (5' 7\").    Weight as of this encounter: 90.7 kg (200 lb).   Commended for weight loss of 75 lbs since gastric sleeve.      Follow up in 6 months for physical exam, sooner as needed.     Susan Haase, APRN CNP  [facility] Appointments   Date Time Provider Department Center   9/1/2020  1:40 PM Haase, Susan Rachele, APRN CNP [facility]     Appointment Notes for this encounter:    reviewed mid to low back pain     Health Maintenance Due   Topic Date Due     PREVENTIVE CARE VISIT  12/26/2019     INFLUENZA VACCINE (1) 09/01/2020     Health Maintenance addressed:  Flu Vaccine    Flu Vaccine Given during rooming process    MyChart Status:  Active and Using    "

## 2020-09-11 ENCOUNTER — OFFICE VISIT (OUTPATIENT)
Dept: ORTHOPEDICS | Facility: CLINIC | Age: 45
End: 2020-09-11
Attending: NURSE PRACTITIONER
Payer: COMMERCIAL

## 2020-09-11 ENCOUNTER — ANCILLARY PROCEDURE (OUTPATIENT)
Dept: GENERAL RADIOLOGY | Facility: CLINIC | Age: 45
End: 2020-09-11
Attending: FAMILY MEDICINE
Payer: COMMERCIAL

## 2020-09-11 VITALS
WEIGHT: 200 LBS | DIASTOLIC BLOOD PRESSURE: 88 MMHG | SYSTOLIC BLOOD PRESSURE: 120 MMHG | HEIGHT: 67 IN | BODY MASS INDEX: 31.39 KG/M2

## 2020-09-11 DIAGNOSIS — M54.6 CHRONIC BILATERAL THORACIC BACK PAIN: ICD-10-CM

## 2020-09-11 DIAGNOSIS — M54.41 CHRONIC BILATERAL LOW BACK PAIN WITH BILATERAL SCIATICA: ICD-10-CM

## 2020-09-11 DIAGNOSIS — M54.6 CHRONIC BILATERAL THORACIC BACK PAIN: Primary | ICD-10-CM

## 2020-09-11 DIAGNOSIS — M54.40 BACK PAIN OF LUMBAR REGION WITH SCIATICA: ICD-10-CM

## 2020-09-11 DIAGNOSIS — M54.42 CHRONIC BILATERAL LOW BACK PAIN WITH BILATERAL SCIATICA: ICD-10-CM

## 2020-09-11 DIAGNOSIS — G89.29 CHRONIC BILATERAL THORACIC BACK PAIN: Primary | ICD-10-CM

## 2020-09-11 DIAGNOSIS — G89.29 CHRONIC BILATERAL THORACIC BACK PAIN: ICD-10-CM

## 2020-09-11 DIAGNOSIS — M54.50 CHRONIC LOW BACK PAIN: ICD-10-CM

## 2020-09-11 DIAGNOSIS — G89.29 CHRONIC LOW BACK PAIN: ICD-10-CM

## 2020-09-11 DIAGNOSIS — G89.29 CHRONIC BILATERAL LOW BACK PAIN WITH BILATERAL SCIATICA: ICD-10-CM

## 2020-09-11 PROCEDURE — 72100 X-RAY EXAM L-S SPINE 2/3 VWS: CPT

## 2020-09-11 PROCEDURE — 99204 OFFICE O/P NEW MOD 45 MIN: CPT | Performed by: FAMILY MEDICINE

## 2020-09-11 PROCEDURE — 72070 X-RAY EXAM THORAC SPINE 2VWS: CPT

## 2020-09-11 RX ORDER — METHYLPREDNISOLONE 4 MG
TABLET, DOSE PACK ORAL
Qty: 21 TABLET | Refills: 0 | Status: SHIPPED | OUTPATIENT
Start: 2020-09-11 | End: 2021-04-05

## 2020-09-11 ASSESSMENT — MIFFLIN-ST. JEOR: SCORE: 1599.82

## 2020-09-11 NOTE — PROGRESS NOTES
ASSESSMENT & PLAN    1. Chronic bilateral thoracic back pain    2. Back pain of lumbar region with sciatica    3. Chronic bilateral low back pain with bilateral sciatica      Seen in consultation for chronic, bilateral, predominantly axial back pain that is severe and not associated with any trauma  Reports AM stiffness and pain with sleeping  Reviewed xray - no significant signs of disc degeneration/narrowing  Physical therapy: [facility] - [phone] with spine therapist  Rx for medrol/steroid. Take in the AM and with food. No other anti-inflammatories (Ibuprofen, Advil, Aleve, Motrin) while you're taking this.  Check with bariatric surgeon prior to starting this medication.   Check inflammatory labs given severity of pain with a benign xray  Follow-up after 6 weeks of therapy  -----    SUBJECTIVE  Zee Kyle is a/an 44 year old female who is seen in consultation at the request of  Susan Rachele Haase C.N.P. for evaluation of mid to low back pain. The patient is seen by themselves.    Onset: 1 years(s) ago. Reports insidious onset without acute precipitating event.  Location of Pain: bilateral mid to low back with pain radiating to bilateral buttocks  Rating of Pain at worst: 7/10  Rating of Pain Currently: 1/10  Worsened by: laying down (sides or back) - patient reports she is not a stomach sleeper  Better with: nothing  Treatments tried: rest/activity avoidance, ice, heat, Tylenol (PRN) and other medications: Cyclobenzaprine (Flexeril) , chiropractic care, physical therapy, home exercises, new mattress, different pillows  Quality: sharp  Red flags: Weakness: No, bowel/bladder loss: No, foot drop: No  Associated symptoms: no distal numbness or tingling; denies swelling or warmth  Orthopedic history: NO  Relevant surgical history: NO  Patient Social History: works at [email or web address] -     Patient's past medical, surgical, social, and family histories were reviewed today  "and no pertinent history related to patient's presenting problem.    REVIEW OF SYSTEMS:  10 point ROS is negative other than symptoms noted above in HPI, Past Medical History or as stated below  Constitutional: NEGATIVE for fever, chills, change in weight  Skin: NEGATIVE for worrisome rashes, moles or lesions  GI/: NEGATIVE for bowel or bladder changes  Neuro: NEGATIVE for weakness, dizziness or paresthesias    OBJECTIVE:  /88   Ht 1.702 m (5' 7\")   Wt 90.7 kg (200 lb)   BMI 31.32 kg/m     General: healthy, alert and in no distress  HEENT: no scleral icterus or conjunctival erythema  Skin: no suspicious lesions or rash. No jaundice.  CV: no pedal edema  Resp: normal respiratory effort without conversational dyspnea   Psych: normal mood and affect  Gait: normal steady gait with appropriate coordination and balance  Neuro: normal light touch sensory exam of the bilateral lower extremities.    MSK:  THORACIC/LUMBAR SPINE  Inspection:    No gross deformity/asymmetry  Palpation:    Tender about the lumbar facet joints (L5-S1). Otherwise remainder of landmarks are nontender.  Range of Motion:     Lumbar flexion full    Lumbar extension full  Strength:    able to heel walk, able to toe walk  Special Tests:    Negative: slump test (bilateral)    Independent visualization of the below image:  XR THORACIC SPINE TWO VIEWS  9/11/2020 4:49 PM      COMPARISON: None     HISTORY: Chronic bilateral thoracic back pain.                                                                        IMPRESSION: There is normal alignment of the thoracic vertebrae.  Vertebral body heights of the thoracic spine are normal. No evidence  for fracture of the thoracic spine. No significant degenerative change  noted.     CAROLINA GONZALEZ MD    XR LUMBAR SPINE TWO-THREE VIEWS  9/11/2020 4:50 PM      COMPARISON: None     HISTORY: Chronic low back pain.                                                                        IMPRESSION: There is " normal alignment of the lumbar vertebrae.  Vertebral body heights of the lumbar spine are normal. Lumbar  intervertebral disc space heights are normal. No fracture. Facet  arthropathy at the L4-L5 and L5-S1 levels; no other significant  degenerative change.     MD Quyen FABIAN, DO [facility]

## 2020-09-11 NOTE — PATIENT INSTRUCTIONS
1. Chronic bilateral thoracic back pain    2. Back pain of lumbar region with sciatica    3. Chronic bilateral low back pain with bilateral sciatica      Reviewed xray - no significant signs of disc degeneration/narrowing  Physical therapy: [facility] - [phone] with spine therapist  Rx for medrol/steroid. Take in the AM and with food. No other anti-inflammatories (Ibuprofen, Advil, Aleve, Motrin) while you're taking this.  Check with your bariatric surgeon prior to starting this medication. I don't suspect this will be a problem but let's double check with them.  Check inflammatory labs to insure there aren't other reasons for your back pain    Follow-up after 6 weeks of therapy

## 2020-09-11 NOTE — LETTER
9/11/2020         RE: Zee Kyle  [address] [facility] [id number]        Dear Colleague,    Thank you for referring your patient, Zee Kyle, to the [facility]. Please see a copy of my visit note below.    ASSESSMENT & PLAN    1. Chronic bilateral thoracic back pain    2. Back pain of lumbar region with sciatica    3. Chronic bilateral low back pain with bilateral sciatica      Seen in consultation for chronic, bilateral, predominantly axial back pain that is severe and not associated with any trauma  Reports AM stiffness and pain with sleeping  Reviewed xray - no significant signs of disc degeneration/narrowing  Physical therapy: [facility] - [phone] with spine therapist  Rx for medrol/steroid. Take in the AM and with food. No other anti-inflammatories (Ibuprofen, Advil, Aleve, Motrin) while you're taking this.  Check with bariatric surgeon prior to starting this medication.   Check inflammatory labs given severity of pain with a benign xray  Follow-up after 6 weeks of therapy  -----    SUBJECTIVE  Zee Kyle is a/an 44 year old female who is seen in consultation at the request of  Susan Rachele Haase C.N.P. for evaluation of mid to low back pain. The patient is seen by themselves.    Onset: 1 years(s) ago. Reports insidious onset without acute precipitating event.  Location of Pain: bilateral mid to low back with pain radiating to bilateral buttocks  Rating of Pain at worst: 7/10  Rating of Pain Currently: 1/10  Worsened by: laying down (sides or back) - patient reports she is not a stomach sleeper  Better with: nothing  Treatments tried: rest/activity avoidance, ice, heat, Tylenol (PRN) and other medications: Cyclobenzaprine (Flexeril) , chiropractic care, physical therapy, home exercises, new mattress, different pillows  Quality: sharp  Red flags: Weakness: No, bowel/bladder loss: No, foot drop: No  Associated symptoms: no  "distal numbness or tingling; denies swelling or warmth  Orthopedic history: NO  Relevant surgical history: NO  Patient Social History: works at "[email or web address] teacher    Patient's past medical, surgical, social, and family histories were reviewed today and no pertinent history related to patient's presenting problem.    REVIEW OF SYSTEMS:  10 point ROS is negative other than symptoms noted above in HPI, Past Medical History or as stated below  Constitutional: NEGATIVE for fever, chills, change in weight  Skin: NEGATIVE for worrisome rashes, moles or lesions  GI/: NEGATIVE for bowel or bladder changes  Neuro: NEGATIVE for weakness, dizziness or paresthesias    OBJECTIVE:  /88   Ht 1.702 m (5' 7\")   Wt 90.7 kg (200 lb)   BMI 31.32 kg/m     General: healthy, alert and in no distress  HEENT: no scleral icterus or conjunctival erythema  Skin: no suspicious lesions or rash. No jaundice.  CV: no pedal edema  Resp: normal respiratory effort without conversational dyspnea   Psych: normal mood and affect  Gait: normal steady gait with appropriate coordination and balance  Neuro: normal light touch sensory exam of the bilateral lower extremities.    MSK:  THORACIC/LUMBAR SPINE  Inspection:    No gross deformity/asymmetry  Palpation:    Tender about the lumbar facet joints (L5-S1). Otherwise remainder of landmarks are nontender.  Range of Motion:     Lumbar flexion full    Lumbar extension full  Strength:    able to heel walk, able to toe walk  Special Tests:    Negative: slump test (bilateral)    Independent visualization of the below image:  XR THORACIC SPINE TWO VIEWS  9/11/2020 4:49 PM      COMPARISON: None     HISTORY: Chronic bilateral thoracic back pain.                                                                        IMPRESSION: There is normal alignment of the thoracic vertebrae.  Vertebral body heights of the thoracic spine are normal. No evidence  for fracture of the thoracic spine. No " significant degenerative change  noted.     CAROLINA GONZALEZ MD    XR LUMBAR SPINE TWO-THREE VIEWS  9/11/2020 4:50 PM      COMPARISON: None     HISTORY: Chronic low back pain.                                                                        IMPRESSION: There is normal alignment of the lumbar vertebrae.  Vertebral body heights of the lumbar spine are normal. Lumbar  intervertebral disc space heights are normal. No fracture. Facet  arthropathy at the L4-L5 and L5-S1 levels; no other significant  degenerative change.     MD Quyen FABIAN,  [facility]      Again, thank you for allowing me to participate in the care of your patient.        Sincerely,        Quyen Acosta DO

## 2020-10-05 ENCOUNTER — THERAPY VISIT (OUTPATIENT)
Dept: PHYSICAL THERAPY | Facility: CLINIC | Age: 45
End: 2020-10-05
Payer: COMMERCIAL

## 2020-10-05 DIAGNOSIS — M54.6 CHRONIC BILATERAL THORACIC BACK PAIN: ICD-10-CM

## 2020-10-05 DIAGNOSIS — G89.29 CHRONIC BILATERAL LOW BACK PAIN WITH BILATERAL SCIATICA: ICD-10-CM

## 2020-10-05 DIAGNOSIS — M54.41 CHRONIC BILATERAL LOW BACK PAIN WITH BILATERAL SCIATICA: ICD-10-CM

## 2020-10-05 DIAGNOSIS — G89.29 CHRONIC BILATERAL THORACIC BACK PAIN: ICD-10-CM

## 2020-10-05 DIAGNOSIS — M54.42 CHRONIC BILATERAL LOW BACK PAIN WITH BILATERAL SCIATICA: ICD-10-CM

## 2020-10-05 PROCEDURE — 97110 THERAPEUTIC EXERCISES: CPT | Mod: GP | Performed by: PHYSICAL THERAPIST

## 2020-10-05 PROCEDURE — 97112 NEUROMUSCULAR REEDUCATION: CPT | Mod: GP | Performed by: PHYSICAL THERAPIST

## 2020-10-05 PROCEDURE — 97162 PT EVAL MOD COMPLEX 30 MIN: CPT | Mod: GP | Performed by: PHYSICAL THERAPIST

## 2020-10-05 NOTE — PROGRESS NOTES
"[facility] Initial Evaluation -- Lumbar    Date: October 5, 2020  Zee Kyle is a 44 year old female with a thoracic/lumbar condition.   Referral: Dr. Hayes  Work mechanical stresses:    Employment status:  Full time  Leisure mechanical stresses: walking  Functional disability score (SONNY/STarT Back):  See flow sheet  VAS score (0-10): 3/10  Patient goals/expectations:  \"to get rid of the pain\"    HISTORY:    Present symptoms: lower thoracic/upper lumbar  Pain quality (sharp/shooting/stabbing/aching/burning/cramping):  aching   Paresthesia (yes/no):  no    Present since (onset date): October 2020.     Symptoms (improving/unchanging/worsening):  worsening.     Symptoms commenced as a result of: no apparent reason   Condition occurred in the following environment:   home     Symptoms at onset (back/thigh/leg): back  Constant symptoms (back/thigh/leg): back  Intermittent symptoms (back/thigh/leg):     Symptoms are made worse with the following: Always Lying and Time of day - Always AM   Symptoms are made better with the following: Time of day - Always as the day progresses and Always On the move    Disturbed sleep (yes/no):  yes Sleeping postures (prone/sup/side R/L): sides/back    Previous episodes (0/1-5/6-10/11+): none Year of first episode: 2019    Previous history: none previous  Previous treatments: PT, not helpful      Specific Questions:  Cough/Sneeze/Strain (pos/neg): neg  Bowel/Bladder (normal/abnormal): normal  Gait (normal/abnormal): normal  Medications (nil/NSAIDS/analg/steroids/anticoag/other):  NSAIDS and Other - Anti-depressants and going to be taking medrol pack   Medical allergies:  none  General health (excellent/good/fair/poor):  fair  Pertinent medical history:  Depression, High blood pressure, Implanted device, Mental illness, Overweight and Sleep disorder/apnea  Imaging (None/Xray/MRI/Other):  xrays  Recent or major surgery (yes/no):  Had bariatric " surgery one year ago  Night pain (yes/no): no  Accidents (yes/no): no  Unexplained weight loss (yes/no): no  Barriers at home: none  Other red flags: none    EXAMINATION    Posture:   Sitting (good/fair/poor): fair  Standing (good/fair/poor):fair  Lordosis (red/acc/normal): normal, incr thoracic curve   Correction of posture (better/worse/no effect): better    Lateral Shift (right/left/nil): nil  Relevant (yes/no):  no  Other Observations: none    Neurological:    Motor deficit:    Reflexes:    Sensory deficit:    Dural signs:      Movement Loss:   Lewis Mod Min Nil Pain   Flexion    x    Extension  x x  erp   Side Gliding R  x x  pdm   Side Gliding L    Thoracic:  Flexion  Extension  R Rotation  L Rotation          x  x   x            x       x pdm     Test Movements:   During: produces, abolishes, increases, decreases, no effect, centralizing, peripheralizing   After: better, worse, no better, no worse, no effect, centralized, peripheralized    Pretest symptoms standing:    Symptoms During Symptoms After ROM increased ROM decreased No Effect   FIS        Rep FIS        EIS        Rep EIS        Pretest symptoms lying: mid/lower thoracic spine, central    Symptoms During Symptoms After ROM increased ROM decreased No Effect   OZZIE        Rep OZZIE        EIL (over foam roller) Produces    No Worse         Rep EIL (over foam roller) Decreases    Better    x     If required, pretest symptoms:    Symptoms During Symptoms After ROM increased ROM decreased No Effect   SGIS - R        Rep SGIS - R        SGIS - L        Rep SGIS - L          Static Tests:  Sitting slouched:    Sitting erect:    Standing slouched   Standing erect:    Lying prone in extension:   Long sitting:      Other Tests:     Provisional Classification:  Derangement - Bilateral, symmetrical, symptoms above knee    Principle of Management:  Education:  Posture, therapeutic dose of exercise   Equipment provided:  Lumbar roll  Mechanical therapy (Y/N):   Y   Extension principle:  Rep Thx Ext over foam roller  Lateral Principle:    Flexion principle:    Other:      ASSESSMENT/PLAN:    Patient is a 44 year old female with thoracic complaints.    Patient has the following significant findings with corresponding treatment plan.                Diagnosis 1:  Thoracic spine pain  Pain -  manual therapy, self management, education, directional preference exercise and home program  Decreased ROM/flexibility - manual therapy and therapeutic exercise  Decreased strength - therapeutic exercise and therapeutic activities  Decreased function - therapeutic activities  Impaired posture - neuro re-education    Therapy Evaluation Codes:   1) History comprised of:   Personal factors that impact the plan of care:      None.    Comorbidity factors that impact the plan of care are:      Depression, High blood pressure, Implanted device, Mental illness, Overweight and Sleep disorder/apnea.     Medications impacting care: Anti-depressant, High blood pressure, Muscle relaxant and Pain.  2) Examination of Body Systems comprised of:   Body structures and functions that impact the plan of care:      Thoracic Spine.   Activity limitations that impact the plan of care are:      Sitting and Sleeping.  3) Clinical presentation characteristics are:   Evolving/Changing.  4) Decision-Making    Moderate complexity using standardized patient assessment instrument and/or measureable assessment of functional outcome.  Cumulative Therapy Evaluation is: Moderate complexity.    Previous and current functional limitations:  (See Goal Flow Sheet for this information)    Short term and Long term goals: (See Goal Flow Sheet for this information)     Communication ability:  Patient appears to be able to clearly communicate and understand verbal and written communication and follow directions correctly.  Treatment Explanation - The following has been discussed with the patient:   RX ordered/plan of  care  Anticipated outcomes  Possible risks and side effects  This patient would benefit from PT intervention to resume normal activities.   Rehab potential is good.    Frequency:  1 X week, once daily  Duration:  for 4 weeks  Discharge Plan:  Achieve all LTG.  Independent in home treatment program.  Reach maximal therapeutic benefit.    Please refer to the daily flowsheet for treatment today, total treatment time and time spent performing 1:1 timed codes.

## 2020-10-13 ENCOUNTER — THERAPY VISIT (OUTPATIENT)
Dept: PHYSICAL THERAPY | Facility: CLINIC | Age: 45
End: 2020-10-13
Payer: COMMERCIAL

## 2020-10-13 DIAGNOSIS — M54.6 PAIN IN THORACIC SPINE: ICD-10-CM

## 2020-10-13 PROCEDURE — 97110 THERAPEUTIC EXERCISES: CPT | Mod: GP | Performed by: PHYSICAL THERAPIST

## 2020-10-21 ENCOUNTER — THERAPY VISIT (OUTPATIENT)
Dept: PHYSICAL THERAPY | Facility: CLINIC | Age: 45
End: 2020-10-21
Payer: COMMERCIAL

## 2020-10-21 DIAGNOSIS — M54.6 PAIN IN THORACIC SPINE: ICD-10-CM

## 2020-10-21 PROCEDURE — 97110 THERAPEUTIC EXERCISES: CPT | Mod: GP | Performed by: PHYSICAL THERAPIST

## 2020-10-21 PROCEDURE — 97140 MANUAL THERAPY 1/> REGIONS: CPT | Mod: GP | Performed by: PHYSICAL THERAPIST

## 2020-10-21 NOTE — PROGRESS NOTES
DISCHARGE REPORT    Progress reporting period is from 10-5-20 to 10-21-20.       SUBJECTIVE  Subjective changes noted by patient:  .  Subjective: Overall unchanged; notes position of supine with legs elevated but does not give lasting relief.  Due to lack of ability to progress patient discussed with her referral to chiro for possible further assessment/treatment.    Current pain level is 3/10 Current Pain level: 3/10.     Previous pain level was  3/10 Initial Pain level: 3/10.   Changes in function:  None  Adverse reaction to treatment or activity: treatment - worse with repeated movements vs sustained positions    OBJECTIVE  Changes noted in objective findings:  The objective findings below are from DOS 10-21-20.  Objective: repeated movement testing worsens symptoms; previous strenthening exercises had no effect on concordant baselines.  Trial of chiro to address limited mobility seems appropriate and patientt was referred.     ASSESSMENT/PLAN  Updated problem list and treatment plan: Diagnosis 1:  Chronic thoracic spine pain  Pain -  manual therapy, splint/taping/bracing/orthotics, self management, education and directional preference exercise  Decreased ROM/flexibility - manual therapy and therapeutic exercise  Decreased strength - therapeutic exercise and therapeutic activities  Decreased function - therapeutic activities  Impaired posture - neuro re-education  STG/LTGs have been met or progress has been made towards goals:  None  Assessment of Progress: The patient's condition is unchanged.  Self Management Plans:  Patient is independent in a home treatment program.  Patient is independent in self management of symptoms.  I have re-evaluated this patient and find that the nature, scope, duration and intensity of the therapy is appropriate for the medical condition of the patient.  Zee continues to require the following intervention to meet STG and LTG's:  PT intervention is no longer required to meet  STG/LTG.    Recommendations:  This patient would benefit from further evaluation.    Please refer to the daily flowsheet for treatment today, total treatment time and time spent performing 1:1 timed codes.

## 2020-11-10 ENCOUNTER — THERAPY VISIT (OUTPATIENT)
Dept: CHIROPRACTIC MEDICINE | Facility: CLINIC | Age: 45
End: 2020-11-10
Payer: COMMERCIAL

## 2020-11-10 DIAGNOSIS — M99.02 THORACIC SEGMENT DYSFUNCTION: Primary | ICD-10-CM

## 2020-11-10 DIAGNOSIS — M99.04 SOMATIC DYSFUNCTION OF SACRAL REGION: ICD-10-CM

## 2020-11-10 DIAGNOSIS — M99.03 SOMATIC DYSFUNCTION OF LUMBAR REGION: ICD-10-CM

## 2020-11-10 DIAGNOSIS — M54.6 PAIN IN THORACIC SPINE: ICD-10-CM

## 2020-11-10 PROCEDURE — 97110 THERAPEUTIC EXERCISES: CPT | Mod: GP | Performed by: CHIROPRACTOR

## 2020-11-10 PROCEDURE — 99203 OFFICE O/P NEW LOW 30 MIN: CPT | Mod: 25 | Performed by: CHIROPRACTOR

## 2020-11-10 PROCEDURE — 98941 CHIROPRACT MANJ 3-4 REGIONS: CPT | Mod: AT | Performed by: CHIROPRACTOR

## 2020-11-10 PROCEDURE — 97035 APP MDLTY 1+ULTRASOUND EA 15: CPT | Mod: GP | Performed by: CHIROPRACTOR

## 2020-11-10 NOTE — PROGRESS NOTES
Initial Chiropractic Clinic Visit    PCP: Haase, Susan Rachele    Zee Kyle is a 45 year old female who is seen  in consultation at the request of  Brenda Chahal PT presenting with lower thoracic/upper lumbar spine pain. Patient reports that the onset was 1 year ago. Patient reports at that time she had weight loss surgery. Patient reports losing weight very quickly.Patient is unsure as to the cause of this back pain.  When asked, patient denies:, falling, slipping, bending and reaching or sleeping awkwardly. Patient reports she did try a couple chiropractic treatments a year ago and has had a couple sessions of physical therapy.  Prior to onset, the patient was able to sit 2 hours without mid to lower back pain. Patient notes that due to symptoms, they can only sit for 1/2 hour before symptoms increase. Sleep is disturbed 2-3 hours per night due to pain. Zee Kyle notes   sleeping rated at a 4/10 difficulty  and prior to this incident it was 0/10.        Injury: no known injuries    Location of Pain: bilateral lower thoracic, upper lumbar-slight worse on the left side. Stiffness over the sacrum at the following level(s) T8 , T11 , T12 , L1, L2, Sacrum  and PSIS Left   Duration of Pain: 1 year  Rating of Pain at worst: 6/10  Rating of Pain Currently: 3/10  Symptoms are better with: walking  Symptoms are worse with: extension movements, sitting prolonged periods of time, sleeping for longer than 4-5 hours.   Additional Features: Denies LE pain or weakness     Health History  as reported by the patient:    How does the patient rate their own health:   Good    Current or past medical history:   Depression, High blood pressure, Mental Illness and Overweight    Medical allergies  None    Past Traumas/Surgeries  Other:  VGS, ear, tonsils    Family History  This patient has no significant family history    Medications:  Anti-depressants, High blood pressure and Pain    Occupation:  Chemistry  "teacher    Primary job tasks:   Computer work, Prolonged sitting and Prolonged standing    Barriers as home/work:   none    Additional health Issues:     ESSENCE Koch was asked to complete the Oswestry Low Back Disability Index and Didier Start Back screening tool, today in the office.  Disability score: 10%.     Review of Systems  Musculoskeletal: as above  Remainder of review of systems is negative including constitutional, CV, pulmonary, GI, Skin and Neurologic except as noted in HPI or medical history.    Past Medical History:   Diagnosis Date     Anxiety Ongoing since 18     Depressive disorder on going since 18     Earache or other ear, nose, or throat complaint 12    Tonsillectomy 7/02     Esophageal reflux     Had stopped once I got my sleep apnea mask 5/18     Herpes simplex without mention of complication      Hypertension     While Pregnant     Hypothyroidism 8/7/2012     Presbyacusis     Presbycusis     Sleep apnea      STD (sexually transmitted disease)     Herpes     Urinary incontinence     Bladder sling surgery 7/17     Past Surgical History:   Procedure Laterality Date     ENT SURGERY       GENITOURINARY SURGERY  2017    bladder sling     LAPAROSCOPIC GASTRIC SLEEVE N/A 10/15/2019    Procedure: LAPAROSCOPIC GASTRIC SLEEVE;  Surgeon: Cesar Lee MD;  Location: [facility]     PE TUBES       TONSILLECTOMY       TYMPANOPLASTY, RT/LT      Tympanoplasty RT/LT     Objective  There were no vitals taken for this visit.      GENERAL APPEARANCE: healthy, alert and no distress   GAIT: NORMAL  SKIN: no suspicious lesions or rashes  NEURO: Normal strength and tone, mentation intact and speech normal  PSYCH:  mentation appears normal and affect normal/bright    Low back exam:    Inspection:  \"     no visible deformity in the low back       normal skin\",    Lumbar ROM:       full flexion       limited extension due to pain    Thoracic ROM:  Moderate reduction with lower thoracic pain on left and right " rotation    Tender:       paraspinal muscles    Non Tender:       remainder of lumbar spine    Strength:       ankle dorsiflexion 5/5 Normal       ankle plantarflexion 5/5 Normal       dorsiflexion of the great toe 5/5 Normal    Reflexes:       patellar (L3, L4) 2 bilaterally       achilles tendons (S1) 2 bilaterally    Sensation:      grossly intact throughout lower extremities    Special tests:  Kemps - Right negative and Left positive, produced mild back pain and Slump - Right negative and Left negative      Segmental spinal dysfunction/restrictions found at:  T11 , T12 , L1, L2, Sacrum  and PSIS Left       The following soft tissue hypotonicities were observed:   Quad lumb: left, ache and stiff, referred pain: no  T paraspinals: left, ache and stiff, no    Trigger points were found in:  Lumbar erector spine and T-spine paraspinal    Muscle spasm found in:  Lumbar erector spine, Quad lumb and T-spine paraspinal      Radiology:  none    Assessment:    No diagnosis found.    RX ordered/plan of care  Anticipated outcomes  Possible risks and side effects    After discussing the risk and benefits of care, patient consented to treatment    Prognosis: fair      Patient's condition:  Patient had restrictions pre-manipulation    Treatment effectiveness:  Post manipulation there is better intersegmental movement and Patient claims to feel looser post manipulation      Plan:    Procedures:  Evaluation and Management:  10204 Moderate level exam 30 min    CMT:  07763 Chiropractic manipulative treatment 3-4 regions performed   Thoracic: Diversified, T10, T11, Prone  Lumbar: Diversified, L1, L2, Side posture  Pelvis: Drop Table, Sacrum , PSIS Left , Prone    Modalities:  56121: US:  2.0 Alicea/cm squared for 8 minutes at 1mhz  cont pulsed, Location:T/L spine    Therapeutic procedures:  89612: Therapeutic Exercises  Direct one-on-one treatment to develop flexibilty, range of motion, strength and endurance.   Strengthening -  Pictures and instructions for home exercise program as well as in-office session of a minimum of 8 minutes of the exercise was done today.   Bridge- for glute strength, plank with knees for core and abdominal strengthening, all four's-core strengthening. Core strength needed to decrease muscle spasm of thoracic/lumbar paraspinals and improve stability of spinal joints.     Response to Treatment  Reduction in symptoms as reported by patient      Treatment plan and goals:  Goals:  SITTING: the patient specific goal is to attain pre-injury status of  2 hours comfortably  PAIN: the patient specific goal of thier symptoms is to attain the pre-inury state of 0-1/10 on the VAS    Frequency of care  Duration of care is estimated to be 6 weeks, from the initial treatment.  It is estimated that the patient will need a total of 4-6 visits to resolve this episode.  For the initial therapeutic trial of care, the frequency is recommended at 1 X week, once daily.  A reevaluation would be clinically appropriate in 6 visits, to determine progress and further course of care.    In-Office Treatment  Evaluation  86693: Therapeutic Exercises  Direct one-on-one treatment to develop flexibilty, range of motion, strength and endurance.   Strengthening - Pictures and instructions for home exercise program as well as in-office session of a minimum of 8 minutes of the exercise was done today.   Bridge- for glute strength, plank with knees for core and abdominal strengthening, all four's-core strengthening.      Recommendations:    Instructions:ice 20 minutes every other hour as needed and core exercises    Follow-up:  Return to care in one week.       Discussed the assessment with the patient.      Disclaimer: This note consists of symbols derived from keyboarding, dictation and/or voice recognition software. As a result, there may be errors in the script that have gone undetected. Please consider this when interpreting information found in  this chart.

## 2020-12-01 ENCOUNTER — THERAPY VISIT (OUTPATIENT)
Dept: CHIROPRACTIC MEDICINE | Facility: CLINIC | Age: 45
End: 2020-12-01
Payer: COMMERCIAL

## 2020-12-01 DIAGNOSIS — M99.03 SOMATIC DYSFUNCTION OF LUMBAR REGION: Primary | ICD-10-CM

## 2020-12-01 DIAGNOSIS — M54.6 PAIN IN THORACIC SPINE: ICD-10-CM

## 2020-12-01 DIAGNOSIS — G89.29 CHRONIC BILATERAL LOW BACK PAIN WITHOUT SCIATICA: ICD-10-CM

## 2020-12-01 DIAGNOSIS — M99.02 THORACIC SEGMENT DYSFUNCTION: ICD-10-CM

## 2020-12-01 DIAGNOSIS — M99.04 SOMATIC DYSFUNCTION OF SACRAL REGION: ICD-10-CM

## 2020-12-01 DIAGNOSIS — M54.50 CHRONIC BILATERAL LOW BACK PAIN WITHOUT SCIATICA: ICD-10-CM

## 2020-12-01 PROCEDURE — 97035 APP MDLTY 1+ULTRASOUND EA 15: CPT | Mod: GP | Performed by: CHIROPRACTOR

## 2020-12-01 PROCEDURE — 98941 CHIROPRACT MANJ 3-4 REGIONS: CPT | Mod: AT | Performed by: CHIROPRACTOR

## 2020-12-01 NOTE — PROGRESS NOTES
Visit #:  2 of 6, based on treatment plan    Subjective:  Zee Kyle is a 45 year old female who is seen in f/u up for:  lower thoracic/upper lumbar spine pain. Patient reports that the onset was 1 year ago.     Data Unavailable.     Since last visit on 11/10/2020,  Zee Kyle reports the following changes: Pain immediately after last treatment: 3/10 and their pain level today 3/10.  Overall no change.    Area of chief complaint:  Thoracic and Lumbar :  Symptoms are graded at 3/10. The quality is described as stiff, achey, dull.  Motion has increased, but is still not normal, T9, T10, T11, L2, L3, SIJ. Patient feels that they have not improved and feel the same. 2       Objective:  The following was observed:    P: palpatory tendernessQuad lumb R>>L    A: static palpation demonstrates intersegmental asymmetry , thoracic, lumbar, pelvis    R: motion palpation notes restricted motion, T9 , T10, T12 , L2, Sacrum  and PSIS Right     T: The following soft tissue hypotonicities were observed:  Quad lumb: right, ache and stiff, referred pain: no, thoracic paraspinals, lumbar paraspinals      Assessment:    Segmental spinal dysfunction/restrictions found at:  T8  T10  T12  L2  Sacrum  PSIS Right    Diagnoses:    No diagnosis found.    Patient's condition:  Patient had restrictions pre-manipulation    Treatment effectiveness:  Post manipulation there is better intersegmental movement and Patient claims to feel looser post manipulation      Procedures:  CMT:  71781 Chiropractic manipulative treatment 3-4 regions performed   Thoracic: Diversified, T10, T11, Prone  Lumbar: Diversified, L1, L2, Side posture  Pelvis: Drop Table, Sacrum , PSIS Left , Prone     Modalities:  79862: US:  2.0 Alicea/cm squared for 8 minutes at 1mhz  cont pulsed, Location:T/L spine  Therapeutic procedures:  None      Prognosis: Guarded-due to chronic nature of symptoms    Progress towards Goals: Patient has not made progress towards  goal    Response to Treatment:   Patient reports feeling looser      Recommendations:    Instructions:  stretch as instructed  and ice 20 minutes every other hour as needed    Follow-up:   Return to care in one week.

## 2020-12-06 ENCOUNTER — HEALTH MAINTENANCE LETTER (OUTPATIENT)
Age: 45
End: 2020-12-06

## 2020-12-17 ENCOUNTER — THERAPY VISIT (OUTPATIENT)
Dept: CHIROPRACTIC MEDICINE | Facility: CLINIC | Age: 45
End: 2020-12-17
Payer: COMMERCIAL

## 2020-12-17 DIAGNOSIS — G89.29 CHRONIC BILATERAL LOW BACK PAIN WITHOUT SCIATICA: ICD-10-CM

## 2020-12-17 DIAGNOSIS — M54.50 CHRONIC BILATERAL LOW BACK PAIN WITHOUT SCIATICA: ICD-10-CM

## 2020-12-17 DIAGNOSIS — M99.02 THORACIC SEGMENT DYSFUNCTION: ICD-10-CM

## 2020-12-17 DIAGNOSIS — M99.01 CERVICAL SEGMENT DYSFUNCTION: ICD-10-CM

## 2020-12-17 DIAGNOSIS — M99.03 SOMATIC DYSFUNCTION OF LUMBAR REGION: Primary | ICD-10-CM

## 2020-12-17 DIAGNOSIS — M54.6 PAIN IN THORACIC SPINE: ICD-10-CM

## 2020-12-17 PROCEDURE — 98941 CHIROPRACT MANJ 3-4 REGIONS: CPT | Mod: AT | Performed by: CHIROPRACTOR

## 2020-12-17 PROCEDURE — 97035 APP MDLTY 1+ULTRASOUND EA 15: CPT | Mod: GP | Performed by: CHIROPRACTOR

## 2020-12-17 NOTE — PROGRESS NOTES
Visit #:  3 of 6, based on treatment plan    Subjective:  Zee Kyle is a 45 year old female who is seen in f/u up for:  lower thoracic/upper lumbar spine pain. Patient reports that the onset was 1 year ago.     Data Unavailable.     Since last visit on 12/1/2020,  Zee Kyle reports the following changes: Pain immediately after last treatment: 1-2/10 and their pain level today 3-4/10.  Overall no change. States she did have 2 days with very little pain post treatment, and then the pain returned again.     Area of chief complaint:  Thoracic and Lumbar :  Symptoms are graded at 3-4/10. The quality is described as stiff, achey, dull.  Motion has increased, but is still not normal, T9, T10, T11, L2, L3, SIJ. Patient feels that they did improve for 2 days before symptoms returned. Patient reports neck is stiff/sore today. Pain did seem to move from the right thoracic/lumbaf spine to the left in the last week.        Objective:  The following was observed:    P: palpatory tendernessQuad lumb R>>L    A: static palpation demonstrates intersegmental asymmetry , thoracic, lumbar, pelvis    R: motion palpation notes restricted motion, T9 , T10, T12 , L2, Sacrum  and PSIS Right     T: The following soft tissue hypotonicities were observed:  Quad lumb: right, ache and stiff, referred pain: no, thoracic paraspinals, lumbar paraspinals      Assessment:    Segmental spinal dysfunction/restrictions found at:  T8  T10  T12  L2  Sacrum  PSIS Right    Diagnoses:    No diagnosis found.    Patient's condition:  Patient had restrictions pre-manipulation    Treatment effectiveness:  Post manipulation there is better intersegmental movement and Patient claims to feel looser post manipulation      Procedures:  CMT:  09252 Chiropractic manipulative treatment 3-4 regions performed   Thoracic: Diversified, T10, T11, Prone  Lumbar: Diversified, L1, L2, Side posture  Pelvis: Drop Table, Sacrum , PSIS Left ,  Prone     Modalities:  78541: US:  2.0 Alicea/cm squared for 8 minutes at 1mhz  cont pulsed, Location:T/L spine  Therapeutic procedures:  None      Prognosis: Guarded-due to chronic nature of symptoms    Progress towards Goals: Patient has not made progress towards goal    Response to Treatment:   Patient reports feeling looser      Recommendations:    Instructions:  stretch as instructed  and ice 20 minutes every other hour as needed    Follow-up:   Return to care in one week.

## 2021-01-18 ENCOUNTER — APPOINTMENT (OUTPATIENT)
Dept: URBAN - METROPOLITAN AREA CLINIC 253 | Age: 46
Setting detail: DERMATOLOGY
End: 2021-01-18

## 2021-01-18 VITALS — RESPIRATION RATE: 14 BRPM | HEIGHT: 67 IN | WEIGHT: 207 LBS

## 2021-01-18 DIAGNOSIS — L60.8 OTHER NAIL DISORDERS: ICD-10-CM

## 2021-01-18 PROCEDURE — OTHER COUNSELING: OTHER

## 2021-01-18 PROCEDURE — OTHER PRESCRIPTION: OTHER

## 2021-01-18 PROCEDURE — 99202 OFFICE O/P NEW SF 15 MIN: CPT

## 2021-01-18 RX ORDER — FLUOCINONIDE 0.5 MG/G
0.05% OINTMENT TOPICAL BID
Qty: 1 | Refills: 0 | Status: ERX

## 2021-01-18 ASSESSMENT — LOCATION SIMPLE DESCRIPTION DERM: LOCATION SIMPLE: RIGHT THUMBNAIL

## 2021-01-18 ASSESSMENT — LOCATION DETAILED DESCRIPTION DERM: LOCATION DETAILED: RIGHT THUMBNAIL

## 2021-01-18 ASSESSMENT — LOCATION ZONE DERM: LOCATION ZONE: FINGERNAIL

## 2021-01-18 NOTE — PROCEDURE: COUNSELING
Patient Specific Counseling (Will Not Stick From Patient To Patient): Recommended she soak her hands to get moisture in it.\\nRecommended OTC CND Solar oil or Olive oil to rub in it.\\nRecommended a Skin and Nail vitamin. \\nRecommended a topical steriod.\\nRecommended that she keep it moist and covered so it doesn’t get caught on things. bandaid applied today.
Detail Level: Detailed

## 2021-01-18 NOTE — HPI: NAIL DYSTROPHY
How Severe Is It?: moderate
Is This A New Presentation, Or A Follow-Up?: Nail Dystrophy
Additional History: She takes two flint stone vitamins daily.

## 2021-02-02 ENCOUNTER — THERAPY VISIT (OUTPATIENT)
Dept: CHIROPRACTIC MEDICINE | Facility: CLINIC | Age: 46
End: 2021-02-02
Payer: COMMERCIAL

## 2021-02-02 DIAGNOSIS — M54.2 CERVICALGIA: ICD-10-CM

## 2021-02-02 DIAGNOSIS — M54.50 CHRONIC BILATERAL LOW BACK PAIN WITHOUT SCIATICA: ICD-10-CM

## 2021-02-02 DIAGNOSIS — M99.01 CERVICAL SEGMENT DYSFUNCTION: Primary | ICD-10-CM

## 2021-02-02 DIAGNOSIS — M99.03 SOMATIC DYSFUNCTION OF LUMBAR REGION: ICD-10-CM

## 2021-02-02 DIAGNOSIS — M99.02 THORACIC SEGMENT DYSFUNCTION: ICD-10-CM

## 2021-02-02 DIAGNOSIS — G89.29 CHRONIC BILATERAL LOW BACK PAIN WITHOUT SCIATICA: ICD-10-CM

## 2021-02-02 PROCEDURE — 98941 CHIROPRACT MANJ 3-4 REGIONS: CPT | Mod: AT | Performed by: CHIROPRACTOR

## 2021-02-02 NOTE — PROGRESS NOTES
Visit #:  4 of 6, based on treatment plan    Subjective:  Zee Kyle is a 45 year old female who is seen in f/u up for:  lower thoracic/upper lumbar spine pain. Patient reports that the onset was 1 year ago.     Data Unavailable.     Since last visit on 12/17/2020,  Zee Kyle reports the following changes: Pain immediately after last treatment: 1-2/10 and their pain level today 3-4/10.  Overall no change. States she did have 2 days with very little pain post treatment, and then the pain returned again.     Area of chief complaint:  Thoracic and Lumbar :  Symptoms are graded at 3-4/10. The quality is described as stiff, achey, dull.  Motion has increased, but is still not normal, T9, T10, T11, L2, L3, SIJ. Patient feels that they did improve for 2 days before symptoms returned. Patient reports neck is stiff/sore today. Pain did seem to move from the right thoracic/lumbaf spine to the left in the last week.        Objective:  The following was observed:    P: palpatory tendernessQuad lumb R>>L    A: static palpation demonstrates intersegmental asymmetry , thoracic, lumbar, pelvis    R: motion palpation notes restricted motion, T9 , T10, T12 , L2, Sacrum  and PSIS Right     T: The following soft tissue hypotonicities were observed:  Quad lumb: right, ache and stiff, referred pain: no, thoracic paraspinals, lumbar paraspinals      Assessment:    Segmental spinal dysfunction/restrictions found at:  T8  T10  T12  L2  Sacrum  PSIS Right    Diagnoses:    No diagnosis found.    Patient's condition:  Patient had restrictions pre-manipulation    Treatment effectiveness:  Post manipulation there is better intersegmental movement and Patient claims to feel looser post manipulation      Procedures:  CMT:  73982 Chiropractic manipulative treatment 3-4 regions performed   Thoracic: Diversified, T10, T11, Prone  Lumbar: Diversified, L1, L2, Side posture  Pelvis: Drop Table, Sacrum , PSIS Left ,  Prone     Modalities:  none      Prognosis: Guarded-due to chronic nature of symptoms    Progress towards Goals: Patient has not made progress towards goal    Response to Treatment:   Patient reports feeling looser      Recommendations:    Instructions:  stretch as instructed  and ice 20 minutes every other hour as needed    Follow-up:   Return to care in one week.

## 2021-02-04 ENCOUNTER — APPOINTMENT (OUTPATIENT)
Dept: URBAN - METROPOLITAN AREA CLINIC 253 | Age: 46
Setting detail: DERMATOLOGY
End: 2021-02-04

## 2021-02-04 VITALS — WEIGHT: 205 LBS | HEIGHT: 67 IN | RESPIRATION RATE: 14 BRPM

## 2021-02-04 DIAGNOSIS — L82.1 OTHER SEBORRHEIC KERATOSIS: ICD-10-CM

## 2021-02-04 DIAGNOSIS — D18.0 HEMANGIOMA: ICD-10-CM

## 2021-02-04 DIAGNOSIS — L81.4 OTHER MELANIN HYPERPIGMENTATION: ICD-10-CM

## 2021-02-04 DIAGNOSIS — D22 MELANOCYTIC NEVI: ICD-10-CM

## 2021-02-04 DIAGNOSIS — Z71.89 OTHER SPECIFIED COUNSELING: ICD-10-CM

## 2021-02-04 DIAGNOSIS — L60.8 OTHER NAIL DISORDERS: ICD-10-CM

## 2021-02-04 PROBLEM — D18.01 HEMANGIOMA OF SKIN AND SUBCUTANEOUS TISSUE: Status: ACTIVE | Noted: 2021-02-04

## 2021-02-04 PROBLEM — D48.5 NEOPLASM OF UNCERTAIN BEHAVIOR OF SKIN: Status: ACTIVE | Noted: 2021-02-04

## 2021-02-04 PROBLEM — D22.5 MELANOCYTIC NEVI OF TRUNK: Status: ACTIVE | Noted: 2021-02-04

## 2021-02-04 PROBLEM — D23.71 OTHER BENIGN NEOPLASM OF SKIN OF RIGHT LOWER LIMB, INCLUDING HIP: Status: ACTIVE | Noted: 2021-02-04

## 2021-02-04 PROCEDURE — 88305 TISSUE EXAM BY PATHOLOGIST: CPT

## 2021-02-04 PROCEDURE — OTHER COUNSELING: OTHER

## 2021-02-04 PROCEDURE — 11102 TANGNTL BX SKIN SINGLE LES: CPT

## 2021-02-04 PROCEDURE — OTHER BIOPSY BY SHAVE METHOD: OTHER

## 2021-02-04 PROCEDURE — 99213 OFFICE O/P EST LOW 20 MIN: CPT | Mod: 25

## 2021-02-04 PROCEDURE — OTHER PATHOLOGY BILLING: OTHER

## 2021-02-04 PROCEDURE — 11103 TANGNTL BX SKIN EA SEP/ADDL: CPT

## 2021-02-04 ASSESSMENT — LOCATION SIMPLE DESCRIPTION DERM
LOCATION SIMPLE: UPPER BACK
LOCATION SIMPLE: LOWER BACK
LOCATION SIMPLE: RIGHT THUMBNAIL
LOCATION SIMPLE: RIGHT CHEEK
LOCATION SIMPLE: RIGHT UPPER BACK
LOCATION SIMPLE: LEFT UPPER BACK

## 2021-02-04 ASSESSMENT — LOCATION ZONE DERM
LOCATION ZONE: TRUNK
LOCATION ZONE: FACE
LOCATION ZONE: FINGERNAIL

## 2021-02-04 ASSESSMENT — LOCATION DETAILED DESCRIPTION DERM
LOCATION DETAILED: RIGHT MEDIAL UPPER BACK
LOCATION DETAILED: SUPERIOR LUMBAR SPINE
LOCATION DETAILED: RIGHT THUMBNAIL
LOCATION DETAILED: INFERIOR THORACIC SPINE
LOCATION DETAILED: LEFT SUPERIOR MEDIAL UPPER BACK
LOCATION DETAILED: RIGHT SUPERIOR LATERAL MALAR CHEEK
LOCATION DETAILED: RIGHT SUPERIOR MEDIAL UPPER BACK
LOCATION DETAILED: RIGHT INFERIOR MEDIAL UPPER BACK
LOCATION DETAILED: RIGHT SUPERIOR UPPER BACK

## 2021-02-04 NOTE — PROCEDURE: PATHOLOGY BILLING
Immunohistochemistry (88779 and [id number]) billing is not performed here. Please use the Immunohistochemistry Stain Billing plan to accomplish this. Immunohistochemistry (31398 and 96391) billing is not performed here. Please use the Immunohistochemistry Stain Billing plan to accomplish this.

## 2021-02-04 NOTE — PROCEDURE: PATHOLOGY BILLING
Immunohistochemistry ([id number] and [id number]) billing is not performed here. Please use the Immunohistochemistry Stain Billing plan to accomplish this.

## 2021-02-04 NOTE — PROCEDURE: COUNSELING
Detail Level: Generalized
Detail Level: Simple
Patient Specific Counseling (Will Not Stick From Patient To Patient): She reports improvement with the fluocinonide ointment but still catches it on things.

## 2021-02-04 NOTE — PROCEDURE: BIOPSY BY SHAVE METHOD
Biopsy Type: H and E
Notification Instructions: Patient will be notified of biopsy results. However, patient instructed to call the office if not contacted within 2 weeks.
Was A Bandage Applied: Yes
Dressing: bandage
Anesthesia Volume In Cc (Will Not Render If 0): 0.3
Hide Anticipated Plan (Based On Presumed Biopsy Results)?: No
Consent: Written consent was obtained and risks were reviewed including but not limited to scarring, infection, bleeding, scabbing, incomplete removal, nerve damage and allergy to anesthesia.
Depth Of Biopsy: dermis
Size Of Lesion In Cm: 0
Curettage Text: The wound bed was treated with curettage after the biopsy was performed.
Electrodesiccation And Curettage Text: The wound bed was treated with electrodesiccation and curettage after the biopsy was performed.
Wound Care: Petrolatum
Silver Nitrate Text: The wound bed was treated with silver nitrate after the biopsy was performed.
Type Of Destruction Used: Curettage
Anesthesia Type: 2% lidocaine with epinephrine and a 1:10 solution of 8.4% sodium bicarbonate
Post-Care Instructions: I reviewed with the patient in detail post-care instructions. Patient is to keep the biopsy site dry overnight, and then apply bacitracin twice daily until healed. Patient may apply hydrogen peroxide soaks to remove any crusting.
Biopsy Method: Dermablade
Hemostasis: Drysol
Billing Type: Client Bill
Information: Selecting Yes will display possible errors in your note based on the variables you have selected. This validation is only offered as a suggestion for you. PLEASE NOTE THAT THE VALIDATION TEXT WILL BE REMOVED WHEN YOU FINALIZE YOUR NOTE. IF YOU WANT TO FAX A PRELIMINARY NOTE YOU WILL NEED TO TOGGLE THIS TO 'NO' IF YOU DO NOT WANT IT IN YOUR FAXED NOTE.
Electrodesiccation Text: The wound bed was treated with electrodesiccation after the biopsy was performed.
Detail Level: Detailed
Cryotherapy Text: The wound bed was treated with cryotherapy after the biopsy was performed.

## 2021-02-17 ENCOUNTER — MYC MEDICAL ADVICE (OUTPATIENT)
Dept: FAMILY MEDICINE | Facility: CLINIC | Age: 46
End: 2021-02-17

## 2021-02-17 NOTE — LETTER
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      Please contact me for questions or concerns.      Sincerely,        Susan Haase, APRN CNP

## 2021-02-17 NOTE — TELEPHONE ENCOUNTER
Routing to Susan Haase,      Please see patients mychart message and advise, re-route to inform patient of plan    Devon Rowe RN

## 2021-02-22 ENCOUNTER — OFFICE VISIT (OUTPATIENT)
Dept: ORTHOPEDICS | Facility: CLINIC | Age: 46
End: 2021-02-22
Payer: COMMERCIAL

## 2021-02-22 VITALS
BODY MASS INDEX: 32.96 KG/M2 | HEIGHT: 67 IN | WEIGHT: 210 LBS | SYSTOLIC BLOOD PRESSURE: 128 MMHG | DIASTOLIC BLOOD PRESSURE: 82 MMHG

## 2021-02-22 DIAGNOSIS — G89.29 CHRONIC BILATERAL LOW BACK PAIN WITHOUT SCIATICA: Primary | ICD-10-CM

## 2021-02-22 DIAGNOSIS — M54.50 CHRONIC BILATERAL LOW BACK PAIN WITHOUT SCIATICA: Primary | ICD-10-CM

## 2021-02-22 PROCEDURE — 99214 OFFICE O/P EST MOD 30 MIN: CPT | Performed by: FAMILY MEDICINE

## 2021-02-22 ASSESSMENT — MIFFLIN-ST. JEOR: SCORE: 1640.18

## 2021-02-22 NOTE — LETTER
2/22/2021         RE: Zee Kyle  [address] [address]        Dear Colleague,    Thank you for referring your patient, Zee Kyle, to the [facility] [place]. Please see a copy of my visit note below.    ASSESSMENT & PLAN  Patient Instructions     1. Chronic bilateral low back pain without sciatica      -Patient is following up for chronic low back pain likely due to some underlying intervertebral disc injuries  -Patient has performed physical therapy, home exercises, chiropractic treatments and oral medications with only short-term relief  -Patient will get an MRI of the lumbar spine for further evaluation. Your MRI has been ordered. You may go down to the first floor, [address] to schedule the MRI in person, or call [phone] to schedule over the phone. Once you know the date of your MRI, please call my office and schedule a follow-up phone visit for 2 days after that.  -Patient has tried muscle relaxers in the past with no relief.  Patient may continue with extra strength Tylenol as needed.  Patient has a history of gastric bypass and must avoid oral anti-inflammatory medications.  -Call direct clinic number [[phone]] at any time with questions or concerns.    Albert Yeo MD [facility]  [facility] [place]          -----    SUBJECTIVE:  Zee Kyle is a 45 year old female who is seen in follow-up for chronic, bilateral, predominantly axial back pain that is severe and not associated with any trauma.They were last seen by Dr. Acosta on 9/11/20.     Since their last visit reports worsening pain. They indicate that their current pain level is 3/10. States laying down and sleeping causes the most pain, states it is a 7/8 in the morning, gets better through out the day but is still there. Went to 2 visits of physical therapy and was sent to chiropractic treatments, and states  "neither of those had helped.  They have tried Tylenol, home exercises, physical therapy (2 visits) and back braces, cbd oil and lotions, new bed, aspercreme. States has a gastric sleeve, so unable to take the medrol dose pack that Dr. Acosta had prescribed.       The patient is seen by themselves.    Patient's past medical, surgical, social, and family histories were reviewed today and no changes are noted.    REVIEW OF SYSTEMS:  Constitutional: NEGATIVE for fever, chills, change in weight  Skin: NEGATIVE for worrisome rashes, moles or lesions  GI/: NEGATIVE for bowel or bladder changes  Neuro: NEGATIVE for weakness, dizziness or paresthesias    OBJECTIVE:  /82   Ht 1.702 m (5' 7\")   Wt 95.3 kg (210 lb)   BMI 32.89 kg/m     General: healthy, alert and in no distress  HEENT: no scleral icterus or conjunctival erythema  Skin: no suspicious lesions or rash. No jaundice.  CV: regular rhythm by palpation, no pedal edema  Resp: normal respiratory effort without conversational dyspnea   Psych: normal mood and affect  Gait: normal steady gait with appropriate coordination and balance  Neuro: normal light touch sensory exam of the extremities.    MSK:  THORACIC/LUMBAR SPINE  Inspection:    No gross deformity/asymmetry  Palpation:    Tender about the lumbar facet joints and bilateral paralumbar musculature. Otherwise remainder of landmarks are nontender.  Range of Motion:     Lumbar flexion full    Lumbar extension full  Strength:    able to heel walk, able to toe walk  Special Tests:    Negative: straight leg and slump test (bilateral)        Albert Yeo MD, [facility]            Again, thank you for allowing me to participate in the care of your patient.        Sincerely,        Albert Yeo, MD    "

## 2021-02-22 NOTE — PATIENT INSTRUCTIONS
1. Chronic bilateral low back pain without sciatica      -Patient is following up for chronic low back pain likely due to some underlying intervertebral disc injuries  -Patient has performed physical therapy, home exercises, chiropractic treatments and oral medications with only short-term relief  -Patient will get an MRI of the lumbar spine for further evaluation. Your MRI has been ordered. You may go down to the first floor, [address] to schedule the MRI in person, or call [phone] to schedule over the phone. Once you know the date of your MRI, please call my office and schedule a follow-up phone visit for 2 days after that.  -Patient has tried muscle relaxers in the past with no relief.  Patient may continue with extra strength Tylenol as needed.  Patient has a history of gastric bypass and must avoid oral anti-inflammatory medications.  -Call direct clinic number [[phone]] at any time with questions or concerns.    Albert Yeo MD [facility]  [facility] [place]

## 2021-02-22 NOTE — PROGRESS NOTES
ASSESSMENT & PLAN  Patient Instructions     1. Chronic bilateral low back pain without sciatica      -Patient is following up for chronic low back pain likely due to some underlying intervertebral disc injuries  -Patient has performed physical therapy, home exercises, chiropractic treatments and oral medications with only short-term relief  -Patient will get an MRI of the lumbar spine for further evaluation. Your MRI has been ordered. You may go down to the first floor, [address] to schedule the MRI in person, or call [phone] to schedule over the phone. Once you know the date of your MRI, please call my office and schedule a follow-up phone visit for 2 days after that.  -Patient has tried muscle relaxers in the past with no relief.  Patient may continue with extra strength Tylenol as needed.  Patient has a history of gastric bypass and must avoid oral anti-inflammatory medications.  -Call direct clinic number [[phone]] at any time with questions or concerns.    Albert Yeo MD [facility] Orthopedics and Sports Medicine  [facility]          -----    SUBJECTIVE:  Zee Kyle is a 45 year old female who is seen in follow-up for chronic, bilateral, predominantly axial back pain that is severe and not associated with any trauma.They were last seen by Dr. Acosta on 9/11/20.     Since their last visit reports worsening pain. They indicate that their current pain level is 3/10. States laying down and sleeping causes the most pain, states it is a 7/8 in the morning, gets better through out the day but is still there. Went to 2 visits of physical therapy and was sent to chiropractic treatments, and states neither of those had helped.  They have tried Tylenol, home exercises, physical therapy (2 visits) and back braces, cbd oil and lotions, new bed, aspercreme. States has a gastric sleeve, so unable to take the medrol dose pack that Dr. Acosta had prescribed.       The patient is seen by  "themselves.    Patient's past medical, surgical, social, and family histories were reviewed today and no changes are noted.    REVIEW OF SYSTEMS:  Constitutional: NEGATIVE for fever, chills, change in weight  Skin: NEGATIVE for worrisome rashes, moles or lesions  GI/: NEGATIVE for bowel or bladder changes  Neuro: NEGATIVE for weakness, dizziness or paresthesias    OBJECTIVE:  /82   Ht 1.702 m (5' 7\")   Wt 95.3 kg (210 lb)   BMI 32.89 kg/m     General: healthy, alert and in no distress  HEENT: no scleral icterus or conjunctival erythema  Skin: no suspicious lesions or rash. No jaundice.  CV: regular rhythm by palpation, no pedal edema  Resp: normal respiratory effort without conversational dyspnea   Psych: normal mood and affect  Gait: normal steady gait with appropriate coordination and balance  Neuro: normal light touch sensory exam of the extremities.    MSK:  THORACIC/LUMBAR SPINE  Inspection:    No gross deformity/asymmetry  Palpation:    Tender about the lumbar facet joints and bilateral paralumbar musculature. Otherwise remainder of landmarks are nontender.  Range of Motion:     Lumbar flexion full    Lumbar extension full  Strength:    able to heel walk, able to toe walk  Special Tests:    Negative: straight leg and slump test (bilateral)        Albert Yeo MD, [facility] and Orthopedic Care        "

## 2021-03-10 ENCOUNTER — HOSPITAL ENCOUNTER (OUTPATIENT)
Dept: MRI IMAGING | Facility: CLINIC | Age: 46
Discharge: HOME | End: 2021-03-10
Attending: FAMILY MEDICINE | Admitting: FAMILY MEDICINE
Payer: COMMERCIAL

## 2021-03-10 DIAGNOSIS — G89.29 CHRONIC BILATERAL LOW BACK PAIN WITHOUT SCIATICA: ICD-10-CM

## 2021-03-10 DIAGNOSIS — M54.50 CHRONIC BILATERAL LOW BACK PAIN WITHOUT SCIATICA: ICD-10-CM

## 2021-03-10 PROCEDURE — 72148 MRI LUMBAR SPINE W/O DYE: CPT

## 2021-03-15 ENCOUNTER — VIRTUAL VISIT (OUTPATIENT)
Dept: ORTHOPEDICS | Facility: CLINIC | Age: 46
End: 2021-03-15
Payer: COMMERCIAL

## 2021-03-15 DIAGNOSIS — M54.40 BACK PAIN OF LUMBAR REGION WITH SCIATICA: Primary | ICD-10-CM

## 2021-03-15 PROCEDURE — 99214 OFFICE O/P EST MOD 30 MIN: CPT | Mod: TEL | Performed by: FAMILY MEDICINE

## 2021-03-15 RX ORDER — CYCLOBENZAPRINE HCL 10 MG
10 TABLET ORAL
Qty: 30 TABLET | Refills: 0 | Status: ON HOLD | OUTPATIENT
Start: 2021-03-15 | End: 2022-07-12

## 2021-03-15 NOTE — PROGRESS NOTES
Marion is a 45 year old who is being evaluated via a billable telephone visit.      What phone number would you like to be contacted at? [phone]  How would you like to obtain your AVS? Tonioyaz  Phone call duration: 32 minutes      ASSESSMENT & PLAN  Patient Instructions     1. Back pain of lumbar region with sciatica      -Patient is following up for chronic low back pain due to unknown etiology  -MRI of the lumbar spine was reviewed today which was normal  -Patient has not responded to therapy or chiropractic treatments  -Reviewed previous notes with Dr. Acosta.   -Patient will get labs to evaluate for inflammatory causes  -Patient has tried Flexeril 5mg last year with no effect.  However, she did not experience side effects.  Patient will start Flexeril 10mg at bedtime since most of her pain is at night and in the morning.  If she does respond, she may call us to try a different muscle relaxer at bedtime.  -Patient reports buying a new Sleep number mattress 1 year ago.  Patient reports increased pain when mattress was either too firm or too soft.  -Patient is instructed to call us a couple days after she has her bloodwork drawn to discuss results and determine next step treatment options.  If blood work is abnormal, will refer to rheumatology.  If blood work is normal, will refer to pain management  -Call direct clinic number [[phone]] at any time with questions or concerns.    Albert Yeo MD [facility] Orthopedics and Sports Medicine  [facility] [place]          -----    SUBJECTIVE:  Zee Kyle is a 45 year old female who is seen in follow-up for chronic low back pain likely due to some underlying intervertebral disc injuries.They were last seen 2/22/2021.     Since their last visit reports 0% - (About the same as last time). They indicate that their current pain level is 6/10. States back is really painful in the morning and then gets better thru out the day.  They have tried  Tylenol, previous imaging (MRI 03/10/21) and over the counter pain relieving creams.      The patient is seen by themselves.    Patient's past medical, surgical, social, and family histories were reviewed today and no changes are noted.    REVIEW OF SYSTEMS:  Constitutional: NEGATIVE for fever, chills, change in weight  Skin: NEGATIVE for worrisome rashes, moles or lesions  GI/: NEGATIVE for bowel or bladder changes  Neuro: NEGATIVE for weakness, dizziness or paresthesias      Independent visualization of the below image:  MR Lumbar Spine w/o Contrast [[id number]] Resulted: 03/10/21 1512   Order Status: Completed Updated: 03/10/21 1513   Narrative:     MRI LUMBAR SPINE WITHOUT CONTRAST   3/10/2021 1:32 PM     HISTORY: Low back pain, no red flags. Chronic low back pain x one   year. Evaluate for lumbar degenerative disc disease. Chronic bilateral   low back pain without sciatica.     TECHNIQUE: Multiplanar multisequence MRI of the lumbar spine without   contrast.     COMPARISON: X-rays 9/11/2020     FINDINGS: Alignment is within normal limits. Bone marrow is within   normal limits. Conus medullaris and cauda equina are unremarkable.   Conus medullaris terminates at the level of the L1-L2 disc. No   appreciable extraspinal abnormality.     Segmental Analysis:     T12-L1:  Disc height maintained. No herniation. Normal facet joints.   No foraminal or spinal canal stenosis.     L1-L2:  Disc height maintained. No herniation. Normal facet joints. No   foraminal or spinal canal stenosis.       L2-L3:  Disc height maintained. No herniation. Normal facet joints. No   foraminal or spinal canal stenosis.       L3-L4:  Disc height maintained. No herniation. Normal facet joints. No   foraminal or spinal canal stenosis.       L4-L5:  Disc height maintained. No herniation. Normal facet joints. No   foraminal or spinal canal stenosis.       L5-S1:  Disc height maintained. No herniation. Normal facet joints. No   foraminal or spinal  canal stenosis.    Impression:     IMPRESSION: Unremarkable MRI of the lumbar spine.     VIOLETA BERMDUEZ MD         Patient's conditions were thoroughly discussed during today's visit, chart review and completing this note and orders being 32 minutes.    Albert Yeo MD, [facility] Sports and Orthopedic [facility] [place]

## 2021-03-15 NOTE — LETTER
3/15/2021         RE: Zee Kyle  [address] [id number]        Dear Colleague,    Thank you for referring your patient, Zee Kyle, to the [facility] [place]. Please see a copy of my visit note below.    Marion is a 45 year old who is being evaluated via a billable telephone visit.      What phone number would you like to be contacted at? [phone]  How would you like to obtain your AVS? MyChart  Phone call duration: 32 minutes      ASSESSMENT & PLAN  Patient Instructions     1. Back pain of lumbar region with sciatica      -Patient is following up for chronic low back pain due to unknown etiology  -MRI of the lumbar spine was reviewed today which was normal  -Patient has not responded to therapy or chiropractic treatments  -Reviewed previous notes with Dr. Acosta.   -Patient will get labs to evaluate for inflammatory causes  -Patient has tried Flexeril 5mg last year with no effect.  However, she did not experience side effects.  Patient will start Flexeril 10mg at bedtime since most of her pain is at night and in the morning.  If she does respond, she may call us to try a different muscle relaxer at bedtime.  -Patient reports buying a new Sleep number mattress 1 year ago.  Patient reports increased pain when mattress was either too firm or too soft.  -Patient is instructed to call us a couple days after she has her bloodwork drawn to discuss results and determine next step treatment options.  If blood work is abnormal, will refer to rheumatology.  If blood work is normal, will refer to pain management  -Call direct clinic number [[phone]] at any time with questions or concerns.    Albert Yeo MD [facility] Orthopedics and Sports Medicine  [facility]          -----    SUBJECTIVE:  Zee Kyle is a 45 year old female who is seen in follow-up for chronic low back pain likely due to some underlying  intervertebral disc injuries.They were last seen 2/22/2021.     Since their last visit reports 0% - (About the same as last time). They indicate that their current pain level is 6/10. States back is really painful in the morning and then gets better thru out the day.  They have tried Tylenol, previous imaging (MRI 03/10/21) and over the counter pain relieving creams.      The patient is seen by themselves.    Patient's past medical, surgical, social, and family histories were reviewed today and no changes are noted.    REVIEW OF SYSTEMS:  Constitutional: NEGATIVE for fever, chills, change in weight  Skin: NEGATIVE for worrisome rashes, moles or lesions  GI/: NEGATIVE for bowel or bladder changes  Neuro: NEGATIVE for weakness, dizziness or paresthesias      Independent visualization of the below image:  MR Lumbar Spine w/o Contrast [[id number]] Resulted: 03/10/21 1512   Order Status: Completed Updated: 03/10/21 1513   Narrative:     MRI LUMBAR SPINE WITHOUT CONTRAST   3/10/2021 1:32 PM     HISTORY: Low back pain, no red flags. Chronic low back pain x one   year. Evaluate for lumbar degenerative disc disease. Chronic bilateral   low back pain without sciatica.     TECHNIQUE: Multiplanar multisequence MRI of the lumbar spine without   contrast.     COMPARISON: X-rays 9/11/2020     FINDINGS: Alignment is within normal limits. Bone marrow is within   normal limits. Conus medullaris and cauda equina are unremarkable.   Conus medullaris terminates at the level of the L1-L2 disc. No   appreciable extraspinal abnormality.     Segmental Analysis:     T12-L1:  Disc height maintained. No herniation. Normal facet joints.   No foraminal or spinal canal stenosis.     L1-L2:  Disc height maintained. No herniation. Normal facet joints. No   foraminal or spinal canal stenosis.       L2-L3:  Disc height maintained. No herniation. Normal facet joints. No   foraminal or spinal canal stenosis.       L3-L4:  Disc height maintained. No  herniation. Normal facet joints. No   foraminal or spinal canal stenosis.       L4-L5:  Disc height maintained. No herniation. Normal facet joints. No   foraminal or spinal canal stenosis.       L5-S1:  Disc height maintained. No herniation. Normal facet joints. No   foraminal or spinal canal stenosis.    Impression:     IMPRESSION: Unremarkable MRI of the lumbar spine.     VIOLETA BERMUDEZ MD         Patient's conditions were thoroughly discussed during today's visit, chart review and completing this note and orders being 32 minutes.    Albert Yeo MD, [facility] Sports and Orthopedic Care            Again, thank you for allowing me to participate in the care of your patient.        Sincerely,        Albert Yeo, MD

## 2021-03-15 NOTE — PATIENT INSTRUCTIONS
1. Back pain of lumbar region with sciatica      -Patient is following up for chronic low back pain due to unknown etiology  -MRI of the lumbar spine was reviewed today which was normal  -Patient has not responded to therapy or chiropractic treatments  -Reviewed previous notes with Dr. Acosta.   -Patient will get labs to evaluate for inflammatory causes  -Patient has tried Flexeril 5mg last year with no effect.  However, she did not experience side effects.  Patient will start Flexeril 10mg at bedtime since most of her pain is at night and in the morning.  If she does respond, she may call us to try a different muscle relaxer at bedtime.  -Patient reports buying a new Sleep number mattress 1 year ago.  Patient reports increased pain when mattress was either too firm or too soft.  -Patient is instructed to call us a couple days after she has her bloodwork drawn to discuss results and determine next step treatment options.  If blood work is abnormal, will refer to rheumatology.  If blood work is normal, will refer to pain management  -Call direct clinic number [[phone]] at any time with questions or concerns.    Albert Yeo MD [facility] Orthopedics and Sports Medicine  [facility] [place]

## 2021-04-01 ENCOUNTER — DOCUMENTATION ONLY (OUTPATIENT)
Dept: LAB | Facility: CLINIC | Age: 46
End: 2021-04-01

## 2021-04-05 ENCOUNTER — APPOINTMENT (OUTPATIENT)
Dept: LAB | Facility: CLINIC | Age: 46
End: 2021-04-05
Payer: COMMERCIAL

## 2021-04-05 ENCOUNTER — OFFICE VISIT (OUTPATIENT)
Dept: ALLERGY | Facility: CLINIC | Age: 46
End: 2021-04-05
Payer: COMMERCIAL

## 2021-04-05 ENCOUNTER — OFFICE VISIT (OUTPATIENT)
Dept: FAMILY MEDICINE | Facility: CLINIC | Age: 46
End: 2021-04-05
Payer: COMMERCIAL

## 2021-04-05 VITALS
TEMPERATURE: 98.3 F | RESPIRATION RATE: 13 BRPM | HEART RATE: 84 BPM | BODY MASS INDEX: 32.89 KG/M2 | OXYGEN SATURATION: 96 % | WEIGHT: 210 LBS | SYSTOLIC BLOOD PRESSURE: 142 MMHG | DIASTOLIC BLOOD PRESSURE: 96 MMHG

## 2021-04-05 DIAGNOSIS — M54.40 BACK PAIN OF LUMBAR REGION WITH SCIATICA: ICD-10-CM

## 2021-04-05 DIAGNOSIS — D50.9 IRON DEFICIENCY ANEMIA, UNSPECIFIED IRON DEFICIENCY ANEMIA TYPE: ICD-10-CM

## 2021-04-05 DIAGNOSIS — I10 BENIGN ESSENTIAL HYPERTENSION: ICD-10-CM

## 2021-04-05 DIAGNOSIS — H10.13 ALLERGIC CONJUNCTIVITIS, BILATERAL: ICD-10-CM

## 2021-04-05 DIAGNOSIS — J30.81 ALLERGIC RHINITIS DUE TO ANIMALS: Primary | ICD-10-CM

## 2021-04-05 DIAGNOSIS — F41.9 ANXIETY: ICD-10-CM

## 2021-04-05 DIAGNOSIS — F33.1 MAJOR DEPRESSIVE DISORDER, RECURRENT EPISODE, MODERATE (H): Primary | ICD-10-CM

## 2021-04-05 PROBLEM — L30.4 INTERTRIGO: Status: RESOLVED | Noted: 2018-07-18 | Resolved: 2021-04-05

## 2021-04-05 LAB
ALBUMIN SERPL-MCNC: 4 G/DL (ref 3.4–5)
ALP SERPL-CCNC: 66 U/L (ref 40–150)
ALT SERPL W P-5'-P-CCNC: 21 U/L (ref 0–50)
ANION GAP SERPL CALCULATED.3IONS-SCNC: 3 MMOL/L (ref 3–14)
AST SERPL W P-5'-P-CCNC: 13 U/L (ref 0–45)
BASOPHILS # BLD AUTO: 0.1 10E9/L (ref 0–0.2)
BASOPHILS NFR BLD AUTO: 0.7 %
BILIRUB SERPL-MCNC: 0.4 MG/DL (ref 0.2–1.3)
BUN SERPL-MCNC: 9 MG/DL (ref 7–30)
CALCIUM SERPL-MCNC: 9.2 MG/DL (ref 8.5–10.1)
CHLORIDE SERPL-SCNC: 106 MMOL/L (ref 94–109)
CO2 SERPL-SCNC: 29 MMOL/L (ref 20–32)
CREAT SERPL-MCNC: 0.7 MG/DL (ref 0.52–1.04)
CRP SERPL-MCNC: <2.9 MG/L (ref 0–8)
DEPRECATED CALCIDIOL+CALCIFEROL SERPL-MC: 39 UG/L (ref 20–75)
DIFFERENTIAL METHOD BLD: ABNORMAL
EOSINOPHIL # BLD AUTO: 0.9 10E9/L (ref 0–0.7)
EOSINOPHIL NFR BLD AUTO: 9.7 %
ERYTHROCYTE [DISTWIDTH] IN BLOOD BY AUTOMATED COUNT: 12.6 % (ref 10–15)
ERYTHROCYTE [SEDIMENTATION RATE] IN BLOOD BY WESTERGREN METHOD: 6 MM/H (ref 0–20)
GFR SERPL CREATININE-BSD FRML MDRD: >90 ML/MIN/{1.73_M2}
GLUCOSE SERPL-MCNC: 79 MG/DL (ref 70–99)
HCT VFR BLD AUTO: 45.6 % (ref 35–47)
HGB BLD-MCNC: 16.1 G/DL (ref 11.7–15.7)
IRON SATN MFR SERPL: 42 % (ref 15–46)
IRON SERPL-MCNC: 135 UG/DL (ref 35–180)
LYMPHOCYTES # BLD AUTO: 2.7 10E9/L (ref 0.8–5.3)
LYMPHOCYTES NFR BLD AUTO: 30.8 %
MCH RBC QN AUTO: 30.5 PG (ref 26.5–33)
MCHC RBC AUTO-ENTMCNC: 35.3 G/DL (ref 31.5–36.5)
MCV RBC AUTO: 86 FL (ref 78–100)
MONOCYTES # BLD AUTO: 0.8 10E9/L (ref 0–1.3)
MONOCYTES NFR BLD AUTO: 9.2 %
NEUTROPHILS # BLD AUTO: 4.3 10E9/L (ref 1.6–8.3)
NEUTROPHILS NFR BLD AUTO: 49.6 %
PLATELET # BLD AUTO: 361 10E9/L (ref 150–450)
POTASSIUM SERPL-SCNC: 3.7 MMOL/L (ref 3.4–5.3)
PROT SERPL-MCNC: 7.8 G/DL (ref 6.8–8.8)
RBC # BLD AUTO: 5.28 10E12/L (ref 3.8–5.2)
SODIUM SERPL-SCNC: 138 MMOL/L (ref 133–144)
TIBC SERPL-MCNC: 321 UG/DL (ref 240–430)
VIT B12 SERPL-MCNC: 416 PG/ML (ref 193–986)
WBC # BLD AUTO: 8.7 10E9/L (ref 4–11)

## 2021-04-05 PROCEDURE — 86140 C-REACTIVE PROTEIN: CPT | Performed by: FAMILY MEDICINE

## 2021-04-05 PROCEDURE — 85652 RBC SED RATE AUTOMATED: CPT | Performed by: FAMILY MEDICINE

## 2021-04-05 PROCEDURE — 80053 COMPREHEN METABOLIC PANEL: CPT | Performed by: FAMILY MEDICINE

## 2021-04-05 PROCEDURE — 95004 PERQ TESTS W/ALRGNC XTRCS: CPT | Performed by: ALLERGY & IMMUNOLOGY

## 2021-04-05 PROCEDURE — 86431 RHEUMATOID FACTOR QUANT: CPT | Performed by: FAMILY MEDICINE

## 2021-04-05 PROCEDURE — 36415 COLL VENOUS BLD VENIPUNCTURE: CPT | Performed by: FAMILY MEDICINE

## 2021-04-05 PROCEDURE — 99214 OFFICE O/P EST MOD 30 MIN: CPT | Performed by: NURSE PRACTITIONER

## 2021-04-05 PROCEDURE — 83550 IRON BINDING TEST: CPT | Performed by: FAMILY MEDICINE

## 2021-04-05 PROCEDURE — 86200 CCP ANTIBODY: CPT | Performed by: FAMILY MEDICINE

## 2021-04-05 PROCEDURE — 82306 VITAMIN D 25 HYDROXY: CPT | Performed by: FAMILY MEDICINE

## 2021-04-05 PROCEDURE — 82607 VITAMIN B-12: CPT | Performed by: FAMILY MEDICINE

## 2021-04-05 PROCEDURE — 85025 COMPLETE CBC W/AUTO DIFF WBC: CPT | Performed by: FAMILY MEDICINE

## 2021-04-05 PROCEDURE — 99203 OFFICE O/P NEW LOW 30 MIN: CPT | Mod: 25 | Performed by: ALLERGY & IMMUNOLOGY

## 2021-04-05 PROCEDURE — 83540 ASSAY OF IRON: CPT | Performed by: FAMILY MEDICINE

## 2021-04-05 RX ORDER — LISINOPRIL 5 MG/1
2.5 TABLET ORAL DAILY
Qty: 90 TABLET | Refills: 1 | Status: SHIPPED | OUTPATIENT
Start: 2021-04-05 | End: 2021-11-29

## 2021-04-05 RX ORDER — VENLAFAXINE HYDROCHLORIDE 37.5 MG/1
37.5 CAPSULE, EXTENDED RELEASE ORAL DAILY
Qty: 90 CAPSULE | Refills: 1 | Status: SHIPPED | OUTPATIENT
Start: 2021-04-05 | End: 2021-11-08

## 2021-04-05 ASSESSMENT — PATIENT HEALTH QUESTIONNAIRE - PHQ9
SUM OF ALL RESPONSES TO PHQ QUESTIONS 1-9: 2
10. IF YOU CHECKED OFF ANY PROBLEMS, HOW DIFFICULT HAVE THESE PROBLEMS MADE IT FOR YOU TO DO YOUR WORK, TAKE CARE OF THINGS AT HOME, OR GET ALONG WITH OTHER PEOPLE: NOT DIFFICULT AT ALL
SUM OF ALL RESPONSES TO PHQ QUESTIONS 1-9: 2

## 2021-04-05 NOTE — NURSING NOTE
Per provider verbal order, placed Adult Environmental Panel scratch test.  Consent was obtained prior to procedure.  Once panels were placed, patient was monitored for 15 minutes in clinic.  Provider read test after 15 minutes..  Pt tolerated procedure well.  All questions and concerns were addressed at office visit.           Sandy Martinez, WESTN, RN

## 2021-04-05 NOTE — PATIENT INSTRUCTIONS
If you have any questions regarding your allergies, asthma, or what we discussed during your visit today please call the allergy clinic or contact us via [email or web address].    [facility] Allergy RN Line: [phone]  [facility] Scheduling Line: [phone]  [facility] Scheduling Line: [phone]    Clinic Schedule:   [place] - Monday, Tuesday, and Thursday  [facility] - Wednesday      ENVIRONMENTAL PERCUTANEOUS SKIN TESTING: ADULT  [place] Environmental 4/5/2021   Consent Y   Ordering Physician Dr. Rodriguez   Interpreting Physician Dr. Rodriguez   Testing Technician Sandy VALENCIA RN   Location Back   Time start:  1:58 PM   Time End:  2:13 PM   Positive Control: Histatrol*ALK 1 mg/ml 5/10   Negative Control: 50% Glycerin 0   Cat Hair*ALK (10,000 BAU/ml) 0   AP Dog Hair/Dander (1:100 w/v) 7/24   Dust Mite p. 30,000 AU/ml 0   Dust Mite f. (30,000 AU/ml) 0   Devante (W/F in millimeters) 0   Matthew Grass (100,000 BAU/mL) 0   Red Cedar (W/F in millimeters) 0   Maple/Charleston (W/F in millimeters) 0   Hackberry (W/F in millimeters) 0   Lilburn (W/F in millimeters) 0   Wells *ALK (W/F in millimeters) 0   American Elm (W/F in millimeters) 0   Pegram (W/F in millimeters) 0   Black Patton (W/F in millimeters) 0   Birch Mix (W/F in millimeters) 0   Monument (W/F in millimeters) 0   Oak (W/F in millimeters) 0   Cocklebur (W/F in millimeters) 0   Fall Branch (W/F in millimeters) 0   White Luis (W/F in millimeters) 0   Careless (W/F in millimeters) 0   Nettle (W/F in millimeters) 0   English Plantain (W/F in millimeters) 0   Kochia (W/F in millimeters) 0   Lamb's Quarter (W/F in millimeters) 0   Marshelder (W/F in millimeters) 0   Ragweed Mix* ALK (W/F in millimeters) 0   Russian Thistle (W/F in millimeters) 0   Sagebrush/Mugwort (W/F in millimeters) 0   Sheep Sorrel (W/F in millimeters) 0   Feather Mix* ALK (W/F in millimeters) 0   Penicillium Mix (1:10 w/v) 0    Curvularia spicifera (1:10 w/v) 0   Epicoccum (1:10 w/v) 0   Aspergillus fumigatus (1:10 w/v): 0   Alternaria tenius (1:10 w/v) 0   H. Cladosporium (1:10 w/v) 0   Phoma herbarum (1:10 w/v) 0        These are the billing and diagnosis codes that your insurance company may need to help you determine if allergy shots are covered and what potential out of pocked costs you may have. You may also want to contact the [place] [employer]/Cost of Care Estimate Line at [phone] for more information.    Regular Allergy Shot Visits: 07602    Cluster Accelerated Build: 98980 - rapid desensitization. This could be a single unit or multiple units billed per day depending on the length of your visit.    Allergy Shot Serum: 17908 - the amount of serum in total varies depending on how many allergy shots you will need. At the start of treatment, each injection is 20mL or 20 doses. You only tested positive for an allergy to dogs and would only need 1 injection.    Potential Diagnosis Codes:    Allergic Rhinitis Due to Animals - J30.81  Allergic Conjunctivitis - H10.13      Patient Education   Allergy Shots (Immunotherapy)  What You Need to Know  What are allergy shots?  Allergy shots are used to treat allergic reactions. They are given in the upper arm.  These shots will slowly build your tolerance to the things you are allergic to (such as pollen, mold and animal dander). They reduce the body's allergic response.  What are the benefits of getting allergy shots?  Allergy shots may:    Relieve symptoms long after treatment has ended    Allow you to take less allergy medicine, or stop taking it altogether    Prevent new allergies in the future    Keep children's allergies from getting worse and turning into asthma    Improve eczema caused by allergies.  The average patient sees a large improvement in his or her symptoms (up to 80%).  Who should have allergy shots?  Allergy shots are helpful when allergies  cause:    Asthma    Itchy, watery eyes (allergic conjunctivitis)    Runny nose, sneezing, sore throat and other symptoms (allergic rhinitis)    Severe reaction to insect stings.  For children, it is best to wait until age 5 before starting allergy shots.  How will I feel during and after treatment?  You will have shots every 3 to 14 days for 4 to 8 months. We will increase the dose each time until we reach a level that works for you. After that, you will have the shots less often.     It may take another year for symptoms to improve. Many people improve much sooner.    You will likely have shots for another 3 to 5 years.  Allergy shots can reduce your symptoms a little or lot. Some people find that their allergies go away entirely.   Most people have long-lasting relief after treatment ends. You should see your doctor every year to find out if you need more shots.  What are the risks?  With each allergy shot, there is the risk of allergic reaction. Some reactions are mild. Others can be life threatening and must be treated at once.   Common reactions  It is common to have a mild reaction, such as redness, swelling, pain and itching in the area of the shot. This can occur right away or up to several hours after the shot. Sometimes the reaction moves into the upper arm. Call your doctor if:    The reaction area is more than 2 inches wide.    You still have the reaction the day after the shot.  Severe reactions  The reactions below are rare, but serious. If not treated, they can lead to very low blood pressure and even death. If you have any of these, get help at once.     Hives include a rash, swelling and itching on more than one part of the body. They may occur within minutes to hours after a shot.    Swelling of any part of the body. For example, you may have swelling of the ears, tongue, lips, throat, intestines, hands or feet. Swelling may affect more than one body part. These reactions could occur within minutes  to hours after the shot.    Trouble breathing. You may develop sneezing, runny nose, stuffy nose, cough or asthma symptoms. These reactions can occur within minutes to hours after the shot.    Anaphylactic shock is sudden, severe and may include symptoms such as hives, trouble breathing, stomach pain, feeling faint or dizzy and low blood pressure. It may cause a loss of consciousness and could lead to death. This reaction usually occurs within minutes of the shot but can happen a few hours later. It is very rare.  You might have a severe reaction after the first shot, or it may occur after a series of shots. If you have a reaction, we will treat you right away. In the future, we will change the dose of your allergy shots.  What happens after each shot?  You should wait in the doctor's office for 30 minutes after each shot. If you have a reaction, we may ask you to stay longer. If you cannot wait the 30 minutes, you should not have your allergy shot.  If you have a severe reaction after leaving the doctor's office, use your epinephrine auto-injector (Epi-pen) and go to the nearest emergency room. If treated promptly, severe reactions are rarely life threatening. We will never give an allergy shot unless medical help is nearby in case of emergency.   What else do I need to know?  If you start taking any new medicines  It is not safe to have these shots while taking certain medicines. If any doctor prescribes new medicine for you, please let us know. This includes:    Beta blockers, often used for heart disease    Blood pressure medicine    Medicine for migraine headaches    Glaucoma medicine.  A note for women  If you get pregnant, tell the office staff at once.   Your doctor will decide how often you should receive shots during pregnancy. We will not increase your dose while you are pregnant.  If you will have shots at another clinic  If you will have your allergy shots at another clinic, you must provide the name  and address of the doctor who will give the shots. We will ask you to fill out a form.  If you have any questions or concerns, please speak to your doctor or a member of our staff.   For informational purposes only. Not to replace the advice of your health care provider.   Copyright   2009 [employer]. All rights reserved. [employer] 148541 - REV 07/17.

## 2021-04-05 NOTE — LETTER
4/5/2021         RE: Zee Kyle  [address] [facility] [place] [address]        Dear Colleague,    Thank you for referring your patient, Zee Kyle, to the [facility]. Please see a copy of my visit note below.    Zee Kyle was seen in the Allergy Clinic at [facility].    Zee Kyle is a 45 year old    White  Some other race female being seen today in consultation for allergies. She states that she has had allergy symptoms for the past 8 to 9 years. Her symptoms include itchy and watery eyes, sneezing, rhinorrhea, nasal congestion, post-nasal drainage, and cough. These symptoms are present throughout the year with some seasonal worsening in the spring and fall. Marion has been taking OTC medications but these are no longer helping. She has been taking fexofenadine twice daily along with fluticasone nasal spray. She doesn't feel the fluticasone doesn't work as well because she often has to blow her nose shortly afterwards. She also uses ketotifen eye drops and Visine eye drops as needed.    Marion has 2 pet dogs that do sleep in her bedroom and her new puppy sleeps in the bed. She also frequently is fostering or caring for other dogs.    Marion also mentions that she has chronic itching of her scalp and back and she is unsure if this may be related to her allergies.      Past Medical History:   Diagnosis Date     Anxiety Ongoing since 18     Depressive disorder on going since 18     Earache or other ear, nose, or throat complaint 12    Tonsillectomy 7/02     Esophageal reflux     Had stopped once I got my sleep apnea mask 5/18     Herpes simplex without mention of complication      Hypertension     While Pregnant     Hypothyroidism 8/7/2012     Intertrigo 7/18/2018     Presbyacusis     Presbycusis     Sleep apnea      STD (sexually transmitted disease)     Herpes     Urinary incontinence     Bladder sling surgery 7/17     Family  History   Problem Relation Age of Onset     Hypertension Father          08     Obesity Father      Cerebrovascular Disease Mother      Dementia Paternal Grandmother      Coronary Artery Disease Paternal Grandfather      Past Surgical History:   Procedure Laterality Date     ENT SURGERY       GENITOURINARY SURGERY  2017    bladder sling     LAPAROSCOPIC GASTRIC SLEEVE N/A 10/15/2019    Procedure: LAPAROSCOPIC GASTRIC SLEEVE;  Surgeon: Cesar Lee MD;  Location: [facility] OR     PE TUBES       TONSILLECTOMY       TYMPANOPLASTY, RT/LT      Tympanoplasty RT/LT       ENVIRONMENTAL HISTORY: [place] The home is heated with a forced air and gas furnace. They do have central air conditioning. The patient's bedroom is furnished with carpeting in bedroom and fabric window coverings.  Pets inside the house include 2 dog(s). There is history of cockroach or mice infestation. Do you smoke cigarettes or other recreational drugs? No Do you vape or use an e-cigarette? No. There is/are 0 smokers living in the house. There is/are 0 who smoke ecigarettes/vape living in the house. The house does have a damp basement.     SOCIAL HISTORY:   Zee is employed as a . She lives with her spouse, son and daughter.  Her spouse works as a/an /.    REVIEW OF SYSTEMS:  General: negative for weight gain. negative for weight loss. negative for changes in sleep.   Eyes: negative for itching. negative for redness. negative for tearing/watering. negative for vision changes  Ears: negative for fullness. negative for hearing loss. negative for dizziness.   Nose: positive  for snoring.negative for changes in smell. negative for drainage.   Throat: positive  for hoarseness. positive  for sore throat. negative for trouble swallowing.   Lungs: negative for cough. positive  for shortness of breath.negative for wheezing. negative for sputum production.  "  Cardiovascular: negative for chest pain. negative for swelling of ankles. negative for fast or irregular heartbeat.   Gastrointestinal: negative for nausea. negative for heartburn. negative for acid reflux.   Musculoskeletal: negative for joint pain. negative for joint stiffness. negative for joint swelling.   Neurologic: negative for seizures. negative for fainting. negative for weakness.   Psychiatric: negative for changes in mood. negative for anxiety.   Endocrine: negative for cold intolerance. negative for heat intolerance. negative for tremors.   Hematologic: negative for easy bruising. negative for easy bleeding.  Integumentary: negative for rash. negative for scaling. negative for nail changes.       Current Outpatient Medications:      calcium carbonate 600 mg-vitamin D 400 units (CALTRATE) 600-400 MG-UNIT per tablet, Take 1 tablet by mouth 2 times daily, Disp: , Rfl:      childrens multivitamin w/iron (FLINTSTONES COMPLETE) chewable tablet, Take 2 chew tab by mouth daily, Disp: , Rfl:      cyanocobalamin (CYANOCOBALAMIN) 1000 MCG/ML injection, Inject 1 mL (1,000 mcg) Subcutaneous every 30 days, Disp: 3 mL, Rfl: 1     cyclobenzaprine (FLEXERIL) 10 MG tablet, Take 1 tablet (10 mg) by mouth nightly as needed for muscle spasms, Disp: 30 tablet, Rfl: 0     FEROSUL 325 (65 Fe) MG tablet, , Disp: , Rfl:      ferrous fumarate 65 mg, [place]. FE,-Vitamin C 125 mg (VITRON C)  MG TABS tablet, Take 1 tablet by mouth daily, Disp: , Rfl:      levonorgestrel (MIRENA) 20 MCG/24HR IUD, 1 each (20 mcg) by Intrauterine route continuous, Disp: , Rfl:      lisinopril (ZESTRIL) 5 MG tablet, Take 0.5 tablets (2.5 mg) by mouth daily, Disp: 90 tablet, Rfl: 1     order for DME, Equipment being ordered: home BP kit (no wrist please), Disp: 1 each, Rfl: 0     Syringe/Needle, Disp, 25G X 5/8\" 5 ML MISC, 1 each every 30 days, Disp: 3 each, Rfl: 1     venlafaxine (EFFEXOR-XR) 37.5 MG 24 hr capsule, Take 1 capsule (37.5 mg) by mouth " daily, Disp: 90 capsule, Rfl: 1     vitamin B complex with vitamin C (VITAMIN  B COMPLEX) tablet, Take 1 tablet by mouth daily, Disp: , Rfl:   Immunization History   Administered Date(s) Administered     COVID-19,PF,Pfizer 01/28/2021, 02/16/2021     Influenza (IIV3) PF 11/17/2011, 11/13/2012     Influenza Vaccine IM > 6 months Valent IIV4 10/14/2013, 12/28/2015, 11/09/2017, 12/26/2018, 10/07/2019     Influenza Vaccine Im 4yrs+ 4 Valent CCIIV4 11/06/2020     TDAP Vaccine (Adacel) 02/10/2011     Tdap (Adult) Unspecified Formulation 12/06/2012     Allergies   Allergen Reactions     Nsaids      Bariatric surgery         EXAM:   BP (!) 142/96   Pulse 84   Temp 98.3  F (36.8  C) (Oral)   Resp 13   Wt 95.3 kg (210 lb)   SpO2 96%   BMI 32.89 kg/m    GENERAL APPEARANCE: alert, cooperative and not in distress  SKIN: no rashes, no lesions  HEAD: atraumatic, normocephalic  EYES: lids and lashes normal, conjunctivae and sclerae clear, pupils equal, round, reactive to light, EOM full and intact  ENT: no scars or lesions, nasal exam showed no discharge, swelling or lesions noted, otoscopy showed perforation of right tympanic membrane, tongue midline and normal, soft palate, uvula, and tonsils normal  NECK: no asymmetry, masses, or scars, supple without significant adenopathy  LUNGS: unlabored respirations, no intercostal retractions or accessory muscle use, clear to auscultation without rales or wheezes  HEART: regular rate and rhythm without murmurs and normal S1 and S2  MUSCULOSKELETAL: no musculoskeletal defects are noted  NEURO: no focal deficits noted  PSYCH: does not appear depressed or anxious    WORKUP: Skin testing    ENVIRONMENTAL PERCUTANEOUS SKIN TESTING: ADULT  [place] [facility] 4/5/2021   Consent Y   Ordering Physician Dr. Rodriguez   Interpreting Physician Dr. Rodriguez   Testing Technician Sandy VALENCIA RN   Location Back   Time start:  1:58 PM   Time End:  2:13 PM   Positive Control: Histatrol*ALK 1 mg/ml 5/10    Negative Control: 50% Glycerin 0   Cat Hair*ALK (10,000 BAU/ml) 0   AP Dog Hair/Dander (1:100 w/v) 7/24   Dust Mite p. 30,000 AU/ml 0   Dust Mite f. (30,000 AU/ml) 0   [place] (W/F in millimeters) 0   [place] (100,000 BAU/mL) 0   Red Cedar (W/F in millimeters) 0   Maple/[place] (W/F in millimeters) 0   [place]berry (W/F in millimeters) 0   [place] (W/F in millimeters) 0   [place] *ALK (W/F in millimeters) 0   American Elm (W/F in millimeters) 0   [place] (W/F in millimeters) 0   Black [place] (W/F in millimeters) 0   Birch Mix (W/F in millimeters) 0   [place] (W/F in millimeters) 0   Oak (W/F in millimeters) 0   Cocklebur (W/F in millimeters) 0   [place] (W/F in millimeters) 0   White [place] (W/F in millimeters) 0   Care[place] (W/F in millimeters) 0   Nettle (W/F in millimeters) 0   English Plantain (W/F in millimeters) 0   Kochia (W/F in millimeters) 0   Lamb's Quarter (W/F in millimeters) 0   Mars[place]er (W/F in millimeters) 0   Ragweed Mix* ALK (W/F in millimeters) 0   Russian Thistle (W/F in millimeters) 0   Sagebrush/Mugwort (W/F in millimeters) 0   Sheep Sorrel (W/F in millimeters) 0   Feather Mix* ALK (W/F in millimeters) 0   Penicillium Mix (1:10 w/v) 0   Curvularia spicifera (1:10 w/v) 0   Epicoccum (1:10 w/v) 0   Aspergillus fumigatus (1:10 w/v): 0   Alternaria tenius (1:10 w/v) 0   H. Cladosporium (1:10 w/v) 0   Phoma herbarum (1:10 w/v) 0      Appropriate response to controls, positive to dog, all others negative    ASSESSMENT/PLAN:  Zee Kyle is a 45 year old female seen for evaluation of allergies.    1. Allergic rhinitis due to animals - Marion reports having perennial rhinoconjunctivitis symptoms for the last several years. She would like to discuss options for treatment in addition to OTC medications. Skin testing was notable for sensitization only to dogs. She currently has 2 pet dogs and has plans to have more and potentially breed them in the future. We discussed adding montelukast  and potential side effects. We also reviewed allergen immunotherapy treatment including the risks, benefits, and recommended duration of treatment. She is interested in proceeding but would like to discuss the potential costs and scheduling with her spouse.    - continue fexofenadine 180mg twice daily  - continue fluticasone nasal spray, 2 sprays in each nostril daily  - recommend starting allergen immunotherapy treatment - would need to sign consent and obtain prescription for epinephrine auto-injector  - ALLERGY SKIN TESTS,ALLERGENS    2. Allergic conjunctivitis, bilateral - see above    - ALLERGY SKIN TESTS,ALLERGENS      Follow-up in 3-6 months, sooner if needed      Thank you for allowing me to participate in the care of Zee Kyle.      Florencia Rodriguez MD, [facility]  Allergy/Immunology  [facility] - [facility]      Chart documentation done in part with Dragon Voice Recognition Software. Although reviewed after completion, some word and grammatical errors may remain.      Again, thank you for allowing me to participate in the care of your patient.        Sincerely,        Florencia Rodriguez MD

## 2021-04-05 NOTE — PROGRESS NOTES
"    Assessment & Plan     Major depressive disorder, recurrent episode, moderate (H) and anxiety:  PHQ 9 of 2, HENRI 7 of 2, well controlled, continue on Effexor 37.5 mg daily, continue counseling.     - venlafaxine (EFFEXOR-XR) 37.5 MG 24 hr capsule; Take 1 capsule (37.5 mg) by mouth daily  Benign essential hypertension:  /96, has not been taking lisinopril for the past month, discussed starting back on lisinopril at 2.5 mg  - Comprehensive metabolic panel (BMP + Alb, Alk Phos, ALT, AST, Total. Bili, TP)  - lisinopril (ZESTRIL) 5 MG tablet; Take 0.5 tablets (2.5 mg) by mouth daily    Iron deficiency anemia, unspecified iron deficiency anemia type: has not been taking ferrous sulfate, vitamin D or B12, history of gastric bypass  - CBC with platelets differential  - Vitamin D Deficiency  - Iron and iron binding capacity  - Vitamin B12    Back pain of lumbar region with sciatica: ordered by Dr. Yeo  - Cyclic Citrullinated Peptide Antibody IgG  - Rheumatoid factor  - CRP inflammation  - Erythrocyte sedimentation rate auto  - CCP Antibodies ([facility])       BMI:   Estimated body mass index is 32.89 kg/m  as calculated from the following:    Height as of 2/22/21: 1.702 m (5' 7\").    Weight as of this encounter: 95.3 kg (210 lb).   Weight management plan: Discussed healthy diet and exercise guidelines        Return in about 6 months (around 10/5/2021) for Physical Exam.    Susan Haase, APRN CNP  [facility]    Mirna Schultz is a 45 year old who presents for the following health issues     History of Present Illness       Mental Health Follow-up:  Patient presents to follow-up on Depression.Patient's depression since last visit has been:  Better  The patient is having other symptoms associated with depression.      Any significant life events: health concerns  Patient is not feeling anxious or having panic attacks.  Patient has no concerns about alcohol or drug use.     Social History  " Tobacco Use    Smoking status: Former Smoker      Packs/day: 1.00      Years: 10.00      Pack years: 10      Types: Cigarettes      Start date: 1996      Quit date: 2/10/2003      Years since quittin.1    Smokeless tobacco: Never Used    Tobacco comment: Glad to be done  Alcohol use: Yes    Alcohol/week: 0.0 standard drinks    Comment: occas  Drug use: No      Today's PHQ-9         PHQ-9 Total Score:     (P) 2   PHQ-9 Q9 Thoughts of better off dead/self-harm past 2 weeks :   (P) Not at all   Thoughts of suicide or self harm:      Self-harm Plan:        Self-harm Action:          Safety concerns for self or others:           She eats 2-3 servings of fruits and vegetables daily.She consumes 1 sweetened beverage(s) daily.She exercises with enough effort to increase her heart rate 10 to 19 minutes per day.  She exercises with enough effort to increase her heart rate 3 or less days per week. She is missing 1 dose(s) of medications per week.     Depression/Anxiety:  PHQ 9 of 2, HENRI 7 of 2.  Taking venlafaxine 37.5 mg     Hypertension:  Has not taken lisinopril 2.5 mg for 30 days, has been checking BP at home, has been 130/80's.          Review of Systems   CONSTITUTIONAL: NEGATIVE for fever, chills, change in weight  RESP: NEGATIVE for significant cough or SOB  CV: NEGATIVE for chest pain, palpitations or peripheral edema  PSYCHIATRIC: see HPI      Objective    BP (!) 142/96   Pulse 84   Temp 98.3  F (36.8  C) (Oral)   Resp 13   Wt 95.3 kg (210 lb)   SpO2 96%   BMI 32.89 kg/m    Body mass index is 32.89 kg/m .  Physical Exam   GENERAL: healthy, alert and no distress  NECK: no adenopathy, no asymmetry, masses, or scars and thyroid normal to palpation  RESP: lungs clear to auscultation - no rales, rhonchi or wheezes  CV: regular rate and rhythm, normal S1 S2, no S3 or S4, no murmur, click or rub, no peripheral edema and peripheral pulses strong  PSYCH: mentation appears normal, affect normal/bright                 Answers for HPI/ROS submitted by the patient on 4/5/2021   Chronic problems general questions HPI Form  If you checked off any problems, how difficult have these problems made it for you to do your work, take care of things at home, or get along with other people?: Not difficult at all  PHQ9 TOTAL SCORE: 2

## 2021-04-05 NOTE — PROGRESS NOTES
Zee Kyle was seen in the Allergy Clinic at [facility].    Zee Kyle is a 45 year old    White  Some other race female being seen today in consultation for allergies. She states that she has had allergy symptoms for the past 8 to 9 years. Her symptoms include itchy and watery eyes, sneezing, rhinorrhea, nasal congestion, post-nasal drainage, and cough. These symptoms are present throughout the year with some seasonal worsening in the spring and fall. Marion has been taking OTC medications but these are no longer helping. She has been taking fexofenadine twice daily along with fluticasone nasal spray. She doesn't feel the fluticasone doesn't work as well because she often has to blow her nose shortly afterwards. She also uses ketotifen eye drops and Visine eye drops as needed.    Marion has 2 pet dogs that do sleep in her bedroom and her new puppy sleeps in the bed. She also frequently is fostering or caring for other dogs.    Marion also mentions that she has chronic itching of her scalp and back and she is unsure if this may be related to her allergies.      Past Medical History:   Diagnosis Date     Anxiety Ongoing since 18     Depressive disorder on going since 18     Earache or other ear, nose, or throat complaint 12    Tonsillectomy      Esophageal reflux     Had stopped once I got my sleep apnea mask      Herpes simplex without mention of complication      Hypertension     While Pregnant     Hypothyroidism 2012     Intertrigo 2018     Presbyacusis     Presbycusis     Sleep apnea      STD (sexually transmitted disease)     Herpes     Urinary incontinence     Bladder sling surgery      Family History   Problem Relation Age of Onset     Hypertension Father          08     Obesity Father      Cerebrovascular Disease Mother      Dementia Paternal Grandmother      Coronary Artery Disease Paternal Grandfather      Past Surgical History:   Procedure  Laterality Date     ENT SURGERY       GENITOURINARY SURGERY  2017    bladder sling     LAPAROSCOPIC GASTRIC SLEEVE N/A 10/15/2019    Procedure: LAPAROSCOPIC GASTRIC SLEEVE;  Surgeon: Cesar Lee MD;  Location: [facility] OR     PE TUBES       TONSILLECTOMY       TYMPANOPLASTY, RT/LT      Tympanoplasty RT/LT       ENVIRONMENTAL HISTORY: [place] The home is heated with a forced air and gas furnace. They do have central air conditioning. The patient's bedroom is furnished with carpeting in bedroom and fabric window coverings.  Pets inside the house include 2 dog(s). There is history of cockroach or mice infestation. Do you smoke cigarettes or other recreational drugs? No Do you vape or use an e-cigarette? No. There is/are 0 smokers living in the house. There is/are 0 who smoke ecigarettes/vape living in the house. The house does have a damp basement.     SOCIAL HISTORY:   Zee is employed as a . She lives with her spouse, son and daughter.  Her spouse works as a/an /.    REVIEW OF SYSTEMS:  General: negative for weight gain. negative for weight loss. negative for changes in sleep.   Eyes: negative for itching. negative for redness. negative for tearing/watering. negative for vision changes  Ears: negative for fullness. negative for hearing loss. negative for dizziness.   Nose: positive  for snoring.negative for changes in smell. negative for drainage.   Throat: positive  for hoarseness. positive  for sore throat. negative for trouble swallowing.   Lungs: negative for cough. positive  for shortness of breath.negative for wheezing. negative for sputum production.   Cardiovascular: negative for chest pain. negative for swelling of ankles. negative for fast or irregular heartbeat.   Gastrointestinal: negative for nausea. negative for heartburn. negative for acid reflux.   Musculoskeletal: negative for joint pain. negative for joint  "stiffness. negative for joint swelling.   Neurologic: negative for seizures. negative for fainting. negative for weakness.   Psychiatric: negative for changes in mood. negative for anxiety.   Endocrine: negative for cold intolerance. negative for heat intolerance. negative for tremors.   Hematologic: negative for easy bruising. negative for easy bleeding.  Integumentary: negative for rash. negative for scaling. negative for nail changes.       Current Outpatient Medications:      calcium carbonate 600 mg-vitamin D 400 units (CALTRATE) 600-400 MG-UNIT per tablet, Take 1 tablet by mouth 2 times daily, Disp: , Rfl:      childrens multivitamin w/iron (FLINTSTONES COMPLETE) chewable tablet, Take 2 chew tab by mouth daily, Disp: , Rfl:      cyanocobalamin (CYANOCOBALAMIN) 1000 MCG/ML injection, Inject 1 mL (1,000 mcg) Subcutaneous every 30 days, Disp: 3 mL, Rfl: 1     cyclobenzaprine (FLEXERIL) 10 MG tablet, Take 1 tablet (10 mg) by mouth nightly as needed for muscle spasms, Disp: 30 tablet, Rfl: 0     FEROSUL 325 (65 Fe) MG tablet, , Disp: , Rfl:      ferrous fumarate 65 mg, [place]. FE,-Vitamin C 125 mg (VITRON C)  MG TABS tablet, Take 1 tablet by mouth daily, Disp: , Rfl:      levonorgestrel (MIRENA) 20 MCG/24HR IUD, 1 each (20 mcg) by Intrauterine route continuous, Disp: , Rfl:      lisinopril (ZESTRIL) 5 MG tablet, Take 0.5 tablets (2.5 mg) by mouth daily, Disp: 90 tablet, Rfl: 1     order for DME, Equipment being ordered: home BP kit (no wrist please), Disp: 1 each, Rfl: 0     Syringe/Needle, Disp, 25G X 5/8\" 5 ML MISC, 1 each every 30 days, Disp: 3 each, Rfl: 1     venlafaxine (EFFEXOR-XR) 37.5 MG 24 hr capsule, Take 1 capsule (37.5 mg) by mouth daily, Disp: 90 capsule, Rfl: 1     vitamin B complex with vitamin C (VITAMIN  B COMPLEX) tablet, Take 1 tablet by mouth daily, Disp: , Rfl:   Immunization History   Administered Date(s) Administered     COVID-19,PF,Pfizer 01/28/2021, 02/16/2021     Influenza (IIV3) PF " 11/17/2011, 11/13/2012     Influenza Vaccine IM > 6 months Valent IIV4 10/14/2013, 12/28/2015, 11/09/2017, 12/26/2018, 10/07/2019     Influenza Vaccine Im 4yrs+ 4 Valent CCIIV4 11/06/2020     TDAP Vaccine (Adacel) 02/10/2011     Tdap (Adult) Unspecified Formulation 12/06/2012     Allergies   Allergen Reactions     Nsaids      Bariatric surgery         EXAM:   BP (!) 142/96   Pulse 84   Temp 98.3  F (36.8  C) (Oral)   Resp 13   Wt 95.3 kg (210 lb)   SpO2 96%   BMI 32.89 kg/m    GENERAL APPEARANCE: alert, cooperative and not in distress  SKIN: no rashes, no lesions  HEAD: atraumatic, normocephalic  EYES: lids and lashes normal, conjunctivae and sclerae clear, pupils equal, round, reactive to light, EOM full and intact  ENT: no scars or lesions, nasal exam showed no discharge, swelling or lesions noted, otoscopy showed perforation of right tympanic membrane, tongue midline and normal, soft palate, uvula, and tonsils normal  NECK: no asymmetry, masses, or scars, supple without significant adenopathy  LUNGS: unlabored respirations, no intercostal retractions or accessory muscle use, clear to auscultation without rales or wheezes  HEART: regular rate and rhythm without murmurs and normal S1 and S2  MUSCULOSKELETAL: no musculoskeletal defects are noted  NEURO: no focal deficits noted  PSYCH: does not appear depressed or anxious    WORKUP: Skin testing    ENVIRONMENTAL PERCUTANEOUS SKIN TESTING: ADULT  [facility] 4/5/2021   Consent Y   Ordering Physician Dr. Rodriguez   Interpreting Physician Dr. Rodriguez   Testing Technician Sandy VALENCIA RN   Location Back   Time start:  1:58 PM   Time End:  2:13 PM   Positive Control: Histatrol*ALK 1 mg/ml 5/10   Negative Control: 50% Glycerin 0   Cat Hair*ALK (10,000 BAU/ml) 0   AP Dog Hair/Dander (1:100 w/v) 7/24   Dust Mite p. 30,000 AU/ml 0   Dust Mite f. (30,000 AU/ml) 0   [place] (W/F in millimeters) 0   Matthew Grass (100,000 BAU/mL) 0   Red Cedar (W/F in millimeters) 0    Maple/Hawaii (W/F in millimeters) 0   Hackberry (W/F in millimeters) 0   Seward (W/F in millimeters) 0   Ector *ALK (W/F in millimeters) 0   American Elm (W/F in millimeters) 0   Kennedy (W/F in millimeters) 0   Black [place] (W/F in millimeters) 0   Birch Mix (W/F in millimeters) 0   [place] (W/F in millimeters) 0   Oak (W/F in millimeters) 0   Cocklebur (W/F in millimeters) 0   [place] (W/F in millimeters) 0   White Luis (W/F in millimeters) 0   Careless (W/F in millimeters) 0   Nettle (W/F in millimeters) 0   English Plantain (W/F in millimeters) 0   Kochia (W/F in millimeters) 0   Lamb's Quarter (W/F in millimeters) 0   Marshelder (W/F in millimeters) 0   Ragweed Mix* ALK (W/F in millimeters) 0   Russian Thistle (W/F in millimeters) 0   Sagebrush/Mugwort (W/F in millimeters) 0   Sheep Sorrel (W/F in millimeters) 0   Feather Mix* ALK (W/F in millimeters) 0   Penicillium Mix (1:10 w/v) 0   Curvularia spicifera (1:10 w/v) 0   Epicoccum (1:10 w/v) 0   Aspergillus fumigatus (1:10 w/v): 0   Alternaria tenius (1:10 w/v) 0   H. Cladosporium (1:10 w/v) 0   Phoma herbarum (1:10 w/v) 0      Appropriate response to controls, positive to dog, all others negative    ASSESSMENT/PLAN:  Zee Kyle is a 45 year old female seen for evaluation of allergies.    1. Allergic rhinitis due to animals - Marion reports having perennial rhinoconjunctivitis symptoms for the last several years. She would like to discuss options for treatment in addition to OTC medications. Skin testing was notable for sensitization only to dogs. She currently has 2 pet dogs and has plans to have more and potentially breed them in the future. We discussed adding montelukast and potential side effects. We also reviewed allergen immunotherapy treatment including the risks, benefits, and recommended duration of treatment. She is interested in proceeding but would like to discuss the potential costs and scheduling with her spouse.    -  continue fexofenadine 180mg twice daily  - continue fluticasone nasal spray, 2 sprays in each nostril daily  - recommend starting allergen immunotherapy treatment - would need to sign consent and obtain prescription for epinephrine auto-injector  - ALLERGY SKIN TESTS,ALLERGENS    2. Allergic conjunctivitis, bilateral - see above    - ALLERGY SKIN TESTS,ALLERGENS      Follow-up in 3-6 months, sooner if needed      Thank you for allowing me to participate in the care of Zee Kyle.      Florencia Rodriguez MD, FAAAAI  Allergy/Immunology  [facility]      Chart documentation done in part with Dragon Voice Recognition Software. Although reviewed after completion, some word and grammatical errors may remain.

## 2021-04-06 LAB
CCP AB SER IA-ACNC: 2 U/ML
RHEUMATOID FACT SER NEPH-ACNC: 12 IU/ML (ref 0–20)

## 2021-04-06 ASSESSMENT — PATIENT HEALTH QUESTIONNAIRE - PHQ9: SUM OF ALL RESPONSES TO PHQ QUESTIONS 1-9: 2

## 2021-04-06 NOTE — RESULT ENCOUNTER NOTE
Nathan Schultz,  Your lab results are as below:  1)  Vitamin B12 level is normal at 416.  2)  Hemoglobin and Iron levels are normal.  3)  Glucose is normal at 79  (normal fasting is <100).  4)  Vitamin D level is normal at 39.    If you have any questions do not hesitate to call the clinic to discuss the results with me further.     Sincerely,    Susan Haase, CNP

## 2021-04-11 ENCOUNTER — HEALTH MAINTENANCE LETTER (OUTPATIENT)
Age: 46
End: 2021-04-11

## 2021-05-10 ENCOUNTER — MYC MEDICAL ADVICE (OUTPATIENT)
Dept: ORTHOPEDICS | Facility: CLINIC | Age: 46
End: 2021-05-10

## 2021-05-10 DIAGNOSIS — G89.29 CHRONIC BILATERAL LOW BACK PAIN WITHOUT SCIATICA: Primary | ICD-10-CM

## 2021-05-10 DIAGNOSIS — M54.50 CHRONIC BILATERAL LOW BACK PAIN WITHOUT SCIATICA: Primary | ICD-10-CM

## 2021-05-10 NOTE — LETTER
May 12, 2021      Zee Kyle  [address] [address] [id number]        To Whom It May Concern:    Zee Kyle  was seen on chronic low back pain .  Please excuse her from her gym membership  due to injury. She is being referred to back specialists for further treatments and to deal with her pain.         Sincerely,        Albert Yeo, MD

## 2021-05-11 NOTE — TELEPHONE ENCOUNTER
Patient is messaging in requesting results of blood work and next steps in the treatment plan.     Also, wanting to know if we can write a note letting her put her gym membership on hold.     Please advise.     Lacy Goodwin MS, ATC

## 2021-07-16 ENCOUNTER — HOSPITAL ENCOUNTER (OUTPATIENT)
Dept: MAMMOGRAPHY | Facility: CLINIC | Age: 46
Discharge: HOME | End: 2021-07-16
Attending: NURSE PRACTITIONER | Admitting: NURSE PRACTITIONER
Payer: COMMERCIAL

## 2021-07-16 DIAGNOSIS — Z12.31 VISIT FOR SCREENING MAMMOGRAM: ICD-10-CM

## 2021-07-16 PROCEDURE — 77063 BREAST TOMOSYNTHESIS BI: CPT

## 2021-09-26 ENCOUNTER — HEALTH MAINTENANCE LETTER (OUTPATIENT)
Age: 46
End: 2021-09-26

## 2021-11-26 ENCOUNTER — MYC MEDICAL ADVICE (OUTPATIENT)
Dept: FAMILY MEDICINE | Facility: CLINIC | Age: 46
End: 2021-11-26
Payer: COMMERCIAL

## 2021-11-26 NOTE — PROGRESS NOTES
Pre-Visit Planning     Future Appointments   Date Time Provider Department Center   11/29/2021  3:20 PM Haase, Susan Rachele, APRN CNP [facility]     Arrival Time for this Appointment:  3:05 PM     Appointment Notes for this encounter:   I just need a prescription refill for Venlafaxine    Questionnaires Reviewed/Assigned  Additional questionnaires assigned: PHQ and HENRI sent via my chart     Health Maintenance Due   Topic Date Due     ADVANCE CARE PLANNING  Never done     PREVENTIVE CARE VISIT  12/26/2019     COVID-19 Vaccine (3 - Booster for Pfizer series) 08/16/2021     INFLUENZA VACCINE (1) 09/01/2021     PHQ-9  10/05/2021     Patient preferred phone number: [phone]    Unable to reach. Left voicemail. Time/date of appt left along with RN PAL direct number for return call     Umm Graves Registered Nurse, PAL (Patient Advocate Liason)   [facility]   [phone]

## 2021-11-29 ENCOUNTER — OFFICE VISIT (OUTPATIENT)
Dept: URGENT CARE | Facility: URGENT CARE | Age: 46
End: 2021-11-29
Payer: COMMERCIAL

## 2021-11-29 ENCOUNTER — VIRTUAL VISIT (OUTPATIENT)
Dept: FAMILY MEDICINE | Facility: CLINIC | Age: 46
End: 2021-11-29
Payer: COMMERCIAL

## 2021-11-29 VITALS
RESPIRATION RATE: 18 BRPM | DIASTOLIC BLOOD PRESSURE: 70 MMHG | SYSTOLIC BLOOD PRESSURE: 120 MMHG | OXYGEN SATURATION: 96 % | HEART RATE: 108 BPM | WEIGHT: 220 LBS | TEMPERATURE: 98.4 F | BODY MASS INDEX: 34.46 KG/M2

## 2021-11-29 DIAGNOSIS — I10 BENIGN ESSENTIAL HYPERTENSION: ICD-10-CM

## 2021-11-29 DIAGNOSIS — M54.40 BACK PAIN OF LUMBAR REGION WITH SCIATICA: Primary | ICD-10-CM

## 2021-11-29 DIAGNOSIS — H65.191 ACUTE MUCOID OTITIS MEDIA OF RIGHT EAR: ICD-10-CM

## 2021-11-29 DIAGNOSIS — F33.1 MAJOR DEPRESSIVE DISORDER, RECURRENT EPISODE, MODERATE (H): ICD-10-CM

## 2021-11-29 DIAGNOSIS — F41.9 ANXIETY: ICD-10-CM

## 2021-11-29 DIAGNOSIS — J01.41 ACUTE RECURRENT PANSINUSITIS: Primary | ICD-10-CM

## 2021-11-29 PROCEDURE — 99214 OFFICE O/P EST MOD 30 MIN: CPT | Mod: GT | Performed by: NURSE PRACTITIONER

## 2021-11-29 PROCEDURE — 99213 OFFICE O/P EST LOW 20 MIN: CPT | Performed by: NURSE PRACTITIONER

## 2021-11-29 RX ORDER — LISINOPRIL 5 MG/1
5 TABLET ORAL DAILY
Qty: 90 TABLET | Refills: 1 | Status: SHIPPED | OUTPATIENT
Start: 2021-11-29 | End: 2022-07-19

## 2021-11-29 RX ORDER — VENLAFAXINE HYDROCHLORIDE 37.5 MG/1
37.5 CAPSULE, EXTENDED RELEASE ORAL DAILY
Qty: 90 CAPSULE | Refills: 1 | Status: SHIPPED | OUTPATIENT
Start: 2021-11-29 | End: 2022-09-01

## 2021-11-29 ASSESSMENT — ANXIETY QUESTIONNAIRES
4. TROUBLE RELAXING: SEVERAL DAYS
6. BECOMING EASILY ANNOYED OR IRRITABLE: SEVERAL DAYS
3. WORRYING TOO MUCH ABOUT DIFFERENT THINGS: NOT AT ALL
8. IF YOU CHECKED OFF ANY PROBLEMS, HOW DIFFICULT HAVE THESE MADE IT FOR YOU TO DO YOUR WORK, TAKE CARE OF THINGS AT HOME, OR GET ALONG WITH OTHER PEOPLE?: NOT DIFFICULT AT ALL
2. NOT BEING ABLE TO STOP OR CONTROL WORRYING: NOT AT ALL
1. FEELING NERVOUS, ANXIOUS, OR ON EDGE: NOT AT ALL
7. FEELING AFRAID AS IF SOMETHING AWFUL MIGHT HAPPEN: NOT AT ALL
3. WORRYING TOO MUCH ABOUT DIFFERENT THINGS: NOT AT ALL
GAD7 TOTAL SCORE: 3
GAD7 TOTAL SCORE: 2
GAD7 TOTAL SCORE: 3
7. FEELING AFRAID AS IF SOMETHING AWFUL MIGHT HAPPEN: NOT AT ALL
5. BEING SO RESTLESS THAT IT IS HARD TO SIT STILL: NOT AT ALL
2. NOT BEING ABLE TO STOP OR CONTROL WORRYING: NOT AT ALL
7. FEELING AFRAID AS IF SOMETHING AWFUL MIGHT HAPPEN: NOT AT ALL
GAD7 TOTAL SCORE: 3
1. FEELING NERVOUS, ANXIOUS, OR ON EDGE: SEVERAL DAYS
IF YOU CHECKED OFF ANY PROBLEMS ON THIS QUESTIONNAIRE, HOW DIFFICULT HAVE THESE PROBLEMS MADE IT FOR YOU TO DO YOUR WORK, TAKE CARE OF THINGS AT HOME, OR GET ALONG WITH OTHER PEOPLE: NOT DIFFICULT AT ALL
6. BECOMING EASILY ANNOYED OR IRRITABLE: SEVERAL DAYS
5. BEING SO RESTLESS THAT IT IS HARD TO SIT STILL: NOT AT ALL

## 2021-11-29 ASSESSMENT — PATIENT HEALTH QUESTIONNAIRE - PHQ9
5. POOR APPETITE OR OVEREATING: SEVERAL DAYS
SUM OF ALL RESPONSES TO PHQ QUESTIONS 1-9: 2
10. IF YOU CHECKED OFF ANY PROBLEMS, HOW DIFFICULT HAVE THESE PROBLEMS MADE IT FOR YOU TO DO YOUR WORK, TAKE CARE OF THINGS AT HOME, OR GET ALONG WITH OTHER PEOPLE: NOT DIFFICULT AT ALL
SUM OF ALL RESPONSES TO PHQ QUESTIONS 1-9: 2

## 2021-11-29 NOTE — PROGRESS NOTES
Marion is a 46 year old who is being evaluated via a billable video visit.      How would you like to obtain your AVS? MyChart  If the video visit is dropped, the invitation should be resent by: Send to e-mail at: [email or web address]  Will anyone else be joining your video visit? No     Video Start Time: 1530    Assessment & Plan     Benign essential hypertension: /70, well controlled, continue on lisinopril 5 mg daily  - lisinopril (ZESTRIL) 5 MG tablet  Dispense: 90 tablet; Refill: 1    Major depressive disorder, recurrent episode, moderate (H) and anxiety:  PHQ 9 of 2, HENRI 7 of 2. Denies thoughts of harming self or others. Continue on venlafaxine 37.5 mg daily.     - venlafaxine (EFFEXOR-XR) 37.5 MG 24 hr capsule  Dispense: 90 capsule; Refill: 1    Back pain of lumbar region with sciatica:  RF elevated at >7 last year, will repeat.  Also, refer to O spine specialist for further evaluation and treatment options.   - Spine Referral  - CRP inflammation  - Rheumatoid factor  - ESR: Erythrocyte sedimentation rate  Follow up in 1 month for labs.    Susan Haase, APRN CNP  [facility]   Marion is a 46 year old who presents for the following health issues   HPI     Hypertension Follow-up      Do you check your blood pressure regularly outside of the clinic? No     Are you following a low salt diet? Yes    Are your blood pressures ever more than 140 on the top number (systolic) OR more   than 90 on the bottom number (diastolic), for example 140/90? No unsure does not check   BP today at /70, taking lisinopril 5 mg daily as prescribed.  Does not check BP at home, denies headache, vision disturbance, BLE edema, chest pain/palpitations.  Depression Followup    How are you doing with your depression since your last visit? No change    Are you having other symptoms that might be associated with depression? No    Have you had a significant life event?  No     Are you feeling  anxious or having panic attacks?   No    Do you have any concerns with your use of alcohol or other drugs? No    Social History     Tobacco Use     Smoking status: Former Smoker     Packs/day: 1.00     Years: 10.00     Pack years: 10.00     Types: Cigarettes     Start date: 1996     Quit date: 2/10/2003     Years since quittin.8     Smokeless tobacco: Never Used     Tobacco comment: Glad to be done   Substance Use Topics     Alcohol use: Yes     Alcohol/week: 0.0 standard drinks     Comment: occas     Drug use: No     PHQ 2021   PHQ-9 Total Score 2 2 2   Q9: Thoughts of better off dead/self-harm past 2 weeks Not at all Not at all Not at all     HENRI-7 SCORE 7/3/2020 2021 2021   Total Score - - 3 (minimal anxiety)   Total Score 2 3 2     Last PHQ-9 2021   1.  Little interest or pleasure in doing things 0   2.  Feeling down, depressed, or hopeless 1   3.  Trouble falling or staying asleep, or sleeping too much 1   4.  Feeling tired or having little energy 0   5.  Poor appetite or overeating 0   6.  Feeling bad about yourself 0   7.  Trouble concentrating 0   8.  Moving slowly or restless 0   Q9: Thoughts of better off dead/self-harm past 2 weeks 0   PHQ-9 Total Score 2   Difficulty at work, home, or with people Not difficult at all     HENRI-7  2021   1. Feeling nervous, anxious, or on edge 0   2. Not being able to stop or control worrying 0   3. Worrying too much about different things 0   4. Trouble relaxing 1   5. Being so restless that it is hard to sit still 0   6. Becoming easily annoyed or irritable 1   7. Feeling afraid, as if something awful might happen 0   HENRI-7 Total Score 2   If you checked any problems, how difficult have they made it for you to do your work, take care of things at home, or get along with other people? Not difficult at all       Suicide Assessment Five-step Evaluation and Treatment (SAFE-T)      How many servings of fruits and  vegetables do you eat daily?  2-3    On average, how many sweetened beverages do you drink each day (Examples: soda, juice, sweet tea, etc.  Do NOT count diet or artificially sweetened beverages)?   0    How many days per week do you exercise enough to make your heart beat faster? 3 or less    How many minutes a day do you exercise enough to make your heart beat faster? 9 or less    How many days per week do you miss taking your medication? 0  PHQ 9 of 2, HENRI 7 of 2. Taking venlafaxine 37.5 mg daily as prescribed.  Denies thoughts of harming self or others.     Lower back pain:  Chronic, has been seen by sports medicine and had an MRI, no known cause for back pain, quite severe, limits any type of exercise.  Has had PT and chiropractic care without change in symptoms.        Review of Systems   CONSTITUTIONAL: NEGATIVE for fever, chills, change in weight  RESP: NEGATIVE for significant cough or SOB  CV: NEGATIVE for chest pain, palpitations or peripheral edema  MUSCULOSKELETAL: see HPI  PSYCHIATRIC: see HPI      Objective           Vitals:  No vitals were obtained today due to virtual visit.    Physical Exam   GENERAL: Healthy, alert and no distress  RESP: No audible wheeze, cough, or visible cyanosis.  No visible retractions or increased work of breathing.    NEURO: Cranial nerves grossly intact.  Mentation and speech appropriate for age.  PSYCH: Mentation appears normal, affect normal/bright, judgement and insight intact, normal speech and appearance well-groomed.      Video-Visit Details    Type of service:  Video Visit    Video End Time 5275    Originating Location (pt. Location): Home    Distant Location (provider location):  [facility] [place] [place]     Platform used for Video Visit: [employer]

## 2021-11-29 NOTE — PROGRESS NOTES
"Chief Complaint   Patient presents with     Sinus Problem     sinus infection x 3 days      Cough     SUBJECTIVE:  Zee Kyle is a 46 year old female presenting with sinus pressure, congestion, headache, earaches, sore throat, cough for 3 days. She has chronic sinusitis and sees ENT. Already taking mucinex without relief.    Past Medical History:   Diagnosis Date     Anxiety Ongoing since 18     Depressive disorder on going since 18     Earache or other ear, nose, or throat complaint 10    Tonsillectomy 7/02     Esophageal reflux     Had stopped once I got my sleep apnea mask 5/18     Herpes simplex without mention of complication      Hypertension     While Pregnant     Hypothyroidism 8/7/2012     Intertrigo 7/18/2018     Presbyacusis     Presbycusis     Sleep apnea      STD (sexually transmitted disease)     Herpes     Urinary incontinence     Bladder sling surgery 7/17     calcium carbonate 600 mg-vitamin D 400 units (CALTRATE) 600-400 MG-UNIT per tablet, Take 1 tablet by mouth 2 times daily  childrens multivitamin w/iron (FLINTSTONES COMPLETE) chewable tablet, Take 2 chew tab by mouth daily  cyanocobalamin (CYANOCOBALAMIN) 1000 MCG/ML injection, Inject 1 mL (1,000 mcg) Subcutaneous every 30 days  cyclobenzaprine (FLEXERIL) 10 MG tablet, Take 1 tablet (10 mg) by mouth nightly as needed for muscle spasms  FEROSUL 325 (65 Fe) MG tablet,   ferrous fumarate 65 mg, [place]. FE,-Vitamin C 125 mg (VITRON C)  MG TABS tablet, Take 1 tablet by mouth daily  levonorgestrel (MIRENA) 20 MCG/24HR IUD, 1 each (20 mcg) by Intrauterine route continuous  lisinopril (ZESTRIL) 5 MG tablet, Take 0.5 tablets (2.5 mg) by mouth daily  order for DME, Equipment being ordered: home BP kit (no wrist please) (Patient taking differently: Equipment being ordered: home BP kit (no wrist please))  Syringe/Needle, Disp, 25G X 5/8\" 5 ML MISC, 1 each every 30 days  venlafaxine (EFFEXOR-XR) 37.5 MG 24 hr capsule, TAKE 1 CAPSULE(37.5 MG) BY " MOUTH DAILY  vitamin B complex with vitamin C (VITAMIN  B COMPLEX) tablet, Take 1 tablet by mouth daily    No current facility-administered medications on file prior to visit.    Social History     Tobacco Use     Smoking status: Former Smoker     Packs/day: 1.00     Years: 10.00     Pack years: 10.00     Types: Cigarettes     Start date: 1996     Quit date: 2/10/2003     Years since quittin.8     Smokeless tobacco: Never Used     Tobacco comment: Glad to be done   Substance Use Topics     Alcohol use: Yes     Alcohol/week: 0.0 standard drinks     Comment: occas     Allergies   Allergen Reactions     Nsaids      Bariatric surgery     Review of Systems   All systems negative except for those listed above in HPI.    OBJECTIVE:   /70 (BP Location: Right arm, Patient Position: Chair, Cuff Size: Adult Large)   Pulse 108   Temp 98.4  F (36.9  C) (Oral)   Resp 18   Wt 99.8 kg (220 lb)   SpO2 96%   Breastfeeding No   BMI 34.46 kg/m       Physical Exam  Constitutional:       General: She is not in acute distress.     Appearance: She is well-developed. She is not ill-appearing, toxic-appearing or diaphoretic.   HENT:      Head: Normocephalic and atraumatic.      Ears:      Comments: Right tympanic membrane with orange mucoid appearance.     Nose: Congestion present.      Mouth/Throat:      Pharynx: No oropharyngeal exudate or posterior oropharyngeal erythema.   Eyes:      Conjunctiva/sclera: Conjunctivae normal.      Pupils: Pupils are equal, round, and reactive to light.   Cardiovascular:      Rate and Rhythm: Normal rate.      Pulses: Normal pulses.   Pulmonary:      Effort: Respiratory distress (cough) present.      Breath sounds: No stridor. No wheezing, rhonchi or rales.   Musculoskeletal:         General: Normal range of motion.      Cervical back: Normal range of motion and neck supple.   Lymphadenopathy:      Cervical: Cervical adenopathy present.   Skin:     General: Skin is warm.       Capillary Refill: Capillary refill takes less than 2 seconds.      Findings: No rash.   Neurological:      General: No focal deficit present.      Mental Status: She is alert and oriented to person, place, and time.   Psychiatric:         Mood and Affect: Mood normal.         Behavior: Behavior normal.       ASSESSMENT:    ICD-10-CM    1. Acute recurrent pansinusitis  J01.41 amoxicillin-clavulanate (AUGMENTIN) 875-125 MG tablet   2. Acute mucoid otitis media of right ear  H65.111      PLAN:     Your symptoms appear to be viral at this time.  Secondary bacterial infections only happen in about 0.5-2% of cases.  Antibiotics are recommended only if you do not have any relief from your symptoms in 10 days or symptoms worsen after 7 days.  Advised that she could do watchful waiting for a couple days if this is viral.  Augmentin sent in if needed.  Declines any additional testing here today.  Lungs clear, vitals stable.  Nasal congestion often starts clear then turns yellow or green towards the end- this is not a sign of a bacterial infection.  Flonase (fluticasone) 2 sprays in each nostril daily until symptoms resolve, then continue 1 spray in each nostril for at least 5 more days.  Take Tylenol or an NSAID such as ibuprofen or naproxen as needed for pain.  May use netti pot with bottled or distilled water and saline packets to flush sinuses.  Sudafed (pseudoephedrine) behind the pharmacist counter for 3-5 days helps relieve congestion.  Afrin (oxymetazoline) nasal spray twice daily for 3 days. Stop after 3 days.  Mucinex (guiafenesin) thins mucus and may help it to loosen more quickly  Saline drops or nasal sprays may loosen mucus.  Sit in the bathroom with the door closed and hot shower running to loosen mucus.  Contact primary care clinic if you do not have any relief from your symptoms after 10 days.  Present to emergency room for significantly increasing pain, persistent high fever >102F, swelling/redness around  your eyes, changes in your vision or ability to move your eyes, altered mental status or a severe headache.    Follow up with primary care provider with any problems, questions or concerns or if symptoms worsen or fail to improve. Patient agreed to plan and verbalized understanding.    Kassidy Soriano, [facility]

## 2021-11-29 NOTE — TELEPHONE ENCOUNTER
RN PAL received BPA trigger after patient completed PHQ9 and GAD7     Patient has a scheduled appt with pcp today in 5 minutes, did not initiate call     Umm Graves Registered Nurse, PAL (Patient Advocate Liason)   [facility]   [phone]

## 2021-11-30 ASSESSMENT — PATIENT HEALTH QUESTIONNAIRE - PHQ9
SUM OF ALL RESPONSES TO PHQ QUESTIONS 1-9: 2
SUM OF ALL RESPONSES TO PHQ QUESTIONS 1-9: 2

## 2021-11-30 ASSESSMENT — ANXIETY QUESTIONNAIRES
GAD7 TOTAL SCORE: 2
GAD7 TOTAL SCORE: 3

## 2021-12-02 ENCOUNTER — MYC MEDICAL ADVICE (OUTPATIENT)
Dept: FAMILY MEDICINE | Facility: CLINIC | Age: 46
End: 2021-12-02
Payer: COMMERCIAL

## 2021-12-02 DIAGNOSIS — J32.9 OTHER SINUSITIS, UNSPECIFIED CHRONICITY: Primary | ICD-10-CM

## 2021-12-02 RX ORDER — AZITHROMYCIN 250 MG/1
250 TABLET, FILM COATED ORAL DAILY
Qty: 5 TABLET | Refills: 0 | Status: SHIPPED | OUTPATIENT
Start: 2021-12-02 | End: 2021-12-07

## 2021-12-22 ENCOUNTER — LAB (OUTPATIENT)
Dept: LAB | Facility: CLINIC | Age: 46
End: 2021-12-22

## 2021-12-22 ENCOUNTER — IMMUNIZATION (OUTPATIENT)
Dept: FAMILY MEDICINE | Facility: CLINIC | Age: 46
End: 2021-12-22
Payer: COMMERCIAL

## 2021-12-22 DIAGNOSIS — Z23 NEED FOR COVID-19 VACCINE: Primary | ICD-10-CM

## 2021-12-22 DIAGNOSIS — M54.40 BACK PAIN OF LUMBAR REGION WITH SCIATICA: ICD-10-CM

## 2021-12-22 DIAGNOSIS — Z23 NEEDS FLU SHOT: ICD-10-CM

## 2021-12-22 LAB — ERYTHROCYTE [SEDIMENTATION RATE] IN BLOOD BY WESTERGREN METHOD: 6 MM/HR (ref 0–20)

## 2021-12-22 PROCEDURE — 86431 RHEUMATOID FACTOR QUANT: CPT

## 2021-12-22 PROCEDURE — 90686 IIV4 VACC NO PRSV 0.5 ML IM: CPT

## 2021-12-22 PROCEDURE — 36415 COLL VENOUS BLD VENIPUNCTURE: CPT

## 2021-12-22 PROCEDURE — 91300 COVID-19,PF,PFIZER (12+ YRS): CPT

## 2021-12-22 PROCEDURE — 0004A COVID-19,PF,PFIZER (12+ YRS): CPT

## 2021-12-22 PROCEDURE — 90471 IMMUNIZATION ADMIN: CPT

## 2021-12-22 PROCEDURE — 85652 RBC SED RATE AUTOMATED: CPT

## 2021-12-22 PROCEDURE — 86140 C-REACTIVE PROTEIN: CPT

## 2021-12-23 LAB
CRP SERPL-MCNC: <2.9 MG/L (ref 0–8)
RHEUMATOID FACT SER NEPH-ACNC: 11 IU/ML

## 2022-04-25 ENCOUNTER — MYC MEDICAL ADVICE (OUTPATIENT)
Dept: FAMILY MEDICINE | Facility: CLINIC | Age: 47
End: 2022-04-25
Payer: COMMERCIAL

## 2022-04-26 ENCOUNTER — E-VISIT (OUTPATIENT)
Dept: FAMILY MEDICINE | Facility: CLINIC | Age: 47
End: 2022-04-26
Payer: COMMERCIAL

## 2022-04-26 DIAGNOSIS — B00.9 HSV (HERPES SIMPLEX VIRUS) INFECTION: Primary | ICD-10-CM

## 2022-04-26 PROCEDURE — 99421 OL DIG E/M SVC 5-10 MIN: CPT | Performed by: PHYSICIAN ASSISTANT

## 2022-04-26 RX ORDER — VALACYCLOVIR HYDROCHLORIDE 500 MG/1
500 TABLET, FILM COATED ORAL 2 TIMES DAILY
Qty: 6 TABLET | Refills: 0 | Status: ON HOLD | OUTPATIENT
Start: 2022-04-26 | End: 2022-07-12

## 2022-04-26 NOTE — PATIENT INSTRUCTIONS
Thank you for choosing us for your care. I have placed an order for a prescription so that you can start treatment. View your full visit summary for details by clicking on the link below. Your pharmacist will able to address any questions you may have about the medication.     If you're not feeling better within 5-7 days, please schedule an appointment.  You can schedule an appointment right here in [facility], or call [phone]  If the visit is for the same symptoms as your eVisit, we'll refund the cost of your eVisit if seen within seven days.

## 2022-05-07 ENCOUNTER — HEALTH MAINTENANCE LETTER (OUTPATIENT)
Age: 47
End: 2022-05-07

## 2022-05-09 ENCOUNTER — APPOINTMENT (OUTPATIENT)
Dept: URBAN - METROPOLITAN AREA CLINIC 253 | Age: 47
Setting detail: DERMATOLOGY
End: 2022-05-12

## 2022-05-09 VITALS — HEIGHT: 67 IN | RESPIRATION RATE: 14 BRPM | WEIGHT: 220 LBS

## 2022-05-09 DIAGNOSIS — Z41.9 ENCOUNTER FOR PROCEDURE FOR PURPOSES OTHER THAN REMEDYING HEALTH STATE, UNSPECIFIED: ICD-10-CM

## 2022-05-09 DIAGNOSIS — D22 MELANOCYTIC NEVI: ICD-10-CM

## 2022-05-09 DIAGNOSIS — Z71.89 OTHER SPECIFIED COUNSELING: ICD-10-CM

## 2022-05-09 DIAGNOSIS — L82.1 OTHER SEBORRHEIC KERATOSIS: ICD-10-CM

## 2022-05-09 DIAGNOSIS — L81.4 OTHER MELANIN HYPERPIGMENTATION: ICD-10-CM

## 2022-05-09 DIAGNOSIS — D18.0 HEMANGIOMA: ICD-10-CM

## 2022-05-09 DIAGNOSIS — L43.8 OTHER LICHEN PLANUS: ICD-10-CM

## 2022-05-09 PROBLEM — D23.71 OTHER BENIGN NEOPLASM OF SKIN OF RIGHT LOWER LIMB, INCLUDING HIP: Status: ACTIVE | Noted: 2022-05-09

## 2022-05-09 PROBLEM — L30.9 DERMATITIS, UNSPECIFIED: Status: ACTIVE | Noted: 2022-05-09

## 2022-05-09 PROBLEM — D18.01 HEMANGIOMA OF SKIN AND SUBCUTANEOUS TISSUE: Status: ACTIVE | Noted: 2022-05-09

## 2022-05-09 PROBLEM — D22.4 MELANOCYTIC NEVI OF SCALP AND NECK: Status: ACTIVE | Noted: 2022-05-09

## 2022-05-09 PROBLEM — D22.5 MELANOCYTIC NEVI OF TRUNK: Status: ACTIVE | Noted: 2022-05-09

## 2022-05-09 PROCEDURE — OTHER COUNSELING: OTHER

## 2022-05-09 PROCEDURE — OTHER ADDITIONAL NOTES: OTHER

## 2022-05-09 PROCEDURE — OTHER PRESCRIPTION: OTHER

## 2022-05-09 PROCEDURE — OTHER MIPS QUALITY: OTHER

## 2022-05-09 PROCEDURE — 99213 OFFICE O/P EST LOW 20 MIN: CPT

## 2022-05-09 RX ORDER — NYSTATIN 100000 [USP'U]/G
OINTMENT TOPICAL BID
Qty: 15 | Refills: 1 | Status: ERX | COMMUNITY
Start: 2022-05-09

## 2022-05-09 RX ORDER — CLOBETASOL PROPIONATE 0.5 MG/G
0.05% CREAM TOPICAL BID
Qty: 15 | Refills: 1 | Status: ERX | COMMUNITY
Start: 2022-05-09

## 2022-05-09 ASSESSMENT — LOCATION ZONE DERM
LOCATION ZONE: TRUNK
LOCATION ZONE: SCALP

## 2022-05-09 ASSESSMENT — LOCATION SIMPLE DESCRIPTION DERM
LOCATION SIMPLE: RIGHT UPPER BACK
LOCATION SIMPLE: ABDOMEN
LOCATION SIMPLE: LEFT SCALP
LOCATION SIMPLE: LOWER BACK
LOCATION SIMPLE: LEFT UPPER BACK
LOCATION SIMPLE: UPPER BACK

## 2022-05-09 ASSESSMENT — LOCATION DETAILED DESCRIPTION DERM
LOCATION DETAILED: SUPERIOR LUMBAR SPINE
LOCATION DETAILED: LEFT RIB CAGE
LOCATION DETAILED: LEFT SUPERIOR MEDIAL UPPER BACK
LOCATION DETAILED: RIGHT INFERIOR MEDIAL UPPER BACK
LOCATION DETAILED: LEFT CENTRAL FRONTAL SCALP
LOCATION DETAILED: RIGHT SUPERIOR MEDIAL UPPER BACK
LOCATION DETAILED: INFERIOR THORACIC SPINE

## 2022-05-09 NOTE — PROCEDURE: ADDITIONAL NOTES
Detail Level: Zone
Additional Notes: Pt will return for removal as she has a wedding this weekend
Render Risk Assessment In Note?: no
Additional Notes: Will offer $ 10 per unit for pt. Recommended 20 units in glabella

## 2022-07-11 ENCOUNTER — APPOINTMENT (OUTPATIENT)
Dept: CT IMAGING | Facility: CLINIC | Age: 47
DRG: 417 | End: 2022-07-11
Attending: EMERGENCY MEDICINE
Payer: COMMERCIAL

## 2022-07-11 ENCOUNTER — HOSPITAL ENCOUNTER (INPATIENT)
Facility: CLINIC | Age: 47
LOS: 1 days | Discharge: HOME | DRG: 417 | End: 2022-07-12
Attending: EMERGENCY MEDICINE | Admitting: INTERNAL MEDICINE
Payer: COMMERCIAL

## 2022-07-11 ENCOUNTER — APPOINTMENT (OUTPATIENT)
Dept: ULTRASOUND IMAGING | Facility: CLINIC | Age: 47
DRG: 417 | End: 2022-07-11
Attending: EMERGENCY MEDICINE
Payer: COMMERCIAL

## 2022-07-11 DIAGNOSIS — K92.1 BLOODY STOOL: ICD-10-CM

## 2022-07-11 DIAGNOSIS — R10.13 ABDOMINAL PAIN, EPIGASTRIC: ICD-10-CM

## 2022-07-11 DIAGNOSIS — K85.90 ACUTE PANCREATITIS WITHOUT INFECTION OR NECROSIS, UNSPECIFIED PANCREATITIS TYPE: ICD-10-CM

## 2022-07-11 DIAGNOSIS — K85.10 ACUTE BILIARY PANCREATITIS, UNSPECIFIED COMPLICATION STATUS: Primary | ICD-10-CM

## 2022-07-11 LAB
ALBUMIN SERPL-MCNC: 4.2 G/DL (ref 3.4–5)
ALP SERPL-CCNC: 63 U/L (ref 40–150)
ALT SERPL W P-5'-P-CCNC: 59 U/L (ref 0–50)
ANION GAP SERPL CALCULATED.3IONS-SCNC: 5 MMOL/L (ref 3–14)
AST SERPL W P-5'-P-CCNC: 86 U/L (ref 0–45)
BASOPHILS # BLD AUTO: 0.1 10E3/UL (ref 0–0.2)
BASOPHILS NFR BLD AUTO: 0 %
BILIRUB DIRECT SERPL-MCNC: 0.4 MG/DL (ref 0–0.2)
BILIRUB SERPL-MCNC: 0.7 MG/DL (ref 0.2–1.3)
BUN SERPL-MCNC: 11 MG/DL (ref 7–30)
CALCIUM SERPL-MCNC: 9.4 MG/DL (ref 8.5–10.1)
CHLORIDE BLD-SCNC: 110 MMOL/L (ref 94–109)
CO2 SERPL-SCNC: 24 MMOL/L (ref 20–32)
CREAT SERPL-MCNC: 0.69 MG/DL (ref 0.52–1.04)
EOSINOPHIL # BLD AUTO: 0.2 10E3/UL (ref 0–0.7)
EOSINOPHIL NFR BLD AUTO: 1 %
ERYTHROCYTE [DISTWIDTH] IN BLOOD BY AUTOMATED COUNT: 12.9 % (ref 10–15)
GFR SERPL CREATININE-BSD FRML MDRD: >90 ML/MIN/1.73M2
GLUCOSE BLD-MCNC: 113 MG/DL (ref 70–99)
HCG SERPL QL: NEGATIVE
HCT VFR BLD AUTO: 47.7 % (ref 35–47)
HGB BLD-MCNC: 15.6 G/DL (ref 11.7–15.7)
HOLD SPECIMEN: NORMAL
HOLD SPECIMEN: NORMAL
IMM GRANULOCYTES # BLD: 0.1 10E3/UL
IMM GRANULOCYTES NFR BLD: 1 %
LACTATE SERPL-SCNC: 1.4 MMOL/L (ref 0.7–2)
LIPASE SERPL-CCNC: ABNORMAL U/L (ref 73–393)
LYMPHOCYTES # BLD AUTO: 1.7 10E3/UL (ref 0.8–5.3)
LYMPHOCYTES NFR BLD AUTO: 8 %
MCH RBC QN AUTO: 29.6 PG (ref 26.5–33)
MCHC RBC AUTO-ENTMCNC: 32.7 G/DL (ref 31.5–36.5)
MCV RBC AUTO: 91 FL (ref 78–100)
MONOCYTES # BLD AUTO: 1.1 10E3/UL (ref 0–1.3)
MONOCYTES NFR BLD AUTO: 5 %
NEUTROPHILS # BLD AUTO: 19.6 10E3/UL (ref 1.6–8.3)
NEUTROPHILS NFR BLD AUTO: 85 %
NRBC # BLD AUTO: 0 10E3/UL
NRBC BLD AUTO-RTO: 0 /100
PLATELET # BLD AUTO: 363 10E3/UL (ref 150–450)
POTASSIUM BLD-SCNC: 4.2 MMOL/L (ref 3.4–5.3)
PROT SERPL-MCNC: 8 G/DL (ref 6.8–8.8)
RBC # BLD AUTO: 5.27 10E6/UL (ref 3.8–5.2)
SARS-COV-2 RNA RESP QL NAA+PROBE: NEGATIVE
SODIUM SERPL-SCNC: 139 MMOL/L (ref 133–144)
TROPONIN I SERPL HS-MCNC: <3 NG/L
WBC # BLD AUTO: 22.8 10E3/UL (ref 4–11)

## 2022-07-11 PROCEDURE — 83690 ASSAY OF LIPASE: CPT | Performed by: EMERGENCY MEDICINE

## 2022-07-11 PROCEDURE — 82248 BILIRUBIN DIRECT: CPT | Performed by: EMERGENCY MEDICINE

## 2022-07-11 PROCEDURE — 250N000011 HC RX IP 250 OP 636: Performed by: EMERGENCY MEDICINE

## 2022-07-11 PROCEDURE — 36415 COLL VENOUS BLD VENIPUNCTURE: CPT | Performed by: EMERGENCY MEDICINE

## 2022-07-11 PROCEDURE — 82435 ASSAY OF BLOOD CHLORIDE: CPT | Performed by: EMERGENCY MEDICINE

## 2022-07-11 PROCEDURE — 96374 THER/PROPH/DIAG INJ IV PUSH: CPT

## 2022-07-11 PROCEDURE — 120N000001 HC R&B MED SURG/OB

## 2022-07-11 PROCEDURE — 96375 TX/PRO/DX INJ NEW DRUG ADDON: CPT

## 2022-07-11 PROCEDURE — U0005 INFEC AGEN DETEC AMPLI PROBE: HCPCS | Performed by: EMERGENCY MEDICINE

## 2022-07-11 PROCEDURE — 258N000003 HC RX IP 258 OP 636: Performed by: EMERGENCY MEDICINE

## 2022-07-11 PROCEDURE — 258N000003 HC RX IP 258 OP 636: Performed by: INTERNAL MEDICINE

## 2022-07-11 PROCEDURE — 93005 ELECTROCARDIOGRAM TRACING: CPT

## 2022-07-11 PROCEDURE — 250N000011 HC RX IP 250 OP 636: Performed by: INTERNAL MEDICINE

## 2022-07-11 PROCEDURE — C9803 HOPD COVID-19 SPEC COLLECT: HCPCS

## 2022-07-11 PROCEDURE — 76705 ECHO EXAM OF ABDOMEN: CPT

## 2022-07-11 PROCEDURE — 99223 1ST HOSP IP/OBS HIGH 75: CPT | Mod: AI | Performed by: INTERNAL MEDICINE

## 2022-07-11 PROCEDURE — 83605 ASSAY OF LACTIC ACID: CPT | Performed by: EMERGENCY MEDICINE

## 2022-07-11 PROCEDURE — 96361 HYDRATE IV INFUSION ADD-ON: CPT

## 2022-07-11 PROCEDURE — 250N000009 HC RX 250: Performed by: EMERGENCY MEDICINE

## 2022-07-11 PROCEDURE — 74177 CT ABD & PELVIS W/CONTRAST: CPT

## 2022-07-11 PROCEDURE — 96376 TX/PRO/DX INJ SAME DRUG ADON: CPT

## 2022-07-11 PROCEDURE — 84703 CHORIONIC GONADOTROPIN ASSAY: CPT | Performed by: EMERGENCY MEDICINE

## 2022-07-11 PROCEDURE — 99285 EMERGENCY DEPT VISIT HI MDM: CPT | Mod: 25

## 2022-07-11 PROCEDURE — 84484 ASSAY OF TROPONIN QUANT: CPT | Performed by: EMERGENCY MEDICINE

## 2022-07-11 PROCEDURE — C9113 INJ PANTOPRAZOLE SODIUM, VIA: HCPCS | Performed by: INTERNAL MEDICINE

## 2022-07-11 PROCEDURE — 85025 COMPLETE CBC W/AUTO DIFF WBC: CPT | Performed by: EMERGENCY MEDICINE

## 2022-07-11 RX ORDER — ONDANSETRON 2 MG/ML
4 INJECTION INTRAMUSCULAR; INTRAVENOUS EVERY 6 HOURS PRN
Status: DISCONTINUED | OUTPATIENT
Start: 2022-07-11 | End: 2022-07-12 | Stop reason: HOSPADM

## 2022-07-11 RX ORDER — MORPHINE SULFATE 4 MG/ML
4 INJECTION, SOLUTION INTRAMUSCULAR; INTRAVENOUS
Status: COMPLETED | OUTPATIENT
Start: 2022-07-11 | End: 2022-07-11

## 2022-07-11 RX ORDER — SODIUM CHLORIDE 9 MG/ML
INJECTION, SOLUTION INTRAVENOUS CONTINUOUS
Status: DISCONTINUED | OUTPATIENT
Start: 2022-07-11 | End: 2022-07-11

## 2022-07-11 RX ORDER — ONDANSETRON 2 MG/ML
4 INJECTION INTRAMUSCULAR; INTRAVENOUS ONCE
Status: COMPLETED | OUTPATIENT
Start: 2022-07-11 | End: 2022-07-11

## 2022-07-11 RX ORDER — ONDANSETRON 4 MG/1
4 TABLET, ORALLY DISINTEGRATING ORAL EVERY 6 HOURS PRN
Status: DISCONTINUED | OUTPATIENT
Start: 2022-07-11 | End: 2022-07-12 | Stop reason: HOSPADM

## 2022-07-11 RX ORDER — LIDOCAINE 40 MG/G
CREAM TOPICAL
Status: DISCONTINUED | OUTPATIENT
Start: 2022-07-11 | End: 2022-07-12 | Stop reason: HOSPADM

## 2022-07-11 RX ORDER — OXYCODONE HYDROCHLORIDE 5 MG/1
5 TABLET ORAL EVERY 4 HOURS PRN
Status: DISCONTINUED | OUTPATIENT
Start: 2022-07-11 | End: 2022-07-12 | Stop reason: HOSPADM

## 2022-07-11 RX ORDER — IOPAMIDOL 755 MG/ML
500 INJECTION, SOLUTION INTRAVASCULAR ONCE
Status: COMPLETED | OUTPATIENT
Start: 2022-07-11 | End: 2022-07-11

## 2022-07-11 RX ORDER — AMOXICILLIN 250 MG
1 CAPSULE ORAL 2 TIMES DAILY PRN
Status: DISCONTINUED | OUTPATIENT
Start: 2022-07-11 | End: 2022-07-12 | Stop reason: HOSPADM

## 2022-07-11 RX ORDER — SODIUM CHLORIDE 9 MG/ML
INJECTION, SOLUTION INTRAVENOUS CONTINUOUS
Status: DISCONTINUED | OUTPATIENT
Start: 2022-07-11 | End: 2022-07-12

## 2022-07-11 RX ORDER — HYDROMORPHONE HYDROCHLORIDE 1 MG/ML
0.3 INJECTION, SOLUTION INTRAMUSCULAR; INTRAVENOUS; SUBCUTANEOUS
Status: DISCONTINUED | OUTPATIENT
Start: 2022-07-11 | End: 2022-07-12 | Stop reason: HOSPADM

## 2022-07-11 RX ORDER — MORPHINE SULFATE 4 MG/ML
4 INJECTION, SOLUTION INTRAMUSCULAR; INTRAVENOUS ONCE
Status: COMPLETED | OUTPATIENT
Start: 2022-07-11 | End: 2022-07-11

## 2022-07-11 RX ORDER — AMOXICILLIN 250 MG
2 CAPSULE ORAL 2 TIMES DAILY PRN
Status: DISCONTINUED | OUTPATIENT
Start: 2022-07-11 | End: 2022-07-12 | Stop reason: HOSPADM

## 2022-07-11 RX ADMIN — ONDANSETRON 4 MG: 2 INJECTION INTRAMUSCULAR; INTRAVENOUS at 17:54

## 2022-07-11 RX ADMIN — SODIUM CHLORIDE: 9 INJECTION, SOLUTION INTRAVENOUS at 23:25

## 2022-07-11 RX ADMIN — MORPHINE SULFATE 4 MG: 4 INJECTION INTRAVENOUS at 18:55

## 2022-07-11 RX ADMIN — MORPHINE SULFATE 4 MG: 4 INJECTION INTRAVENOUS at 17:54

## 2022-07-11 RX ADMIN — IOPAMIDOL 90 ML: 755 INJECTION, SOLUTION INTRAVENOUS at 20:11

## 2022-07-11 RX ADMIN — SODIUM CHLORIDE 1000 ML: 9 INJECTION, SOLUTION INTRAVENOUS at 17:53

## 2022-07-11 RX ADMIN — SODIUM CHLORIDE 65 ML: 9 INJECTION, SOLUTION INTRAVENOUS at 20:12

## 2022-07-11 RX ADMIN — SODIUM CHLORIDE 1000 ML: 9 INJECTION, SOLUTION INTRAVENOUS at 21:26

## 2022-07-11 RX ADMIN — PANTOPRAZOLE SODIUM 40 MG: 40 INJECTION, POWDER, FOR SOLUTION INTRAVENOUS at 22:57

## 2022-07-11 ASSESSMENT — ACTIVITIES OF DAILY LIVING (ADL): ADLS_ACUITY_SCORE: 35

## 2022-07-11 ASSESSMENT — ENCOUNTER SYMPTOMS
BLOOD IN STOOL: 1
SORE THROAT: 0
HEMATURIA: 0
ABDOMINAL PAIN: 1
COUGH: 0
DYSURIA: 0

## 2022-07-11 NOTE — ED TRIAGE NOTES
Pt reports sudden onset of middle abdominal pain with nausea and vomiting 1 hour PTA. Endorses SOB d/t pain. Denies CP.

## 2022-07-11 NOTE — ED PROVIDER NOTES
"  History   Chief Complaint:  Abdominal Pain       The history is provided by the patient.      Zee Kyle is a 46 year old female with gastric sleeve who presents with abdominal pain. On Saturday and Sunday she had bright red mixed in blood in her stool for every bowel movement. Today she woke up with abdominal tightness that worsened over the day. She vomited about an hour or two along with cold sweats and nausea before reporting to the ED. She denies dysuria, hematuria, cough, sore throat, or chest pain. She has not had a bowel movement yet today. She has only taken her blood pressure meds and not anything for pain. She has had blood in her stool once a month ago and 3 months ago.     Review of Systems   HENT: Negative for sore throat.    Respiratory: Negative for cough.    Cardiovascular: Negative for chest pain.   Gastrointestinal: Positive for abdominal pain and blood in stool.   Genitourinary: Negative for dysuria and hematuria.   All other systems reviewed and are negative.    Allergies:  Nsaids    Medications:  Qnasl  Mirena IUD  Effexorxr  Zestril    Past Medical History:     Presbycusis  Hypothroidism  Depression  Anxiety  CLARK  Benign essential hypertension     Past Surgical History:    ENT surgery  Genitourinary surgery  Gastric sleeve  PE tubes  Tonsillectomy  tympanoplasty     Family History:    Father: hypertension  Mother: cerebrovascular disease    Social History:  She reports to the ED alone.   She denies alcohol, street drugs, or tobacco use.     Physical Exam     Patient Vitals for the past 24 hrs:   BP Temp Temp src Pulse Resp SpO2 Height Weight   07/11/22 2316 123/68 98.2  F (36.8  C) Oral 61 18 99 % 1.702 m (5' 7\") 102.4 kg (225 lb 12.8 oz)   07/11/22 2122 -- -- -- -- -- 99 % -- --   07/11/22 2115 119/73 -- -- 71 -- 96 % -- --   07/11/22 2100 117/60 -- -- 70 -- 96 % -- --   07/11/22 2050 110/68 -- -- 94 -- 96 % -- --   07/11/22 2005 -- -- -- -- -- 96 % -- --   07/11/22 2004 -- -- -- " -- -- 95 % -- --   07/11/22 2003 -- -- -- -- -- 96 % -- --   07/11/22 2000 124/80 -- -- 79 -- 96 % -- --   07/11/22 1824 -- -- -- -- -- 98 % -- --   07/11/22 1815 127/76 -- -- 87 -- 96 % -- --   07/11/22 1800 127/75 -- -- 79 -- 94 % -- --   07/11/22 1747 123/71 -- -- 82 18 98 % -- --   07/11/22 1540 (!) 146/98 97.7  F (36.5  C) Oral 93 22 97 % -- 101.1 kg (222 lb 14.2 oz)       Physical Exam  General: Resting on the bed.  Head: No obvious trauma to head.  Ears, Nose, Throat:  External ears normal.  Nose normal.   Eyes:  Conjunctivae clear.  Pupils are equal, round, and reactive.   Neck: Normal range of motion.  Neck supple.   CV: Regular rate and rhythm.  No murmurs.      Respiratory: Effort normal and breath sounds normal.  No wheezing or crackles.   Gastrointestinal: Soft.  No distension. There is left sided and epigastric tenderness.  There is no rigidity, no rebound and no guarding.   Musculoskeletal: No cva tenderness    Neuro: Alert. Moving all extremities appropriately.  Normal speech.    Skin: Skin is warm and dry.  No rash noted.       Emergency Department Course   ECG  ECG results from 07/11/22   EKG 12 lead     Value    Ventricular Rate 95    Atrial Rate 95    WI Interval 150    QRS Duration 74        QTc 449    P Axis 73    R AXIS 80    T Axis 66    Interpretation ECG      Normal sinus rhythm  No significant change compared to EKG dated 10/07/19.        Imaging:  US Abdomen Limited (RUQ)   Final Result   IMPRESSION:   1.  Cholelithiasis. There is no biliary dilatation or evidence of cholecystitis.            CT Abdomen Pelvis w Contrast   Final Result   IMPRESSION:    1.  No acute finding seen to explain patient's abdominal pain, bloody stools, vomiting and leukocytosis clinically.      2.  IUD and this is in good position.      3.  Tiny benign cyst in the liver with no follow-up recommended.      4.  Prior postoperative changes with no complications identified.        Report per  radiology    Laboratory:  Labs Ordered and Resulted from Time of ED Arrival to Time of ED Departure   BASIC METABOLIC PANEL - Abnormal       Result Value    Sodium 139      Potassium 4.2      Chloride 110 (*)     Carbon Dioxide (CO2) 24      Anion Gap 5      Urea Nitrogen 11      Creatinine 0.69      Calcium 9.4      Glucose 113 (*)     GFR Estimate >90     CBC WITH PLATELETS AND DIFFERENTIAL - Abnormal    WBC Count 22.8 (*)     RBC Count 5.27 (*)     Hemoglobin 15.6      Hematocrit 47.7 (*)     MCV 91      MCH 29.6      MCHC 32.7      RDW 12.9      Platelet Count 363      % Neutrophils 85      % Lymphocytes 8      % Monocytes 5      % Eosinophils 1      % Basophils 0      % Immature Granulocytes 1      NRBCs per 100 WBC 0      Absolute Neutrophils 19.6 (*)     Absolute Lymphocytes 1.7      Absolute Monocytes 1.1      Absolute Eosinophils 0.2      Absolute Basophils 0.1      Absolute Immature Granulocytes 0.1      Absolute NRBCs 0.0     HEPATIC FUNCTION PANEL - Abnormal    Bilirubin Total 0.7      Bilirubin Direct 0.4 (*)     Protein Total 8.0      Albumin 4.2      Alkaline Phosphatase 63      AST 86 (*)     ALT 59 (*)    LIPASE - Abnormal    Lipase 18,835 (*)    TROPONIN I - Normal    Troponin I High Sensitivity <3     HCG QUALITATIVE PREGNANCY - Normal    hCG Serum Qualitative Negative     LACTIC ACID WHOLE BLOOD - Normal    Lactic Acid 1.4     COVID-19 VIRUS (CORONAVIRUS) BY PCR - Normal    SARS CoV2 PCR Negative        Emergency Department Course:    Reviewed:  I reviewed nursing notes, vitals, past medical history and Care Everywhere    Assessments:  1836 I obtained history and examined the patient as noted above.   2000 I rechecked the patient and explained findings.     Consults:  2152 I spoke to Dr. Chatman, hospitalist, who accepts the patient.     Interventions:  Medications   lidocaine 1 % 0.1-1 mL (has no administration in time range)   lidocaine (LMX4) cream (has no administration in time range)    sodium chloride (PF) 0.9% PF flush 3 mL (3 mLs Intracatheter Given 7/11/22 2300)   sodium chloride (PF) 0.9% PF flush 3 mL (has no administration in time range)   melatonin tablet 1 mg (has no administration in time range)   oxyCODONE (ROXICODONE) tablet 5 mg (has no administration in time range)   HYDROmorphone (PF) (DILAUDID) injection 0.3 mg (has no administration in time range)   ondansetron (ZOFRAN ODT) ODT tab 4 mg (has no administration in time range)     Or   ondansetron (ZOFRAN) injection 4 mg (has no administration in time range)   senna-docusate (SENOKOT-S/PERICOLACE) 8.6-50 MG per tablet 1 tablet (has no administration in time range)     Or   senna-docusate (SENOKOT-S/PERICOLACE) 8.6-50 MG per tablet 2 tablet (has no administration in time range)   pantoprazole (PROTONIX) IV push injection 40 mg (40 mg Intravenous Given 7/11/22 2257)   sodium chloride 0.9% infusion ( Intravenous New Bag 7/12/22 0022)   0.9% sodium chloride BOLUS (0 mLs Intravenous Stopped 7/11/22 1855)   morphine (PF) injection 4 mg (4 mg Intravenous Given 7/11/22 1754)   ondansetron (ZOFRAN) injection 4 mg (4 mg Intravenous Given 7/11/22 1754)   morphine (PF) injection 4 mg (4 mg Intravenous Given 7/11/22 1855)   CT Scan Flush (65 mLs Intravenous Given 7/11/22 2012)   iopamidol (ISOVUE-370) solution 500 mL (90 mLs Intravenous Given 7/11/22 2011)   0.9% sodium chloride BOLUS (1,000 mLs Intravenous New Bag 7/11/22 2126)     Disposition:  The patient was admitted to the hospital under the care of Dr. Chatman.     Impression & Plan     Medical Decision Making:  Zee Kyle is a 46 year old female who presents with epigastric abdominal pain.  The differential diagnosis would include GERD, GIB, esophageal spasm, atypical cardiac sx's, pancreatitis, biliary colic or gallstone disease, AAA, gastroenteritis, gastritis, large vs small bowel disease, etc.  Based on history, PE and labs, the most likely explanation is pancreatitis.   CBC shows elevated white count 22.8, no anemia.  Lactate normal not concerning for severe sepsis or septic shock.  BMP without acute electrolyte, metabolic or renal dysfunction.  LFTs and lipase consistent with pancreatitis.  Mild AST ALT elevation and marked elevation in lipase consistent with pancreatitis.  EKG and troponin unrevealing, no signs of acute ischemic change.  Pregnancy test negative.  CT without evidence of acute findings therefore ultrasound of the abdomen shows Cholelithiasis with no biliary dilatation or evidence of cholecystitis.   Pain control in ED was  successful.  IV fluids given.  Based on this, will admit to the hospital under the care of Dr. Chatman.       Diagnosis:    ICD-10-CM    1. Acute pancreatitis without infection or necrosis, unspecified pancreatitis type  K85.90    2. Abdominal pain, epigastric  R10.13    3. Bloody stool  K92.1      Scribe Disclosure:  I, IVAN JIM, am serving as a scribe at 6:36 PM on 7/11/2022 to document services personally performed by Zee Brown MD based on my observations and the provider's statements to me.            Zee Brown MD  07/12/22 0137

## 2022-07-12 ENCOUNTER — APPOINTMENT (OUTPATIENT)
Dept: GENERAL RADIOLOGY | Facility: CLINIC | Age: 47
DRG: 417 | End: 2022-07-12
Attending: INTERNAL MEDICINE
Payer: COMMERCIAL

## 2022-07-12 ENCOUNTER — ANESTHESIA (OUTPATIENT)
Dept: SURGERY | Facility: CLINIC | Age: 47
DRG: 417 | End: 2022-07-12
Payer: COMMERCIAL

## 2022-07-12 ENCOUNTER — ANESTHESIA EVENT (OUTPATIENT)
Dept: SURGERY | Facility: CLINIC | Age: 47
DRG: 417 | End: 2022-07-12
Payer: COMMERCIAL

## 2022-07-12 VITALS
DIASTOLIC BLOOD PRESSURE: 72 MMHG | BODY MASS INDEX: 35.44 KG/M2 | RESPIRATION RATE: 16 BRPM | HEART RATE: 88 BPM | SYSTOLIC BLOOD PRESSURE: 121 MMHG | WEIGHT: 225.8 LBS | HEIGHT: 67 IN | TEMPERATURE: 99.1 F | OXYGEN SATURATION: 94 %

## 2022-07-12 LAB
ALBUMIN SERPL-MCNC: 3 G/DL (ref 3.4–5)
ALP SERPL-CCNC: 51 U/L (ref 40–150)
ALT SERPL W P-5'-P-CCNC: 177 U/L (ref 0–50)
ANION GAP SERPL CALCULATED.3IONS-SCNC: 5 MMOL/L (ref 3–14)
AST SERPL W P-5'-P-CCNC: 133 U/L (ref 0–45)
ATRIAL RATE - MUSE: 95 BPM
BILIRUB SERPL-MCNC: 0.6 MG/DL (ref 0.2–1.3)
BUN SERPL-MCNC: 11 MG/DL (ref 7–30)
CALCIUM SERPL-MCNC: 8.3 MG/DL (ref 8.5–10.1)
CHLORIDE BLD-SCNC: 113 MMOL/L (ref 94–109)
CO2 SERPL-SCNC: 24 MMOL/L (ref 20–32)
CREAT SERPL-MCNC: 0.67 MG/DL (ref 0.52–1.04)
DIASTOLIC BLOOD PRESSURE - MUSE: NORMAL MMHG
ERYTHROCYTE [DISTWIDTH] IN BLOOD BY AUTOMATED COUNT: 13.1 % (ref 10–15)
GFR SERPL CREATININE-BSD FRML MDRD: >90 ML/MIN/1.73M2
GLUCOSE BLD-MCNC: 83 MG/DL (ref 70–99)
HCT VFR BLD AUTO: 39.6 % (ref 35–47)
HGB BLD-MCNC: 12.5 G/DL (ref 11.7–15.7)
INTERPRETATION ECG - MUSE: NORMAL
LIPASE SERPL-CCNC: 1091 U/L (ref 73–393)
MAGNESIUM SERPL-MCNC: 2 MG/DL (ref 1.6–2.3)
MCH RBC QN AUTO: 29.1 PG (ref 26.5–33)
MCHC RBC AUTO-ENTMCNC: 31.6 G/DL (ref 31.5–36.5)
MCV RBC AUTO: 92 FL (ref 78–100)
P AXIS - MUSE: 73 DEGREES
PLATELET # BLD AUTO: 281 10E3/UL (ref 150–450)
POTASSIUM BLD-SCNC: 3.7 MMOL/L (ref 3.4–5.3)
PR INTERVAL - MUSE: 150 MS
PROT SERPL-MCNC: 5.9 G/DL (ref 6.8–8.8)
QRS DURATION - MUSE: 74 MS
QT - MUSE: 358 MS
QTC - MUSE: 449 MS
R AXIS - MUSE: 80 DEGREES
RBC # BLD AUTO: 4.3 10E6/UL (ref 3.8–5.2)
SODIUM SERPL-SCNC: 142 MMOL/L (ref 133–144)
SYSTOLIC BLOOD PRESSURE - MUSE: NORMAL MMHG
T AXIS - MUSE: 66 DEGREES
TRIGL SERPL-MCNC: 88 MG/DL
VENTRICULAR RATE- MUSE: 95 BPM
WBC # BLD AUTO: 10.6 10E3/UL (ref 4–11)

## 2022-07-12 PROCEDURE — 250N000011 HC RX IP 250 OP 636: Performed by: SURGERY

## 2022-07-12 PROCEDURE — 47563 LAPARO CHOLECYSTECTOMY/GRAPH: CPT | Performed by: SURGERY

## 2022-07-12 PROCEDURE — 88304 TISSUE EXAM BY PATHOLOGIST: CPT | Mod: 26 | Performed by: PATHOLOGY

## 2022-07-12 PROCEDURE — 88304 TISSUE EXAM BY PATHOLOGIST: CPT | Mod: TC | Performed by: SURGERY

## 2022-07-12 PROCEDURE — 83735 ASSAY OF MAGNESIUM: CPT | Performed by: INTERNAL MEDICINE

## 2022-07-12 PROCEDURE — 272N000001 HC OR GENERAL SUPPLY STERILE: Performed by: SURGERY

## 2022-07-12 PROCEDURE — 84478 ASSAY OF TRIGLYCERIDES: CPT | Performed by: INTERNAL MEDICINE

## 2022-07-12 PROCEDURE — 36415 COLL VENOUS BLD VENIPUNCTURE: CPT | Performed by: INTERNAL MEDICINE

## 2022-07-12 PROCEDURE — BF121ZZ FLUOROSCOPY OF GALLBLADDER USING LOW OSMOLAR CONTRAST: ICD-10-PCS | Performed by: SURGERY

## 2022-07-12 PROCEDURE — 80053 COMPREHEN METABOLIC PANEL: CPT | Performed by: INTERNAL MEDICINE

## 2022-07-12 PROCEDURE — 85027 COMPLETE CBC AUTOMATED: CPT | Performed by: INTERNAL MEDICINE

## 2022-07-12 PROCEDURE — 99238 HOSP IP/OBS DSCHRG MGMT 30/<: CPT | Performed by: PHYSICIAN ASSISTANT

## 2022-07-12 PROCEDURE — 360N000083 HC SURGERY LEVEL 3 W/ FLUORO, PER MIN: Performed by: SURGERY

## 2022-07-12 PROCEDURE — 250N000009 HC RX 250: Performed by: ANESTHESIOLOGY

## 2022-07-12 PROCEDURE — 255N000002 HC RX 255 OP 636: Performed by: SURGERY

## 2022-07-12 PROCEDURE — 370N000017 HC ANESTHESIA TECHNICAL FEE, PER MIN: Performed by: SURGERY

## 2022-07-12 PROCEDURE — 99204 OFFICE O/P NEW MOD 45 MIN: CPT | Mod: 57 | Performed by: SURGERY

## 2022-07-12 PROCEDURE — 250N000011 HC RX IP 250 OP 636: Performed by: NURSE ANESTHETIST, CERTIFIED REGISTERED

## 2022-07-12 PROCEDURE — 250N000013 HC RX MED GY IP 250 OP 250 PS 637: Performed by: SURGERY

## 2022-07-12 PROCEDURE — 258N000003 HC RX IP 258 OP 636: Performed by: ANESTHESIOLOGY

## 2022-07-12 PROCEDURE — 999N000141 HC STATISTIC PRE-PROCEDURE NURSING ASSESSMENT: Performed by: SURGERY

## 2022-07-12 PROCEDURE — 258N000001 HC RX 258: Performed by: SURGERY

## 2022-07-12 PROCEDURE — 250N000009 HC RX 250: Performed by: SURGERY

## 2022-07-12 PROCEDURE — 250N000011 HC RX IP 250 OP 636: Performed by: INTERNAL MEDICINE

## 2022-07-12 PROCEDURE — 710N000009 HC RECOVERY PHASE 1, LEVEL 1, PER MIN: Performed by: SURGERY

## 2022-07-12 PROCEDURE — 47563 LAPARO CHOLECYSTECTOMY/GRAPH: CPT | Mod: AS | Performed by: PHYSICIAN ASSISTANT

## 2022-07-12 PROCEDURE — 258N000003 HC RX IP 258 OP 636: Performed by: INTERNAL MEDICINE

## 2022-07-12 PROCEDURE — 250N000013 HC RX MED GY IP 250 OP 250 PS 637: Performed by: PHYSICIAN ASSISTANT

## 2022-07-12 PROCEDURE — 0FT44ZZ RESECTION OF GALLBLADDER, PERCUTANEOUS ENDOSCOPIC APPROACH: ICD-10-PCS | Performed by: SURGERY

## 2022-07-12 PROCEDURE — 83690 ASSAY OF LIPASE: CPT | Performed by: PHYSICIAN ASSISTANT

## 2022-07-12 PROCEDURE — 250N000009 HC RX 250: Performed by: NURSE ANESTHETIST, CERTIFIED REGISTERED

## 2022-07-12 PROCEDURE — 999N000179 XR SURGERY CARM FLUORO LESS THAN 5 MIN W STILLS

## 2022-07-12 PROCEDURE — 258N000003 HC RX IP 258 OP 636: Performed by: PHYSICIAN ASSISTANT

## 2022-07-12 RX ORDER — PROPOFOL 10 MG/ML
INJECTION, EMULSION INTRAVENOUS CONTINUOUS PRN
Status: DISCONTINUED | OUTPATIENT
Start: 2022-07-12 | End: 2022-07-12

## 2022-07-12 RX ORDER — CEFAZOLIN SODIUM 2 G/100ML
2 INJECTION, SOLUTION INTRAVENOUS SEE ADMIN INSTRUCTIONS
Status: DISCONTINUED | OUTPATIENT
Start: 2022-07-12 | End: 2022-07-12 | Stop reason: HOSPADM

## 2022-07-12 RX ORDER — ONDANSETRON 4 MG/1
4 TABLET, ORALLY DISINTEGRATING ORAL EVERY 30 MIN PRN
Status: DISCONTINUED | OUTPATIENT
Start: 2022-07-12 | End: 2022-07-12 | Stop reason: HOSPADM

## 2022-07-12 RX ORDER — LEVOCETIRIZINE DIHYDROCHLORIDE 5 MG/1
10 TABLET, FILM COATED ORAL DAILY
Status: DISCONTINUED | OUTPATIENT
Start: 2022-07-12 | End: 2022-07-12 | Stop reason: HOSPADM

## 2022-07-12 RX ORDER — OXYCODONE HYDROCHLORIDE 5 MG/1
5-10 TABLET ORAL EVERY 4 HOURS PRN
Qty: 12 TABLET | Refills: 0 | Status: SHIPPED | OUTPATIENT
Start: 2022-07-12 | End: 2022-07-19

## 2022-07-12 RX ORDER — BUPIVACAINE HYDROCHLORIDE 5 MG/ML
INJECTION, SOLUTION EPIDURAL; INTRACAUDAL PRN
Status: DISCONTINUED | OUTPATIENT
Start: 2022-07-12 | End: 2022-07-12 | Stop reason: HOSPADM

## 2022-07-12 RX ORDER — DEXAMETHASONE SODIUM PHOSPHATE 4 MG/ML
INJECTION, SOLUTION INTRA-ARTICULAR; INTRALESIONAL; INTRAMUSCULAR; INTRAVENOUS; SOFT TISSUE PRN
Status: DISCONTINUED | OUTPATIENT
Start: 2022-07-12 | End: 2022-07-12

## 2022-07-12 RX ORDER — NALOXONE HYDROCHLORIDE 0.4 MG/ML
0.2 INJECTION, SOLUTION INTRAMUSCULAR; INTRAVENOUS; SUBCUTANEOUS
Status: DISCONTINUED | OUTPATIENT
Start: 2022-07-12 | End: 2022-07-12 | Stop reason: HOSPADM

## 2022-07-12 RX ORDER — LEVOCETIRIZINE DIHYDROCHLORIDE 5 MG/1
10 TABLET, FILM COATED ORAL DAILY
COMMUNITY

## 2022-07-12 RX ORDER — SODIUM CHLORIDE 9 MG/ML
INJECTION, SOLUTION INTRAVENOUS CONTINUOUS
Status: DISCONTINUED | OUTPATIENT
Start: 2022-07-12 | End: 2022-07-12 | Stop reason: HOSPADM

## 2022-07-12 RX ORDER — PROPOFOL 10 MG/ML
INJECTION, EMULSION INTRAVENOUS PRN
Status: DISCONTINUED | OUTPATIENT
Start: 2022-07-12 | End: 2022-07-12

## 2022-07-12 RX ORDER — NALOXONE HYDROCHLORIDE 0.4 MG/ML
0.4 INJECTION, SOLUTION INTRAMUSCULAR; INTRAVENOUS; SUBCUTANEOUS
Status: DISCONTINUED | OUTPATIENT
Start: 2022-07-12 | End: 2022-07-12 | Stop reason: HOSPADM

## 2022-07-12 RX ORDER — FENTANYL CITRATE 50 UG/ML
25 INJECTION, SOLUTION INTRAMUSCULAR; INTRAVENOUS EVERY 5 MIN PRN
Status: DISCONTINUED | OUTPATIENT
Start: 2022-07-12 | End: 2022-07-12 | Stop reason: HOSPADM

## 2022-07-12 RX ORDER — FENTANYL CITRATE 50 UG/ML
25 INJECTION, SOLUTION INTRAMUSCULAR; INTRAVENOUS
Status: CANCELLED | OUTPATIENT
Start: 2022-07-12

## 2022-07-12 RX ORDER — FENTANYL CITRATE 50 UG/ML
INJECTION, SOLUTION INTRAMUSCULAR; INTRAVENOUS PRN
Status: DISCONTINUED | OUTPATIENT
Start: 2022-07-12 | End: 2022-07-12

## 2022-07-12 RX ORDER — NEOSTIGMINE METHYLSULFATE 1 MG/ML
VIAL (ML) INJECTION PRN
Status: DISCONTINUED | OUTPATIENT
Start: 2022-07-12 | End: 2022-07-12

## 2022-07-12 RX ORDER — CEFAZOLIN SODIUM 2 G/100ML
2 INJECTION, SOLUTION INTRAVENOUS
Status: COMPLETED | OUTPATIENT
Start: 2022-07-12 | End: 2022-07-12

## 2022-07-12 RX ORDER — LISINOPRIL 5 MG/1
5 TABLET ORAL DAILY
Status: DISCONTINUED | OUTPATIENT
Start: 2022-07-13 | End: 2022-07-12 | Stop reason: HOSPADM

## 2022-07-12 RX ORDER — SODIUM CHLORIDE, SODIUM LACTATE, POTASSIUM CHLORIDE, CALCIUM CHLORIDE 600; 310; 30; 20 MG/100ML; MG/100ML; MG/100ML; MG/100ML
INJECTION, SOLUTION INTRAVENOUS CONTINUOUS
Status: DISCONTINUED | OUTPATIENT
Start: 2022-07-12 | End: 2022-07-12 | Stop reason: HOSPADM

## 2022-07-12 RX ORDER — HYDROMORPHONE HCL IN WATER/PF 6 MG/30 ML
0.2 PATIENT CONTROLLED ANALGESIA SYRINGE INTRAVENOUS EVERY 5 MIN PRN
Status: DISCONTINUED | OUTPATIENT
Start: 2022-07-12 | End: 2022-07-12 | Stop reason: HOSPADM

## 2022-07-12 RX ORDER — MEPERIDINE HYDROCHLORIDE 25 MG/ML
12.5 INJECTION INTRAMUSCULAR; INTRAVENOUS; SUBCUTANEOUS
Status: DISCONTINUED | OUTPATIENT
Start: 2022-07-12 | End: 2022-07-12 | Stop reason: HOSPADM

## 2022-07-12 RX ORDER — LABETALOL 20 MG/4 ML (5 MG/ML) INTRAVENOUS SYRINGE
PRN
Status: DISCONTINUED | OUTPATIENT
Start: 2022-07-12 | End: 2022-07-12

## 2022-07-12 RX ORDER — VENLAFAXINE HYDROCHLORIDE 37.5 MG/1
37.5 CAPSULE, EXTENDED RELEASE ORAL DAILY
Status: DISCONTINUED | OUTPATIENT
Start: 2022-07-12 | End: 2022-07-12 | Stop reason: HOSPADM

## 2022-07-12 RX ORDER — LIDOCAINE 40 MG/G
CREAM TOPICAL
Status: DISCONTINUED | OUTPATIENT
Start: 2022-07-12 | End: 2022-07-12 | Stop reason: HOSPADM

## 2022-07-12 RX ORDER — OXYCODONE HYDROCHLORIDE 5 MG/1
5 TABLET ORAL
Status: DISCONTINUED | OUTPATIENT
Start: 2022-07-12 | End: 2022-07-12

## 2022-07-12 RX ORDER — ACETAMINOPHEN 325 MG/1
650 TABLET ORAL
Status: DISCONTINUED | OUTPATIENT
Start: 2022-07-12 | End: 2022-07-12 | Stop reason: HOSPADM

## 2022-07-12 RX ORDER — FLUTICASONE PROPIONATE 50 MCG
2 SPRAY, SUSPENSION (ML) NASAL DAILY PRN
COMMUNITY
Start: 2021-11-04

## 2022-07-12 RX ORDER — ONDANSETRON 2 MG/ML
4 INJECTION INTRAMUSCULAR; INTRAVENOUS EVERY 30 MIN PRN
Status: DISCONTINUED | OUTPATIENT
Start: 2022-07-12 | End: 2022-07-12 | Stop reason: HOSPADM

## 2022-07-12 RX ORDER — GLYCOPYRROLATE 0.2 MG/ML
INJECTION, SOLUTION INTRAMUSCULAR; INTRAVENOUS PRN
Status: DISCONTINUED | OUTPATIENT
Start: 2022-07-12 | End: 2022-07-12

## 2022-07-12 RX ORDER — OXYCODONE HYDROCHLORIDE 5 MG/1
5 TABLET ORAL EVERY 4 HOURS PRN
Status: DISCONTINUED | OUTPATIENT
Start: 2022-07-12 | End: 2022-07-12 | Stop reason: HOSPADM

## 2022-07-12 RX ADMIN — HYDROMORPHONE HYDROCHLORIDE 1 MG: 1 INJECTION, SOLUTION INTRAMUSCULAR; INTRAVENOUS; SUBCUTANEOUS at 09:35

## 2022-07-12 RX ADMIN — ONDANSETRON 4 MG: 2 INJECTION INTRAMUSCULAR; INTRAVENOUS at 10:09

## 2022-07-12 RX ADMIN — LIDOCAINE HYDROCHLORIDE 30 MG: 10 INJECTION, SOLUTION INFILTRATION; PERINEURAL at 09:23

## 2022-07-12 RX ADMIN — LABETALOL 20 MG/4 ML (5 MG/ML) INTRAVENOUS SYRINGE 10 MG: at 09:56

## 2022-07-12 RX ADMIN — CEFAZOLIN SODIUM 2 G: 2 INJECTION, SOLUTION INTRAVENOUS at 09:14

## 2022-07-12 RX ADMIN — PROPOFOL 200 MG: 10 INJECTION, EMULSION INTRAVENOUS at 09:23

## 2022-07-12 RX ADMIN — DEXAMETHASONE SODIUM PHOSPHATE 4 MG: 4 INJECTION, SOLUTION INTRA-ARTICULAR; INTRALESIONAL; INTRAMUSCULAR; INTRAVENOUS; SOFT TISSUE at 09:23

## 2022-07-12 RX ADMIN — PROPOFOL 140 MCG/KG/MIN: 10 INJECTION, EMULSION INTRAVENOUS at 09:27

## 2022-07-12 RX ADMIN — ROCURONIUM BROMIDE 50 MG: 50 INJECTION, SOLUTION INTRAVENOUS at 09:23

## 2022-07-12 RX ADMIN — MIDAZOLAM 2 MG: 1 INJECTION INTRAMUSCULAR; INTRAVENOUS at 09:14

## 2022-07-12 RX ADMIN — LEVOCETIRIZINE DIHYDROCHLORIDE 10 MG: 5 TABLET ORAL at 14:53

## 2022-07-12 RX ADMIN — FENTANYL CITRATE 100 MCG: 50 INJECTION, SOLUTION INTRAMUSCULAR; INTRAVENOUS at 09:23

## 2022-07-12 RX ADMIN — GLYCOPYRROLATE 0.4 MG: 0.2 INJECTION, SOLUTION INTRAMUSCULAR; INTRAVENOUS at 10:30

## 2022-07-12 RX ADMIN — NEOSTIGMINE METHYLSULFATE 3 MG: 1 INJECTION, SOLUTION INTRAVENOUS at 10:30

## 2022-07-12 RX ADMIN — GLYCOPYRROLATE 0.2 MG: 0.2 INJECTION, SOLUTION INTRAMUSCULAR; INTRAVENOUS at 09:23

## 2022-07-12 RX ADMIN — SODIUM CHLORIDE: 9 INJECTION, SOLUTION INTRAVENOUS at 09:29

## 2022-07-12 RX ADMIN — ACETAMINOPHEN 650 MG: 325 TABLET, FILM COATED ORAL at 14:31

## 2022-07-12 RX ADMIN — SODIUM CHLORIDE, POTASSIUM CHLORIDE, SODIUM LACTATE AND CALCIUM CHLORIDE: 600; 310; 30; 20 INJECTION, SOLUTION INTRAVENOUS at 09:30

## 2022-07-12 RX ADMIN — SODIUM CHLORIDE: 9 INJECTION, SOLUTION INTRAVENOUS at 13:39

## 2022-07-12 RX ADMIN — SODIUM CHLORIDE: 9 INJECTION, SOLUTION INTRAVENOUS at 00:22

## 2022-07-12 ASSESSMENT — ACTIVITIES OF DAILY LIVING (ADL)
ADLS_ACUITY_SCORE: 18

## 2022-07-12 NOTE — ANESTHESIA CARE TRANSFER NOTE
Patient: Zee Kyle    Procedure: Procedure(s):  LAPAROSCOPIC CHOLECYSTECTOMY WITH CHOLANGIOGRAM       Diagnosis: Acute biliary pancreatitis, unspecified complication status [K85.10]  Diagnosis Additional Information: No value filed.    Anesthesia Type:   General     Note:    Oropharynx: oral airway in place  Level of Consciousness: drowsy  Oxygen Supplementation: face mask  Level of Supplemental Oxygen (L/min / FiO2): 8  Independent Airway: airway patency satisfactory and stable  Dentition: dentition unchanged  Vital Signs Stable: post-procedure vital signs reviewed and stable  Report to RN Given: handoff report given  Patient transferred to: PACU    Handoff Report: Identifed the Patient, Identified the Reponsible Provider, Reviewed the pertinent medical history, Discussed the surgical course, Reviewed Intra-OP anesthesia mangement and issues during anesthesia, Set expectations for post-procedure period and Allowed opportunity for questions and acknowledgement of understanding      Vitals:  Vitals Value Taken Time   /49 07/12/22 1045   Temp 98  F (36.7  C) 07/12/22 1045   Pulse 70 07/12/22 1048   Resp 11 07/12/22 1048   SpO2 98 % 07/12/22 1048   Vitals shown include unvalidated device data.    Electronically Signed By: Dean Dennis Severson, APRN CRNA  July 12, 2022  10:50 AM

## 2022-07-12 NOTE — PROVIDER NOTIFICATION
6:12 AM  Provider: X  Message: Pt hemoglobin at 1616 last night was 15.6. Lab just called informing staff that her hemoglobin dropped to 12.5 overnight. Lab is wondering if there should be a redraw.  Response: No redraw

## 2022-07-12 NOTE — ANESTHESIA PREPROCEDURE EVALUATION
Anesthesia Pre-Procedure Evaluation    Patient: Zee Kyle   MRN: [id number] : 1975        Procedure : Procedure(s):  CHOLECYSTECTOMY, LAPAROSCOPIC, WITH CHOLANGIOGRAM          Past Medical History:   Diagnosis Date     Anxiety Ongoing since 18     Depressive disorder on going since 18     Earache or other ear, nose, or throat complaint 10    Tonsillectomy      Esophageal reflux     Had stopped once I got my sleep apnea mask      Herpes simplex without mention of complication      Hypertension     While Pregnant     Hypothyroidism 2012     Intertrigo 2018     Presbyacusis     Presbycusis     Sleep apnea      STD (sexually transmitted disease)     Herpes     Urinary incontinence     Bladder sling surgery       Past Surgical History:   Procedure Laterality Date     ENT SURGERY       GENITOURINARY SURGERY      bladder sling     LAPAROSCOPIC GASTRIC SLEEVE N/A 10/15/2019    Procedure: LAPAROSCOPIC GASTRIC SLEEVE;  Surgeon: Cesar Lee MD;  Location: [facility] OR     PE TUBES       TONSILLECTOMY       TYMPANOPLASTY, RT/LT      Tympanoplasty RT/LT      Allergies   Allergen Reactions     Nsaids      Bariatric surgery      Social History     Tobacco Use     Smoking status: Former Smoker     Packs/day: 1.00     Years: 10.00     Pack years: 10.00     Types: Cigarettes     Start date: 1996     Quit date: 2/10/2003     Years since quittin.4     Smokeless tobacco: Never Used     Tobacco comment: Glad to be done   Substance Use Topics     Alcohol use: Yes     Alcohol/week: 0.0 standard drinks     Comment: occas      Wt Readings from Last 1 Encounters:   22 102.4 kg (225 lb 12.8 oz)        Anesthesia Evaluation   Pt has had prior anesthetic. Type: General.    No history of anesthetic complications       ROS/MED HX  ENT/Pulmonary:     (+) sleep apnea,     Neurologic:  - neg neurologic ROS     Cardiovascular:     (+) hypertension-----    METS/Exercise Tolerance:      Hematologic:  - neg hematologic  ROS     Musculoskeletal:  - neg musculoskeletal ROS     GI/Hepatic:     (+) GERD, Asymptomatic on medication, cholecystitis/cholelithiasis,     Renal/Genitourinary:  - neg Renal ROS     Endo:     (+) Obesity,     Psychiatric/Substance Use:  - neg psychiatric ROS     Infectious Disease:       Malignancy:       Other:            Physical Exam    Airway        Mallampati: II   TM distance: > 3 FB   Neck ROM: full   Mouth opening: > 3 cm    Respiratory Devices and Support         Dental  no notable dental history         Cardiovascular   cardiovascular exam normal          Pulmonary   pulmonary exam normal                OUTSIDE LABS:  CBC:   Lab Results   Component Value Date    WBC 10.6 07/12/2022    WBC 22.8 (H) 07/11/2022    HGB 12.5 07/12/2022    HGB 15.6 07/11/2022    HCT 39.6 07/12/2022    HCT 47.7 (H) 07/11/2022     07/12/2022     07/11/2022     BMP:   Lab Results   Component Value Date     07/12/2022     07/11/2022    POTASSIUM 3.7 07/12/2022    POTASSIUM 4.2 07/11/2022    CHLORIDE 113 (H) 07/12/2022    CHLORIDE 110 (H) 07/11/2022    CO2 24 07/12/2022    CO2 24 07/11/2022    BUN 11 07/12/2022    BUN 11 07/11/2022    CR 0.67 07/12/2022    CR 0.69 07/11/2022    GLC 83 07/12/2022     (H) 07/11/2022     COAGS: No results found for: PTT, INR, FIBR  POC:   Lab Results   Component Value Date    BGM 86 10/17/2019    HCG Negative 10/15/2019    HCGS Negative 07/11/2022     HEPATIC:   Lab Results   Component Value Date    ALBUMIN 3.0 (L) 07/12/2022    PROTTOTAL 5.9 (L) 07/12/2022     (H) 07/12/2022     (H) 07/12/2022    ALKPHOS 51 07/12/2022    BILITOTAL 0.6 07/12/2022     OTHER:   Lab Results   Component Value Date    LACT 1.4 07/11/2022    A1C 5.3 05/09/2019    HOLLY 8.3 (L) 07/12/2022    MAG 2.0 07/12/2022    LIPASE 1,091 (H) 07/12/2022    TSH 1.64 08/01/2012    CRP <2.9 12/22/2021    SED 6 12/22/2021       Anesthesia Plan    ASA Status:  2       Anesthesia Type: General.     - Airway: ETT   Induction: Intravenous.   Maintenance: Balanced.        Consents    Anesthesia Plan(s) and associated risks, benefits, and realistic alternatives discussed. Questions answered and patient/representative(s) expressed understanding.    - Discussed:     - Discussed with:  Patient      - Extended Intubation/Ventilatory Support Discussed: No.      - Patient is DNR/DNI Status: No    Use of blood products discussed: No .     Postoperative Care    Pain management: IV analgesics, Oral pain medications, Multi-modal analgesia.   PONV prophylaxis: Ondansetron (or other 5HT-3), Dexamethasone or Solumedrol     Comments:                James Tilley MD

## 2022-07-12 NOTE — PLAN OF CARE
"Writer took over care for pt. [phone]    Patient is alert and oriented x4. Lung sounds clear, on room air. Tolerating oral intake. Vital signs stable. Stated pain 4/10 but declined intervention. Independent in room, steady gait. IV saline locked. Surgical incision- clean, dry, intact. Plan to discharge pt today with spouse.    /72 (BP Location: Right arm, Patient Position: Semi-Arrieta's, Cuff Size: Adult Regular)   Pulse 88   Temp 99.1  F (37.3  C) (Oral)   Resp 16   Ht 1.702 m (5' 7\")   Wt 102.4 kg (225 lb 12.8 oz)   SpO2 94%   BMI 35.37 kg/m         "

## 2022-07-12 NOTE — PROGRESS NOTES
ROOM # 203    Living Situation (if not independent, order SW consult): Home with spouse  Facility name:  :  [facility]    Activity level at baseline: Ind  Activity level on admit: SBA    Who will be transporting you at discharge: [facility]    Patient registered to observation; given Patient Bill of Rights; given the opportunity to ask questions about observation status and their plan of care.  Patient has been oriented to the observation room, bathroom and call light is in place.    Discussed discharge goals and expectations with patient/family.

## 2022-07-12 NOTE — H&P
[facility]  Hospitalist Admission Note  Name: Zee Kyle    MRN: [id number]  YOB: 1975    Age: 46 year old  Date of admission: 7/11/2022  Primary care provider: Haase, Susan Rachele    Chief Complaint:  pancreatitis    Zee Kyle is a 46 year old female who is generally healthy but did have a gastric sleeve procedure about 4 years ago who presents with mid abdominal pain with nausea and vomiting which started today.  Notably, 2 and 3 days ago she had bright red blood mixed in her stool with every bowel movement.  She also had a similar episode 1 month ago and another about 3 months ago.  Today she woke up with abdominal symptoms but started feeling like tightness and worsened to become more painful throughout the day as well as a couple of episodes of cold sweats and nausea which led to multiple bouts of nonbloody vomiting.    She denies prior history of pancreatitis.  She essentially never drinks heavily and denies any exception to that recently.  She does have her gallbladder in place.    Here in the emergency room she was hemodynamically stable and not hypoxic.  Lab work-up was notable for grossly normal BMP with mild abnormalities on her hepatic panel including ALT of 59, AST of 86 but with normal bilirubin and normal alkaline phosphatase.  Pregnancy test was negative and lactic acid was normal.  Lipase was dramatically elevated at 18,000.  Troponin was negative  Glucose was also grossly normal.  CBC showed leukocytosis of 22.8 with neutrophilic predominance.  CT scan of her abdomen was obtained which did not show any specific acute pathology.    She was given fluids, antiemetics and pain medications and I am asked to admit her for further care.    Ultrasound now shows gallstones.  Suspect this is gallstone pancreatitis.  Unclear what the clinical significance is of her lower GI bleeding.  Will monitor, consider consulting GI if this recurs or she fails to improve  as expected.    Assessment and Plan:   1. Acute pancreatitis: Highest suspicion is for gallstone pancreatitis given presence of gallstones on ultrasound and lack of any prominent alcohol abuse history.  We will check LFTs for completeness.  Plan initially on conservative management with fluids, antiemetics and pain control.  We will also consult general surgery given suspicion for gallstone pancreatitis.  -- Admit under inpatient status  --Normal saline at 200 cc/h  -- Checked abdominal ultrasound, this appears to show gallstones.  Normal LFTs argue against ongoing obstruction.  --Check triglyceride level  --Repeat hepatic panel in the morning  -- If LFTs worsen or she fails to improve as expected consider GI consult well hospitalized.  -- Monitor for significant evidence of bleeding.    2.   Hematochezia: Certainly seems to be lower in nature.  Denies any actual melena or passing clots.  Does not seem to be hemodynamically significant.  Question hemorrhoidal bleeding.  Will monitor overnight for recurrence.  Recheck hemoglobin in the morning.  --Stronger suspicion for lower GI etiology which may be self-limited with further endoscopic evaluation deferred to the outpatient setting.  -- If significant bleeding recurs consider GI consultation while admitted.    3.   leukocytosis: No infection yet identified.  Suspect stress response but will recheck.  Monitor off antibiotics for now.    4.   History of gastric sleeve procedure about 4 years ago: Restart home vitamins once tolerating oral intake.      DVT Prophylaxis: Pneumatic Compression Devices  Code Status: Full Code  Discharge Dispo: admit under inpatient status      History of Present Illness:  Zee Kyle is a 46 year old female who is generally healthy but did have a gastric sleeve procedure about 4 years ago who presents with mid abdominal pain with nausea and vomiting which started today.  Notably, 2 and 3 days ago she had bright red blood mixed in  her stool with every bowel movement.  She also had a similar episode 1 month ago and another about 3 months ago.  Today she woke up with abdominal symptoms but started feeling like tightness and worsened to become more painful throughout the day as well as a couple of episodes of cold sweats and nausea which led to multiple bouts of nonbloody vomiting.    She denies prior history of pancreatitis.  She essentially never drinks heavily and denies any exception to that recently.  She does have her gallbladder in place.    Here in the emergency room she was hemodynamically stable and not hypoxic.  Lab work-up was notable for grossly normal BMP with mild abnormalities on her hepatic panel including ALT of 59, AST of 86 but with normal bilirubin and normal alkaline phosphatase.  Pregnancy test was negative and lactic acid was normal.  Lipase was dramatically elevated at 18,000.  Troponin was negative  Glucose was also grossly normal.  CBC showed leukocytosis of 22.8 with neutrophilic predominance.  CT scan of her abdomen was obtained which did not show any specific acute pathology.    She was given fluids, antiemetics and pain medications and I am asked to admit her for further care.     Past Medical History:  Past Medical History:   Diagnosis Date     Anxiety Ongoing since 18     Depressive disorder on going since 18     Earache or other ear, nose, or throat complaint 12    Tonsillectomy 7/02     Esophageal reflux     Had stopped once I got my sleep apnea mask 5/18     Herpes simplex without mention of complication      Hypertension     While Pregnant     Hypothyroidism 8/7/2012     Intertrigo 7/18/2018     Presbyacusis     Presbycusis     Sleep apnea      STD (sexually transmitted disease)     Herpes     Urinary incontinence     Bladder sling surgery 7/17     Past Surgical History:  Past Surgical History:   Procedure Laterality Date     ENT SURGERY       GENITOURINARY SURGERY  2017    bladder sling     LAPAROSCOPIC  "GASTRIC SLEEVE N/A 10/15/2019    Procedure: LAPAROSCOPIC GASTRIC SLEEVE;  Surgeon: Cesar Lee MD;  Location: [facility] OR     PE TUBES       TONSILLECTOMY       TYMPANOPLASTY, RT/LT      Tympanoplasty RT/LT     Social History:  Social History     Tobacco Use     Smoking status: Former Smoker     Packs/day: 1.00     Years: 10.00     Pack years: 10.00     Types: Cigarettes     Start date: 1996     Quit date: 2/10/2003     Years since quittin.4     Smokeless tobacco: Never Used     Tobacco comment: Glad to be done   Substance Use Topics     Alcohol use: Yes     Alcohol/week: 0.0 standard drinks     Comment: occas     Social History     Social History Narrative     Not on file     Family History:  Family History   Problem Relation Age of Onset     Hypertension Father          08     Obesity Father      Cerebrovascular Disease Mother      Dementia Paternal Grandmother      Coronary Artery Disease Paternal Grandfather      Allergies:  Allergies   Allergen Reactions     Nsaids      Bariatric surgery     Medications:  No current facility-administered medications on file prior to encounter.  calcium carbonate 600 mg-vitamin D 400 units (CALTRATE) 600-400 MG-UNIT per tablet, Take 1 tablet by mouth 2 times daily  childrens multivitamin w/iron (FLINTSTONES COMPLETE) chewable tablet, Take 2 chew tab by mouth daily  cyanocobalamin (CYANOCOBALAMIN) 1000 MCG/ML injection, Inject 1 mL (1,000 mcg) Subcutaneous every 30 days  cyclobenzaprine (FLEXERIL) 10 MG tablet, Take 1 tablet (10 mg) by mouth nightly as needed for muscle spasms  FEROSUL 325 (65 Fe) MG tablet,   ferrous fumarate 65 mg, [place]. FE,-Vitamin C 125 mg (VITRON C)  MG TABS tablet, Take 1 tablet by mouth daily  levonorgestrel (MIRENA) 20 MCG/24HR IUD, 1 each (20 mcg) by Intrauterine route continuous  lisinopril (ZESTRIL) 5 MG tablet, Take 1 tablet (5 mg) by mouth daily  Syringe/Needle, Disp, 25G X 5/8\" 5 ML MISC, 1 each every 30 days  valACYclovir " (VALTREX) 500 MG tablet, Take 1 tablet (500 mg) by mouth 2 times daily for 3 days  venlafaxine (EFFEXOR-XR) 37.5 MG 24 hr capsule, Take 1 capsule (37.5 mg) by mouth daily  vitamin B complex with vitamin C (VITAMIN  B COMPLEX) tablet, Take 1 tablet by mouth daily        Review of Systems:  A Comprehensive greater than 10 system review of systems was carried out.  Pertinent positives and negatives are noted above.  Otherwise negative for contributory information.     Physical Exam:  Blood pressure 124/80, pulse 79, temperature 97.7  F (36.5  C), temperature source Oral, resp. rate 18, weight 101.1 kg (222 lb 14.2 oz), SpO2 96 %, not currently breastfeeding.  Wt Readings from Last 1 Encounters:   07/11/22 101.1 kg (222 lb 14.2 oz)     Exam:  General: Alert, awake, no acute distress.  HEENT: NC/AT, eyes anicteric, external occular movements intact, face symmetric.    Cardiac: RRR, S1, S2.  No murmurs appreciated.  Pulmonary: Normal chest rise, normal work of breathing.  Lungs CTA BL  Abdomen: soft, tender in epigastrium.  Nondistended.  Sounds present.  Extremities: no deformities.  Warm, well perfused.  Skin: no rashes or lesions noted.  Warm and Dry.  Neuro: No focal deficits noted.  Speech clear.  Coordination and strength grossly normal.  Psych: Appropriate affect.    Data:  EKG:  Sinus rhythm, non-specific lateral ST changes.  Imaging:  Results for orders placed or performed during the hospital encounter of 07/11/22   CT Abdomen Pelvis w Contrast    Narrative    EXAM: CT ABDOMEN PELVIS W CONTRAST  LOCATION: [facility]  DATE/TIME: 7/11/2022 8:10 PM    INDICATION: Abdominal pain, bloody stools, vomiting, leukocytosis.  COMPARISON: None.  TECHNIQUE: CT scan of the abdomen and pelvis was performed following injection of IV contrast. Multiplanar reformats were obtained. Dose reduction techniques were used.  CONTRAST: 90mL Isovue 370    FINDINGS:   LOWER CHEST: Mild dependent  atelectasis.    HEPATOBILIARY: Tiny benign cyst left lobe of the liver. Liver is otherwise normal. The gallbladder and bile ducts are normal.    PANCREAS: Normal.    SPLEEN: Normal variant accessory splenule.    ADRENAL GLANDS: Normal.    KIDNEYS/BLADDER: Normal.    BOWEL: Prior postoperative changes upper GI tract. No complication seen. The remainder of the bowel is normal to include the appendix.    LYMPH NODES: Normal.    VASCULATURE: Minimal calcification with no aneurysmal dilatation.    PELVIC ORGANS: There is an IUD and this appears to be in good position.    MUSCULOSKELETAL: Normal.      Impression    IMPRESSION:   1.  No acute finding seen to explain patient's abdominal pain, bloody stools, vomiting and leukocytosis clinically.    2.  IUD and this is in good position.    3.  Tiny benign cyst in the liver with no follow-up recommended.    4.  Prior postoperative changes with no complications identified.     Labs:  Recent Labs   Lab 07/11/22  1616   WBC 22.8*   HGB 15.6   HCT 47.7*   MCV 91             Lab Results   Component Value Date     07/11/2022     04/05/2021     10/16/2019     10/07/2019    Lab Results   Component Value Date    CHLORIDE 110 07/11/2022    CHLORIDE 106 04/05/2021    CHLORIDE 109 10/16/2019    CHLORIDE 108 10/07/2019    Lab Results   Component Value Date    BUN 11 07/11/2022    BUN 9 04/05/2021    BUN 16 10/07/2019    BUN 14 08/16/2019      Lab Results   Component Value Date    POTASSIUM 4.2 07/11/2022    POTASSIUM 3.7 04/05/2021    POTASSIUM 3.7 10/16/2019    POTASSIUM 3.6 10/15/2019    Lab Results   Component Value Date    CO2 24 07/11/2022    CO2 29 04/05/2021    CO2 27 10/16/2019    CO2 22 10/07/2019    Lab Results   Component Value Date    CR 0.69 07/11/2022    CR 0.70 04/05/2021    CR 0.68 10/07/2019    CR 0.72 08/16/2019        Recent Labs   Lab 07/11/22  1616   AST 86*   ALT 59*   ALKPHOS 63   BILITOTAL 0.7     No results for input(s): INR in the  last 168 hours.  Recent Labs   Lab 07/11/22  1808   LACT 1.4         Efe Chatman MD  Hospitalist  [facility]

## 2022-07-12 NOTE — ED NOTES
[facility]  ED Nurse Handoff Report    Zee Kyle is a 46 year old female   ED Chief complaint: Abdominal Pain  . ED Diagnosis:   Final diagnoses:   None     Allergies:   Allergies   Allergen Reactions     Nsaids      Bariatric surgery       Code Status: Full Code  Activity level - Baseline/Home:  Independent. Activity Level - Current:   Stand by Assist. Lift room needed: No. Bariatric: No   Needed: No   Isolation: No. Infection: Not Applicable.     Vital Signs:   Vitals:    07/11/22 1815 07/11/22 1824 07/11/22 2000 07/11/22 2004   BP: 127/76  124/80    Pulse: 87  79    Resp:       Temp:       TempSrc:       SpO2: 96% 98% 96% 95%   Weight:           Cardiac Rhythm:  ,      Pain level:    Patient confused: No. Patient Falls Risk: Yes.   Elimination Status: Has voided   Patient Report - Initial Complaint: abdominal pain. Focused Assessment: Zee Kyle is a 46 year old female with gastric sleeve who presents with abdominal pain. On Saturday and Sunday she had bright red mixed in blood in her stool for every bowel movement. Today she woke up with abdominal tightness that worsened over the day. She vomited about an hour or two along with cold sweats and nausea before reporting to the ED. She denies dysuria, hematuria, cough, sore throat, or chest pain. She has not had a bowel movement yet today. She has only taken her blood pressure meds and not anything for pain. She has had blood in her stool once a month ago and 3 months ago.      Review of Systems   All other systems reviewed and are negative      20:42 Gastrointestinal JJ      Details:   Gastrointestinal - Gastrointestinal WDL: .WDL except (c/o sudden onset LUE abdominal pain worsening with palpation. nausea, denies urinary or bowel changes. Denies alcohol abuse or previous abodminal pain)          Tests Performed: labs, imaing. Abnormal Results:   Labs Ordered and Resulted from Time of ED Arrival to Time of ED Departure    BASIC METABOLIC PANEL - Abnormal       Result Value    Sodium 139      Potassium 4.2      Chloride 110 (*)     Carbon Dioxide (CO2) 24      Anion Gap 5      Urea Nitrogen 11      Creatinine 0.69      Calcium 9.4      Glucose 113 (*)     GFR Estimate >90     CBC WITH PLATELETS AND DIFFERENTIAL - Abnormal    WBC Count 22.8 (*)     RBC Count 5.27 (*)     Hemoglobin 15.6      Hematocrit 47.7 (*)     MCV 91      MCH 29.6      MCHC 32.7      RDW 12.9      Platelet Count 363      % Neutrophils 85      % Lymphocytes 8      % Monocytes 5      % Eosinophils 1      % Basophils 0      % Immature Granulocytes 1      NRBCs per 100 WBC 0      Absolute Neutrophils 19.6 (*)     Absolute Lymphocytes 1.7      Absolute Monocytes 1.1      Absolute Eosinophils 0.2      Absolute Basophils 0.1      Absolute Immature Granulocytes 0.1      Absolute NRBCs 0.0     HEPATIC FUNCTION PANEL - Abnormal    Bilirubin Total 0.7      Bilirubin Direct 0.4 (*)     Protein Total 8.0      Albumin 4.2      Alkaline Phosphatase 63      AST 86 (*)     ALT 59 (*)    LIPASE - Abnormal    Lipase 18,835 (*)    TROPONIN I - Normal    Troponin I High Sensitivity <3     HCG QUALITATIVE PREGNANCY - Normal    hCG Serum Qualitative Negative     LACTIC ACID WHOLE BLOOD - Normal    Lactic Acid 1.4     COVID-19 VIRUS (CORONAVIRUS) BY PCR - Normal    SARS CoV2 PCR Negative       CT Abdomen Pelvis w Contrast   Final Result   IMPRESSION:    1.  No acute finding seen to explain patient's abdominal pain, bloody stools, vomiting and leukocytosis clinically.      2.  IUD and this is in good position.      3.  Tiny benign cyst in the liver with no follow-up recommended.      4.  Prior postoperative changes with no complications identified.        .   Treatments provided: frequent VS, pain control  Family Comments: n/a per pt  OBS brochure/video discussed/provided to patient:  No  ED Medications:   Medications   0.9% sodium chloride BOLUS (0 mLs Intravenous Stopped 7/11/22 6814)    morphine (PF) injection 4 mg (4 mg Intravenous Given 7/11/22 1754)   ondansetron (ZOFRAN) injection 4 mg (4 mg Intravenous Given 7/11/22 1754)   morphine (PF) injection 4 mg (4 mg Intravenous Given 7/11/22 7355)   CT Scan Flush (65 mLs Intravenous Given 7/11/22 2012)   iopamidol (ISOVUE-370) solution 500 mL (90 mLs Intravenous Given 7/11/22 2011)     Drips infusing:  No  For the majority of the shift, the patient's behavior Green. Interventions performed were n/a.    Sepsis treatment initiated: No     Patient tested for COVID 19 prior to admission: YES    ED Nurse Name/Phone Number: Arleth Low RN,   8:44 PM    RECEIVING UNIT ED HANDOFF REVIEW    Above ED Nurse Handoff Report was reviewed: Yes  Reviewed by: Genie Robertson RN on July 11, 2022 at 10:25 PM

## 2022-07-12 NOTE — PHARMACY-ADMISSION MEDICATION HISTORY
Admission medication history interview status for this patient is complete. See [facility] admission navigator for allergy information, prior to admission medications and immunization status.     Medication history interview done, indicate source(s): Patient  Medication history resources (including written lists, pill bottles, clinic record): Conro Hilario  Pharmacy: [id number] on [address], [place], [place]    Changes made to PTA medication list:  Added: Flonase, Xyzal, hair and nail gummy  Changed: none  Reported as Not Taking: none  Removed: calcium carbonate-vitamin D, cyanocobalamin, Ferosul, Vitron C, syringe needle, valacyclovir, vitamin B complex    Actions taken by pharmacist (provider contacted, etc): Sticky note to physician      Additional medication history information: Patient reported she no longer takes vitamin B 12 injections as the pharmacy never had it in stock. She took it for approximately 8 months post-gastric sleeve procedure. She no longer takes multiple vitamins that used to be on her PTA med list. She reports using her Flonase only as needed when she is around new dogs and when her allergies flare up. She reports taking 2 tablets of Xyzal in the morning for allergies as well.     Medication reconciliation/reorder completed by provider prior to medication history?  Y   (Y/N)       Prior to Admission medications    Medication Sig Last Dose Taking? Auth Provider Long Term End Date   Biotin w/ Vitamins C & E (HAIR SKIN & NAILS GUMMIES PO) Take 2 chew tab by mouth daily 7/11/2022 at AM Yes Unknown, Entered By History     childrens multivitamin w/iron (FLINTSTONES COMPLETE) chewable tablet Take 2 chew tab by mouth daily 7/11/2022 at AM Yes Reported, Patient     fluticasone (FLONASE) 50 MCG/ACT nasal spray Spray 2 sprays into both nostrils daily Past Month at Unknown time Yes Unknown, Entered By History No    levocetirizine (XYZAL) 5 MG tablet Take 10 mg by mouth daily 7/11/2022 at AM Yes  Unknown, Entered By History     lisinopril (ZESTRIL) 5 MG tablet Take 1 tablet (5 mg) by mouth daily 7/11/2022 at AM Yes Haase, Susan Rachele, APRN CNP Yes    venlafaxine (EFFEXOR-XR) 37.5 MG 24 hr capsule Take 1 capsule (37.5 mg) by mouth daily 7/11/2022 at AM Yes Haase, Susan Rachele, APRN CNP Yes    levonorgestrel (MIRENA) 20 MCG/24HR IUD 1 each (20 mcg) by Intrauterine route continuous   Haase, Susan Rachele, APRN CNP Yes

## 2022-07-12 NOTE — PROGRESS NOTES
Pt a/o x4. C/o 2/10 pain, denied pain meds, rest promoted. Pain in LUQ, pt stated it feels sore. IV Protonix given. SBA. Pt NPO. Continue monitoring pain.

## 2022-07-12 NOTE — PLAN OF CARE
Shift from [id number]     Inpatient Progress Note:  For complete assessment see flow sheet documentation.      Orientation: AO x4   Neuro: WDL  Pain status: 2/10 in abdomin LUQ  Activity: Up independently  Peripheral edema: NA  Resp: LS clear on RA  Cardiac: WDL  GI: BS active, Continent  : Continent  Skin: WDL  LDA: PIV R arm  Infusions:  mL/hr  Diet: NPO  Consults: Surgery  Discharge Plan:

## 2022-07-12 NOTE — OP NOTE
General Surgery Operative Note    PREOPERATIVE DIAGNOSIS:  Acute biliary pancreatitis, unspecified complication status [K85.10]    POSTOPERATIVE DIAGNOSIS:  Same    PROCEDURE:  Laparoscopic Cholecystectomy with Intraoperative Cholangiogram    ANESTHESIA:  General.    PREOPERATIVE MEDICATIONS:  Ancef IV.    SURGEON:  Era Villela MD    ASSISTANT:  Ajit Webster PA-C    ESTIMATED BLOOD LOSS:  50 ml    INDICATIONS:  Zee Kyle is a 46 year old female who has been experiencing epigastric abdominal pain for the past day. Labs are consistent with pancreatitis with a markedly elevated lipase. Abdominal imaging has revealed gallstones.  She now presents for laparoscopic cholecystectomy after having risks and benefits reviewed in detail.    PROCEDURE:   A supraumbilical incision was made and the abdomen was entered using a William trocar technique.  Secondary trocars were placed under laparoscopic guidance.  We proceeded in the usual fashion first finding the gallbladder neck cystic duct junction.  Omentum was adherent to the infundibulum of the gallbladder, however we are able to carefully peel this down. The cystic duct was then identified and cleared of inflammatory adhesions. The cystic artery was similarly identified.  Once a critical window of safety was achieved, the Dvei clamp was positioned across the infundibulum for cholangiogram.  Cholangiogram showed normal filling of the duodenum with no filling defects within the common bile duct.  The cholangiogram catheter was removed and the cystic duct was triply clipped and divided.  Cystic artery was also triply clipped and divided. The gallbladder was removed from the gallbladder bed using coag cautery.  Once the remaining attachments were divided, the gallbladder was extracted from the infraumbilical site in an EndoCatch bag.  The camera was repositioned and the right upper quadrant inspected.  The right upper quadrant was copiously irrigated with the  saline solution.  Returns were clear.  Trocar sites were then infiltrated with 0.5% Marcaine plain. The infraumbilical fascial opening was closed with interrupted 0 Vicryl. The skin was closed using 4-0 subcuticular vicryl. Steri-Strips were placed on the incisions. The patient was transferred to recovery in good condition.        INTRAOPERATIVE FINDINGS: Normal cholangiogram     Specimens:   ID Type Source Tests Collected by Time Destination   1 :  Tissue Gallbladder SURGICAL PATHOLOGY EXAM Era Villela MD 7/12/2022 10:26 AM        Era Villela MD

## 2022-07-12 NOTE — DISCHARGE SUMMARY
Discharge Summary  Hospitalist Service    Zee Kyle MRN# [id number]   YOB: 1975 Age: 46 year old     Date of Admission: 7/11/2022  Date of Discharge: 7/12/2022  Admitting Physician: James Chatman MD  Discharge Physician: Twyla Low PA-C  Discharging Service: Hospitalist Service     Primary Provider: Haase, Susan Rachele  Primary Care Physician Phone Number: [phone]         Discharge Diagnoses/Problem Oriented Hospital Course (Providers):      Zee Kyle was admitted on 7/11/2022 by James Chatman MD and I would refer you to their history and physical.  Briefly, patient was admitted with acute onset of mid abdominal pain, nausea and vomiting.     Lab work-up was notable for grossly normal BMP with mild abnormalities on her hepatic panel including ALT of 59, AST of 86 but with normal bilirubin and normal alkaline phosphatase.  Pregnancy test was negative and lactic acid was normal.  Lipase was dramatically elevated at 18,000.  Troponin was negative  Glucose was also grossly normal.  CBC showed leukocytosis of 22.8 with neutrophilic predominance.  CT scan of her abdomen was obtained which did not show any specific acute pathology.     She was given fluids, antiemetics and pain medications.  Right upper quadrant ultrasound shows gallstones and general surgery was consulted who removed her gallbladder on 7/12.  Intraoperative cholangiogram was negative for stone stuck in the biliary tree.  It was recommended that she discharged home with follow-up of her liver function tests and lipase later this week.    Incidentally on admission she also complained of hematochezia that was self-limited and should have outpatient work-up performed.  Unfortunately I forgot to recommend this at discharge so I did call her at home and left a message with her .           Code Status:      Full Code        Brief Hospital Stay Summary Sent Home With  Patient in AVS:          Reason for your hospital stay      You were admitted for concerns of gallstone pancreatitis that was related   to gallstones in your gallbladder (a stone tried to pass causing extreme   pain). You were seen by general surgery who performed a gallbladder   removal and did a cholangiogram that did not show any evidence of stones.   From their stand point you can discharge. You need to have your liver   function tests and lipase checked again at the end of the week to ensure   they are normal.                          Pending Results:        Unresulted Labs Ordered in the Past 30 Days of this Admission     Date and Time Order Name Status Description    7/12/2022 10:26 AM Surgical Pathology Exam In process             Discharge Instructions and Follow-Up:      Follow-up Appointments     Follow-up and recommended labs and tests       Call primary care provider to have liver function tests  and lipase done   at the end of the week    Follow up with general surgery as recommended               Discharge Disposition:        Discharged to home         Discharge Medications:        Current Discharge Medication List      START taking these medications    Details   oxyCODONE (ROXICODONE) 5 MG tablet Take 1-2 tablets (5-10 mg) by mouth every 4 hours as needed for moderate to severe pain  Qty: 12 tablet, Refills: 0    Associated Diagnoses: Acute biliary pancreatitis, unspecified complication status         CONTINUE these medications which have NOT CHANGED    Details   Biotin w/ Vitamins C & E (HAIR SKIN & NAILS GUMMIES PO) Take 2 chew tab by mouth daily      childrens multivitamin w/iron (FLINTSTONES COMPLETE) chewable tablet Take 2 chew tab by mouth daily      fluticasone (FLONASE) 50 MCG/ACT nasal spray Spray 2 sprays into both nostrils daily      levocetirizine (XYZAL) 5 MG tablet Take 10 mg by mouth daily      levonorgestrel (MIRENA) 20 MCG/24HR IUD 1 each (20 mcg) by Intrauterine route continuous     "Associated Diagnoses: Encounter for insertion of intrauterine contraceptive device      lisinopril (ZESTRIL) 5 MG tablet Take 1 tablet (5 mg) by mouth daily  Qty: 90 tablet, Refills: 1    Associated Diagnoses: Benign essential hypertension      venlafaxine (EFFEXOR-XR) 37.5 MG 24 hr capsule Take 1 capsule (37.5 mg) by mouth daily  Qty: 90 capsule, Refills: 1    Associated Diagnoses: Major depressive disorder, recurrent episode, moderate (H); Anxiety               Allergies:         Allergies   Allergen Reactions     Nsaids      Bariatric surgery           Consultations This Hospital Stay:        Consultation during this admission received from surgery         Condition and Physical on Discharge:        Discharge condition: Stable   Vitals: Blood pressure 116/72, pulse 68, temperature 97.1  F (36.2  C), temperature source Oral, resp. rate 14, height 1.702 m (5' 7\"), weight 102.4 kg (225 lb 12.8 oz), SpO2 94 %, not currently breastfeeding.     Constitutional: Alert and orientated x 3     Lungs: CTAB   Cardiovascular: RRR with no murmur   Abdomen: Bowel sounds are present without severe tenderness   Skin: No rash or open sores   Other:          Discharge Time:      Less than 30 minutes.        Image Results From This Hospital Stay (For Non-EPIC Providers):        Results for orders placed or performed during the hospital encounter of 07/11/22   CT Abdomen Pelvis w Contrast    Narrative    EXAM: CT ABDOMEN PELVIS W CONTRAST  LOCATION: [facility]  DATE/TIME: 7/11/2022 8:10 PM    INDICATION: Abdominal pain, bloody stools, vomiting, leukocytosis.  COMPARISON: None.  TECHNIQUE: CT scan of the abdomen and pelvis was performed following injection of IV contrast. Multiplanar reformats were obtained. Dose reduction techniques were used.  CONTRAST: 90mL Isovue 370    FINDINGS:   LOWER CHEST: Mild dependent atelectasis.    HEPATOBILIARY: Tiny benign cyst left lobe of the liver. Liver is otherwise normal. " The gallbladder and bile ducts are normal.    PANCREAS: Normal.    SPLEEN: Normal variant accessory splenule.    ADRENAL GLANDS: Normal.    KIDNEYS/BLADDER: Normal.    BOWEL: Prior postoperative changes upper GI tract. No complication seen. The remainder of the bowel is normal to include the appendix.    LYMPH NODES: Normal.    VASCULATURE: Minimal calcification with no aneurysmal dilatation.    PELVIC ORGANS: There is an IUD and this appears to be in good position.    MUSCULOSKELETAL: Normal.      Impression    IMPRESSION:   1.  No acute finding seen to explain patient's abdominal pain, bloody stools, vomiting and leukocytosis clinically.    2.  IUD and this is in good position.    3.  Tiny benign cyst in the liver with no follow-up recommended.    4.  Prior postoperative changes with no complications identified.   US Abdomen Limited (RUQ)    Narrative    EXAM: US ABDOMEN LIMITED  LOCATION: [facility]  DATE/TIME: 7/11/2022 9:27 PM    INDICATION: Elevated lipase.  COMPARISON: None.  TECHNIQUE: Limited abdominal ultrasound.    FINDINGS:    GALLBLADDER: There are multiple small stones in the gallbladder. Gallbladder otherwise appears normal. No sonographic Mello sign.    BILE DUCTS: No biliary dilatation. The common duct measures 3 mm.    LIVER: Normal parenchyma with smooth contour. No focal mass.    RIGHT KIDNEY: No hydronephrosis.    PANCREAS: The visualized portions are normal.    No ascites.      Impression    IMPRESSION:  1.  Cholelithiasis. There is no biliary dilatation or evidence of cholecystitis.               Most Recent Lab Results In EPIC (For Non-EPIC Providers):    Most Recent 3 CBC's:  Recent Labs   Lab Test 07/12/22  0520 07/11/22  1616 04/05/21  0832   WBC 10.6 22.8* 8.7   HGB 12.5 15.6 16.1*   MCV 92 91 86    363 361      Most Recent 3 BMP's:  Recent Labs   Lab Test 07/12/22  0520 07/11/22  1616 04/05/21  0832    139 138   POTASSIUM 3.7 4.2 3.7   CHLORIDE 113*  110* 106   CO2 24 24 29   BUN 11 11 9   CR 0.67 0.69 0.70   ANIONGAP 5 5 3   HOLLY 8.3* 9.4 9.2   GLC 83 113* 79     Most Recent 3 Troponin's:No lab results found.  Most Recent 3 INR's:No lab results found.  Most Recent 2 LFT's:  Recent Labs   Lab Test 07/12/22  0520 07/11/22  1616   * 86*   * 59*   ALKPHOS 51 63   BILITOTAL 0.6 0.7     Most Recent Cholesterol Panel:  Recent Labs   Lab Test 07/12/22  0520 12/26/18  0952   CHOL  --  146   LDL  --  98   HDL  --  24*   TRIG 88 119     Most Recent 6 Bacteria Isolates From Any Culture (See EPIC Reports for Culture Details):No lab results found.  Most Recent TSH, T4 and HgbA1c:   Recent Labs   Lab Test 05/09/19  1531   A1C 5.3

## 2022-07-12 NOTE — CONSULTS
General Surgery Consultation    Zee Kyle MRN# [id number]   Age: 46 year old YOB: 1975     Date of Admission:  7/11/2022    Reason for consult:            Abdominal pain, epigastric and LUQ       Requesting physician:            James Chatman MD                Assessment and Plan:   Assessment:   Zee Kyle is a 46 year old female with pancreatitis, most likely of biliary etiology. She has gallstones without evidence of concurrent cholecystitis. Triglycerides are normal and she has no significant alcohol consumption.  I believe she has likely passed the offending gallstone, bilirubin is normal and her CBD is only 3 mm.      Plan:   I have offered the patient a laparoscopic cholecystectomy with intraoperative cholangiogram to ensure her bile duct is now clear.     We have discussed the indication, alternatives, risks and expected recovery.  Specifically we have discussed incisions, scarring, postoperative infections, anesthesia, bleeding, blood transfusion, open conversion, common bile duct injury, injury to intra-abdominal organs, adhesions leading to bowel obstruction, retained common bile duct stone, bile leak, DVT, PE, hernia, post cholecystectomy diarrhea, recovery, postoperative dietary restrictions and physical limitations.  We have discussed the recommended interventions and treatments for these complications.  All questions have been answered to the best of my ability.    She elects to proceed with surgery.      Era Villela MD         Chief Complaint:   Abdominal pain, epigastric and left upper quadrant     History is obtained from the patient.         History of Present Illness:   Zee Kyle is a 46 year old female who presents with epigastric region abdominal pain for the past 20 hours.  The pain came on suddenly and was sharp.  It was focused in the epigastrium, but also radiated to the LUQ and to the back.   She has not had similar pain in the past.   There is not an association with eating any type of food.  Positive for associated symptoms of nausea, vomiting, diarrhea and chills.  She  does not have a history of jaundice or dark urine.  She  has not had pancreatitis in the past.              Past Medical History:   Anxiety  Depression  Esophageal reflux   Herpes simplex without mention of complication  Hypertension  Hypothyroidism (2012)   Presbyacusis  Sleep apnea          Past Surgical History:     Past Surgical History:   Procedure Laterality Date     ENT SURGERY       GENITOURINARY SURGERY  2017    bladder sling     LAPAROSCOPIC GASTRIC SLEEVE N/A 10/15/2019    Procedure: LAPAROSCOPIC GASTRIC SLEEVE;  Surgeon: Cesar Lee MD;  Location: [facility] OR     PE TUBES       TONSILLECTOMY       TYMPANOPLASTY, RT/LT      Tympanoplasty RT/LT             Social History:     Social History     Tobacco Use     Smoking status: Former Smoker     Packs/day: 1.00     Years: 10.00     Pack years: 10.00     Types: Cigarettes     Start date: 1996     Quit date: 2/10/2003     Years since quittin.4     Smokeless tobacco: Never Used     Tobacco comment: Glad to be done   Substance Use Topics     Alcohol use: Yes     Alcohol/week: 0.0 standard drinks     Comment: occas             Family History:   Family history reviewed and is not pertinent.         Allergies:     Allergies   Allergen Reactions     Nsaids      Bariatric surgery             Medications:   No current facility-administered medications on file prior to encounter.  Biotin w/ Vitamins C & E (HAIR SKIN & NAILS GUMMIES PO), Take 2 chew tab by mouth daily  childrens multivitamin w/iron (FLINTSTONES COMPLETE) chewable tablet, Take 2 chew tab by mouth daily  fluticasone (FLONASE) 50 MCG/ACT nasal spray, Spray 2 sprays into both nostrils daily  levocetirizine (XYZAL) 5 MG tablet, Take 10 mg by mouth daily  lisinopril (ZESTRIL) 5 MG tablet, Take 1 tablet (5 mg) by mouth daily  venlafaxine (EFFEXOR-XR) 37.5 MG 24  "hr capsule, Take 1 capsule (37.5 mg) by mouth daily  levonorgestrel (MIRENA) 20 MCG/24HR IUD, 1 each (20 mcg) by Intrauterine route continuous        ceFAZolin  2 g Intravenous Pre-Op/Pre-procedure x 1 dose     ceFAZolin  2 g Intravenous See Admin Instructions     [Auto Hold] pantoprazole (PROTONIX) IV  40 mg Intravenous Q24H     sodium chloride (PF)  3 mL Intracatheter Q8H     [Auto Hold] sodium chloride (PF)  3 mL Intracatheter Q8H            Review of Systems:   The 10 point review of systems is negative other than noted in the HPI.          Physical Exam:   /69 (BP Location: Right arm)   Pulse 62   Temp 98.1  F (36.7  C) (Oral)   Resp 18   Ht 1.702 m (5' 7\")   Wt 102.4 kg (225 lb 12.8 oz)   SpO2 95%   BMI 35.37 kg/m    General - Well developed, well nourished female in no apparent distress  HEENT:  Head normocephalic and atraumatic, pupils equal and round, conjunctivae clear, no scleral icterus, mucous membranes moist, external ears and nose normal  Neck: Supple without thyromegaly or masses  Lymphatic: No cervical, or supraclavicular lymphadenopathy  Lungs: Clear to auscultation bilaterally  Heart: regular rate and rhythm, no murmurs  Abdomen:   soft, non-distended with mild tenderness noted in the epigastric region. no masses palpated  Extremities: Warm without edema  Neurologic: nonfocal  Psychiatric: Mood and affect appropriate  Skin: Without lesions, rashes, or juandice         Data:     WBC -   Lab Results   Component Value Date    WBC 10.6 07/12/2022       HgB -   Lab Results   Component Value Date    HGB 12.5 07/12/2022       Plt-   Lab Results   Component Value Date     07/12/2022       Liver Function Studies -   Recent Labs   Lab Test 07/12/22  0520   PROTTOTAL 5.9*   ALBUMIN 3.0*   BILITOTAL 0.6   ALKPHOS 51   *   *       Lipase-   Lab Results   Component Value Date    LIPASE 1,091 (H) 07/12/2022         Imaging:  All imaging studies reviewed by me.    Results for " orders placed or performed during the hospital encounter of 07/11/22   CT Abdomen Pelvis w Contrast    Narrative    EXAM: CT ABDOMEN PELVIS W CONTRAST  LOCATION: [facility]  DATE/TIME: 7/11/2022 8:10 PM    INDICATION: Abdominal pain, bloody stools, vomiting, leukocytosis.  COMPARISON: None.  TECHNIQUE: CT scan of the abdomen and pelvis was performed following injection of IV contrast. Multiplanar reformats were obtained. Dose reduction techniques were used.  CONTRAST: 90mL Isovue 370    FINDINGS:   LOWER CHEST: Mild dependent atelectasis.    HEPATOBILIARY: Tiny benign cyst left lobe of the liver. Liver is otherwise normal. The gallbladder and bile ducts are normal.    PANCREAS: Normal.    SPLEEN: Normal variant accessory splenule.    ADRENAL GLANDS: Normal.    KIDNEYS/BLADDER: Normal.    BOWEL: Prior postoperative changes upper GI tract. No complication seen. The remainder of the bowel is normal to include the appendix.    LYMPH NODES: Normal.    VASCULATURE: Minimal calcification with no aneurysmal dilatation.    PELVIC ORGANS: There is an IUD and this appears to be in good position.    MUSCULOSKELETAL: Normal.      Impression    IMPRESSION:   1.  No acute finding seen to explain patient's abdominal pain, bloody stools, vomiting and leukocytosis clinically.    2.  IUD and this is in good position.    3.  Tiny benign cyst in the liver with no follow-up recommended.    4.  Prior postoperative changes with no complications identified.   US Abdomen Limited (RUQ)    Narrative    EXAM: US ABDOMEN LIMITED  LOCATION: [facility]  DATE/TIME: 7/11/2022 9:27 PM    INDICATION: Elevated lipase.  COMPARISON: None.  TECHNIQUE: Limited abdominal ultrasound.    FINDINGS:    GALLBLADDER: There are multiple small stones in the gallbladder. Gallbladder otherwise appears normal. No sonographic Mello sign.    BILE DUCTS: No biliary dilatation. The common duct measures 3 mm.    LIVER: Normal  parenchyma with smooth contour. No focal mass.    RIGHT KIDNEY: No hydronephrosis.    PANCREAS: The visualized portions are normal.    No ascites.      Impression    IMPRESSION:  1.  Cholelithiasis. There is no biliary dilatation or evidence of cholecystitis.         This note was created using voice recognition software. Undetected word substitutions or other errors may have occurred.     Time spent with the patient, reviewing the EMR, reviewing laboratory and imaging studies, more than 50% of which was counseling and coordinating care:  40 minutes.     rEa Villela MD

## 2022-07-12 NOTE — PROGRESS NOTES
Care Management Discharge Note    Discharge Date: 07/12/2022     Discharge Disposition: Home    Handoff Referral Completed: Yes    Additional Information:  Pt identified as a Service Bundle #2. No needs or assessment needed at this time. Please consult CM/SW  if discharge needs should arise.    DANIELLE Henriquez

## 2022-07-12 NOTE — PLAN OF CARE
"Patient's After Visit Summary was reviewed with patient and spouse.   Patient verbalized understanding of After Visit Summary, recommended follow up and was given an opportunity to ask questions.   Discharge medications sent home with patient/family: YES, oxycodone  Discharged with spouse    Patient alert and oriented x4. Steady gait. Vital signs stable. Iv removed. Belongings sent home with pt. Medication sent home with pt. Declined WC ride to vehicle. Discharged with spouse via private vehicle.     /72 (BP Location: Right arm, Patient Position: Semi-Arrieta's, Cuff Size: Adult Regular)   Pulse 88   Temp 99.1  F (37.3  C) (Oral)   Resp 16   Ht 1.702 m (5' 7\")   Wt 102.4 kg (225 lb 12.8 oz)   SpO2 94%   BMI 35.37 kg/m          "

## 2022-07-12 NOTE — ANESTHESIA PROCEDURE NOTES
Airway       Patient location during procedure: OR       Procedure Start/Stop Times: 7/12/2022 9:26 AM  Staff -        Anesthesiologist:  James Tilley MD       CRNA: Severson, Dean Dennis, APRN CRNA       Performed By: CRNA  Consent for Airway        Urgency: elective  Indications and Patient Condition       Indications for airway management: dakota-procedural and airway protection       Induction type:intravenous       Mask difficulty assessment: 1 - vent by mask    Final Airway Details       Final airway type: endotracheal airway       Successful airway: ETT - single  Endotracheal Airway Details        ETT size (mm): 7.0       Cuffed: yes       Successful intubation technique: video laryngoscopy       VL Blade Size: Glidescope 3       Grade View of Cords: 1       Adjucts: stylet       Position: Center       Measured from: lips       Secured at (cm): 22       Bite block used: Soft    Post intubation assessment        Placement verified by: capnometry, equal breath sounds and chest rise        Number of attempts at approach: 1       Number of other approaches attempted: 0       Secured with: plastic tape       Ease of procedure: easy       Dentition: Intact and Unchanged    Medication(s) Administered   Medication Administration Time: 7/12/2022 9:26 AM

## 2022-07-12 NOTE — PLAN OF CARE
PRIMARY DIAGNOSIS: ACUTE PAIN/ Post op Lap Cholecystectomy  OUTPATIENT/OBSERVATION GOALS TO BE MET BEFORE DISCHARGE:  1. Pain Status: Pain free.    2. Return to near baseline physical activity: No    3. Cleared for discharge by consultants (if involved): No    Discharge Planner Nurse   Safe discharge environment identified: No  Barriers to discharge: Yes       Entered by: Kelin Lowe RN 07/12/2022 1:51 PM     Please review provider order for any additional goals.   Nurse to notify provider when observation goals have been met and patient is ready for discharge.Goal Outcome Evaluation:      Pt arrived from PACU sleepy but arousable. Pt refusing meds or fluids due to throat being sore. Viutals stable.

## 2022-07-12 NOTE — ANESTHESIA POSTPROCEDURE EVALUATION
Patient: Zee Kyle    Procedure: Procedure(s):  LAPAROSCOPIC CHOLECYSTECTOMY WITH CHOLANGIOGRAM       Anesthesia Type:  General    Note:  Disposition: Outpatient   Postop Pain Control: Uneventful            Sign Out: Well controlled pain   PONV: No   Neuro/Psych: Uneventful            Sign Out: Acceptable/Baseline neuro status   Airway/Respiratory: Uneventful            Sign Out: Acceptable/Baseline resp. status   CV/Hemodynamics: Uneventful            Sign Out: Acceptable CV status; No obvious hypovolemia; No obvious fluid overload   Other NRE: NONE   DID A NON-ROUTINE EVENT OCCUR? No           Last vitals:  Vitals Value Taken Time   /58 07/12/22 1230   Temp 97.8  F (36.6  C) 07/12/22 1200   Pulse 71 07/12/22 1237   Resp 15 07/12/22 1237   SpO2 96 % 07/12/22 1236   Vitals shown include unvalidated device data.    Electronically Signed By: Era Fernandez MD  July 12, 2022  5:00 PM

## 2022-07-13 LAB
PATH REPORT.COMMENTS IMP SPEC: NORMAL
PATH REPORT.COMMENTS IMP SPEC: NORMAL
PATH REPORT.FINAL DX SPEC: NORMAL
PATH REPORT.GROSS SPEC: NORMAL
PATH REPORT.MICROSCOPIC SPEC OTHER STN: NORMAL
PATH REPORT.RELEVANT HX SPEC: NORMAL
PHOTO IMAGE: NORMAL

## 2022-07-19 ENCOUNTER — OFFICE VISIT (OUTPATIENT)
Dept: FAMILY MEDICINE | Facility: CLINIC | Age: 47
End: 2022-07-19
Payer: COMMERCIAL

## 2022-07-19 VITALS
DIASTOLIC BLOOD PRESSURE: 78 MMHG | BODY MASS INDEX: 34.72 KG/M2 | WEIGHT: 221.7 LBS | HEART RATE: 80 BPM | SYSTOLIC BLOOD PRESSURE: 110 MMHG

## 2022-07-19 DIAGNOSIS — Z09 HOSPITAL DISCHARGE FOLLOW-UP: Primary | ICD-10-CM

## 2022-07-19 DIAGNOSIS — I10 BENIGN ESSENTIAL HYPERTENSION: ICD-10-CM

## 2022-07-19 DIAGNOSIS — K85.90 ACUTE PANCREATITIS, UNSPECIFIED COMPLICATION STATUS, UNSPECIFIED PANCREATITIS TYPE: ICD-10-CM

## 2022-07-19 LAB
ALBUMIN SERPL BCG-MCNC: 4.4 G/DL (ref 3.5–5.2)
ALP SERPL-CCNC: 59 U/L (ref 35–104)
ALT SERPL W P-5'-P-CCNC: 39 U/L (ref 10–35)
AST SERPL W P-5'-P-CCNC: 25 U/L (ref 10–35)
BILIRUB DIRECT SERPL-MCNC: <0.2 MG/DL (ref 0–0.3)
BILIRUB SERPL-MCNC: 0.2 MG/DL
PROT SERPL-MCNC: 7.5 G/DL (ref 6.4–8.3)

## 2022-07-19 PROCEDURE — 99214 OFFICE O/P EST MOD 30 MIN: CPT | Performed by: FAMILY MEDICINE

## 2022-07-19 PROCEDURE — 36415 COLL VENOUS BLD VENIPUNCTURE: CPT | Performed by: FAMILY MEDICINE

## 2022-07-19 PROCEDURE — 83690 ASSAY OF LIPASE: CPT | Performed by: FAMILY MEDICINE

## 2022-07-19 PROCEDURE — 80076 HEPATIC FUNCTION PANEL: CPT | Performed by: FAMILY MEDICINE

## 2022-07-19 RX ORDER — LISINOPRIL 5 MG/1
5 TABLET ORAL DAILY
Qty: 90 TABLET | Refills: 1 | Status: SHIPPED | OUTPATIENT
Start: 2022-07-19 | End: 2022-09-20

## 2022-07-19 NOTE — PROGRESS NOTES
Hospital Follow-up Visit:    Hospital/Nursing Home/IP Rehab Facility: [facility]  Date of Admission: 7/11/22  Date of Discharge: 7/12/22  Reason(s) for Admission: Abdominal Pain, Bloody Stool, Acute Pancreatitis w/o Infection      Was your hospitalization related to COVID-19? No   Problems taking medications regularly:  None  Medication changes since discharge: None  Problems adhering to non-medication therapy:  None    Summary of hospitalization:  [facility] discharge summary reviewed  Diagnostic Tests/Treatments reviewed.  Follow up needed: none  Other Healthcare Providers Involved in Patient s Care:         None  Update since discharge: improved.       Post Discharge Medication Reconciliation: discharge medications reconciled, continue medications without change.  Plan of care communicated with patient              46-year-old female who had a gastric sleeve procedure in 2019 presented with left upper quadrant pain and was found to have pancreatitis secondary to gallstone.  She had her gallbladder removed on July 12, 2022.  She is here for follow-up.  She no longer has any pain.  She is not taking pain medication.  She has loose stool but has always had loose stool since her gastric sleeve procedure in 2019.  She has no new questions or concerns today    /78 (BP Location: Left arm, Patient Position: Sitting, Cuff Size: Adult Large)   Pulse 80   Wt 100.6 kg (221 lb 11.2 oz)   LMP  (LMP Unknown)   Breastfeeding No   BMI 34.72 kg/m      Constitutional: Patient is oriented to person, place, and time. Patient appears well-developed and well-nourished. No distress.   Head: Normocephalic and atraumatic.   Right Ear: External ear normal.   Left Ear: External ear normal.   Eyes: Conjunctivae and EOM are normal. Right eye exhibits no discharge. Left eye exhibits no discharge. No scleral icterus.   Neurological: Patient is alert and oriented to person, place, and  time.  Skin: No rash noted. Patient is not diaphoretic. No erythema. No pallor.  Abdomen: Abdomen is soft, nontender, nondistended.  The incision sites are clean, dry, healing    Diagnoses and all orders for this visit:  Hospital discharge follow-up  Improving  Hospital notes, lab results, operative notes, imagings were reviewed.    Post Discharge Medication Reconciliation Status: discharge medications reconciled, continue medications without change    Acute pancreatitis  Improved following cholecystecomy 2/2 gallstones  -     Lipase; Future  -     Hepatic panel (Albumin, ALT, AST, Bili, Alk Phos, TP); Future    Benign essential hypertension  -     lisinopril (ZESTRIL) 5 MG tablet; Take 1 tablet (5 mg) by mouth daily  refilled

## 2022-07-20 LAB — LIPASE SERPL-CCNC: 48 U/L (ref 13–60)

## 2022-07-27 ENCOUNTER — TELEPHONE (OUTPATIENT)
Dept: SURGERY | Facility: CLINIC | Age: 47
End: 2022-07-27

## 2022-07-27 NOTE — TELEPHONE ENCOUNTER
Attempted to call patient for post op check. No answer.  A message was left for patient to call back if they had any questions or concerns.     Ajit Webster PA-C

## 2022-08-08 ENCOUNTER — HOSPITAL ENCOUNTER (OUTPATIENT)
Dept: MAMMOGRAPHY | Facility: CLINIC | Age: 47
Discharge: HOME | End: 2022-08-08
Attending: FAMILY MEDICINE | Admitting: FAMILY MEDICINE
Payer: COMMERCIAL

## 2022-08-08 DIAGNOSIS — Z12.31 VISIT FOR SCREENING MAMMOGRAM: ICD-10-CM

## 2022-08-08 PROCEDURE — 77067 SCR MAMMO BI INCL CAD: CPT

## 2022-09-01 DIAGNOSIS — F41.9 ANXIETY: ICD-10-CM

## 2022-09-01 DIAGNOSIS — F33.1 MAJOR DEPRESSIVE DISORDER, RECURRENT EPISODE, MODERATE (H): ICD-10-CM

## 2022-09-01 RX ORDER — VENLAFAXINE HYDROCHLORIDE 37.5 MG/1
CAPSULE, EXTENDED RELEASE ORAL
Qty: 30 CAPSULE | Refills: 0 | Status: SHIPPED | OUTPATIENT
Start: 2022-09-01 | End: 2022-09-20

## 2022-09-01 NOTE — TELEPHONE ENCOUNTER
Routing refill request to provider for review/approval because:  Patient needs to be seen because:  Needs to establish with new primary  Failing PHQ9    Wanda Lira RN

## 2022-09-20 ENCOUNTER — TELEPHONE (OUTPATIENT)
Dept: GASTROENTEROLOGY | Facility: CLINIC | Age: 47
End: 2022-09-20

## 2022-09-20 ENCOUNTER — OFFICE VISIT (OUTPATIENT)
Dept: FAMILY MEDICINE | Facility: CLINIC | Age: 47
End: 2022-09-20
Payer: COMMERCIAL

## 2022-09-20 VITALS
OXYGEN SATURATION: 96 % | HEIGHT: 67 IN | WEIGHT: 224.9 LBS | TEMPERATURE: 98.2 F | BODY MASS INDEX: 35.3 KG/M2 | HEART RATE: 86 BPM | SYSTOLIC BLOOD PRESSURE: 122 MMHG | DIASTOLIC BLOOD PRESSURE: 76 MMHG

## 2022-09-20 DIAGNOSIS — F33.1 MAJOR DEPRESSIVE DISORDER, RECURRENT EPISODE, MODERATE (H): ICD-10-CM

## 2022-09-20 DIAGNOSIS — F41.9 ANXIETY: ICD-10-CM

## 2022-09-20 DIAGNOSIS — E66.01 MORBID OBESITY (H): ICD-10-CM

## 2022-09-20 DIAGNOSIS — Z98.84 S/P LAPAROSCOPIC SLEEVE GASTRECTOMY: ICD-10-CM

## 2022-09-20 DIAGNOSIS — Z23 NEED FOR PROPHYLACTIC VACCINATION AND INOCULATION AGAINST INFLUENZA: ICD-10-CM

## 2022-09-20 DIAGNOSIS — E78.6 LOW HDL (UNDER 40): ICD-10-CM

## 2022-09-20 DIAGNOSIS — Z23 HIGH PRIORITY FOR 2019-NCOV VACCINE: ICD-10-CM

## 2022-09-20 DIAGNOSIS — Z76.89 ENCOUNTER TO ESTABLISH CARE WITH NEW DOCTOR: Primary | ICD-10-CM

## 2022-09-20 DIAGNOSIS — I10 BENIGN ESSENTIAL HYPERTENSION: ICD-10-CM

## 2022-09-20 DIAGNOSIS — Z90.49 S/P CHOLECYSTECTOMY: ICD-10-CM

## 2022-09-20 DIAGNOSIS — K92.1 BLOODY STOOL: ICD-10-CM

## 2022-09-20 DIAGNOSIS — F50.9 EATING DISORDER, UNSPECIFIED TYPE: ICD-10-CM

## 2022-09-20 DIAGNOSIS — E03.9 HYPOTHYROIDISM, UNSPECIFIED TYPE: ICD-10-CM

## 2022-09-20 DIAGNOSIS — R19.7 DIARRHEA, UNSPECIFIED TYPE: ICD-10-CM

## 2022-09-20 PROBLEM — R60.0 LOWER EXTREMITY EDEMA: Status: RESOLVED | Noted: 2018-07-18 | Resolved: 2022-09-20

## 2022-09-20 PROBLEM — R10.13 ABDOMINAL PAIN, EPIGASTRIC: Status: RESOLVED | Noted: 2022-07-11 | Resolved: 2022-09-20

## 2022-09-20 PROBLEM — K85.90 PANCREATITIS: Status: RESOLVED | Noted: 2022-07-11 | Resolved: 2022-09-20

## 2022-09-20 PROBLEM — K85.90 ACUTE PANCREATITIS WITHOUT INFECTION OR NECROSIS, UNSPECIFIED PANCREATITIS TYPE: Status: RESOLVED | Noted: 2022-07-11 | Resolved: 2022-09-20

## 2022-09-20 PROCEDURE — 90686 IIV4 VACC NO PRSV 0.5 ML IM: CPT | Performed by: FAMILY MEDICINE

## 2022-09-20 PROCEDURE — 99214 OFFICE O/P EST MOD 30 MIN: CPT | Mod: 25 | Performed by: FAMILY MEDICINE

## 2022-09-20 PROCEDURE — 91312 COVID-19,PF,PFIZER BOOSTER BIVALENT: CPT | Performed by: FAMILY MEDICINE

## 2022-09-20 PROCEDURE — 0124A COVID-19,PF,PFIZER BOOSTER BIVALENT: CPT | Performed by: FAMILY MEDICINE

## 2022-09-20 PROCEDURE — 90471 IMMUNIZATION ADMIN: CPT | Performed by: FAMILY MEDICINE

## 2022-09-20 RX ORDER — LISINOPRIL 5 MG/1
5 TABLET ORAL DAILY
Qty: 90 TABLET | Refills: 3 | Status: SHIPPED | OUTPATIENT
Start: 2022-09-20 | End: 2024-01-08

## 2022-09-20 RX ORDER — VENLAFAXINE HYDROCHLORIDE 37.5 MG/1
37.5 CAPSULE, EXTENDED RELEASE ORAL DAILY
Qty: 90 CAPSULE | Refills: 1 | Status: SHIPPED | OUTPATIENT
Start: 2022-09-20 | End: 2023-03-20

## 2022-09-20 ASSESSMENT — ANXIETY QUESTIONNAIRES
4. TROUBLE RELAXING: SEVERAL DAYS
IF YOU CHECKED OFF ANY PROBLEMS ON THIS QUESTIONNAIRE, HOW DIFFICULT HAVE THESE PROBLEMS MADE IT FOR YOU TO DO YOUR WORK, TAKE CARE OF THINGS AT HOME, OR GET ALONG WITH OTHER PEOPLE: NOT DIFFICULT AT ALL
7. FEELING AFRAID AS IF SOMETHING AWFUL MIGHT HAPPEN: NOT AT ALL
7. FEELING AFRAID AS IF SOMETHING AWFUL MIGHT HAPPEN: NOT AT ALL
6. BECOMING EASILY ANNOYED OR IRRITABLE: SEVERAL DAYS
5. BEING SO RESTLESS THAT IT IS HARD TO SIT STILL: NOT AT ALL
3. WORRYING TOO MUCH ABOUT DIFFERENT THINGS: SEVERAL DAYS
1. FEELING NERVOUS, ANXIOUS, OR ON EDGE: SEVERAL DAYS
GAD7 TOTAL SCORE: 5
8. IF YOU CHECKED OFF ANY PROBLEMS, HOW DIFFICULT HAVE THESE MADE IT FOR YOU TO DO YOUR WORK, TAKE CARE OF THINGS AT HOME, OR GET ALONG WITH OTHER PEOPLE?: NOT DIFFICULT AT ALL
2. NOT BEING ABLE TO STOP OR CONTROL WORRYING: SEVERAL DAYS

## 2022-09-20 ASSESSMENT — PATIENT HEALTH QUESTIONNAIRE - PHQ9
SUM OF ALL RESPONSES TO PHQ QUESTIONS 1-9: 4
SUM OF ALL RESPONSES TO PHQ QUESTIONS 1-9: 4

## 2022-09-20 NOTE — PROGRESS NOTES
Assessment & Plan     Encounter to establish care with new doctor  Reviewed problem list, medications, allergies, past medical history, surgical history, social history, and family history.  Recommended wellness visit.  - REVIEW OF HEALTH MAINTENANCE PROTOCOL ORDERS    Bloody stool  Referral to GI for consultation, referral for colonoscopy not placed today so patient can be evaluated to see if she needs an upper GI as well. Reports a history of polyps.  - Adult GI  Referral - Consult Only; Future    Diarrhea, unspecified type  Referral to GI.  Discussed keeping a food diary, not of amounts of food, which was a concern for her with her disordered eating, but specifically the types of foods: Gluten, dairy, high FODMAP, etc.  Encouraged her to look into high fodmap foods to avoid, and keep diary for information prior to her GI appointment.  - Adult GI  Referral - Consult Only; Future    Benign essential hypertension  Well controlled, continue current dose.  - lisinopril (ZESTRIL) 5 MG tablet; Take 1 tablet (5 mg) by mouth daily    Morbid obesity (H)  S/p Sleeve Gastrectomy, disordered eating, working with a counselor.  Referral to weight management in place, visit upcoming.    Major depressive disorder, recurrent episode, moderate (H)  Well controlled per patient, follow up in 6 months or sooner as needed.  - venlafaxine (EFFEXOR XR) 37.5 MG 24 hr capsule; Take 1 capsule (37.5 mg) by mouth daily    Anxiety  As above  - venlafaxine (EFFEXOR XR) 37.5 MG 24 hr capsule; Take 1 capsule (37.5 mg) by mouth daily    Low HDL (under 40)  Due for labs, Recommendations pending results  - Lipid panel reflex to direct LDL Fasting; Future    Eating disorder, unspecified type  As above.  Seeing counselor, upcoming weight management visit.    Hypothyroidism, unspecified type  Due for labs, Recommendations pending results  - TSH with free T4 reflex; Future    S/P laparoscopic sleeve gastrectomy  Will check labs given  "patient has not been taking supplements. Recommendations pending results  - Vitamin B12; Future  - Vitamin D Deficiency; Future  - Adult GI  Referral - Consult Only; Future    S/P cholecystectomy  As above  - Adult GI  Referral - Consult Only; Future    Need for prophylactic vaccination and inoculation against influenza  - INFLUENZA VACCINE IM > 6 MONTHS VALENT IIV4 (AFLURIA/FLUZONE)    High priority for 2019-nCoV vaccine  - COVID-19,PF,PFIZER BOOSTER BIVALENT 12+Yrs      35 minutes spent on the date of the encounter doing chart review, history and exam, documentation and further activities per the note       BMI:   Estimated body mass index is 35.22 kg/m  as calculated from the following:    Height as of this encounter: 1.702 m (5' 7\").    Weight as of this encounter: 102 kg (224 lb 14.4 oz).   Weight management plan: Patient referred to endocrine and/or weight management specialty Discussed healthy diet and exercise guidelines    See Patient Instructions    Return in about 3 months (around 12/20/2022) for Preventative Care Visit.    Christy De La Garza MD  [facility]    Mirna Schultz is a 46 year old, presenting for the following health issues:  Anxiety, Depression, Establish Care, Imm/Inj (COVID-19 VACCINE), and Imm/Inj (Flu Shot)      History of Present Illness       Mental Health Follow-up:  Patient presents to follow-up on Depression & Anxiety.Patient's depression since last visit has been:  Better  The patient is not having other symptoms associated with depression.  Patient's anxiety since last visit has been:  Good  The patient is not having other symptoms associated with anxiety.  Any significant life events: No  Patient is not feeling anxious or having panic attacks.  Patient has no concerns about alcohol or drug use.    Reason for visit:  New primary doc., Medication, diarrhea daily, gallbladder follow up    She eats 2-3 servings of fruits and vegetables " daily.She consumes 1 sweetened beverage(s) daily.She exercises with enough effort to increase her heart rate 9 or less minutes per day.  She exercises with enough effort to increase her heart rate 3 or less days per week. She is missing 1 dose(s) of medications per week.    Today's PHQ-9         PHQ-9 Total Score: 4    PHQ-9 Q9 Thoughts of better off dead/self-harm past 2 weeks :   Not at all      Today's HENRI-7 Score: 5     Establish care    Diarrhea  Onset/Duration: 1 year  Description:       Consistency of stool: watery, runny and loose       Blood in stool: YES- 1 week before gallbladder surgery and twice after, happens twice       Number of loose stools past 24 hours: 2  Progression of Symptoms: same  Accompanying signs and symptoms:       Fever: No       Nausea/Vomiting: No       Abdominal pain: No       Weight loss: No       Episodes of constipation: No  History   Ill contacts: No  Recent use of antibiotics: No  Recent travels: No  Recent medication-new or changes(Rx or OTC): No  Precipitating or alleviating factors: None  Therapies tried and outcome: none    Has had two incidences of bloody stools since her surgery, which was one of the concerns before her surgery.  Had Gastric Bypass a couple years ago, and had diarrhea before the gallbladder was removed.    Patient has some disordered eating, sees a counselor for this.  Has gained some weight back since her gastric bypass, notes that she has had increased portion sizes and has not been taking her vitamins/supplements as she should be.    Takes Venlafaxine for depression and anxiety, needs refill.  Sees a counselor for a couple years, working well overall.    Current Outpatient Medications   Medication Sig Dispense Refill     Biotin w/ Vitamins C & E (HAIR SKIN & NAILS GUMMIES PO) Take 2 chew tab by mouth daily       childrens multivitamin w/iron ([place] COMPLETE) chewable tablet Take 2 chew tab by mouth daily       fluticasone (FLONASE) 50 MCG/ACT  "nasal spray Spray 2 sprays into both nostrils daily       levocetirizine (XYZAL) 5 MG tablet Take 10 mg by mouth daily       levonorgestrel (MIRENA) 20 MCG/24HR IUD 1 each (20 mcg) by Intrauterine route continuous       lisinopril (ZESTRIL) 5 MG tablet Take 1 tablet (5 mg) by mouth daily 90 tablet 3     venlafaxine (EFFEXOR XR) 37.5 MG 24 hr capsule Take 1 capsule (37.5 mg) by mouth daily 90 capsule 1       Review of Systems   Constitutional, HEENT, cardiovascular, pulmonary, gi and gu systems are negative, except as otherwise noted.      Objective    /76 (BP Location: Right arm, Patient Position: Sitting, Cuff Size: Adult Regular)   Pulse 86   Temp 98.2  F (36.8  C) (Oral)   Ht 1.702 m (5' 7\")   Wt 102 kg (224 lb 14.4 oz)   SpO2 96%   BMI 35.22 kg/m    Body mass index is 35.22 kg/m .  Physical Exam   GENERAL: healthy, alert and no distress  RESP: lungs clear to auscultation - no rales, rhonchi or wheezes  CV: regular rate and rhythm, normal S1 S2, no S3 or S4, no murmur, click or rub, no peripheral edema and peripheral pulses strong  PSYCH: mentation appears normal, affect normal/bright    Labs reviewed in chart              "

## 2022-10-01 ENCOUNTER — LAB (OUTPATIENT)
Dept: LAB | Facility: CLINIC | Age: 47
End: 2022-10-01
Payer: COMMERCIAL

## 2022-10-01 DIAGNOSIS — E78.6 LOW HDL (UNDER 40): ICD-10-CM

## 2022-10-01 DIAGNOSIS — Z98.84 S/P LAPAROSCOPIC SLEEVE GASTRECTOMY: ICD-10-CM

## 2022-10-01 DIAGNOSIS — E03.9 HYPOTHYROIDISM, UNSPECIFIED TYPE: ICD-10-CM

## 2022-10-01 LAB
CHOLEST SERPL-MCNC: 174 MG/DL
FASTING STATUS PATIENT QL REPORTED: YES
HDLC SERPL-MCNC: 38 MG/DL
LDLC SERPL CALC-MCNC: 109 MG/DL
NONHDLC SERPL-MCNC: 136 MG/DL
TRIGL SERPL-MCNC: 136 MG/DL
TSH SERPL DL<=0.005 MIU/L-ACNC: 1.48 MU/L (ref 0.4–4)
VIT B12 SERPL-MCNC: 410 PG/ML (ref 232–1245)

## 2022-10-01 PROCEDURE — 82306 VITAMIN D 25 HYDROXY: CPT

## 2022-10-01 PROCEDURE — 84443 ASSAY THYROID STIM HORMONE: CPT

## 2022-10-01 PROCEDURE — 80061 LIPID PANEL: CPT

## 2022-10-01 PROCEDURE — 36415 COLL VENOUS BLD VENIPUNCTURE: CPT

## 2022-10-01 PROCEDURE — 82607 VITAMIN B-12: CPT

## 2022-10-03 LAB — DEPRECATED CALCIDIOL+CALCIFEROL SERPL-MC: 40 UG/L (ref 20–75)

## 2022-10-07 ENCOUNTER — TRANSFERRED RECORDS (OUTPATIENT)
Dept: HEALTH INFORMATION MANAGEMENT | Facility: CLINIC | Age: 47
End: 2022-10-07

## 2022-10-07 NOTE — PROGRESS NOTES
Marion is a 46 year old who is being evaluated via a billable video visit.      If the video visit is dropped, the invitation should be resent by: Text to cell phone: [phone]  Will anyone else be joining your video visit? No      Video-Visit Details    Type of service:  Video Visit    Video Start Time: 2:38 PM    Video End Time:3:04 PM        Originating Location (pt. Location): Home    Distant Location (provider location):  [facility] [place]     Platform used for Video Visit: [facility] Bariatric Surgery Note    RE: Zee Kyle  MR#: [id number]  : 1975  VISIT DATE: 10/11/2022    Dear Christy Talbert,    I had the pleasure of seeing your patient, Zee Kyle, in my post-bariatric surgery assessment clinic.    Assessment & Plan   Problem List Items Addressed This Visit     Class 1 obesity due to excess calories without serious comorbidity with body mass index (BMI) of 34.0 to 34.9 in adult - Primary     We discussed healthy habits to assist with weight loss.               Disordered eating     Continues to follow with Jasmin Colbert          Bariatric surgery status     10/15/2019 LEL Gastic Sleeve         Postsurgical malabsorption     Reviewed recommended post-op vitamins.  Labs ordered per protocol.           Relevant Orders    Parathyroid Hormone Intact    Iron and Iron Binding Capacity    Ferritin    NUTRITION REFERRAL    Vitamin A        PATIENT INSTRUCTIONS:  Have labs darwn    Restart bariatric vitamins   2 Complete multivitamins with minerals (at different times than calcium)  Vitamin D 2000 Int Units/50 mg daily   Calcium 600 mg twice daily or 500 mg three times daily   Vitamin B12: 500 mcg sl daily or 2500 mcg weekly  Thiamine 100 mg weekly    Activity Goal:   Find your time wasters and make those moments count towards your health    FOLLOW-UP:  Please call [phone] to schedule your next visit in 3 months    30  minutes spent on the date of the encounter doing chart review, history and exam, result review, counseling, developing plan of care, documentation, and further activities as noted        CHIEF COMPLAINT: Post-bariatric surgery follow-up    HISTORY OF PRESENT ILLNESS:  Questions Regarding Prior Weight Loss Surgery Reviewed With Patient 10/7/2022   I had the following weight loss procedure: Sleeve Gastrectomy   What year was your surgery? 2019   How has your weight changed since your last visit? I have gained weight   Are you currently taking any weight loss medications? No   Do you currently have any of the following: Sleep Apnea, Hypertension (high blood pressure)?, Hyperlipidemia (high cholesterol, triglycerides)?   Have you been to the Emergency room since your last visit with us? Yes   Were you in the hospital since your last visit with us? Yes   Do you have any concerns today? 1.  Chronic Diarrhea (seeing a GI Doctor on Tuesday, 10/11)   2.  Getting back to a lower weight within the constraints of my eating disorder.     PT last seen at 6 month post op in 2020. Is up about 20 lbs since then.  Wants to make sure she does not continue to gain weight.  Notices she ahs veared off course. Following up today to get back on track.  Pt has ED NOS.  She works with Dr. Jasmin Colbert bi weekly.and has since prior to surgery.  This is under good control.         Weight History:     10/7/2022   What is your highest lifetime weight? 274   What is your lowest weight since surgery? (In pounds) 193     Initial Weight (lbs): 258.4 lbs  Weight: 220 lb (99.8 kg) (pt reported)  Last Visits Weight: 199 lb (90.3 kg)  Cumulative weight loss (lbs): 38.4  Weight Loss Percentage: 14.86%    Patient is taking the following bariatric postoperative vitamins:  2 [place] Complete multivitamins      Questions Regarding Co-Morbidities and Health Concerns Reviewed With Patient 10/7/2022   Pre-diabetes: Stayed the same   Diabetes II: Never    High Blood Pressure: Stayed the same   High cholesterol: Worsened   Heartburn/Reflux: Stayed the same   Sleep apnea: Stayed the same   Do you use a CPAP? Yes   PCOS: Never   Back pain: Worsened   Joint pain: Stayed the same   Lower leg swelling: Never       Eating Habits 10/7/2022   How many meals do you eat per day? 3   Do you snack between meals? Yes   How much food are you eating at each meal? Greater than 1 cup   Are you able to separate your meals and liquids by at least 30 minutes? No   Are you able to avoid liquid calories? Yes   Eating out of convenience.  Grab and go for breakfast.  Not always eating protein first.   Junior not feel like she is binging.      She does not experience hunger if eating her 3 meals a day and her snack.    Exercise Questions Reviewed With Patient 10/7/2022   How often do you exercise? Never   What is the duration of your exercise (in minutes)? -   What types of exercise do you do? -   What keeps you from being more active?  Lack of Time       Social History:      10/7/2022   Are you smoking? No   Are you drinking alcohol? Yes   How much alcohol? only for social events.  1 - 2 per month, where I have 2- 3 drinks at an occasion.       Medications:  Current Outpatient Medications   Medication     Biotin w/ Vitamins C & E (HAIR SKIN & NAILS GUMMIES PO)     childrens multivitamin w/iron (FLINTSTONES COMPLETE) chewable tablet     fluticasone (FLONASE) 50 MCG/ACT nasal spray     levocetirizine (XYZAL) 5 MG tablet     levonorgestrel (MIRENA) 20 MCG/24HR IUD     lisinopril (ZESTRIL) 5 MG tablet     venlafaxine (EFFEXOR XR) 37.5 MG 24 hr capsule     No current facility-administered medications for this visit.         10/7/2022   Do you avoid NSAIDs such as (Ibuprofen, Aleve, Naproxen, Advil)?   Yes       ROS:  GI:      10/7/2022   Vomiting: No   Diarrhea: Yes, Chronic diarrhea Met with GI specialist at [facility] and started Imodium.  Will be setting up endoscopy and endoscopy.     Constipation:  "No   Swallowing trouble: No   Abdominal pain: No   Heartburn: No   Rash in skin folds: Yes   Depression: Yes   Stress urinary incontinence No     Skin:   BAR RBS ROS - SKIN 10/7/2022   Rash in skin folds: Yes     Psych:      10/7/2022   Depression: Yes   Anxiety: Yes     Female Only:   MIRIAM RBS ROS - FEMALE ONLY 10/7/2022   Female only: None of the above       LABS/IMAGING/MEDICAL RECORDS REVIEW:   Hemoglobin A1C   Date Value Ref Range Status   05/09/2019 5.3 0 - 5.6 % Final     Comment:     Normal <5.7% Prediabetes 5.7-6.4%  Diabetes 6.5% or higher - adopted from ADA   consensus guidelines.       Vitamin D, Total (25-Hydroxy)   Date Value Ref Range Status   10/01/2022 40 20 - 75 ug/L Final     Parathyroid Hormone Intact   Date Value Ref Range Status   06/15/2020 14 (L) 18 - 80 pg/mL Final     Vitamin B12   Date Value Ref Range Status   10/01/2022 410 232 - 1,245 pg/mL Final     Hemoglobin   Date Value Ref Range Status   07/12/2022 12.5 11.7 - 15.7 g/dL Final     Ferritin   Date Value Ref Range Status   01/20/2020 84 12 - 150 ng/mL Final     Iron   Date Value Ref Range Status   04/05/2021 135 35 - 180 ug/dL Final     Iron Binding Cap   Date Value Ref Range Status   04/05/2021 321 240 - 430 ug/dL Final     Iron Saturation Index   Date Value Ref Range Status   04/05/2021 42 15 - 46 % Final   01/20/2020 16 15 - 46 % Final   08/16/2019 17 15 - 46 % Final       Age less than 45, no DEXA indicated.    PHYSICAL EXAMINATION:  Ht 5' 7\" (1.702 m)   Wt 220 lb (99.8 kg)   BMI 34.46 kg/m    GENERAL: Healthy, alert and no distress  EYES: Eyes grossly normal to inspection.  No discharge or erythema, or obvious scleral/conjunctival abnormalities.  RESP: No audible wheeze, cough, or visible cyanosis.  No visible retractions or increased work of breathing.    SKIN: Visible skin clear. No significant rash, abnormal pigmentation or lesions.  NEURO: Cranial nerves grossly intact.  Mentation and speech appropriate for age.  PSYCH: " Mentation appears normal, affect normal/bright, judgement and insight intact, normal speech and appearance well-groomed.    COUNSELING PROVIDED:  We reviewed the important post op bariatric recommendations:  eating 3 meals daily  eating protein first, getting >60gm protein daily  eating slowly, chewing food well  avoiding/limiting calorie containing beverages  avoiding fluids 30 minutes before, during, and after meals  limiting restaurant or cafeteria eating to twice a week or less  Pt reminded to avoid marginal ulcers she should avoid tobacco at all, alcohol in excess, caffeine, and NSAIDS (unless indicated for cardioprotection or othewise and opposed by a PPI).  Pt encouraged to establish and maintain a consistent physical activity routine, 6-8 hours of restorative sleep each night and optimal stress management.  Pt counseled on the importance of life long vitamin supplementation and life long follow up.    Sincerely,    Tammi Cedillo PA-C

## 2022-10-10 NOTE — PROGRESS NOTES
"Marion is a 46 year old who is being evaluated via a billable video visit.      How would you like to obtain your AVS? MyChart  If the video visit is dropped, the invitation should be resent by: Text to cell phone: [phone]  Will anyone else be joining your video visit? No              Video-Visit Details    Video Start Time: 3:05pm    Type of service:  Video Visit    Video End Time:3:33pm    Originating Location (pt. Location): Home    Distant Location (provider location):  Provider Remote setting    Platform used for Video Visit: [facility]      NUTRITION POST OP APPOINTMENT  DATE OF VISIT: October 10, 2022    Zee Kyle  1975  female  [id number]  46 year old     ASSESSMENT:    REASON FOR VISIT:  Zee is a 46 year old year old female presents today for 3 year PO nutrition follow-up appointment. Patient is accompanied by self.    DIAGNOSIS:  Status post gastric sleeve surgery.  Obesity Obesity Grade I BMI 30-34.9     ANTHROPOMETRICS:  Initial Weight: 279.9 lbs    Height: 5' 7\"   Current Weight: 220 lbs 0 oz     BMI: Body mass index is 34.46 kg/m .    VITAMINS AND MINERALS:  2 Multivitamin with Minerals (AM; [place]'s Complete)    Hair, Skin & Nails Gummie (Biotin, Vitamins C & E)    NUTRITION HISTORY:  Breakfast: shortbread cookies  breakfast biscuit ([place])  Lunch: leftovers  Supper: pot roast  cheeseburger soup  Snacks: [place] biscuit, fruit snacks, cheese sticks  Fluids consumed: Water/enhanced water (96oz), diet coke (12oz), coffee (occasional), EtOH (socially; 1-2x/month, 2-3 drinks)   Consuming liquid calories: No  Protein intake: 40-50 grams/day  Tolerate regular texture food: Yes  Any foods not tolerated details: Yes  If any food not tolerated: lactose  Portion size: 1 cup or more (~2 cups)  Take 20-30 minutes to consume each meal: Sometimes (15-30 minutes)  Eat protein foods first: No  Fluids and meals separate by at least 30 minutes: No  Chew foods thoroughly: " Yes  Tolerating diet: Yes  Drinking high protein supplements: No  Consuming snacks per day: 1  Additional Information: Pt lost to follow up since 6m post-op. Overall has done well until the last ~1 year. Sgtruggling with chronic diarrhea (note gallbladder removal) as well as return of some her of EDNOS symptoms. She continues to work with her psychologist (Jasmin Colbert) and has recently started working with ED RD (Ida Renteria; [facility]).     Discussed eating behaviors and patterns that may be contributing to loose stool. Pt will restart her vitamins/minerals per guidelines. Recommended f/u q2-3m. Will focus on bariatric lifestyle habits and controlling GI symptoms until ED team determines pt to be appropriate for more in-depth concentration on weight loss.         PHYSICAL ACTIVITY:  None    DIAGNOSIS:  Previous Nutrition Diagnosis: Altered gastrointestinal function related to alteration in gastrointestinal structure as evidenced by history of gastric sleeve surgery.- no change    Previous goals:  (remote)    Current Nutrition Diagnosis: Altered gastrointestinal function related to alteration in gastrointestinal structure as evidenced by history of gastric sleeve surgery.    INTERVENTION:   Nutrition Prescription: Eat 3 meals a day at regular intervals. Consume 60-90 grams of protein daily. Follow post-surgical vitamin and mineral protocol.  Assessed learning needs and learning preferences.    GOALS:  Take all post-op vitamins/minerals per guidelines  Aim for 3 food groups at each meal/avoid consuming fruits/starches without a protein  Separate fluids and meals by 30 minutes      Follow-Up:   Recommend q2-3m follow up visits to assist with lifestyle changes or per insurance.  Implementation: Discussed progress toward previous goals; reinforced importance of following bariatric lifestyle changes.    NUTRITION MONITORING AND EVALUATION:  Anticipated compliance: good  Verbalized good understanding.    Follow  up: Patient to follow up in 2-3 months.    TIME SPENT WITH PATIENT:  28 minutes      Ofelia Weiss RD, LD  Clinical Dietitian

## 2022-10-10 NOTE — PATIENT INSTRUCTIONS
"Nathan Schultz!      It was great chatting with you today - welcome back! Here's a summary of the goals we discussed:    1. Restart all vitamins/minerals per guidelines  [email or web address]    Multivitamin: Continue to take 2 [place]'s Complete, in the morning  Calcium: Start 600mg 2x/day OR 500mg 3x/day. Take at least 2 hours apart from your multivitamin  Vitamin D: Start 3000 international unit(s) per day  Vitamin B1: Start 12mg per day OR 100mg 1x/week  Vitamin B12: Start 500mcg per day OR 5000mcg 1x/week. This should be sublingual (dissolves in the mouth rather than a pill)      2. Have 3 food groups per meal  - 1/2c protein + 1/4c  vegetable or fruit + 1/4c fruit or starch  - These portions and ratios help starches to break down slowly in your stomach, and not enter the small intestine too quickly (leading to diarrhea)  - Avoid consuming vegetables/fruits/starches without protein  - Eat the protein portion of your meal first    3. Separate fluids and solids by 30 minutes  - Avoid sipping/drinking during meals/snacks, as well as for 30 minutes after  - This helps prevent the food from getting pushed into the small intestine too quickly (leading to diarrhea)      Breakfast Ideas:  - Protein bars (however, be careful with sugar alcohols)  - [place] products (protein-fortified frozen pancakes/waffles, oatmeal, snack bites, etc)  Bariatric Pal products - lots of options on this website!     Additional Tips:  \"10-10 rule\" - foods with more than 10g of fat or 10g of sugar per serving may cause diarrhea  - Take 20-30 minutes to eat each meal  - Take tiny bites and chew all food to an applesauce consistency  - Aim for at least 60g of protein each day. You may need to use protein supplementation and/or low-fat (lactose-free) milk to help get there.         Plan on following up in 2-3 months. This can be scheduled via our call center at . Reach out sooner with any questions or concerns. Have a great " day!      Ofelia Weiss RD, LD  ?Clinical Dietitian

## 2022-10-11 ENCOUNTER — VIRTUAL VISIT (OUTPATIENT)
Dept: SURGERY | Facility: CLINIC | Age: 47
End: 2022-10-11
Payer: COMMERCIAL

## 2022-10-11 VITALS — WEIGHT: 220 LBS | BODY MASS INDEX: 34.53 KG/M2 | HEIGHT: 67 IN

## 2022-10-11 VITALS — BODY MASS INDEX: 34.53 KG/M2 | HEIGHT: 67 IN | WEIGHT: 220 LBS

## 2022-10-11 DIAGNOSIS — Z98.84 BARIATRIC SURGERY STATUS: ICD-10-CM

## 2022-10-11 DIAGNOSIS — Z98.84 S/P LAPAROSCOPIC SLEEVE GASTRECTOMY: ICD-10-CM

## 2022-10-11 DIAGNOSIS — K91.2 POSTSURGICAL MALABSORPTION: ICD-10-CM

## 2022-10-11 DIAGNOSIS — E66.811 CLASS 1 OBESITY DUE TO EXCESS CALORIES WITHOUT SERIOUS COMORBIDITY WITH BODY MASS INDEX (BMI) OF 34.0 TO 34.9 IN ADULT: Primary | ICD-10-CM

## 2022-10-11 DIAGNOSIS — E66.09 CLASS 1 OBESITY DUE TO EXCESS CALORIES WITHOUT SERIOUS COMORBIDITY WITH BODY MASS INDEX (BMI) OF 34.0 TO 34.9 IN ADULT: Primary | ICD-10-CM

## 2022-10-11 DIAGNOSIS — E66.01 MORBID OBESITY (H): ICD-10-CM

## 2022-10-11 DIAGNOSIS — F50.9 EATING DISORDER, UNSPECIFIED TYPE: ICD-10-CM

## 2022-10-11 PROCEDURE — 97803 MED NUTRITION INDIV SUBSEQ: CPT | Mod: GT | Performed by: DIETITIAN, REGISTERED

## 2022-10-11 PROCEDURE — 99214 OFFICE O/P EST MOD 30 MIN: CPT | Mod: GT | Performed by: PHYSICIAN ASSISTANT

## 2022-10-11 NOTE — PATIENT INSTRUCTIONS
"Nice to talk with you today. Thank you for allowing me the privilege of caring for you. We hope we provided you with the excellent service you deserve.     To ensure the quality of our services you may receive a patient satisfaction survey from an independent monitoring company.  The greatest compliment you can give is \"Likely to Recommend\"    Below is our plan we discussed.-  FLAVIA Cadena      Labs have been ordered in the system.You can have these drawn at any [facility] lab.  Please call [phone] set up an appointment.  Your results will be posted on [employer] as soon as they're complete.  After all are resulted, I will review and comment to you.    Restart bariatric vitamins   2 Complete multivitamins with minerals (at different times than calcium)  Vitamin D 2000 Int Units/50 mg daily   Calcium 600 mg twice daily or 500 mg three times daily   Vitamin B12: 500 mcg sl daily or 2500 mcg weekly  Thiamine 100 mg weekly    Activity Goal:   Find your time wasters and make those moments count towards your health    FOLLOW-UP:  Please call [phone] to schedule your next visit in 3 months    Bariatric Post Op Guidelines  General:    To avoid marginal ulcers avoid all forms of tobacco, alcohol in excess, caffeine, and NSAIDS (unless indicated for cardioprotection or othewise and opposed by a PPI).    Exercise is key for continued weight loss and weight maintenance. Aim for 30-60 minutes of physical activity most days of the week. Include cardiovascular and strength training.    Continue lifelong vitamins supplementation and annual lab follow up.    The bariatric team should be aware and evaluate all adverse gastrointestinal symptoms which can be a sign of complication. Inability to tolerate textured solid food (chicken, steak, fish) may need to be evaluated by endoscopy.    There is a 10% increase of Alcohol Use Disorder in patients with bariatric surgery.   Most often occurring around 2 years post op.  " Please call the clinic if you feel alcohol is interfering in your daily life.  We can help.     Follow up annually lifelong. Obesity is a chronic disease.  Weight gain can be expected. The goal of follow-up visits is to ensure adequate vitamin and protein absorption, evaluate food intake behavior, review exercise/activity level, and assist with weight regain.    Nutritional:  Eat 3 meals per day  (No snacks between meals.)  Do not skip meals.  This can cause overeating at the next meal and will prevent adequate protein and nutritional intake.    Aim for 60-80 grams of protein per day.  Always eat your protein first. This assists with optimal nutrition and helps you stay full longer.    Eat your protein first, and then follow with fiber.    Add fiber by including fruits, vegetables, whole grains, and beans.     Portions should be about 1 cup per meal. Use measuring cups to be accurate.  Continue to use saucer/salad plates, infant/toddler silverware to keep portion sizes small and take small bites.    Eat S-L-[id number]-W-[id number]-Y to make each meal last 20-30 minutes. Always stop eating when satisfied.    Aim for 64 oz. of calorie-free fluids daily.    Avoid drinking 30 min before, during, and 30 min after meal    Avoid high sugar and high fat foods to prevent high calorie intake. This will reduce your rate of weight loss and can cause weight regain.   Check nutrition labels for less than 10 grams of sugar and less than 10 grams of fat per serving.

## 2022-11-08 ENCOUNTER — LAB (OUTPATIENT)
Dept: LAB | Facility: CLINIC | Age: 47
End: 2022-11-08
Payer: COMMERCIAL

## 2022-11-08 DIAGNOSIS — K91.2 POSTSURGICAL MALABSORPTION: ICD-10-CM

## 2022-11-08 LAB
IRON BINDING CAPACITY (ROCHE): 268 UG/DL (ref 240–430)
IRON SATN MFR SERPL: 26 % (ref 15–46)
IRON SERPL-MCNC: 71 UG/DL (ref 37–145)
PTH-INTACT SERPL-MCNC: 18 PG/ML (ref 15–65)

## 2022-11-08 PROCEDURE — 83540 ASSAY OF IRON: CPT

## 2022-11-08 PROCEDURE — 83970 ASSAY OF PARATHORMONE: CPT

## 2022-11-08 PROCEDURE — 82728 ASSAY OF FERRITIN: CPT

## 2022-11-08 PROCEDURE — 84590 ASSAY OF VITAMIN A: CPT | Mod: 90

## 2022-11-08 PROCEDURE — 99000 SPECIMEN HANDLING OFFICE-LAB: CPT

## 2022-11-08 PROCEDURE — 83550 IRON BINDING TEST: CPT

## 2022-11-08 PROCEDURE — 36415 COLL VENOUS BLD VENIPUNCTURE: CPT

## 2022-11-09 LAB — FERRITIN SERPL-MCNC: 120 NG/ML (ref 8–252)

## 2022-11-11 LAB
ANNOTATION COMMENT IMP: NORMAL
RETINYL PALMITATE SERPL-MCNC: 0.03 MG/L
VIT A SERPL-MCNC: 0.68 MG/L

## 2023-02-10 ENCOUNTER — TRANSFERRED RECORDS (OUTPATIENT)
Dept: HEALTH INFORMATION MANAGEMENT | Facility: CLINIC | Age: 48
End: 2023-02-10
Payer: COMMERCIAL

## 2023-03-20 ENCOUNTER — OFFICE VISIT (OUTPATIENT)
Dept: FAMILY MEDICINE | Facility: CLINIC | Age: 48
End: 2023-03-20
Attending: FAMILY MEDICINE
Payer: COMMERCIAL

## 2023-03-20 VITALS
HEIGHT: 67 IN | HEART RATE: 85 BPM | OXYGEN SATURATION: 96 % | TEMPERATURE: 97.8 F | SYSTOLIC BLOOD PRESSURE: 110 MMHG | RESPIRATION RATE: 14 BRPM | WEIGHT: 224 LBS | DIASTOLIC BLOOD PRESSURE: 78 MMHG | BODY MASS INDEX: 35.16 KG/M2

## 2023-03-20 DIAGNOSIS — Z00.00 ROUTINE GENERAL MEDICAL EXAMINATION AT A HEALTH CARE FACILITY: Primary | ICD-10-CM

## 2023-03-20 DIAGNOSIS — F41.9 ANXIETY: ICD-10-CM

## 2023-03-20 DIAGNOSIS — F33.1 MAJOR DEPRESSIVE DISORDER, RECURRENT EPISODE, MODERATE (H): ICD-10-CM

## 2023-03-20 DIAGNOSIS — Z23 NEED FOR VACCINATION: ICD-10-CM

## 2023-03-20 DIAGNOSIS — R19.7 DIARRHEA, UNSPECIFIED TYPE: ICD-10-CM

## 2023-03-20 DIAGNOSIS — K64.4 EXTERNAL HEMORRHOIDS: ICD-10-CM

## 2023-03-20 DIAGNOSIS — Z98.84 S/P LAPAROSCOPIC SLEEVE GASTRECTOMY: ICD-10-CM

## 2023-03-20 DIAGNOSIS — E66.01 MORBID OBESITY (H): ICD-10-CM

## 2023-03-20 DIAGNOSIS — N63.15 BREAST LUMP ON RIGHT SIDE AT 9 O'CLOCK POSITION: ICD-10-CM

## 2023-03-20 PROBLEM — R19.5 LOOSE STOOLS: Status: ACTIVE | Noted: 2023-03-20

## 2023-03-20 PROBLEM — K31.7 POLYP OF DUODENUM: Status: ACTIVE | Noted: 2020-04-22

## 2023-03-20 LAB
ERYTHROCYTE [DISTWIDTH] IN BLOOD BY AUTOMATED COUNT: 13.4 % (ref 10–15)
HCT VFR BLD AUTO: 44.9 % (ref 35–47)
HGB BLD-MCNC: 14.7 G/DL (ref 11.7–15.7)
MCH RBC QN AUTO: 29.5 PG (ref 26.5–33)
MCHC RBC AUTO-ENTMCNC: 32.7 G/DL (ref 31.5–36.5)
MCV RBC AUTO: 90 FL (ref 78–100)
PLATELET # BLD AUTO: 296 10E3/UL (ref 150–450)
RBC # BLD AUTO: 4.99 10E6/UL (ref 3.8–5.2)
WBC # BLD AUTO: 6.1 10E3/UL (ref 4–11)

## 2023-03-20 PROCEDURE — 85027 COMPLETE CBC AUTOMATED: CPT | Performed by: FAMILY MEDICINE

## 2023-03-20 PROCEDURE — 36415 COLL VENOUS BLD VENIPUNCTURE: CPT | Performed by: FAMILY MEDICINE

## 2023-03-20 PROCEDURE — 90471 IMMUNIZATION ADMIN: CPT | Performed by: FAMILY MEDICINE

## 2023-03-20 PROCEDURE — 90746 HEPB VACCINE 3 DOSE ADULT IM: CPT | Performed by: FAMILY MEDICINE

## 2023-03-20 PROCEDURE — 99396 PREV VISIT EST AGE 40-64: CPT | Mod: 25 | Performed by: FAMILY MEDICINE

## 2023-03-20 PROCEDURE — 99214 OFFICE O/P EST MOD 30 MIN: CPT | Mod: 25 | Performed by: FAMILY MEDICINE

## 2023-03-20 RX ORDER — VENLAFAXINE HYDROCHLORIDE 37.5 MG/1
37.5 CAPSULE, EXTENDED RELEASE ORAL DAILY
Qty: 90 CAPSULE | Refills: 1 | Status: SHIPPED | OUTPATIENT
Start: 2023-03-20 | End: 2023-10-04

## 2023-03-20 SDOH — HEALTH STABILITY: PHYSICAL HEALTH: ON AVERAGE, HOW MANY MINUTES DO YOU ENGAGE IN EXERCISE AT THIS LEVEL?: 0 MIN

## 2023-03-20 SDOH — ECONOMIC STABILITY: INCOME INSECURITY: HOW HARD IS IT FOR YOU TO PAY FOR THE VERY BASICS LIKE FOOD, HOUSING, MEDICAL CARE, AND HEATING?: NOT HARD AT ALL

## 2023-03-20 SDOH — ECONOMIC STABILITY: TRANSPORTATION INSECURITY
IN THE PAST 12 MONTHS, HAS THE LACK OF TRANSPORTATION KEPT YOU FROM MEDICAL APPOINTMENTS OR FROM GETTING MEDICATIONS?: NO

## 2023-03-20 SDOH — ECONOMIC STABILITY: INCOME INSECURITY: IN THE LAST 12 MONTHS, WAS THERE A TIME WHEN YOU WERE NOT ABLE TO PAY THE MORTGAGE OR RENT ON TIME?: NO

## 2023-03-20 SDOH — ECONOMIC STABILITY: FOOD INSECURITY: WITHIN THE PAST 12 MONTHS, YOU WORRIED THAT YOUR FOOD WOULD RUN OUT BEFORE YOU GOT MONEY TO BUY MORE.: NEVER TRUE

## 2023-03-20 SDOH — ECONOMIC STABILITY: TRANSPORTATION INSECURITY
IN THE PAST 12 MONTHS, HAS LACK OF TRANSPORTATION KEPT YOU FROM MEETINGS, WORK, OR FROM GETTING THINGS NEEDED FOR DAILY LIVING?: NO

## 2023-03-20 SDOH — HEALTH STABILITY: PHYSICAL HEALTH: ON AVERAGE, HOW MANY DAYS PER WEEK DO YOU ENGAGE IN MODERATE TO STRENUOUS EXERCISE (LIKE A BRISK WALK)?: 0 DAYS

## 2023-03-20 SDOH — ECONOMIC STABILITY: FOOD INSECURITY: WITHIN THE PAST 12 MONTHS, THE FOOD YOU BOUGHT JUST DIDN'T LAST AND YOU DIDN'T HAVE MONEY TO GET MORE.: NEVER TRUE

## 2023-03-20 ASSESSMENT — ANXIETY QUESTIONNAIRES
7. FEELING AFRAID AS IF SOMETHING AWFUL MIGHT HAPPEN: NOT AT ALL
3. WORRYING TOO MUCH ABOUT DIFFERENT THINGS: SEVERAL DAYS
4. TROUBLE RELAXING: SEVERAL DAYS
GAD7 TOTAL SCORE: 6
2. NOT BEING ABLE TO STOP OR CONTROL WORRYING: SEVERAL DAYS
IF YOU CHECKED OFF ANY PROBLEMS ON THIS QUESTIONNAIRE, HOW DIFFICULT HAVE THESE PROBLEMS MADE IT FOR YOU TO DO YOUR WORK, TAKE CARE OF THINGS AT HOME, OR GET ALONG WITH OTHER PEOPLE: NOT DIFFICULT AT ALL
5. BEING SO RESTLESS THAT IT IS HARD TO SIT STILL: SEVERAL DAYS
8. IF YOU CHECKED OFF ANY PROBLEMS, HOW DIFFICULT HAVE THESE MADE IT FOR YOU TO DO YOUR WORK, TAKE CARE OF THINGS AT HOME, OR GET ALONG WITH OTHER PEOPLE?: NOT DIFFICULT AT ALL
6. BECOMING EASILY ANNOYED OR IRRITABLE: SEVERAL DAYS
7. FEELING AFRAID AS IF SOMETHING AWFUL MIGHT HAPPEN: NOT AT ALL
1. FEELING NERVOUS, ANXIOUS, OR ON EDGE: SEVERAL DAYS

## 2023-03-20 ASSESSMENT — ENCOUNTER SYMPTOMS
CHILLS: 0
DIARRHEA: 1
SORE THROAT: 1
HEMATOCHEZIA: 1
DIZZINESS: 0
SHORTNESS OF BREATH: 0
CONSTIPATION: 0
ARTHRALGIAS: 0
PALPITATIONS: 0
DYSURIA: 0
EYE PAIN: 0
BREAST MASS: 0
FREQUENCY: 0
MYALGIAS: 0
COUGH: 1
HEADACHES: 0
ABDOMINAL PAIN: 0
JOINT SWELLING: 0
HEMATURIA: 0
WEAKNESS: 0
PARESTHESIAS: 0
NERVOUS/ANXIOUS: 1
NAUSEA: 0
FEVER: 0
HEARTBURN: 0

## 2023-03-20 ASSESSMENT — LIFESTYLE VARIABLES
SKIP TO QUESTIONS 9-10: 1
HOW OFTEN DO YOU HAVE A DRINK CONTAINING ALCOHOL: 2-4 TIMES A MONTH
HOW OFTEN DO YOU HAVE SIX OR MORE DRINKS ON ONE OCCASION: NEVER
HOW MANY STANDARD DRINKS CONTAINING ALCOHOL DO YOU HAVE ON A TYPICAL DAY: 1 OR 2
AUDIT-C TOTAL SCORE: 2

## 2023-03-20 ASSESSMENT — SOCIAL DETERMINANTS OF HEALTH (SDOH)
IN A TYPICAL WEEK, HOW MANY TIMES DO YOU TALK ON THE PHONE WITH FAMILY, FRIENDS, OR NEIGHBORS?: ONCE A WEEK
DO YOU BELONG TO ANY CLUBS OR ORGANIZATIONS SUCH AS CHURCH GROUPS UNIONS, FRATERNAL OR ATHLETIC GROUPS, OR SCHOOL GROUPS?: YES
HOW OFTEN DO YOU ATTEND CHURCH OR RELIGIOUS SERVICES?: 1 TO 4 TIMES PER YEAR
HOW OFTEN DO YOU GET TOGETHER WITH FRIENDS OR RELATIVES?: ONCE A WEEK

## 2023-03-20 ASSESSMENT — PATIENT HEALTH QUESTIONNAIRE - PHQ9
10. IF YOU CHECKED OFF ANY PROBLEMS, HOW DIFFICULT HAVE THESE PROBLEMS MADE IT FOR YOU TO DO YOUR WORK, TAKE CARE OF THINGS AT HOME, OR GET ALONG WITH OTHER PEOPLE: NOT DIFFICULT AT ALL
SUM OF ALL RESPONSES TO PHQ QUESTIONS 1-9: 5
SUM OF ALL RESPONSES TO PHQ QUESTIONS 1-9: 5

## 2023-03-20 NOTE — PROGRESS NOTES
SUBJECTIVE:   CC: Marion is an 47 year old who presents for preventive health visit.     Here for physical.    Sick, congested, sore throat, hears like she is under the water, started Friday (3 days ago).  Has body aches and sweating, denies fevers or chills, no headache.  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  Had COVID and flu shots.  Has tried Mucinex and Tylenol cold and flu, which helped.  Nyquil made her sleep.    Still sees her therapist and dietitian.  Dietitian recommended monitoring diet, coffee removal from diet has improved some stools, one loose bowel movement daily. Non-loose stools are few and far between.  Had a colonoscopy, was normal.  Still experiences diarrhea and blood in the stools.  Has External hemorrhoid, which would explain the bright red blood.    Did have gallbladder removed, does not recall any change in stool with this.    Patient has been advised of split billing requirements and indicates understanding: Yes  Healthy Habits:     Getting at least 3 servings of Calcium per day:  Yes    Bi-annual eye exam:  NO    Dental care twice a year:  Yes    Sleep apnea or symptoms of sleep apnea:  Sleep apnea    Diet:  Regular (no restrictions)    Frequency of exercise:  None    Taking medications regularly:  Yes    Medication side effects:  Not applicable    PHQ-2 Total Score: 2    Additional concerns today:  Yes        Today's PHQ-2 Score:   PHQ-2 ( 1999 Pfizer) 3/20/2023   Q1: Little interest or pleasure in doing things 1   Q2: Feeling down, depressed or hopeless 1   PHQ-2 Score 2   PHQ-2 Total Score (12-17 Years)- Positive if 3 or more points; Administer PHQ-A if positive -   Q1: Little interest or pleasure in doing things Several days   Q2: Feeling down, depressed or hopeless Several days   PHQ-2 Score 2     Sees Therapist every two weeks.  Feels the Venlafaxine dose is still good.    Have you ever done Advance Care Planning? (For example, a Health Directive, POLST, or a  discussion with a medical provider or your loved ones about your wishes): No, advance care planning information given to patient to review.  Patient declined advance care planning discussion at this time.    Social History     Tobacco Use     Smoking status: Former     Packs/day: 1.00     Years: 10.00     Pack years: 10.00     Types: Cigarettes     Start date: 1996     Quit date: 2/10/2003     Years since quittin.1     Smokeless tobacco: Never     Tobacco comments:     Glad to be done   Substance Use Topics     Alcohol use: Yes     Comment: occas       Alcohol Use 3/20/2023   Prescreen: >3 drinks/day or >7 drinks/week? No       Reviewed orders with patient.  Reviewed health maintenance and updated orders accordingly - Yes  Lab work is in process  Labs reviewed in EPIC  BP Readings from Last 3 Encounters:   23 110/78   22 122/76   22 110/78    Wt Readings from Last 3 Encounters:   23 101.6 kg (224 lb)   10/11/22 99.8 kg (220 lb)   10/11/22 99.8 kg (220 lb)               Patient Active Problem List   Diagnosis     Presbycusis     Hypothyroidism     Major depressive disorder, recurrent episode, moderate (H)     Anxiety     CLARK (obstructive sleep apnea)     Low HDL (under 40)     Benign essential hypertension     Bloody stool     Class 1 obesity due to excess calories without serious comorbidity with body mass index (BMI) of 34.0 to 34.9 in adult     Disordered eating     S/P laparoscopic sleeve gastrectomy     S/P cholecystectomy     Postsurgical malabsorption     Morbid obesity (H)     Past Surgical History:   Procedure Laterality Date     ENT SURGERY       GENITOURINARY SURGERY  2017    bladder sling     LAPAROSCOPIC CHOLECYSTECTOMY WITH CHOLANGIOGRAMS N/A 2022    Procedure: LAPAROSCOPIC CHOLECYSTECTOMY WITH CHOLANGIOGRAM;  Surgeon: Era Villela MD;  Location: [facility] OR     LAPAROSCOPIC GASTRIC SLEEVE N/A 10/15/2019    Procedure: LAPAROSCOPIC GASTRIC SLEEVE;  Surgeon:  Cesar Lee MD;  Location: [facility] OR     PE TUBES       TONSILLECTOMY       TYMPANOPLASTY, RT/LT      Tympanoplasty RT/LT       Social History     Tobacco Use     Smoking status: Former     Packs/day: 1.00     Years: 10.00     Pack years: 10.00     Types: Cigarettes     Start date: 1996     Quit date: 2/10/2003     Years since quittin.1     Smokeless tobacco: Never     Tobacco comments:     Glad to be done   Substance Use Topics     Alcohol use: Yes     Comment: occas     Family History   Problem Relation Age of Onset     Peripheral Vascular Disease Mother      Mental Illness Mother      Hypertension Father          08     Obesity Father      Gout Father      Cerebrovascular Disease Maternal Grandfather      Dementia Paternal Grandmother      Coronary Artery Disease Paternal Grandfather      Breast Cancer Maternal Grandmother          Current Outpatient Medications   Medication Sig Dispense Refill     Biotin w/ Vitamins C & E (HAIR SKIN & NAILS GUMMIES PO) Take 2 chew tab by mouth daily       childrens multivitamin w/iron (FLINTSTONES COMPLETE) chewable tablet Take 2 chew tab by mouth daily       fluticasone (FLONASE) 50 MCG/ACT nasal spray Spray 2 sprays into both nostrils daily       levocetirizine (XYZAL) 5 MG tablet Take 10 mg by mouth daily       levonorgestrel (MIRENA) 20 MCG/24HR IUD 1 each (20 mcg) by Intrauterine route continuous       lisinopril (ZESTRIL) 5 MG tablet Take 1 tablet (5 mg) by mouth daily 90 tablet 3     venlafaxine (EFFEXOR XR) 37.5 MG 24 hr capsule Take 1 capsule (37.5 mg) by mouth daily 90 capsule 1     Allergies   Allergen Reactions     Dog Epithelium Itching     Dogs      Nsaids      Bariatric surgery     Recent Labs   Lab Test 10/01/22  1011 22  1049 22  0520 22  1616 21  0832 10/15/19  0630 10/07/19  1152 19  1130 19  1531 18  0952   A1C  --   --   --   --   --   --   --   --  5.3  --    *  --   --   --   --   --   --    --   --  98   HDL 38*  --   --   --   --   --   --   --   --  24*   TRIG 136  --  88  --   --   --   --   --   --  119   ALT  --  39* 177* 59* 21  --  79*  --  44 44   CR  --   --  0.67 0.69 0.70  --  0.68   < > 0.88 0.80   GFRESTIMATED  --   --  >90 >90 >90  --  >90   < > 82 >90   GFRESTBLACK  --   --   --   --  >90  --  >90   < > >90 >90   POTASSIUM  --   --  3.7 4.2 3.7   < > 4.1   < > 3.7 3.9   TSH 1.48  --   --   --   --   --   --   --   --   --     < > = values in this interval not displayed.        Breast Cancer Screening:    FHS-7:   Breast CA Risk Assessment (FHS-7) 7/16/2021 8/8/2022 3/20/2023   Did any of your first-degree relatives have breast or ovarian cancer? No No No   Did any of your relatives have bilateral breast cancer? No No Yes   Did any man in your family have breast cancer? No No No   Did any woman in your family have breast and ovarian cancer? Yes No Yes   Did any woman in your family have breast cancer before age 50 y? No No No   Do you have 2 or more relatives with breast and/or ovarian cancer? No No No   Do you have 2 or more relatives with breast and/or bowel cancer? No No No       Mammogram Screening: Recommended annual mammography  Pertinent mammograms are reviewed under the imaging tab.    History of abnormal Pap smear: NO - age 30-65 PAP every 5 years with negative HPV co-testing recommended  PAP / HPV Latest Ref Rng & Units 12/26/2018   PAP (Historical) - NIL   HPV16 NEG:Negative Negative   HPV18 NEG:Negative Negative   HRHPV NEG:Negative Negative     Reviewed and updated as needed this visit by clinical staff   Tobacco  Allergies  Meds              Reviewed and updated as needed this visit by Provider                 Past Medical History:   Diagnosis Date     Abdominal pain, epigastric 7/11/2022     Acute pancreatitis without infection or necrosis, unspecified pancreatitis type 7/11/2022     Anxiety Ongoing since 18     Depressive disorder on going since 18     Earache or other  ear, nose, or throat complaint 12    Tonsillectomy 7/02     Esophageal reflux     Had stopped once I got my sleep apnea mask 5/18     Herpes simplex without mention of complication      Hypertension     While Pregnant     Hypothyroidism 8/7/2012     Intertrigo 7/18/2018     Presbyacusis     Presbycusis     Sleep apnea      STD (sexually transmitted disease)     Herpes     Urinary incontinence     Bladder sling surgery 7/17      Past Surgical History:   Procedure Laterality Date     ENT SURGERY       GENITOURINARY SURGERY  2017    bladder sling     LAPAROSCOPIC CHOLECYSTECTOMY WITH CHOLANGIOGRAMS N/A 7/12/2022    Procedure: LAPAROSCOPIC CHOLECYSTECTOMY WITH CHOLANGIOGRAM;  Surgeon: Era Villela MD;  Location: [facility] OR     LAPAROSCOPIC GASTRIC SLEEVE N/A 10/15/2019    Procedure: LAPAROSCOPIC GASTRIC SLEEVE;  Surgeon: Cesar Lee MD;  Location: [facility] OR     PE TUBES       TONSILLECTOMY       TYMPANOPLASTY, RT/LT      Tympanoplasty RT/LT       Review of Systems   Constitutional: Negative for chills and fever.   HENT: Positive for congestion, ear pain, hearing loss and sore throat.    Eyes: Positive for visual disturbance. Negative for pain.   Respiratory: Positive for cough. Negative for shortness of breath.    Cardiovascular: Negative for chest pain, palpitations and peripheral edema.   Gastrointestinal: Positive for diarrhea and hematochezia. Negative for abdominal pain, constipation, heartburn and nausea.   Breasts:  Negative for tenderness, breast mass and discharge.   Genitourinary: Negative for dysuria, frequency, genital sores, hematuria, pelvic pain, urgency, vaginal bleeding and vaginal discharge.   Musculoskeletal: Negative for arthralgias, joint swelling and myalgias.   Skin: Negative for rash.   Neurological: Negative for dizziness, weakness, headaches and paresthesias.   Psychiatric/Behavioral: Negative for mood changes. The patient is nervous/anxious.           OBJECTIVE:   /78 (BP Location:  "Right arm, Patient Position: Sitting, Cuff Size: Adult Large)   Pulse 85   Temp 97.8  F (36.6  C) (Oral)   Resp 14   Ht 1.702 m (5' 7\")   Wt 101.6 kg (224 lb)   SpO2 96%   BMI 35.08 kg/m    Physical Exam  GENERAL: healthy, alert and no distress  EYES: Eyes grossly normal to inspection, PERRL and conjunctivae and sclerae normal  HENT: ear canals and TM's normal, nose and mouth without ulcers or lesions  NECK: no adenopathy, no asymmetry, masses, or scars and thyroid normal to palpation  RESP: lungs clear to auscultation - no rales, rhonchi or wheezes  BREAST: Palpable, mobile, nontender lump in right breast at 9 o'clock position approximately 3 cm from nipple.  Normal exam in left breast.  CV: regular rate and rhythm, normal S1 S2, no S3 or S4, no murmur, click or rub, no peripheral edema and peripheral pulses strong  ABDOMEN: soft, nontender, no hepatosplenomegaly, no masses and bowel sounds normal  MS: no gross musculoskeletal defects noted, no edema  SKIN: no suspicious lesions or rashes  NEURO: Normal strength and tone, mentation intact and speech normal  PSYCH: mentation appears normal, affect normal/bright    Diagnostic Test Results:  Labs reviewed in Epic    ASSESSMENT/PLAN:   (Z00.00) Routine general medical examination at a health care facility  (primary encounter diagnosis)  Comment: Exam completed today, routine health maintenance items updated as able.  Labs ordered.  Follow up one year or sooner as needed.      (K64.4) External hemorrhoids  Comment: Patient notes bloody stools with external hemorrhoids, will check CBC to rule out anemia due to bleeding, though unlikely  Plan:  CBC with platelets            (R19.7) Diarrhea, unspecified type  Comment: Referral to GI for persistent diarrhea, normal colonoscopy  Plan: Adult GI  Referral - Consult Only          (Z98.84) S/P laparoscopic sleeve gastrectomy  Comment: Referral to GI  Plan: Adult GI  Referral - Consult Only        " "    (E66.01) Morbid obesity (H)  Comment: Diet and exercise, lifestyle modifications recommended    (F33.1) Major depressive disorder, recurrent episode, moderate (H)  Comment: Well-controlled, continue venlafaxine at current dose, follow-up in 6 months or sooner as needed.  Plan: venlafaxine (EFFEXOR XR) 37.5 MG 24 hr capsule            (F41.9) Anxiety  Comment: As above, well controlled  Plan: venlafaxine (EFFEXOR XR) 37.5 MG 24 hr capsule            (N63.15) Breast lump on right side at 9 o'clock position  Comment: Possibly a new breast lump, as previous imaging reports lumps at 12 o'clock position and 11:30 position in right breast.  Additional recommendations pending results.  Plan: US Breast Right Limited 1-3 Quadrants, MA         Diagnostic Right w/ Abdifatah,           (Z23) Need for vaccination  Plan: HEPATITIS B VACCINE,  ADULT         (ENGERIX-B/RECOMBIVAX HB)              Patient has been advised of split billing requirements and indicates understanding: Yes      COUNSELING:  Reviewed preventive health counseling, as reflected in patient instructions      BMI:   Estimated body mass index is 35.08 kg/m  as calculated from the following:    Height as of this encounter: 1.702 m (5' 7\").    Weight as of this encounter: 101.6 kg (224 lb).   Weight management plan: Discussed healthy diet and exercise guidelines      She reports that she quit smoking about 20 years ago. Her smoking use included cigarettes. She started smoking about 26 years ago. She has a 10.00 pack-year smoking history. She has never used smokeless tobacco.      Christy De La Garza MD  [facility]"

## 2023-04-04 ENCOUNTER — HOSPITAL ENCOUNTER (OUTPATIENT)
Dept: MAMMOGRAPHY | Facility: CLINIC | Age: 48
Discharge: HOME | End: 2023-04-04
Attending: FAMILY MEDICINE
Payer: COMMERCIAL

## 2023-04-04 ENCOUNTER — HOSPITAL ENCOUNTER (OUTPATIENT)
Dept: ULTRASOUND IMAGING | Facility: CLINIC | Age: 48
Discharge: HOME | End: 2023-04-04
Attending: FAMILY MEDICINE
Payer: COMMERCIAL

## 2023-04-04 DIAGNOSIS — N63.15 BREAST LUMP ON RIGHT SIDE AT 9 O'CLOCK POSITION: ICD-10-CM

## 2023-04-04 PROCEDURE — 77061 BREAST TOMOSYNTHESIS UNI: CPT | Mod: RT

## 2023-04-04 PROCEDURE — 76642 ULTRASOUND BREAST LIMITED: CPT | Mod: RT

## 2023-04-05 ENCOUNTER — TRANSFERRED RECORDS (OUTPATIENT)
Dept: HEALTH INFORMATION MANAGEMENT | Facility: CLINIC | Age: 48
End: 2023-04-05
Payer: COMMERCIAL

## 2023-06-09 ENCOUNTER — TRANSFERRED RECORDS (OUTPATIENT)
Dept: HEALTH INFORMATION MANAGEMENT | Facility: CLINIC | Age: 48
End: 2023-06-09

## 2023-06-09 ENCOUNTER — APPOINTMENT (OUTPATIENT)
Dept: CT IMAGING | Facility: CLINIC | Age: 48
End: 2023-06-09
Attending: EMERGENCY MEDICINE
Payer: COMMERCIAL

## 2023-06-09 ENCOUNTER — OFFICE VISIT (OUTPATIENT)
Dept: URGENT CARE | Facility: URGENT CARE | Age: 48
End: 2023-06-09
Payer: COMMERCIAL

## 2023-06-09 ENCOUNTER — HOSPITAL ENCOUNTER (EMERGENCY)
Facility: CLINIC | Age: 48
Discharge: HOME | End: 2023-06-09
Attending: EMERGENCY MEDICINE | Admitting: EMERGENCY MEDICINE
Payer: COMMERCIAL

## 2023-06-09 VITALS
TEMPERATURE: 98.5 F | SYSTOLIC BLOOD PRESSURE: 133 MMHG | DIASTOLIC BLOOD PRESSURE: 84 MMHG | OXYGEN SATURATION: 97 % | HEART RATE: 79 BPM

## 2023-06-09 VITALS
OXYGEN SATURATION: 99 % | DIASTOLIC BLOOD PRESSURE: 89 MMHG | TEMPERATURE: 97.2 F | HEART RATE: 73 BPM | RESPIRATION RATE: 18 BRPM | SYSTOLIC BLOOD PRESSURE: 140 MMHG

## 2023-06-09 DIAGNOSIS — S06.0X0A CLOSED HEAD INJURY WITH CONCUSSION, WITHOUT LOSS OF CONSCIOUSNESS, INITIAL ENCOUNTER: ICD-10-CM

## 2023-06-09 DIAGNOSIS — S09.90XA TRAUMATIC INJURY OF HEAD, INITIAL ENCOUNTER: Primary | ICD-10-CM

## 2023-06-09 DIAGNOSIS — S00.03XA HEMATOMA OF SCALP, INITIAL ENCOUNTER: ICD-10-CM

## 2023-06-09 PROCEDURE — 99214 OFFICE O/P EST MOD 30 MIN: CPT

## 2023-06-09 PROCEDURE — 70450 CT HEAD/BRAIN W/O DYE: CPT

## 2023-06-09 PROCEDURE — 250N000013 HC RX MED GY IP 250 OP 250 PS 637: Performed by: EMERGENCY MEDICINE

## 2023-06-09 PROCEDURE — 99284 EMERGENCY DEPT VISIT MOD MDM: CPT | Mod: 25

## 2023-06-09 RX ORDER — ACETAMINOPHEN 325 MG/1
650 TABLET ORAL ONCE
Status: COMPLETED | OUTPATIENT
Start: 2023-06-09 | End: 2023-06-09

## 2023-06-09 RX ADMIN — ACETAMINOPHEN 650 MG: 325 TABLET, FILM COATED ORAL at 16:29

## 2023-06-09 NOTE — ED PROVIDER NOTES
History     Chief Complaint:  Head Injury     HPI   Zee Kyle is a 47 year old female presenting today after a cabinet fell on her head this morning at approximately 930 this morning.  She states that this was an accident.  Since then, she has had a headache, nausea with 2 episodes of vomiting, dizziness.  She denies any history of significant head trauma.  She is not anticoagulated.  She has not taken anything for pain prior to arrival and states she cannot have ibuprofen due to previous bariatric surgery.  She denies any other injuries including pain to her neck, shoulders, back.  No changes to her vision.  She has no other medical or physical concerns at this time.    Independent Historian:   None - Patient Only    Review of External Notes:   Patient notes were reviewed.  None relevant to today's visit.    Medications:    Biotin w/ Vitamins C & E (HAIR SKIN & NAILS GUMMIES PO)  childrens multivitamin w/iron (FLINTSTONES COMPLETE) chewable tablet  fluticasone (FLONASE) 50 MCG/ACT nasal spray  levocetirizine (XYZAL) 5 MG tablet  levonorgestrel (MIRENA) 20 MCG/24HR IUD  lisinopril (ZESTRIL) 5 MG tablet  venlafaxine (EFFEXOR XR) 37.5 MG 24 hr capsule        Past Medical History:    Past Medical History:   Diagnosis Date     Abdominal pain, epigastric 7/11/2022     Acute pancreatitis without infection or necrosis, unspecified pancreatitis type 7/11/2022     Anxiety Ongoing since 18     Depressive disorder on going since 18     Earache or other ear, nose, or throat complaint 10     Esophageal reflux      Herpes simplex without mention of complication      Hypertension      Hypothyroidism 8/7/2012     Intertrigo 7/18/2018     Presbyacusis      Sleep apnea      STD (sexually transmitted disease)      Urinary incontinence        Past Surgical History:    Past Surgical History:   Procedure Laterality Date     ENT SURGERY       GENITOURINARY SURGERY  2017    bladder sling     LAPAROSCOPIC CHOLECYSTECTOMY WITH  CHOLANGIOGRAMS N/A 7/12/2022    Procedure: LAPAROSCOPIC CHOLECYSTECTOMY WITH CHOLANGIOGRAM;  Surgeon: Era Villela MD;  Location: [facility] OR     LAPAROSCOPIC GASTRIC SLEEVE N/A 10/15/2019    Procedure: LAPAROSCOPIC GASTRIC SLEEVE;  Surgeon: Cesar Lee MD;  Location: [facility] OR     PE TUBES       TONSILLECTOMY       TYMPANOPLASTY, RT/LT      Tympanoplasty RT/LT        Physical Exam     Patient Vitals for the past 24 hrs:   BP Pulse SpO2   06/09/23 1635 -- -- 98 %   06/09/23 1630 (!) 140/89 73 --        Physical Exam  BP (!) 140/89   Pulse 73   SpO2 98%    General: Patient appears uncomfortable, has an ice pack on her head. Tearful.  Head: Tenderness with palpation of the frontal scalp, with focal area of swelling and ecchymosis.  Neck:  No midline cervical spinous process tenderness.  EENT: PERRL. EOMI.   CV: Regular rate and rhythm.   Respiratory: Breathing comfortably on room air.   GI: Soft, non-distended. Non-tender abdomen. No rebound, rigidity, or guarding.   Msk: Extremities without tenderness to palpation or deformity, including shoulder and chest wall.  Skin: Warm and dry.  Neuro: Awake, alert, and conversant. No focal neurologic deficits.     Emergency Department Course   Imaging:  CT Head w/o Contrast   Final Result   IMPRESSION:   No intracranial hemorrhage, mass, or definite CT evidence of recent   ischemia.      TRACEE ROCHA MD            SYSTEM ID:  [id number]         Report per radiology      Emergency Department Course & Assessments:  Interventions:  Medications   acetaminophen (TYLENOL) tablet 650 mg (650 mg Oral $Given 6/9/23 1629)      Assessments:  1530  Initial assessment and examination of the patient was performed.  1637  Patient was updated on the results of the CT scan and plan for discharge to home with conservative management and close outpatient follow up.    Independent Interpretation (X-rays, CTs, rhythm strip):  None    Consultations/Discussion of Management or Tests:  None       Social Determinants of Health affecting care:   None    Disposition:  The patient was discharged to home.     Impression & Plan    Medical Decision Makin-year-old female presenting with the above concerns.  She was in a significant discomfort when she was initially examined and had thrown up twice, thus head CT was obtained.  The differential diagnosis includes skull fracture and various types of intracranial hemorrhage (e.g., epidural hematoma, subdural hematoma) and there was no evidence of this on CT imaging.  She was given Tylenol here.  No midline neck tenderness to warrant cervical CT.  She does not have any other injuries from this.  We discussed that she does clinically have a concussion.  We discussed conservative management at home including limited screen time, limited physical activity, and lots of rest and rehydration.  Discussed signs and symptoms that should prompt immediate return to the emergency department and patient voiced understanding.  Patient should follow-up with her primary care provider in 1 week for reassessment.      Diagnosis:    ICD-10-CM    1. Closed head injury with concussion, without loss of consciousness, initial encounter  S06.0X0A       2. Hematoma of scalp, initial encounter  S00.03XA                 Sallie Monson PA-C  23 [phone]

## 2023-06-09 NOTE — DISCHARGE INSTRUCTIONS
Continue taking Tylenol as needed for headache.    Drink plenty of fluids.    Get plenty of sleep.

## 2023-06-09 NOTE — PROGRESS NOTES
Assessment & Plan:      Problem List Items Addressed This Visit    None  Visit Diagnoses     Traumatic injury of head, initial encounter    -  Primary        Medical Decision Making  Patient presents after sustaining significant head trauma to the parietal scalp.  Of concern is patient's 2 episodes of emesis.  Patient drove herself here to the clinic.  Strongly encouraged transferring by EMS and noted patient possibly being able to lose consciousness, potentially passing away, or potentially hurting other drivers if she ended up in a vehicle accident.  Patient still continued to deny EMS services and will transfer by private vehicle for immediate evaluation at the emergency room.     Subjective:      Zee Kyle is a 47 year old female here for evaluation of head trauma.  Event of injury occurred 1 to 2 hours ago.  Patient was crying a dog leash from the garage when a tall cabinet fell over and struck the patient along the parietal scalp.  Patient denies loss of consciousness.  Patient was crying and became nauseous.  She had 2 episodes of emesis.  Patient also notes photosensitivity.  No numbness, tingling, or muscle weakness.  Patient has a large bump along the parietal scalp.     The following portions of the patient's history were reviewed and updated as appropriate: allergies, current medications, and problem list.     Review of Systems  Pertinent items are noted in HPI.    Allergies  Allergies   Allergen Reactions     Dog Epithelium Allergy Skin Test Itching     Dogs      Nsaids      Bariatric surgery       Family History   Problem Relation Age of Onset     Peripheral Vascular Disease Mother      Mental Illness Mother      Hypertension Father          08     Obesity Father      Gout Father      Cerebrovascular Disease Maternal Grandfather      Dementia Paternal Grandmother      Coronary Artery Disease Paternal Grandfather      Breast Cancer Maternal Grandmother        Social History      Tobacco Use     Smoking status: Former     Packs/day: 1.00     Years: 10.00     Pack years: 10.00     Types: Cigarettes     Start date: 1996     Quit date: 2/10/2003     Years since quittin.3     Smokeless tobacco: Never     Tobacco comments:     Glad to be done   Vaping Use     Vaping status: Never Used   Substance Use Topics     Alcohol use: Yes     Comment: occas        Objective:      /84   Pulse 79   Temp 98.5  F (36.9  C) (Tympanic)   SpO2 97%   General appearance - alert, well appearing, and in no distress and non-toxic  Skin - Large hematoma of the parietal scalp with increased warmth to touch, skin otherwise intact    The use of Dragon/[employer] dictation services was used to construct the content of this note; any grammatical errors are non-intentional. Please contact the author directly if you are in need of any clarification.

## 2023-06-09 NOTE — ED TRIAGE NOTES
Patient reports a garage cabinet fell on her head around 0930 this morning.  She reports since then she has nausea, dizziness, and a headache.  History of HTN, anxiety, depression.  ABCs intact, A&Ox4.     Triage Assessment       Row Name 06/09/23 0419       Triage Assessment (Adult)    Airway WDL X       Respiratory WDL    Respiratory WDL WDL       Skin Circulation/Temperature WDL    Skin Circulation/Temperature WDL WDL       Cardiac WDL    Cardiac WDL WDL       Peripheral/Neurovascular WDL    Peripheral Neurovascular WDL WDL       Cognitive/Neuro/Behavioral WDL    Cognitive/Neuro/Behavioral WDL WDL

## 2023-06-12 DIAGNOSIS — F41.9 ANXIETY: ICD-10-CM

## 2023-06-12 DIAGNOSIS — F33.1 MAJOR DEPRESSIVE DISORDER, RECURRENT EPISODE, MODERATE (H): ICD-10-CM

## 2023-06-14 ENCOUNTER — MYC MEDICAL ADVICE (OUTPATIENT)
Dept: FAMILY MEDICINE | Facility: CLINIC | Age: 48
End: 2023-06-14
Payer: COMMERCIAL

## 2023-06-14 RX ORDER — VENLAFAXINE HYDROCHLORIDE 37.5 MG/1
CAPSULE, EXTENDED RELEASE ORAL
Qty: 90 CAPSULE | Refills: 1 | OUTPATIENT
Start: 2023-06-14

## 2023-06-15 ENCOUNTER — ANCILLARY ORDERS (OUTPATIENT)
Dept: CT IMAGING | Facility: CLINIC | Age: 48
End: 2023-06-15

## 2023-06-15 DIAGNOSIS — K52.9 CHRONIC DIARRHEA: ICD-10-CM

## 2023-06-20 ENCOUNTER — OFFICE VISIT (OUTPATIENT)
Dept: FAMILY MEDICINE | Facility: CLINIC | Age: 48
End: 2023-06-20
Payer: COMMERCIAL

## 2023-06-20 VITALS
SYSTOLIC BLOOD PRESSURE: 110 MMHG | DIASTOLIC BLOOD PRESSURE: 74 MMHG | HEART RATE: 74 BPM | HEIGHT: 67 IN | TEMPERATURE: 97.7 F | RESPIRATION RATE: 14 BRPM | OXYGEN SATURATION: 97 % | BODY MASS INDEX: 36.1 KG/M2 | WEIGHT: 230 LBS

## 2023-06-20 DIAGNOSIS — Z01.818 PREOP GENERAL PHYSICAL EXAM: Primary | ICD-10-CM

## 2023-06-20 LAB
ANION GAP SERPL CALCULATED.3IONS-SCNC: 11 MMOL/L (ref 7–15)
BASOPHILS # BLD AUTO: 0 10E3/UL (ref 0–0.2)
BASOPHILS NFR BLD AUTO: 1 %
BUN SERPL-MCNC: 12.6 MG/DL (ref 6–20)
CALCIUM SERPL-MCNC: 9.6 MG/DL (ref 8.6–10)
CHLORIDE SERPL-SCNC: 105 MMOL/L (ref 98–107)
CREAT SERPL-MCNC: 0.86 MG/DL (ref 0.51–0.95)
DEPRECATED HCO3 PLAS-SCNC: 23 MMOL/L (ref 22–29)
EOSINOPHIL # BLD AUTO: 0.4 10E3/UL (ref 0–0.7)
EOSINOPHIL NFR BLD AUTO: 5 %
ERYTHROCYTE [DISTWIDTH] IN BLOOD BY AUTOMATED COUNT: 12.6 % (ref 10–15)
GFR SERPL CREATININE-BSD FRML MDRD: 84 ML/MIN/1.73M2
GLUCOSE SERPL-MCNC: 93 MG/DL (ref 70–99)
HCT VFR BLD AUTO: 43.7 % (ref 35–47)
HGB BLD-MCNC: 14.5 G/DL (ref 11.7–15.7)
IMM GRANULOCYTES # BLD: 0 10E3/UL
IMM GRANULOCYTES NFR BLD: 0 %
LYMPHOCYTES # BLD AUTO: 2.6 10E3/UL (ref 0.8–5.3)
LYMPHOCYTES NFR BLD AUTO: 32 %
MCH RBC QN AUTO: 29.7 PG (ref 26.5–33)
MCHC RBC AUTO-ENTMCNC: 33.2 G/DL (ref 31.5–36.5)
MCV RBC AUTO: 90 FL (ref 78–100)
MONOCYTES # BLD AUTO: 0.7 10E3/UL (ref 0–1.3)
MONOCYTES NFR BLD AUTO: 9 %
NEUTROPHILS # BLD AUTO: 4.2 10E3/UL (ref 1.6–8.3)
NEUTROPHILS NFR BLD AUTO: 54 %
PLATELET # BLD AUTO: 369 10E3/UL (ref 150–450)
POTASSIUM SERPL-SCNC: 4.4 MMOL/L (ref 3.4–5.3)
RBC # BLD AUTO: 4.88 10E6/UL (ref 3.8–5.2)
SODIUM SERPL-SCNC: 139 MMOL/L (ref 136–145)
WBC # BLD AUTO: 7.9 10E3/UL (ref 4–11)

## 2023-06-20 PROCEDURE — 80048 BASIC METABOLIC PNL TOTAL CA: CPT | Performed by: FAMILY MEDICINE

## 2023-06-20 PROCEDURE — 99213 OFFICE O/P EST LOW 20 MIN: CPT | Performed by: FAMILY MEDICINE

## 2023-06-20 PROCEDURE — 36415 COLL VENOUS BLD VENIPUNCTURE: CPT | Performed by: FAMILY MEDICINE

## 2023-06-20 PROCEDURE — 85025 COMPLETE CBC W/AUTO DIFF WBC: CPT | Performed by: FAMILY MEDICINE

## 2023-06-20 NOTE — PROGRESS NOTES
[address] [address]  Phone: [phone]  Primary Provider: Christy Talbert  Pre-op Performing Provider: ALPESH HO      PREOPERATIVE EVALUATION:  Today's date: 6/20/2023    Zee Kyle is a 47 year old female who presents for a preoperative evaluation.      6/20/2023     2:13 PM   Additional Questions   Roomed by Letty     Surgical Information:  Surgery/Procedure: repair hole in R ear drum  Surgery Location: [facility]  Surgeon: 7/14/2023  Surgery Date: Fer Mello  Time of Surgery: TBD  Where patient plans to recover: At home with family  Fax number for surgical facility: Note does not need to be faxed, will be available electronically in Epic.    Assessment & Plan     The proposed surgical procedure is considered INTERMEDIATE risk.    Problem List Items Addressed This Visit     Preop general physical exam - Primary     No contraindications to proposed procedure         Relevant Orders    Basic metabolic panel  (Ca, Cl, CO2, Creat, Gluc, K, Na, BUN)    CBC with platelets and differential (Completed)               - No identified additional risk factors other than previously addressed    Antiplatelet or Anticoagulation Medication Instructions:   - Patient is on no antiplatelet or anticoagulation medications.    Additional Medication Instructions:  Patient is to take all scheduled medications on the day of surgery    RECOMMENDATION:  APPROVAL GIVEN to proceed with proposed procedure, without further diagnostic evaluation.            Subjective       HPI related to upcoming procedure: ear drum perforation        6/20/2023     2:06 PM   Preop Questions   1. Have you ever had a heart attack or stroke? No   2. Have you ever had surgery on your heart or blood vessels, such as a stent placement, a coronary artery bypass, or surgery on an artery in your head, neck, heart, or legs? No   3. Do you have chest pain with  activity? No   4. Do you have a history of  heart failure? No   5. Do you currently have a cold, bronchitis or symptoms of other infection? YES - coryza   6. Do you have a cough, shortness of breath, or wheezing? No   7. Do you or anyone in your family have previous history of blood clots? No   8. Do you or does anyone in your family have a serious bleeding problem such as prolonged bleeding following surgeries or cuts? No   9. Have you ever had problems with anemia or been told to take iron pills? YES -    10. Have you had any abnormal blood loss such as black, tarry or bloody stools, or abnormal vaginal bleeding? No   11. Have you ever had a blood transfusion? No   12. Are you willing to have a blood transfusion if it is medically needed before, during, or after your surgery? Yes   13. Have you or any of your relatives ever had problems with anesthesia? YES - nausea   14. Do you have sleep apnea, excessive snoring or daytime drowsiness? YES - CPAP   14a. Do you have a CPAP machine? Yes   15. Do you have any artifical heart valves or other implanted medical devices like a pacemaker, defibrillator, or continuous glucose monitor? No   16. Do you have artificial joints? No   17. Are you allergic to latex? No   18. Is there any chance that you may be pregnant? No       Health Care Directive:  Patient does not have a Health Care Directive or Living Will:     Preoperative Review of :   reviewed - controlled substances prescribed by other outside provider(s).          Review of Systems  CONSTITUTIONAL: NEGATIVE for fever, chills, change in weight  ENT/MOUTH: NEGATIVE for ear, mouth and throat problems  RESP: NEGATIVE for significant cough or SOB  CV: NEGATIVE for chest pain, palpitations or peripheral edema  HEME/ALLERGY/IMMUNE: NEGATIVE for bleeding problems    Patient Active Problem List    Diagnosis Date Noted     Morbid obesity (H) 03/20/2023     Priority: Medium     Loose stools 03/20/2023     Priority: Medium      Postsurgical malabsorption 10/11/2022     Priority: Medium     Class 1 obesity due to excess calories without serious comorbidity with body mass index (BMI) of 34.0 to 34.9 in adult 09/20/2022     Priority: Medium     Disordered eating 09/20/2022     Priority: Medium     S/P laparoscopic sleeve gastrectomy 09/20/2022     Priority: Medium     S/P cholecystectomy 09/20/2022     Priority: Medium     Bloody stool 07/11/2022     Priority: Medium     Polyp of duodenum 04/22/2020     Priority: Medium     Benign essential hypertension 07/16/2019     Priority: Medium     Low HDL (under 40) 04/16/2019     Priority: Medium     CLARK (obstructive sleep apnea) 07/18/2018     Priority: Medium     Major depressive disorder, recurrent episode, moderate (H) 11/24/2015     Priority: Medium     Anxiety 11/24/2015     Priority: Medium     Hypothyroidism 08/07/2012     Priority: Medium     Presbycusis 02/10/2011     Priority: Medium      Past Medical History:   Diagnosis Date     Abdominal pain, epigastric 7/11/2022     Acute pancreatitis without infection or necrosis, unspecified pancreatitis type 7/11/2022     Anxiety Ongoing since 18     Depressive disorder on going since 18     Earache or other ear, nose, or throat complaint 12    Tonsillectomy 7/02     Esophageal reflux     Had stopped once I got my sleep apnea mask 5/18     Herpes simplex without mention of complication      Hypertension     While Pregnant     Hypothyroidism 8/7/2012     Intertrigo 7/18/2018     Presbyacusis     Presbycusis     Sleep apnea      STD (sexually transmitted disease)     Herpes     Urinary incontinence     Bladder sling surgery 7/17     Past Surgical History:   Procedure Laterality Date     ENT SURGERY       GENITOURINARY SURGERY  2017    bladder sling     LAPAROSCOPIC CHOLECYSTECTOMY WITH CHOLANGIOGRAMS N/A 7/12/2022    Procedure: LAPAROSCOPIC CHOLECYSTECTOMY WITH CHOLANGIOGRAM;  Surgeon: Era Villela MD;  Location: [facility] OR     LAPAROSCOPIC  GASTRIC SLEEVE N/A 10/15/2019    Procedure: LAPAROSCOPIC GASTRIC SLEEVE;  Surgeon: Cesar Lee MD;  Location: [facility] OR     PE TUBES       TONSILLECTOMY       TYMPANOPLASTY, RT/LT      Tympanoplasty RT/LT     Current Outpatient Medications   Medication Sig Dispense Refill     Biotin w/ Vitamins C & E (HAIR SKIN & NAILS GUMMIES PO) Take 2 chew tab by mouth daily       childrens multivitamin w/iron (FLINTSTONES COMPLETE) chewable tablet Take 2 chew tab by mouth daily       fluticasone (FLONASE) 50 MCG/ACT nasal spray Spray 2 sprays into both nostrils daily       levocetirizine (XYZAL) 5 MG tablet Take 10 mg by mouth daily       levonorgestrel (MIRENA) 20 MCG/24HR IUD 1 each (20 mcg) by Intrauterine route continuous       lisinopril (ZESTRIL) 5 MG tablet Take 1 tablet (5 mg) by mouth daily 90 tablet 3     venlafaxine (EFFEXOR XR) 37.5 MG 24 hr capsule Take 1 capsule (37.5 mg) by mouth daily 90 capsule 1       Allergies   Allergen Reactions     Dog Epithelium Allergy Skin Test Itching     Dogs      Nsaids      Bariatric surgery        Social History     Tobacco Use     Smoking status: Former     Packs/day: 1.00     Years: 10.00     Pack years: 10.00     Types: Cigarettes     Start date: 1996     Quit date: 2/10/2003     Years since quittin.3     Smokeless tobacco: Never     Tobacco comments:     Glad to be done   Vaping Use     Vaping status: Never Used   Substance Use Topics     Alcohol use: Yes     Comment: occas       History   Drug Use No         Objective     There were no vitals taken for this visit.    Physical Exam  Constitutional:       Appearance: Normal appearance.   HENT:      Head: Atraumatic.      Right Ear: Tympanic membrane normal.      Left Ear: Tympanic membrane normal.      Nose: Nose normal.      Mouth/Throat:      Mouth: Mucous membranes are moist.   Eyes:      Pupils: Pupils are equal, round, and reactive to light.   Cardiovascular:      Rate and Rhythm: Normal rate and regular rhythm.       Heart sounds: Normal heart sounds.   Pulmonary:      Effort: Pulmonary effort is normal.      Breath sounds: Normal breath sounds.   Abdominal:      General: Bowel sounds are normal.      Palpations: Abdomen is soft.   Musculoskeletal:      Cervical back: Neck supple.      Right lower leg: No edema.      Left lower leg: Edema present.   Skin:     General: Skin is warm and dry.   Neurological:      General: No focal deficit present.      Mental Status: She is alert.   Psychiatric:         Mood and Affect: Mood normal.           Recent Labs   Lab Test 03/20/23  1704 07/12/22  0520 07/11/22  1616   HGB 14.7 12.5 15.6    281 363   NA  --  142 139   POTASSIUM  --  3.7 4.2   CR  --  0.67 0.69        Diagnostics:  Labs pending at this time.  Results will be reviewed when available.   No EKG required, no history of coronary heart disease, significant arrhythmia, peripheral arterial disease or other structural heart disease.    Revised Cardiac Risk Index (RCRI):  The patient has the following serious cardiovascular risks for perioperative complications:   - No serious cardiac risks = 0 points     RCRI Interpretation: 0 points: Class I (very low risk - 0.4% complication rate)           Signed Electronically by: Brad Mathews MD  Copy of this evaluation report is provided to requesting physician.

## 2023-06-20 NOTE — PATIENT INSTRUCTIONS
For informational purposes only. Not to replace the advice of your health care provider. Copyright   2003,  [employer] [facility]. All rights reserved. Clinically reviewed by Elke Hennessy MD. [employer] [id number] - REV .  Preparing for Your Surgery  Getting started  A nurse will call you to review your health history and instructions. They will give you an arrival time based on your scheduled surgery time. Please be ready to share:  Your doctor's clinic name and phone number  Your medical, surgical, and anesthesia history  A list of allergies and sensitivities  A list of medicines, including herbal treatments and over-the-counter drugs  Whether the patient has a legal guardian (ask how to send us the papers in advance)  Please tell us if you're pregnant--or if there's any chance you might be pregnant. Some surgeries may injure a fetus (unborn baby), so they require a pregnancy test. Surgeries that are safe for a fetus don't always need a test, and you can choose whether to have one.   If you have a child who's having surgery, please ask for a copy of Preparing for Your Child's Surgery.    Preparing for surgery  Within 10 to 30 days of surgery: Have a pre-op exam (sometimes called an H&P, or History and Physical). This can be done at a clinic or pre-operative center.  If you're having a , you may not need this exam. Talk to your care team.  At your pre-op exam, talk to your care team about all medicines you take. If you need to stop any medicines before surgery, ask when to start taking them again.  We do this for your safety. Many medicines can make you bleed too much during surgery. Some change how well surgery (anesthesia) drugs work.  Call your insurance company to let them know you're having surgery. (If you don't have insurance, call [phone].)  Call your clinic if there's any change in your health. This includes signs of a cold or flu (sore throat, runny nose, cough, rash, fever). It  also includes a scrape or scratch near the surgery site.  If you have questions on the day of surgery, call your hospital or surgery center.  Eating and drinking guidelines  For your safety: Unless your surgeon tells you otherwise, follow the guidelines below.  Eat and drink as usual until 8 hours before you arrive for surgery. After that, no food or milk.  Drink clear liquids until 2 hours before you arrive. These are liquids you can see through, like water, Gatorade, and Propel Water. They also include plain black coffee and tea (no cream or milk), candy, and breath mints. You can spit out gum when you arrive.  If you drink alcohol: Stop drinking it the night before surgery.  If your care team tells you to take medicine on the morning of surgery, it's okay to take it with a sip of water.  Preventing infection  Shower or bathe the night before and morning of your surgery. Follow the instructions your clinic gave you. (If no instructions, use regular soap.)  Don't shave or clip hair near your surgery site. We'll remove the hair if needed.  Don't smoke or vape the morning of surgery. You may chew nicotine gum up to 2 hours before surgery. A nicotine patch is okay.  Note: Some surgeries require you to completely quit smoking and nicotine. Check with your surgeon.  Your care team will make every effort to keep you safe from infection. We will:  Clean our hands often with soap and water (or an alcohol-based hand rub).  Clean the skin at your surgery site with a special soap that kills germs.  Give you a special gown to keep you warm. (Cold raises the risk of infection.)  Wear special hair covers, masks, gowns and gloves during surgery.  Give antibiotic medicine, if prescribed. Not all surgeries need antibiotics.  What to bring on the day of surgery  Photo ID and insurance card  Copy of your health care directive, if you have one  Glasses and hearing aids (bring cases)  You can't wear contacts during surgery  Inhaler and  eye drops, if you use them (tell us about these when you arrive)  CPAP machine or breathing device, if you use them  A few personal items, if spending the night  If you have . . .  A pacemaker, ICD (cardiac defibrillator) or other implant: Bring the ID card.  An implanted stimulator: Bring the remote control.  A legal guardian: Bring a copy of the certified (court-stamped) guardianship papers.  Please remove any jewelry, including body piercings. Leave jewelry and other valuables at home.  If you're going home the day of surgery  You must have a responsible adult drive you home. They should stay with you overnight as well.  If you don't have someone to stay with you, and you aren't safe to go home alone, we may keep you overnight. Insurance often won't pay for this.  After surgery  If it's hard to control your pain or you need more pain medicine, please call your surgeon's office.  Questions?   If you have any questions for your care team, list them here: _________________________________________________________________________________________________________________________________________________________________________ ____________________________________ ____________________________________ ____________________________________    How to Take Your Medication Before Surgery  - Take all of your medications before surgery except as noted below  Stop multivitamins 10 days before procedure    Bring CPAP

## 2023-06-27 NOTE — ED PROVIDER NOTES
Emergency Department Attending Supervision Note  6/26/2023  11:26 PM      I evaluated this patient in conjunction with Sallie Monson PA-C      Briefly, the patient presented with headache and vomiting.  The patient reports that earlier this morning she sustained a head injury in which a cabinet fell on top of her head at 9:30 AM.  Since the injury she has a headache and she had nausea and vomited twice with some associated dizziness.  She denies neck pain.      On my exam, the patient has tenderness with palpation of the frontal scalp area with a focal area of swelling and ecchymosis.  Neck is nontender.  She is neurologically normal.    Results:  CT Head w/o Contrast   Final Result   IMPRESSION:   No intracranial hemorrhage, mass, or definite CT evidence of recent   ischemia.      TRACEE ROCHA MD            SYSTEM ID:  [id number]          ED course:    My impression is closed head injury with concussion and hematoma of the scalp without any sign of intracranial bleed or skull fracture.  Cervical spine was clinically cleared.  She is neurologically normal and I feel she can be safely discharged home.        Diagnosis    ICD-10-CM    1. Closed head injury with concussion, without loss of consciousness, initial encounter  S06.0X0A       2. Hematoma of scalp, initial encounter  S00.03XA             MD Juli Bunch Cheri Lee, MD  06/26/23 9147

## 2023-07-07 ENCOUNTER — HOSPITAL ENCOUNTER (OUTPATIENT)
Dept: CT IMAGING | Facility: CLINIC | Age: 48
Discharge: HOME | End: 2023-07-07
Attending: INTERNAL MEDICINE | Admitting: INTERNAL MEDICINE
Payer: COMMERCIAL

## 2023-07-07 DIAGNOSIS — K52.9 CHRONIC DIARRHEA: ICD-10-CM

## 2023-07-07 PROCEDURE — 250N000009 HC RX 250: Performed by: INTERNAL MEDICINE

## 2023-07-07 PROCEDURE — 250N000011 HC RX IP 250 OP 636: Performed by: INTERNAL MEDICINE

## 2023-07-07 PROCEDURE — 74177 CT ABD & PELVIS W/CONTRAST: CPT

## 2023-07-07 RX ORDER — IOPAMIDOL 755 MG/ML
500 INJECTION, SOLUTION INTRAVASCULAR ONCE
Status: COMPLETED | OUTPATIENT
Start: 2023-07-07 | End: 2023-07-07

## 2023-07-07 RX ADMIN — SODIUM CHLORIDE 73 ML: 9 INJECTION, SOLUTION INTRAVENOUS at 10:10

## 2023-07-07 RX ADMIN — IOPAMIDOL 100 ML: 755 INJECTION, SOLUTION INTRAVENOUS at 10:10

## 2023-07-10 ENCOUNTER — APPOINTMENT (OUTPATIENT)
Dept: URBAN - METROPOLITAN AREA CLINIC 253 | Age: 48
Setting detail: DERMATOLOGY
End: 2023-07-15

## 2023-07-10 VITALS — WEIGHT: 225 LBS | HEIGHT: 67 IN | RESPIRATION RATE: 14 BRPM

## 2023-07-10 DIAGNOSIS — D22 MELANOCYTIC NEVI: ICD-10-CM

## 2023-07-10 DIAGNOSIS — Z41.9 ENCOUNTER FOR PROCEDURE FOR PURPOSES OTHER THAN REMEDYING HEALTH STATE, UNSPECIFIED: ICD-10-CM

## 2023-07-10 DIAGNOSIS — D18.0 HEMANGIOMA: ICD-10-CM

## 2023-07-10 DIAGNOSIS — L82.1 OTHER SEBORRHEIC KERATOSIS: ICD-10-CM

## 2023-07-10 DIAGNOSIS — D49.2 NEOPLASM OF UNSPECIFIED BEHAVIOR OF BONE, SOFT TISSUE, AND SKIN: ICD-10-CM

## 2023-07-10 DIAGNOSIS — Z71.89 OTHER SPECIFIED COUNSELING: ICD-10-CM

## 2023-07-10 DIAGNOSIS — L81.4 OTHER MELANIN HYPERPIGMENTATION: ICD-10-CM

## 2023-07-10 PROBLEM — D18.01 HEMANGIOMA OF SKIN AND SUBCUTANEOUS TISSUE: Status: ACTIVE | Noted: 2023-07-10

## 2023-07-10 PROBLEM — D22.39 MELANOCYTIC NEVI OF OTHER PARTS OF FACE: Status: ACTIVE | Noted: 2023-07-10

## 2023-07-10 PROBLEM — D23.72 OTHER BENIGN NEOPLASM OF SKIN OF LEFT LOWER LIMB, INCLUDING HIP: Status: ACTIVE | Noted: 2023-07-10

## 2023-07-10 PROBLEM — D22.5 MELANOCYTIC NEVI OF TRUNK: Status: ACTIVE | Noted: 2023-07-10

## 2023-07-10 PROBLEM — D23.71 OTHER BENIGN NEOPLASM OF SKIN OF RIGHT LOWER LIMB, INCLUDING HIP: Status: ACTIVE | Noted: 2023-07-10

## 2023-07-10 PROCEDURE — OTHER MIPS QUALITY: OTHER

## 2023-07-10 PROCEDURE — OTHER ADDITIONAL NOTES: OTHER

## 2023-07-10 PROCEDURE — OTHER BIOPSY BY SHAVE METHOD: OTHER

## 2023-07-10 PROCEDURE — 99213 OFFICE O/P EST LOW 20 MIN: CPT | Mod: 25

## 2023-07-10 PROCEDURE — OTHER COUNSELING: OTHER

## 2023-07-10 PROCEDURE — OTHER PHOTO-DOCUMENTATION: OTHER

## 2023-07-10 PROCEDURE — 11102 TANGNTL BX SKIN SINGLE LES: CPT

## 2023-07-10 ASSESSMENT — LOCATION DETAILED DESCRIPTION DERM
LOCATION DETAILED: LEFT INFERIOR UPPER BACK
LOCATION DETAILED: SUPERIOR LUMBAR SPINE
LOCATION DETAILED: LEFT FOREHEAD
LOCATION DETAILED: INFERIOR THORACIC SPINE

## 2023-07-10 ASSESSMENT — LOCATION SIMPLE DESCRIPTION DERM
LOCATION SIMPLE: LEFT FOREHEAD
LOCATION SIMPLE: LEFT UPPER BACK
LOCATION SIMPLE: LOWER BACK
LOCATION SIMPLE: UPPER BACK

## 2023-07-10 ASSESSMENT — LOCATION ZONE DERM
LOCATION ZONE: TRUNK
LOCATION ZONE: FACE

## 2023-07-10 NOTE — HPI: FULL BODY SKIN EXAMINATION
What Type Of Note Output Would You Prefer (Optional)?: Standard Output
What Is The Reason For Today's Visit?: Annual Full Body Skin Examination with No Concerns
What Is The Reason For Today's Visit? (Being Monitored For X): concerning skin lesions on an annual basis
Additional History: The patient has a mole on their back that is growing upward and rubbing on their bra strap. They would like this mole removed.

## 2023-07-10 NOTE — PROCEDURE: ADDITIONAL NOTES
Additional Notes: Improved since last visit. If the nevus becomes worse, the patient is encouraged to follow up for further treatment.
Detail Level: Simple
Render Risk Assessment In Note?: no
Additional Notes: Estimated the patient would need 20 units in the glabella for satisfactory results.

## 2023-07-10 NOTE — PROCEDURE: BIOPSY BY SHAVE METHOD
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       Allergies  ALLERGIES:  No Known Allergies    Current Meds  Current Outpatient Medications   Medication Sig Dispense Refill   • amLODIPine (NORVASC) 2.5 MG tablet TAKE 1 TABLET BY MOUTH ONCE DAILY 90 tablet 2   • abacavir-dolutegravir-lamiVUDine (TRIUMEQ) 600- MG tablet Take 1 tablet by mouth daily. 30 tablet 3   • darunavir (PREZISTA) 800 MG tablet Take 1 tablet by mouth daily. 30 tablet 3   • ritonavir (NORVIR) 100 MG tablet Take 1 tablet by mouth daily. 30 tablet 3   • atorvastatin (LIPITOR) 40 MG tablet TAKE 1 TABLET BY MOUTH DAILY. 90 tablet 0     No current facility-administered medications for this visit.        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 
Visual field defect of left eye    • Vitamin D deficiency        Surgical History  Past Surgical History:   Procedure Laterality Date   • Colonoscopy     • No past surgeries         Family History  Family History   Problem Relation Age of Onset   • Dementia/Alzheimers Mother    • Diabetes Father    • Diabetes Brother    • Hypertension Brother        Social History  Social History     Tobacco Use   • Smoking status: Never Smoker   • Smokeless tobacco: Never Used   Substance Use Topics   • Alcohol use: No     Frequency: Never   • Drug use: No         Vitals    Visit Vitals  /86 (BP Location: LFA - Left forearm, Patient Position: Sitting, Cuff Size: Regular)   Pulse 77   Temp 98 °F (36.7 °C) (Tympanic)   Resp 16   Ht 5' 4\" (1.626 m)   Wt 71.9 kg (158 lb 8.2 oz)   SpO2 97%   BMI 27.21 kg/m²       Physical exam  Physical Exam  Vitals signs and nursing note reviewed.   Constitutional:       Appearance: She is well-developed.   HENT:      Head: Normocephalic.      Right Ear: External ear normal.      Left Ear: External ear normal.      Nose: Nose normal.   Eyes:      General: No scleral icterus.        Right eye: No discharge.         Left eye: No discharge.      Conjunctiva/sclera: Conjunctivae normal.      Pupils: Pupils are equal, round, and reactive to light.      Comments: Fundi not seen   Neck:      Musculoskeletal: Normal range of motion and neck supple.      Thyroid: No thyromegaly.      Vascular: No JVD.      Trachea: No tracheal deviation.   Cardiovascular:      Rate and Rhythm: Normal rate and regular rhythm.      Pulses:           Dorsalis pedis pulses are 3+ on the right side and 3+ on the left side.        Posterior tibial pulses are 3+ on the right side and 3+ on the left side.      Heart sounds: Normal heart sounds. No murmur. No friction rub. No gallop.    Pulmonary:      Effort: Pulmonary effort is normal. No respiratory distress.      Breath sounds: Normal breath sounds. No stridor. No wheezing or 
rales.   Chest:      Chest wall: No tenderness.   Abdominal:      General: Bowel sounds are normal. There is no distension.      Palpations: Abdomen is soft. There is no mass.      Tenderness: There is no tenderness. There is no guarding or rebound.      Hernia: No hernia is present.   Musculoskeletal: Normal range of motion.         General: No tenderness or deformity.      Right foot: Normal range of motion.      Left foot: Normal range of motion.      Comments: Minimal edema left foot and left lower leg/negative Sukhwinder's   Feet:      Right foot:      Skin integrity: No ulcer, erythema or callus.      Left foot:      Skin integrity: No ulcer, erythema or callus.   Lymphadenopathy:      Cervical: No cervical adenopathy.   Skin:     General: Skin is warm and dry.      Capillary Refill: Capillary refill takes less than 2 seconds.      Coloration: Skin is not pale.      Findings: No erythema or rash.   Neurological:      Mental Status: She is alert and oriented to person, place, and time.      Cranial Nerves: No cranial nerve deficit.      Sensory: No sensory deficit.      Coordination: Coordination normal.      Comments: Mild residual left sided weakness and mild residual dysarrthria same   Psychiatric:         Mood and Affect: Mood is not depressed.         Behavior: Behavior normal.         Thought Content: Thought content normal. Thought content is not delusional. Thought content does not include homicidal or suicidal ideation.         Results  Lab Services on 09/16/2019   Component Date Value Ref Range Status   • HIV PCR QUANT 09/16/2019 NOT DETECTED  NOT DETECTED copies/mL Final   • LOG 10 HIV 09/16/2019 NOT DETECTED  NOT DETECTED copies/mL Final    Comment: No HIV 1 RNA was detected in this specimen.  The lower limit of detection of this assay is about 30 HIV RNA copies per mL.  A negative result does not rule out the possibility of a viral load below the detection limit of the assay.     This result is a 
quantitative estimate of HIV 1 RNA copies per mL of plasma, determined by a TMA based method.  Assay performed using the Aptima HIV-1 Quant assay from [facility].     • Absolute CD3 (Total T) 09/16/2019 1,195  660 - 2,160 /mcL Final   • Absolute CD3/CD4+ Marlette 09/16/2019 794  400 - 1,400 /mcL Final   • Absolute CD3/CD8+ Suppressor 09/16/2019 402  210 - 820 /mcL Final   • CD4/CD8 Ratio 09/16/2019 2.0  0.8 - 4.1 Final   • CD3 % (Total T) 09/16/2019 62  53 - 80 % of lymp Final   • CD3/CD4+ Marlette % 09/16/2019 42  32 - 59 % of lymp Final   • CD3/CD8+ Suppressor % 09/16/2019 21  14 - 36 % of lymp Final   • WBC 09/16/2019 6.2  4.2 - 11.0 K/mcL Final   • RBC 09/16/2019 4.61  4.00 - 5.20 mil/mcL Final   • HGB 09/16/2019 13.6  12.0 - 15.5 g/dL Final   • HCT 09/16/2019 42.6  36.0 - 46.5 % Final   • MCV 09/16/2019 92.4  78.0 - 100.0 fl Final   • MCH 09/16/2019 29.5  26.0 - 34.0 pg Final   • MCHC 09/16/2019 31.9* 32.0 - 36.5 g/dL Final   • RDW-CV 09/16/2019 14.9  11.0 - 15.0 % Final   • PLT 09/16/2019 335  140 - 450 K/mcL Final   • NRBC 09/16/2019 0  0 /100 WBC Final   • DIFF TYPE 09/16/2019 AUTOMATED DIFFERENTIAL   Final   • Neutrophil 09/16/2019 62  % Final   • LYMPH 09/16/2019 28  % Final   • MONO 09/16/2019 7  % Final   • EOSIN 09/16/2019 2  % Final   • BASO 09/16/2019 1  % Final   • Percent Immature Granuloctyes 09/16/2019 0  % Final   • Absolute Neutrophil 09/16/2019 3.8  1.8 - 7.7 K/mcL Final   • Absolute Lymph 09/16/2019 1.7  1.0 - 4.0 K/mcL Final   • Absolute Mono 09/16/2019 0.5  0.3 - 0.9 K/mcL Final   • Absolute Eos 09/16/2019 0.1  0.1 - 0.5 K/mcL Final   • Absolute Baso 09/16/2019 0.1  0.0 - 0.3 K/mcL Final   • Absolute Immature Granulocytes 09/16/2019 0.0  0 - 0.2 K/mcl Final   • Fasting Status 09/16/2019 UNKNOWN  hrs Final   • Sodium 09/16/2019 144  135 - 145 mmol/L Final   • Potassium 09/16/2019 4.0  3.4 - 5.1 mmol/L Final   • Chloride 09/16/2019 109* 98 - 107 mmol/L Final   • Carbon Dioxide 09/16/2019 26  21 - 32 
mmol/L Final   • Anion Gap 2019 13  10 - 20 mmol/L Final   • Glucose 2019 88  65 - 99 mg/dL Final   • BUN 2019 10  6 - 20 mg/dL Final   • Creatinine 2019 0.71  0.51 - 0.95 mg/dL Final   • GFR Estimate,  2019 >90   Final    eGFR results = or >90 mL/min/1.73m2 = Normal kidney function.   • GFR Estimate, Non  2019 88   Final    eGFR 60 - 89 mL/min/1.73m2 = Mild decrease in kidney function.   • BUN/Creatinine Ratio 2019 14  7 - 25 Final   • CALCIUM 2019 9.4  8.4 - 10.2 mg/dL Final   • TOTAL BILIRUBIN 2019 0.3  0.2 - 1.0 mg/dL Final   • AST/SGOT 2019 19  <38 Units/L Final   • ALT/SGPT 2019 24  <64 Units/L Final   • ALK PHOSPHATASE 2019 147* 45 - 117 Units/L Final   • TOTAL PROTEIN 2019 7.0  6.4 - 8.2 g/dL Final   • Albumin 2019 4.1  3.6 - 5.1 g/dL Final   • GLOBULIN 2019 2.9  2.0 - 4.0 g/dL Final   • A/G Ratio, Serum 2019 1.4  1.0 - 2.4 Final   Office Visit on 2019   Component Date Value Ref Range Status   • PATH REPORT, PAPHPV 2019    Final                    Value:Name: VIRGILIO MERINO              MRN:     [id number]    :  1949                     Visit#:  [id number]                            Gynecologic Cytology Consultation Report        Client: [facility] [address]        Date Specimen Collected: 19            Accession #:  [id number]    Date Specimen Received:  19            Requisition #:[id number]    Date Reported:           2019 15:42    Location:     [facility]                            * Addendum Present *    ______________________________________________________________________________    Cytologic Interpretation :        Negative for intraepithelial lesion or malignancy.    Shift in cher suggestive of bacterial vaginosis (coccobacilli).         Satisfactory for evaluation.  Presence of endocervical/transformation 
zone    component.            MARA Roberto(ASCP)    ** Electronic Signature (DXM) 9/19/2019   15:42 **        Educational note:  The Pap test is a screening josue                          t with a well-recognized    false negative rate.  The best means available to lower the false negative    rate and to detect early cervical lesions is a Pap test at regular intervals.     All ThinPrep Paps will be reviewed with the aid of the ThinPrep Imaging    System, unless otherwise specified.        ______________________________________________________________________________    Clinical Information:    Menstrual Hx:  No Menstrual History Provided    Infection Hx:  Human Immunodeficiency Virus    Other Clinical Conditions:Pap source: Cervical    LMP not provided - this is required per CAP, CLIA and CMS    Comment:DAILY PESSARY USER    REASON FOR COLLECTION::DIAGNOSTIC: (SEE COMMENTS)-*** ASCUS PAP 2018    SOURCE::CERVICAL        Specimen(s) Submitted:     PAP with High Risk HPV        Procedures/Addenda:    HPV, High Risk_IL:    Date Ordered:     9/13/2019     Date Reported:9/16/2019        Interpretation    Aptima HPV HIGH RISK (TYPES 16,18,31,33,35,39,45,51,52,56,58,59,66,68):                              NEGATIVE        The APTIMA HPV Assay is an FDA approved in vitro nucleic acid amplification    test for the qualitative detection of E6/E7 viral messenger RNA (mRNA) from 14    high-risk types of human papillomavirus (HPV) in cervical specimens. The    high-risk HPV types detected by the assay include: 16, 18, 31, 33, 35, 39, 45,    51, 52, 56, 58, 59, 66, and 68. The Aptima HPV Assay does not discriminate    between the 14 high-risk types. Cervical specimens in the Thin Prep Pap Test    vials containing PreservCyt Solution and collected with broom-type or    cytobrush/spatula collection devices may be tested with this assay using the    PANTHER System.         The APTIMA HPV Assay is intended to screen women 21 
years and older with    atypical squamous cells of undetermined significance (ASC-US) cervical    cytology results to determine the need for referral to colposcopy. Test    results are not intended to prevent women from proceeding to colposcopy.         In women 30 years and                           older, the above assay can be used with cervical    cytology to adjunctively screen to assess the presence or absence of high-risk    HPV types. This information, with the physician's assessment of cytology    history, other risk factors, and guidelines, may be used to guide patient    management.                                         ** Electronic Signature ** ([facility]) 9/16/2019 09:41 **                ICD Codes:     Z12.31 Z12.4 B20        Fee Codes:     A: [id number]     HPV_IL: [id number]        Performing Lab Location (Unless otherwise specified):    [address] [address]           Assessment/ PLAN    Problem List Items Addressed This Visit        Nervous    Hemiplegia of nondominant side as late effect of cerebrovascular disease (CMS/HCC) - Primary       Circulatory    Benign hypertension       Digestive    Vitamin D deficiency       Urinary    Prolapse of female pelvic organs       Endocrine    Hyperlipidemia       Immune    AIDS (acquired immunodeficiency syndrome) (CMS/HCC)       Other    Atypical squamous cells of undetermined significance (ASCUS) on Papanicolaou smear of cervix    Breast cancer screening    Relevant Orders    MAMMO SCREENING BILATERAL W CARTER    Colon cancer screening    Relevant Orders    SERVICE TO GASTROENTEROLOGY    Osteoporosis screening    Relevant Orders    BD DEXA AXIAL SKELETON          Colonoscopy referral for CRC screening  MMG referral  BSE encouraged  Dexa scan  Oscal BID  Active weight bearing exercises  Follow up with Gyne service for pessary management and abnormal Paps follow up/monitoring  Continue ASA and CCB and low sodium 
diet  Continue statin  Follow up with ID for HIV management    Return Follow up  Return in about 6 months (around 6/9/2020).          
Detail Level: Detailed
Depth Of Biopsy: dermis
Was A Bandage Applied: Yes
Size Of Lesion In Cm: 0
Biopsy Type: H and E
Biopsy Method: Dermablade
Anesthesia Type: 1% lidocaine with epinephrine
Anesthesia Volume In Cc (Will Not Render If 0): 0.5
Hemostasis: Drysol
Wound Care: Petrolatum
Dressing: bandage
Destruction After The Procedure: No
Type Of Destruction Used: Curettage
Curettage Text: The wound bed was treated with curettage after the biopsy was performed.
Cryotherapy Text: The wound bed was treated with cryotherapy after the biopsy was performed.
Electrodesiccation Text: The wound bed was treated with electrodesiccation after the biopsy was performed.
Electrodesiccation And Curettage Text: The wound bed was treated with electrodesiccation and curettage after the biopsy was performed.
Silver Nitrate Text: The wound bed was treated with silver nitrate after the biopsy was performed.
Lab: -[id number]
Consent: Written consent was obtained and risks were reviewed including but not limited to scarring, infection, bleeding, scabbing, incomplete removal, nerve damage and allergy to anesthesia.
Post-Care Instructions: I reviewed with the patient in detail post-care instructions. Patient is to keep the biopsy site dry overnight, and then apply bacitracin twice daily until healed. Patient may apply hydrogen peroxide soaks to remove any crusting.
Notification Instructions: Patient will be notified of biopsy results. However, patient instructed to call the office if not contacted within 2 weeks.
Billing Type: Third-Party Bill
Information: Selecting Yes will display possible errors in your note based on the variables you have selected. This validation is only offered as a suggestion for you. PLEASE NOTE THAT THE VALIDATION TEXT WILL BE REMOVED WHEN YOU FINALIZE YOUR NOTE. IF YOU WANT TO FAX A PRELIMINARY NOTE YOU WILL NEED TO TOGGLE THIS TO 'NO' IF YOU DO NOT WANT IT IN YOUR FAXED NOTE.

## 2023-07-12 ENCOUNTER — TRANSFERRED RECORDS (OUTPATIENT)
Dept: HEALTH INFORMATION MANAGEMENT | Facility: CLINIC | Age: 48
End: 2023-07-12
Payer: COMMERCIAL

## 2023-08-04 ENCOUNTER — ANCILLARY ORDERS (OUTPATIENT)
Dept: MAMMOGRAPHY | Facility: CLINIC | Age: 48
End: 2023-08-04

## 2023-08-04 DIAGNOSIS — Z12.31 VISIT FOR SCREENING MAMMOGRAM: Primary | ICD-10-CM

## 2023-08-17 ENCOUNTER — ANCILLARY ORDERS (OUTPATIENT)
Dept: FAMILY MEDICINE | Facility: CLINIC | Age: 48
End: 2023-08-17

## 2023-08-17 ENCOUNTER — HOSPITAL ENCOUNTER (OUTPATIENT)
Dept: MAMMOGRAPHY | Facility: CLINIC | Age: 48
Discharge: HOME | End: 2023-08-17
Attending: FAMILY MEDICINE | Admitting: FAMILY MEDICINE
Payer: COMMERCIAL

## 2023-08-17 DIAGNOSIS — Z12.31 VISIT FOR SCREENING MAMMOGRAM: ICD-10-CM

## 2023-08-17 DIAGNOSIS — Z12.31 VISIT FOR SCREENING MAMMOGRAM: Primary | ICD-10-CM

## 2023-08-17 PROCEDURE — 77067 SCR MAMMO BI INCL CAD: CPT

## 2023-10-04 ENCOUNTER — OFFICE VISIT (OUTPATIENT)
Dept: FAMILY MEDICINE | Facility: CLINIC | Age: 48
End: 2023-10-04
Payer: COMMERCIAL

## 2023-10-04 ENCOUNTER — ANCILLARY ORDERS (OUTPATIENT)
Dept: FAMILY MEDICINE | Facility: CLINIC | Age: 48
End: 2023-10-04

## 2023-10-04 VITALS
DIASTOLIC BLOOD PRESSURE: 70 MMHG | RESPIRATION RATE: 16 BRPM | OXYGEN SATURATION: 95 % | WEIGHT: 231.8 LBS | SYSTOLIC BLOOD PRESSURE: 110 MMHG | BODY MASS INDEX: 36.38 KG/M2 | HEIGHT: 67 IN | TEMPERATURE: 98.1 F | HEART RATE: 75 BPM

## 2023-10-04 DIAGNOSIS — N63.14 MASS OF LOWER INNER QUADRANT OF RIGHT BREAST: Primary | ICD-10-CM

## 2023-10-04 DIAGNOSIS — F41.9 ANXIETY: ICD-10-CM

## 2023-10-04 DIAGNOSIS — F33.1 MAJOR DEPRESSIVE DISORDER, RECURRENT EPISODE, MODERATE (H): ICD-10-CM

## 2023-10-04 PROCEDURE — 99213 OFFICE O/P EST LOW 20 MIN: CPT

## 2023-10-04 RX ORDER — VENLAFAXINE HYDROCHLORIDE 37.5 MG/1
37.5 CAPSULE, EXTENDED RELEASE ORAL DAILY
Qty: 90 CAPSULE | Refills: 1 | Status: SHIPPED | OUTPATIENT
Start: 2023-10-04 | End: 2024-01-28

## 2023-10-04 RX ORDER — MONTELUKAST SODIUM 4 MG/1
1 TABLET, CHEWABLE ORAL DAILY
COMMUNITY
End: 2024-08-20

## 2023-10-04 ASSESSMENT — PATIENT HEALTH QUESTIONNAIRE - PHQ9
SUM OF ALL RESPONSES TO PHQ QUESTIONS 1-9: 2
SUM OF ALL RESPONSES TO PHQ QUESTIONS 1-9: 2
10. IF YOU CHECKED OFF ANY PROBLEMS, HOW DIFFICULT HAVE THESE PROBLEMS MADE IT FOR YOU TO DO YOUR WORK, TAKE CARE OF THINGS AT HOME, OR GET ALONG WITH OTHER PEOPLE: NOT DIFFICULT AT ALL

## 2023-10-04 NOTE — PROGRESS NOTES
"  Assessment & Plan     (N63.14) Mass of lower inner quadrant of right breast  (primary encounter diagnosis)  Comment: further evaluation necessary, diagnostic mammogram ordered. Patient fully understands and is agreeable with plan of care, at this point patient will follow up as needed unless acute concerns arise in the meantime.  Plan: MA Diagnostic Digital Bilateral     (F33.1) Major depressive disorder, recurrent episode, moderate (H)  (F41.9) Anxiety  Comment: stable, sees psych for counseling. Doing quite well. Effexor refilled.   Plan: venlafaxine (EFFEXOR XR) 37.5 MG 24 hr capsule           BMI:   Estimated body mass index is 36.31 kg/m  as calculated from the following:    Height as of this encounter: 1.702 m (5' 7\").    Weight as of this encounter: 105.1 kg (231 lb 12.8 oz).     GARRETT Cowan CNP  [facility]   Marion is a 47 year old, presenting for the following health issues:  Mass (Right breast noticed about 3 days ago a lump) and  Follow Up (Refill on venlafaxine (EFFEXOR XR) 37.5 MG 24 hr capsule)        10/4/2023     9:11 AM   Additional Questions   Roomed by Aylin Zamudio       History of Present Illness       Reason for visit:  Lump in right breast  refill venafleiaine(sp?)  Symptom onset:  3-7 days ago  Symptoms include:  Lump  Symptom intensity:  Moderate  Symptom progression:  Staying the same  Had these symptoms before:  Yes  Has tried/received treatment for these symptoms:  Yes  Previous treatment was successful:  No  What makes it worse:  Not knowing waiting  What makes it better:  Confirmed cancer free    She eats 0-1 servings of fruits and vegetables daily.She consumes 2 sweetened beverage(s) daily.She exercises with enough effort to increase her heart rate 10 to 19 minutes per day.  She exercises with enough effort to increase her heart rate 3 or less days per week.   She is taking medications regularly.     Depression and Anxiety " Follow-Up  How are you doing with your depression since your last visit? Improved sees a psych ever other week and dietician. Feels stable  How are you doing with your anxiety since your last visit?  Improved   Are you having other symptoms that might be associated with depression or anxiety? No  Have you had a significant life event? Health Concerns - lump  Do you have any concerns with your use of alcohol or other drugs? No    Social History     Tobacco Use    Smoking status: Former     Packs/day: 1.00     Years: 10.00     Pack years: 10.00     Types: Cigarettes     Start date: 1996     Quit date: 2/10/2003     Years since quittin.6    Smokeless tobacco: Never    Tobacco comments:     Glad to be done   Vaping Use    Vaping Use: Never used   Substance Use Topics    Alcohol use: Yes     Comment: occas    Drug use: No         2022     2:04 PM 3/20/2023     1:59 PM 10/4/2023     9:07 AM   PHQ   PHQ-9 Total Score 4 5 2   Q9: Thoughts of better off dead/self-harm past 2 weeks Not at all Not at all Not at all         2021     3:19 PM 2022     2:04 PM 3/20/2023     2:00 PM   HENRI-7 SCORE   Total Score  5 (mild anxiety) 6 (mild anxiety)   Total Score 2 5 6         10/4/2023     9:07 AM   Last PHQ-9   1.  Little interest or pleasure in doing things 0   2.  Feeling down, depressed, or hopeless 0   3.  Trouble falling or staying asleep, or sleeping too much 1   4.  Feeling tired or having little energy 1   5.  Poor appetite or overeating 0   6.  Feeling bad about yourself 0   7.  Trouble concentrating 0   8.  Moving slowly or restless 0   Q9: Thoughts of better off dead/self-harm past 2 weeks 0   PHQ-9 Total Score 2         3/20/2023     2:00 PM   HENRI-7    1. Feeling nervous, anxious, or on edge 1   2. Not being able to stop or control worrying 1   3. Worrying too much about different things 1   4. Trouble relaxing 1   5. Being so restless that it is hard to sit still 1   6. Becoming easily annoyed or  irritable 1   7. Feeling afraid, as if something awful might happen 0   HENRI-7 Total Score 6   If you checked any problems, how difficult have they made it for you to do your work, take care of things at home, or get along with other people? Not difficult at all     Breast Mass  -noticed within this past week  -recently had Mammogram in August and was clear  -not painful, slightly tender to deep palpation  -no drainage noted on right breast, however did have some yellow drainage to left breast that resolved over 1 day  -no rash, fever, chills, myalgias, unintentional weight changes.     MDD/HENRI  -stable  -venlafaxine working well  -sees psychiatry/counseling    Past Medical History:   Diagnosis Date    Abdominal pain, epigastric 7/11/2022    Acute pancreatitis without infection or necrosis, unspecified pancreatitis type 7/11/2022    Anxiety Ongoing since 18    Depressive disorder on going since 18    Earache or other ear, nose, or throat complaint 12    Tonsillectomy 7/02    Esophageal reflux     Had stopped once I got my sleep apnea mask 5/18    Herpes simplex without mention of complication     Hypertension     While Pregnant    Hypothyroidism 8/7/2012    Intertrigo 7/18/2018    Presbyacusis     Presbycusis    Sleep apnea     STD (sexually transmitted disease)     Herpes    Urinary incontinence     Bladder sling surgery 7/17     Current Outpatient Medications   Medication    Biotin w/ Vitamins C & E (HAIR SKIN & NAILS GUMMIES PO)    childrens multivitamin w/iron (FLINTSTONES COMPLETE) chewable tablet    colestipol (COLESTID) 1 g tablet    fluticasone (FLONASE) 50 MCG/ACT nasal spray    levocetirizine (XYZAL) 5 MG tablet    levonorgestrel (MIRENA) 20 MCG/24HR IUD    lisinopril (ZESTRIL) 5 MG tablet    venlafaxine (EFFEXOR XR) 37.5 MG 24 hr capsule     No current facility-administered medications for this visit.         Review of Systems   Constitutional, skin, and psych systems are negative, except as otherwise  "noted.      Objective    /70 (BP Location: Right arm, Patient Position: Sitting, Cuff Size: Adult Large)   Pulse 75   Temp 98.1  F (36.7  C) (Oral)   Resp 16   Ht 1.702 m (5' 7\")   Wt 105.1 kg (231 lb 12.8 oz)   SpO2 95%   BMI 36.31 kg/m    Body mass index is 36.31 kg/m .  Physical Exam  Vitals and nursing note reviewed.   Constitutional:       General: She is not in acute distress.     Appearance: Normal appearance. She is not ill-appearing.   Cardiovascular:      Rate and Rhythm: Normal rate.   Pulmonary:      Effort: Pulmonary effort is normal.   Chest:   Breasts:     Right: Mass present. No swelling, bleeding, inverted nipple, nipple discharge, skin change or tenderness.      Left: No swelling, bleeding, inverted nipple, mass, nipple discharge, skin change or tenderness.          Comments: Breast mass (gumball size) noted at highlighted area.   Skin:     General: Skin is warm and dry.   Neurological:      Mental Status: She is alert.   Psychiatric:         Attention and Perception: Attention normal.         Mood and Affect: Mood normal.         Speech: Speech normal.         Behavior: Behavior normal.         Thought Content: Thought content normal.         Cognition and Memory: Cognition normal.         Judgment: Judgment normal.                  "

## 2023-10-11 ENCOUNTER — HOSPITAL ENCOUNTER (OUTPATIENT)
Dept: ULTRASOUND IMAGING | Facility: CLINIC | Age: 48
Discharge: HOME | End: 2023-10-11
Payer: COMMERCIAL

## 2023-10-11 DIAGNOSIS — N63.14 MASS OF LOWER INNER QUADRANT OF RIGHT BREAST: ICD-10-CM

## 2023-10-11 PROCEDURE — 76642 ULTRASOUND BREAST LIMITED: CPT | Mod: RT

## 2023-11-23 ENCOUNTER — APPOINTMENT (OUTPATIENT)
Dept: GENERAL RADIOLOGY | Facility: CLINIC | Age: 48
End: 2023-11-23
Attending: EMERGENCY MEDICINE
Payer: COMMERCIAL

## 2023-11-23 ENCOUNTER — HOSPITAL ENCOUNTER (EMERGENCY)
Facility: CLINIC | Age: 48
Discharge: HOME | End: 2023-11-23
Attending: EMERGENCY MEDICINE | Admitting: EMERGENCY MEDICINE
Payer: COMMERCIAL

## 2023-11-23 VITALS
RESPIRATION RATE: 18 BRPM | TEMPERATURE: 98.6 F | SYSTOLIC BLOOD PRESSURE: 124 MMHG | OXYGEN SATURATION: 97 % | DIASTOLIC BLOOD PRESSURE: 78 MMHG | BODY MASS INDEX: 36.43 KG/M2 | WEIGHT: 232.59 LBS | HEART RATE: 85 BPM
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
## 2023-11-23 PROCEDURE — 64450 NJX AA&/STRD OTHER PN/BRANCH: CPT

## 2023-11-23 PROCEDURE — 99283 EMERGENCY DEPT VISIT LOW MDM: CPT

## 2023-11-23 PROCEDURE — 73130 X-RAY EXAM OF HAND: CPT | Mod: 50

## 2023-11-23 PROCEDURE — 250N000013 HC RX MED GY IP 250 OP 250 PS 637: Performed by: EMERGENCY MEDICINE

## 2023-11-23 RX ORDER — BUPIVACAINE HYDROCHLORIDE 5 MG/ML
INJECTION, SOLUTION EPIDURAL; INTRACAUDAL
Status: DISCONTINUED
Start: 2023-11-23 | End: 2023-11-23 | Stop reason: HOSPADM

## 2023-11-23 RX ADMIN — AMOXICILLIN AND CLAVULANATE POTASSIUM 1 TABLET: 875; 125 TABLET, FILM COATED ORAL at 19:24

## 2023-11-23 ASSESSMENT — ACTIVITIES OF DAILY LIVING (ADL)
ADLS_ACUITY_SCORE: 35
ADLS_ACUITY_SCORE: 33

## 2023-11-24 NOTE — DISCHARGE INSTRUCTIONS
You are recommending no sutures to the finger but a splint to hold the finger still while the skin is healing.  Use Augmentin to prevent infection.  Okay for ibuprofen or Tylenol.  Pain should improve with time if you see rapid increase in redness swelling pus or concern for infection return to the emergency room.  Thanks for your patience this evening and have a happy Thanksgiving.

## 2023-11-24 NOTE — ED PROVIDER NOTES
History     Chief Complaint:  Dog Bite       HPI   Zee Kyle is a 48 year old female who presents with bilateral hand pain after dog bite.  Patient is a 48-year-old female who likes dogs.  Patient had at times up to 6 dogs in her house.  Today she was hand feeding a dog in her house to other dogs came and they got an argument over the food she tried to separate them with her hands and they bit her.  Patient describes a deep wound to the left hand as well as wounds to the right hand presents from home for assessment dogs up-to-date on rabies shots.  Her tetanus is unknown.  No other injury other than the hands.      Independent Historian:   None - Patient Only    Review of External Notes:       Medications:    Biotin w/ Vitamins C & E (HAIR SKIN & NAILS GUMMIES PO)  childrens multivitamin w/iron (FLINTSTONES COMPLETE) chewable tablet  colestipol (COLESTID) 1 g tablet  fluticasone (FLONASE) 50 MCG/ACT nasal spray  levocetirizine (XYZAL) 5 MG tablet  levonorgestrel (MIRENA) 20 MCG/24HR IUD  lisinopril (ZESTRIL) 5 MG tablet  venlafaxine (EFFEXOR XR) 37.5 MG 24 hr capsule        Past Medical History:    Past Medical History:   Diagnosis Date    Abdominal pain, epigastric 7/11/2022    Acute pancreatitis without infection or necrosis, unspecified pancreatitis type 7/11/2022    Anxiety Ongoing since 18    Depressive disorder on going since 18    Earache or other ear, nose, or throat complaint 12    Esophageal reflux     Herpes simplex without mention of complication     Hypertension     Hypothyroidism 8/7/2012    Intertrigo 7/18/2018    Presbyacusis     Sleep apnea     STD (sexually transmitted disease)     Urinary incontinence        Past Surgical History:    Past Surgical History:   Procedure Laterality Date    ENT SURGERY      GENITOURINARY SURGERY  2017    bladder sling    LAPAROSCOPIC CHOLECYSTECTOMY WITH CHOLANGIOGRAMS N/A 7/12/2022    Procedure: LAPAROSCOPIC CHOLECYSTECTOMY WITH CHOLANGIOGRAM;  Surgeon:  Era Villela MD;  Location: [facility] OR    LAPAROSCOPIC GASTRIC SLEEVE N/A 10/15/2019    Procedure: LAPAROSCOPIC GASTRIC SLEEVE;  Surgeon: Cesar Lee MD;  Location: [facility] OR    PE TUBES      TONSILLECTOMY      TYMPANOPLASTY, RT/LT      Tympanoplasty RT/LT        Physical Exam   Patient Vitals for the past 24 hrs:   BP Temp Temp src Pulse Resp SpO2 Weight   11/23/23 1841 124/78 98.6  F (37  C) Oral 85 18 97 % 105.5 kg (232 lb 9.4 oz)        Physical Exam  Vitals reviewed.   Cardiovascular:      Rate and Rhythm: Normal rate.   Pulmonary:      Effort: Pulmonary effort is normal.   Musculoskeletal:      Comments: Right hand: There is slight swelling over the distal ulna with a slight abrasion.  No step-off or deformity.  There is slight swelling of the interphalangeal joint of the third digit.  No open wound.    Left hand.  There is a U-shaped skin avulsion to the palmar surface of the hand.  There is also a puncture wound in the mid aspect of the third digit.  There is limited flexion extension due to pain.   Neurological:      Mental Status: She is alert.           Emergency Department Course       Imaging:  XR Hand Bilateral G/E 3 Views   Final Result   IMPRESSION: Normal joint spaces and alignment. No erosive change. No fracture. No foreign body.             Laboratory:  Labs Ordered and Resulted from Time of ED Arrival to Time of ED Departure - No data to display     Procedures   Patient was offered a digital block for pain control and irrigation.  Patient was given bupivacaine 0.5% total of 8 cc for digital block of the right third digit.  Wound was cleansed by the ER tech in review patient has a small puncture wound do not recommend sutures.  Recommended heal by secondary  intact intention due to risk for infection and lack of cosmetic or functional needs for sutures.    Emergency Department Course & Assessments:             Interventions:  Medications   amoxicillin-clavulanate (AUGMENTIN) 875-125 MG per  tablet 1 tablet (has no administration in time range)        Assessments:      Independent Interpretation (X-rays, CTs, rhythm strip):  None    Consultations/Discussion of Management or Tests:  None        Social Determinants of Health affecting care:   None    Disposition:  The patient was discharged to home.     Impression & Plan        Medical Decision Making:  Patient presents with a dog bite to the hand.  No concern for rabies tetanus is up-to-date.  Prophylactic antibiotics recommended due to finger injury.  X-rays were performed due to penetrating injury to the finger to evaluate for bony injury and was negative.  Patient was offered a digital block due to severe pain in the skin though the wound is not deep and does not require suturing in fact prefer not suturing due to concerns for secondary infection.  Patient offered prophylactic antibiotics heal by secondary intention a finger splint and follow-up as needed and was discharged home in stable condition.      Diagnosis:    ICD-10-CM    1. Dog bite, initial encounter  W54.0XXA            Discharge Medications:  Discharge Medication List as of 11/23/2023  9:13 PM        START taking these medications    Details   amoxicillin-clavulanate (AUGMENTIN) 875-125 MG tablet Take 1 tablet by mouth 2 times daily for 5 days, Disp-10 tablet, R-0, Local Print                Brad Mendes MD  11/23/2023   Brad Mendes MD Goodman, Brian Samuel, MD  11/23/23 6751

## 2023-11-24 NOTE — ED TRIAGE NOTES
Patient was on the floor helping a dog eat and other dogs were around and proceeded to bite patient. Patient unsure if more than one dog bit her. Lacs noted to right inner wrist and right middle finger. Lacs to left palm and left middle finger. Patient thinks all the dogs have been vaccinated as they are not all hers. All bleeding is controled. ABCs intact, VSS.

## 2024-01-06 DIAGNOSIS — I10 BENIGN ESSENTIAL HYPERTENSION: ICD-10-CM

## 2024-01-07 ENCOUNTER — MYC REFILL (OUTPATIENT)
Dept: FAMILY MEDICINE | Facility: CLINIC | Age: 49
End: 2024-01-07
Payer: COMMERCIAL

## 2024-01-07 DIAGNOSIS — F33.1 MAJOR DEPRESSIVE DISORDER, RECURRENT EPISODE, MODERATE (H): ICD-10-CM

## 2024-01-07 DIAGNOSIS — F41.9 ANXIETY: ICD-10-CM

## 2024-01-08 RX ORDER — VENLAFAXINE HYDROCHLORIDE 37.5 MG/1
37.5 CAPSULE, EXTENDED RELEASE ORAL DAILY
Qty: 90 CAPSULE | Refills: 1 | OUTPATIENT
Start: 2024-01-08

## 2024-01-08 RX ORDER — LISINOPRIL 5 MG/1
5 TABLET ORAL DAILY
Qty: 90 TABLET | Refills: 0 | Status: SHIPPED | OUTPATIENT
Start: 2024-01-08 | End: 2024-01-28

## 2024-01-26 ENCOUNTER — TELEPHONE (OUTPATIENT)
Dept: FAMILY MEDICINE | Facility: CLINIC | Age: 49
End: 2024-01-26
Payer: COMMERCIAL

## 2024-01-26 DIAGNOSIS — F41.9 ANXIETY: ICD-10-CM

## 2024-01-26 DIAGNOSIS — F33.1 MAJOR DEPRESSIVE DISORDER, RECURRENT EPISODE, MODERATE (H): ICD-10-CM

## 2024-01-26 DIAGNOSIS — I10 BENIGN ESSENTIAL HYPERTENSION: ICD-10-CM

## 2024-01-26 NOTE — TELEPHONE ENCOUNTER
FYI - Status Update    Who is Calling: patient    Update: Pt rescheduled her annual preventative but will be out of medicationg before then. She would like to know if Dr. Janet De La Garza would be able to refill her medications in the mean time.     Does caller want a call/response back: Yes     Could we send this information to you in [employer] or would you prefer to receive a phone call?:   Patient would prefer a phone call   Okay to leave a detailed message?: Yes at Cell number on file:    Telephone Information:   Mobile [phone]

## 2024-01-28 RX ORDER — LISINOPRIL 5 MG/1
5 TABLET ORAL DAILY
Qty: 90 TABLET | Refills: 0 | Status: SHIPPED | OUTPATIENT
Start: 2024-01-28 | End: 2024-07-23

## 2024-01-28 RX ORDER — VENLAFAXINE HYDROCHLORIDE 37.5 MG/1
37.5 CAPSULE, EXTENDED RELEASE ORAL DAILY
Qty: 90 CAPSULE | Refills: 0 | Status: SHIPPED | OUTPATIENT
Start: 2024-01-28 | End: 2024-04-16

## 2024-01-30 NOTE — TELEPHONE ENCOUNTER
Pt called back. Relayed message that Dr. Fraga sent prescriptions to pharmacy to cover her until her appointment. Marion verbally understood and reviewed her appointment for 4/23/24.    Aylin Zamudio MA

## 2024-02-16 ENCOUNTER — TELEPHONE (OUTPATIENT)
Dept: FAMILY MEDICINE | Facility: CLINIC | Age: 49
End: 2024-02-16

## 2024-02-16 NOTE — TELEPHONE ENCOUNTER
Patient Quality Outreach    Patient is due for the following:   Cervical Cancer Screening - PAP Needed  Physical Preventive Adult Physical      Topic Date Due    Hepatitis B Vaccine (2 of 3 - 19+ 3-dose series) 04/17/2023    Flu Vaccine (1) 09/01/2023    COVID-19 Vaccine (5 - 2023-24 season) 09/01/2023       Next Steps:   Patient was scheduled for 04/23/2024    Type of outreach:    Chart review performed, no outreach needed.      Questions for provider review:    None           Davy García, CMA

## 2024-04-14 ENCOUNTER — MYC MEDICAL ADVICE (OUTPATIENT)
Dept: FAMILY MEDICINE | Facility: CLINIC | Age: 49
End: 2024-04-14
Payer: COMMERCIAL

## 2024-04-14 DIAGNOSIS — F41.9 ANXIETY: ICD-10-CM

## 2024-04-14 DIAGNOSIS — F33.1 MAJOR DEPRESSIVE DISORDER, RECURRENT EPISODE, MODERATE (H): ICD-10-CM

## 2024-04-16 RX ORDER — VENLAFAXINE HYDROCHLORIDE 37.5 MG/1
37.5 CAPSULE, EXTENDED RELEASE ORAL DAILY
Qty: 90 CAPSULE | Refills: 0 | Status: SHIPPED | OUTPATIENT
Start: 2024-04-16 | End: 2024-07-23

## 2024-04-16 NOTE — TELEPHONE ENCOUNTER
Please see [employer] message in reference to 3 questions. Routed to PCP, Please review and advise, pended prescription below per one of the questions from patient, please advise on the other questions.     Thank you,    Efe Conklin RN    04/16/2024 at 9:19 AM

## 2024-04-20 ENCOUNTER — HEALTH MAINTENANCE LETTER (OUTPATIENT)
Age: 49
End: 2024-04-20

## 2024-04-23 ENCOUNTER — OFFICE VISIT (OUTPATIENT)
Dept: FAMILY MEDICINE | Facility: CLINIC | Age: 49
End: 2024-04-23
Payer: COMMERCIAL

## 2024-04-23 VITALS
DIASTOLIC BLOOD PRESSURE: 76 MMHG | RESPIRATION RATE: 17 BRPM | OXYGEN SATURATION: 98 % | TEMPERATURE: 97.8 F | SYSTOLIC BLOOD PRESSURE: 120 MMHG | HEART RATE: 66 BPM | HEIGHT: 67 IN | BODY MASS INDEX: 36.26 KG/M2 | WEIGHT: 231 LBS

## 2024-04-23 DIAGNOSIS — Z12.4 CERVICAL CANCER SCREENING: ICD-10-CM

## 2024-04-23 DIAGNOSIS — E78.6 LOW HDL (UNDER 40): ICD-10-CM

## 2024-04-23 DIAGNOSIS — F33.1 MAJOR DEPRESSIVE DISORDER, RECURRENT EPISODE, MODERATE (H): ICD-10-CM

## 2024-04-23 DIAGNOSIS — Z23 NEED FOR VACCINATION: ICD-10-CM

## 2024-04-23 DIAGNOSIS — E66.09 CLASS 2 OBESITY DUE TO EXCESS CALORIES WITHOUT SERIOUS COMORBIDITY WITH BODY MASS INDEX (BMI) OF 36.0 TO 36.9 IN ADULT: ICD-10-CM

## 2024-04-23 DIAGNOSIS — E66.812 CLASS 2 OBESITY DUE TO EXCESS CALORIES WITHOUT SERIOUS COMORBIDITY WITH BODY MASS INDEX (BMI) OF 36.0 TO 36.9 IN ADULT: ICD-10-CM

## 2024-04-23 DIAGNOSIS — Z00.00 ROUTINE GENERAL MEDICAL EXAMINATION AT A HEALTH CARE FACILITY: Primary | ICD-10-CM

## 2024-04-23 LAB — HBA1C MFR BLD: 5.5 % (ref 0–5.6)

## 2024-04-23 PROCEDURE — G0145 SCR C/V CYTO,THINLAYER,RESCR: HCPCS | Performed by: FAMILY MEDICINE

## 2024-04-23 PROCEDURE — 87624 HPV HI-RISK TYP POOLED RSLT: CPT | Performed by: FAMILY MEDICINE

## 2024-04-23 PROCEDURE — 80061 LIPID PANEL: CPT | Performed by: FAMILY MEDICINE

## 2024-04-23 PROCEDURE — 36415 COLL VENOUS BLD VENIPUNCTURE: CPT | Performed by: FAMILY MEDICINE

## 2024-04-23 PROCEDURE — 80048 BASIC METABOLIC PNL TOTAL CA: CPT | Performed by: FAMILY MEDICINE

## 2024-04-23 PROCEDURE — 91320 SARSCV2 VAC 30MCG TRS-SUC IM: CPT | Performed by: FAMILY MEDICINE

## 2024-04-23 PROCEDURE — 99396 PREV VISIT EST AGE 40-64: CPT | Mod: 25 | Performed by: FAMILY MEDICINE

## 2024-04-23 PROCEDURE — 90471 IMMUNIZATION ADMIN: CPT | Performed by: FAMILY MEDICINE

## 2024-04-23 PROCEDURE — 83036 HEMOGLOBIN GLYCOSYLATED A1C: CPT | Performed by: FAMILY MEDICINE

## 2024-04-23 PROCEDURE — 99213 OFFICE O/P EST LOW 20 MIN: CPT | Mod: 25 | Performed by: FAMILY MEDICINE

## 2024-04-23 PROCEDURE — 90746 HEPB VACCINE 3 DOSE ADULT IM: CPT | Performed by: FAMILY MEDICINE

## 2024-04-23 PROCEDURE — 90480 ADMN SARSCOV2 VAC 1/ONLY CMP: CPT | Performed by: FAMILY MEDICINE

## 2024-04-23 SDOH — HEALTH STABILITY: PHYSICAL HEALTH: ON AVERAGE, HOW MANY MINUTES DO YOU ENGAGE IN EXERCISE AT THIS LEVEL?: 0 MIN

## 2024-04-23 SDOH — HEALTH STABILITY: PHYSICAL HEALTH: ON AVERAGE, HOW MANY DAYS PER WEEK DO YOU ENGAGE IN MODERATE TO STRENUOUS EXERCISE (LIKE A BRISK WALK)?: 0 DAYS

## 2024-04-23 ASSESSMENT — ANXIETY QUESTIONNAIRES
2. NOT BEING ABLE TO STOP OR CONTROL WORRYING: SEVERAL DAYS
1. FEELING NERVOUS, ANXIOUS, OR ON EDGE: SEVERAL DAYS
7. FEELING AFRAID AS IF SOMETHING AWFUL MIGHT HAPPEN: NOT AT ALL
6. BECOMING EASILY ANNOYED OR IRRITABLE: NOT AT ALL
IF YOU CHECKED OFF ANY PROBLEMS ON THIS QUESTIONNAIRE, HOW DIFFICULT HAVE THESE PROBLEMS MADE IT FOR YOU TO DO YOUR WORK, TAKE CARE OF THINGS AT HOME, OR GET ALONG WITH OTHER PEOPLE: NOT DIFFICULT AT ALL
GAD7 TOTAL SCORE: 3
5. BEING SO RESTLESS THAT IT IS HARD TO SIT STILL: NOT AT ALL
4. TROUBLE RELAXING: NOT AT ALL
3. WORRYING TOO MUCH ABOUT DIFFERENT THINGS: SEVERAL DAYS
GAD7 TOTAL SCORE: 3
8. IF YOU CHECKED OFF ANY PROBLEMS, HOW DIFFICULT HAVE THESE MADE IT FOR YOU TO DO YOUR WORK, TAKE CARE OF THINGS AT HOME, OR GET ALONG WITH OTHER PEOPLE?: NOT DIFFICULT AT ALL
GAD7 TOTAL SCORE: 3
7. FEELING AFRAID AS IF SOMETHING AWFUL MIGHT HAPPEN: NOT AT ALL

## 2024-04-23 ASSESSMENT — LIFESTYLE VARIABLES
SKIP TO QUESTIONS 9-10: 1
HOW OFTEN DO YOU HAVE SIX OR MORE DRINKS ON ONE OCCASION: NEVER
HOW OFTEN DO YOU HAVE A DRINK CONTAINING ALCOHOL: MONTHLY OR LESS
HOW MANY STANDARD DRINKS CONTAINING ALCOHOL DO YOU HAVE ON A TYPICAL DAY: 1 OR 2
AUDIT-C TOTAL SCORE: 1

## 2024-04-23 ASSESSMENT — SOCIAL DETERMINANTS OF HEALTH (SDOH)
HOW OFTEN DO YOU GET TOGETHER WITH FRIENDS OR RELATIVES?: ONCE A WEEK
HOW OFTEN DO YOU ATTEND CHURCH OR RELIGIOUS SERVICES?: MORE THAN 4 TIMES PER YEAR
DO YOU BELONG TO ANY CLUBS OR ORGANIZATIONS SUCH AS CHURCH GROUPS UNIONS, FRATERNAL OR ATHLETIC GROUPS, OR SCHOOL GROUPS?: YES
HOW OFTEN DO YOU ATTENT MEETINGS OF THE CLUB OR ORGANIZATION YOU BELONG TO?: MORE THAN 4 TIMES PER YEAR
IN A TYPICAL WEEK, HOW MANY TIMES DO YOU TALK ON THE PHONE WITH FAMILY, FRIENDS, OR NEIGHBORS?: THREE TIMES A WEEK
HOW OFTEN DO YOU GET TOGETHER WITH FRIENDS OR RELATIVES?: ONCE A WEEK

## 2024-04-23 NOTE — PATIENT INSTRUCTIONS
Preventive Care Advice   This is general advice given by our system to help you stay healthy. However, your care team may have specific advice just for you. Please talk to your care team about your preventive care needs.  Nutrition  Eat 5 or more servings of fruits and vegetables each day.  Try wheat bread, brown rice and whole grain pasta (instead of white bread, rice, and pasta).  Get enough calcium and vitamin D. Check the label on foods and aim for 100% of the RDA (recommended daily allowance).  Lifestyle  Exercise at least 150 minutes each week   (30 minutes a day, 5 days a week).  Do muscle strengthening activities 2 days a week. These help control your weight and prevent disease.  No smoking.  Wear sunscreen to prevent skin cancer.  Have a dental exam and cleaning every 6 months.  Yearly exams  See your health care team every year to talk about:  Any changes in your health.  Any medicines your care team has prescribed.  Preventive care, family planning, and ways to prevent chronic diseases.  Shots (vaccines)   HPV shots (up to age 26), if you've never had them before.  Hepatitis B shots (up to age 59), if you've never had them before.  COVID-19 shot: Get this shot when it's due.  Flu shot: Get a flu shot every year.  Tetanus shot: Get a tetanus shot every 10 years.  Pneumococcal, hepatitis A, and RSV shots: Ask your care team if you need these based on your risk.  Shingles shot (for age 50 and up).  General health tests  Diabetes screening:  Starting at age 35, Get screened for diabetes at least every 3 years.  If you are younger than age 35, ask your care team if you should be screened for diabetes.  Cholesterol test: At age 39, start having a cholesterol test every 5 years, or more often if advised.  Bone density scan (DEXA): At age 50, ask your care team if you should have this scan for osteoporosis (brittle bones).  Hepatitis C: Get tested at least once in your life.  STIs (sexually transmitted  infections)  Before age 24: Ask your care team if you should be screened for STIs.  After age 24: Get screened for STIs if you're at risk. You are at risk for STIs (including HIV) if:  You are sexually active with more than one person.  You don't use condoms every time.  You or a partner was diagnosed with a sexually transmitted infection.  If you are at risk for HIV, ask about PrEP medicine to prevent HIV.  Get tested for HIV at least once in your life, whether you are at risk for HIV or not.  Cancer screening tests  Cervical cancer screening: If you have a cervix, begin getting regular cervical cancer screening tests at age 21. Most people who have regular screenings with normal results can stop after age 65. Talk about this with your provider.  Breast cancer scan (mammogram): If you've ever had breasts, begin having regular mammograms starting at age 40. This is a scan to check for breast cancer.  Colon cancer screening: It is important to start screening for colon cancer at age 45.  Have a colonoscopy test every 10 years (or more often if you're at risk) Or, ask your provider about stool tests like a FIT test every year or Cologuard test every 3 years.  To learn more about your testing options, visit: https://www.CARGOBR/339748.pdf.  For help making a decision, visit: https://bit.ly/il44279.  Prostate cancer screening test: If you have a prostate and are age 55 to 69, ask your provider if you would benefit from a yearly prostate cancer screening test.  Lung cancer screening: If you are a current or former smoker age 50 to 80, ask your care team if ongoing lung cancer screenings are right for you.  For informational purposes only. Not to replace the advice of your health care provider. Copyright   2023 [employer]. All rights reserved. Clinically reviewed by the [facility]. [employer] [id number] - REV 01/24.

## 2024-04-23 NOTE — COMMUNITY RESOURCES LIST (ENGLISH)
April 23, 2024           YOUR PERSONALIZED LIST OF SERVICES & PROGRAMS           & RECREATION    Sports      [facility] - Summer Sports Camp  [address] [place] [address] (Distance: 1.5 miles)  Phone: [phone]  Website: [email or web address]_care__preschool/summer_programs/[place]/summer_youth_sports  Language: English  Fee: Self pay      [place] - Sports clubs and recreational activities - [facility]  [address] [place] [address] (Distance: 1.5 miles)  Language: English  Fee: Self pay, Sliding scale      [facility] - Adult Enrichment  Phone: [phone]  Website: [email or web address]  Language: English  Hours: Mon 7:30 AM - 4:00 PM Tue 7:30 AM - 4:00 PM Wed 7:30 AM - 4:00 PM Thu 7:30 AM - 4:00 PM Fri 7:30 AM - 4:00 PM    Classes/Groups      [facility] - [facility]  [address] [place] [address] (Distance: 1.0 miles)  Phone: [phone]  Language: English  Fee: Self pay  Accessibility: [facility] Personal training  [address] [place] [address] (Distance: 1.5 miles)  Language: English      [facility] - Care Coordination (Healthcare only)  Phone: [phone]  Website: [email or web address]  Language: English, Argentine  Hours: Wed 9:00 AM - 11:30 AM Thu 1:00 PM - 4:00 PM, 5:30 PM - 7:00 PM               IMPORTANT NUMBERS & WEBSITES        Emergency Services  911  .   United Way  211 http://211unitedway.org  .   Poison Control  (213) 195-3774 http://mnpoison.org http://wisconsinpoison.org  .     Suicide and Crisis Lifeline  988 http://988lifeline.org  .   Childhelp National Child Abuse Hotline  551.247.8715 http://Childhelphotline.org   .   National Sexual Assault Hotline  (841) 138-3268 (HOPE) http://Rainn.org   .     National Runaway Safeline  (395) 949-4229 (RUNAWAY) http://[employer].org  .    Pregnancy & Postpartum Support  Call/text [phone]  [place]: http://ppsupportmn.org  [place]: http://[employer].com/wi  .   Substance Abuse National Helpline ([facility])  535-182-HELP (3637) http://Findtreatment.gov   .                DISCLAIMER: These resources have been generated via the [employer] Platform. [employer] does not endorse any service providers mentioned in this resource list. [employer] does not guarantee that the services mentioned in this resource list will be available to you or will improve your health or wellness.    [facility]

## 2024-04-23 NOTE — COMMUNITY RESOURCES LIST (ENGLISH)
April 23, 2024           YOUR PERSONALIZED LIST OF SERVICES & PROGRAMS           & RECREATION    Sports      [facility] - Summer Sports Camp  [address] [place] [address] (Distance: 1.5 miles)  Phone: [phone]  Website: [email or web address]://[email or web address]/child_care__preschool/summer_programs/[place]/summer_youth_sports  Language: English  Fee: Self pay      [place] - Sports clubs and recreational activities - [facility]  [address] [place] [address] (Distance: 1.5 miles)  Language: English  Fee: Self pay, Sliding scale      [facility] - Adult Enrichment  Phone: [phone]  Website: [email or web address]://[email or web address]/adult-enrichment/  Language: English  Hours: Mon 7:30 AM - 4:00 PM Tue 7:30 AM - 4:00 PM Wed 7:30 AM - 4:00 PM Thu 7:30 AM - 4:00 PM Fri 7:30 AM - 4:00 PM    Classes/Groups      Center - [facility] Therapy  [address] [facility] [address] [place] [address] (Distance: 1.0 miles)  Phone: [phone]  Language: English  Fee: Self pay  Accessibility: [facility] Personal training  [address] [place] [address] (Distance: 1.5 miles)  Language: English      [facility] - Adult Enrichment  Phone: [phone]  Website: [email or web address]-enrichment/  Language: English  Hours: Mon 7:30 AM - 4:00 PM Tue 7:30 AM - 4:00 PM Wed 7:30 AM - 4:00 PM Thu 7:30 AM - 4:00 PM Fri 7:30 AM - 4:00 PM               IMPORTANT NUMBERS & WEBSITES        Emergency Services  911  .   United Way  211 http://211unitedway.org  .   Poison Control  (555) 814-2032 http://mnpoison.org http://wisconsinpoison.org  .     Suicide and Crisis Lifeline  988 http://988lifeline.org  .   Childhelp Cut Off Child Abuse Hotline  379.525.3268 http://Childhelphotline.org   .   National Sexual Assault Hotline  (602) 689-4110 (HOPE) http://Rainn.org   .     National Runaway Safeline  (937) 962-8973   referred to [place] and Rhys Colón .Record needs to be send attn:Carmen [phone] phone ,needs echo CD,   (RUNAWAY) http://Sportgenicrunaway.org  .   Pregnancy & Postpartum Support  Call/text 439-140-6137  [place]: http://ppsupportmn.org  [place]: http://psichapters.com/wi  .   Substance Abuse National Helpline ([facility])  747-682-HELP (5564) http://Findtreatment.gov   .                DISCLAIMER: These resources have been generated via the [employer] Platform. [employer] does not endorse any service providers mentioned in this resource list. [employer] does not guarantee that the services mentioned in this resource list will be available to you or will improve your health or wellness.    [facility]

## 2024-04-23 NOTE — PROGRESS NOTES
Preventive Care Visit  [facility]  April ANGELICA De La Garza MD, Family Medicine  Apr 23, 2024      Assessment & Plan     Routine general medical examination at a health care facility  Exam completed today, routine health maintenance items updated as able.  Labs ordered.  Follow up one year or sooner as needed.  Discussed that IUD can remain in place for up to 8 years now.  With her being on hormones we cannot be sure if she is perimenopausal based on labs, but would suspect not.  IUD inserted for period control, so will monitor for return of cycles given we are approaching 5 years in July 2024.  Can remove at any time, and since normal cycles could take up to a year to return, there is no need to remove this time.  - Hemoglobin A1c; Future  - Basic metabolic panel  (Ca, Cl, CO2, Creat, Gluc, K, Na, BUN); Future  - Hemoglobin A1c  - Basic metabolic panel  (Ca, Cl, CO2, Creat, Gluc, K, Na, BUN)    Class 2 obesity due to excess calories without serious comorbidity with body mass index (BMI) of 36.0 to 36.9 in adult  Will try to start Wegovy, consider Saxenda if not able to find.  Consider Contrave if not able to get GLP-1 covered.  - Semaglutide-Weight Management (WEGOVY) 0.25 MG/0.5ML pen; Inject 0.25 mg Subcutaneous once a week    Low HDL (under 40)  - Lipid panel reflex to direct LDL Fasting; Future  - Lipid panel reflex to direct LDL Fasting    Major depressive disorder, recurrent episode, moderate (H)  Stable, continue current medication.    Cervical cancer screening  - Pap screen with HPV - recommended age 30 - 65 years    Need for vaccination  - HEPATITIS B VACCINE,  ADULT (ENGERIX-B/RECOMBIVAX HB)  - COVID-19 12+ (2023-24) (PFIZER)        40 minutes spent by me on the date of the encounter doing chart review, history and exam, documentation and further activities per the note      BMI  Estimated body mass index is 36.18 kg/m  as calculated from the following:    Height as of this  "encounter: 1.702 m (5' 7\").    Weight as of this encounter: 104.8 kg (231 lb).   Weight management plan: Discussed healthy diet and exercise guidelines    Counseling  Appropriate preventive services were discussed with this patient, including applicable screening as appropriate for fall prevention, nutrition, physical activity, Tobacco-use cessation, weight loss and cognition.  Checklist reviewing preventive services available has been given to the patient.  Reviewed patient's diet, addressing concerns and/or questions.       See Patient Instructions    Subjective   Marion is a 48 year old, presenting for the following:  Physical        4/23/2024     3:06 PM   Additional Questions   Roomed by Davy BEAVERS CMA         4/23/2024     3:24 PM   Patient Reported Additional Medications   Patient reports taking the following new medications psyllium fiber capsules  one daily/ B12 1000 MCG, D3 5000 IU/ Antidiarrhea every morning          HPI     Patient would like to get her pap done today and take the IUD out, wondering if she is perimenopausal. HOLD    Patient would like to discuss weight loss medication.      Patient is fasting for labs today    Seeing a therapist for body dysmorphia, looking to get started on medication.  She had gastric sleeve in past.    Has a possible bartholin gland cyst, was painful last week.        4/23/2024   General Health   How would you rate your overall physical health? (!) FAIR   Feel stress (tense, anxious, or unable to sleep) Not at all    Not at all         4/23/2024   Nutrition   Three or more servings of calcium each day? (!) NO   Diet: Regular (no restrictions)   How many servings of fruit and vegetables per day? (!) 0-1   How many sweetened beverages each day? 0-1         4/23/2024   Exercise   Days per week of moderate/strenous exercise 0 days    0 days   Average minutes spent exercising at this level 0 min    0 min   (!) EXERCISE CONCERN      4/23/2024   Social Factors   Frequency of " gathering with friends or relatives Once a week    Once a week   Worry food won't last until get money to buy more No    No   Food not last or not have enough money for food? No    No   Do you have housing?  Yes    Yes   Are you worried about losing your housing? No    No   Lack of transportation? No    No   Unable to get utilities (heat,electricity)? No    No         2024   Dental   Dentist two times every year? Yes         2024   TB Screening   Were you born outside of the [place]? No       Today's PHQ-9 Score:       2024    11:56 AM   PHQ-9 SCORE   PHQ-9 Total Score MyChart 2 (Minimal depression)   PHQ-9 Total Score 2         2024   Substance Use   Frequency of drinking alcohol? Monthly or less   Alcohol more than 3/day or more than 7/wk No   Do you use any other substances recreationally? No     Social History     Tobacco Use    Smoking status: Former     Current packs/day: 0.00     Average packs/day: 1 pack/day for 10.0 years (10.0 ttl pk-yrs)     Types: Cigarettes     Start date: 1996     Quit date: 2/10/2003     Years since quittin.2    Smokeless tobacco: Never    Tobacco comments:     Glad to be done   Vaping Use    Vaping status: Never Used   Substance Use Topics    Alcohol use: Yes     Comment: occas    Drug use: No             2024   Breast Cancer Screening   Family history of breast, colon, or ovarian cancer? Yes         2024   LAST FHS-7 RESULTS   1st degree relative breast or ovarian cancer No   Any relative bilateral breast cancer Unknown   Any male have breast cancer No   Any ONE woman have BOTH breast AND ovarian cancer No   Any woman with breast cancer before 50yrs No   2 or more relatives with breast AND/OR ovarian cancer No   2 or more relatives with breast AND/OR bowel cancer No        Mammogram Screening - Mammogram every 1-2 years updated in Health Maintenance based on mutual decision making        2024   STI Screening   New sexual partner(s) since last  STI/HIV test? No     History of abnormal Pap smear: NO - age 30-65 PAP every 5 years with negative HPV co-testing recommended        Latest Ref Rng & Units 12/26/2018    11:45 AM 12/26/2018     9:40 AM   PAP / HPV   PAP (Historical)  NIL     HPV 16 DNA NEG^Negative  Negative    HPV 18 DNA NEG^Negative  Negative    Other HR HPV NEG^Negative  Negative      ASCVD Risk   The 10-year ASCVD risk score (Sheryl GONZALEZ, et al., 2019) is: 1.5%    Values used to calculate the score:      Age: 48 years      Sex: Female      Is Non- : No      Diabetic: No      Tobacco smoker: No      Systolic Blood Pressure: 120 mmHg      Is BP treated: Yes      HDL Cholesterol: 38 mg/dL      Total Cholesterol: 174 mg/dL        4/23/2024   Contraception/Family Planning   Questions about contraception or family planning (!) YES IUD removal?        Reviewed and updated as needed this visit by Provider                    Past Medical History:   Diagnosis Date    Abdominal pain, epigastric 7/11/2022    Acute pancreatitis without infection or necrosis, unspecified pancreatitis type 7/11/2022    Anxiety Ongoing since 18    Depressive disorder on going since 18    Earache or other ear, nose, or throat complaint 12    Tonsillectomy 7/02    Esophageal reflux     Had stopped once I got my sleep apnea mask 5/18    Herpes simplex without mention of complication     Hypertension     While Pregnant    Hypothyroidism 8/7/2012    Intertrigo 7/18/2018    Presbyacusis     Presbycusis    Sleep apnea     STD (sexually transmitted disease)     Herpes    Urinary incontinence     Bladder sling surgery 7/17     Past Surgical History:   Procedure Laterality Date    ENT SURGERY      GENITOURINARY SURGERY  2017    bladder sling    LAPAROSCOPIC CHOLECYSTECTOMY WITH CHOLANGIOGRAMS N/A 7/12/2022    Procedure: LAPAROSCOPIC CHOLECYSTECTOMY WITH CHOLANGIOGRAM;  Surgeon: Era Villela MD;  Location: [facility] OR    LAPAROSCOPIC GASTRIC SLEEVE N/A  10/15/2019    Procedure: LAPAROSCOPIC GASTRIC SLEEVE;  Surgeon: Cesar Lee MD;  Location: [facility] OR    PE TUBES      TONSILLECTOMY      TYMPANOPLASTY, RT/LT      Tympanoplasty RT/LT     Lab work is in process  Labs reviewed in EPIC  BP Readings from Last 3 Encounters:   24 120/76   23 124/78   10/04/23 110/70    Wt Readings from Last 3 Encounters:   24 104.8 kg (231 lb)   23 105.5 kg (232 lb 9.4 oz)   10/04/23 105.1 kg (231 lb 12.8 oz)                  Patient Active Problem List   Diagnosis    Presbycusis    Hypothyroidism    Major depressive disorder, recurrent episode, moderate (H)    Anxiety    CLARK (obstructive sleep apnea)    Low HDL (under 40)    Benign essential hypertension    Bloody stool    Class 1 obesity due to excess calories without serious comorbidity with body mass index (BMI) of 34.0 to 34.9 in adult    Disordered eating    S/P laparoscopic sleeve gastrectomy    S/P cholecystectomy    Postsurgical malabsorption    Morbid obesity (H)    Loose stools    Polyp of duodenum    Preop general physical exam     Past Surgical History:   Procedure Laterality Date    ENT SURGERY      GENITOURINARY SURGERY      bladder sling    LAPAROSCOPIC CHOLECYSTECTOMY WITH CHOLANGIOGRAMS N/A 2022    Procedure: LAPAROSCOPIC CHOLECYSTECTOMY WITH CHOLANGIOGRAM;  Surgeon: Era Villela MD;  Location: [facility] OR    LAPAROSCOPIC GASTRIC SLEEVE N/A 10/15/2019    Procedure: LAPAROSCOPIC GASTRIC SLEEVE;  Surgeon: Cesar Lee MD;  Location: [facility] OR    PE TUBES      TONSILLECTOMY      TYMPANOPLASTY, RT/LT      Tympanoplasty RT/LT       Social History     Tobacco Use    Smoking status: Former     Current packs/day: 0.00     Average packs/day: 1 pack/day for 10.0 years (10.0 ttl pk-yrs)     Types: Cigarettes     Start date: 1996     Quit date: 2/10/2003     Years since quittin.2    Smokeless tobacco: Never    Tobacco comments:     Glad to be done   Substance Use Topics    Alcohol use: Yes      Comment: occas     Family History   Problem Relation Age of Onset    Peripheral Vascular Disease Mother     Mental Illness Mother     Hypertension Father          08    Obesity Father     Gout Father     Cerebrovascular Disease Maternal Grandfather     Dementia Paternal Grandmother     Coronary Artery Disease Paternal Grandfather     Breast Cancer Maternal Grandmother          Current Outpatient Medications   Medication Sig Dispense Refill    Biotin w/ Vitamins C & E (HAIR SKIN & NAILS GUMMIES PO) Take 2 chew tab by mouth daily      childrens multivitamin w/iron (FLINTSTONES COMPLETE) chewable tablet Take 2 chew tab by mouth daily      colestipol (COLESTID) 1 g tablet Take 1 g by mouth daily      fluticasone (FLONASE) 50 MCG/ACT nasal spray Spray 2 sprays into both nostrils daily      levocetirizine (XYZAL) 5 MG tablet Take 10 mg by mouth daily      levonorgestrel (MIRENA) 20 MCG/24HR IUD 1 each (20 mcg) by Intrauterine route continuous      lisinopril (ZESTRIL) 5 MG tablet Take 1 tablet (5 mg) by mouth daily 90 tablet 0    Semaglutide-Weight Management (WEGOVY) 0.25 MG/0.5ML pen Inject 0.25 mg Subcutaneous once a week 2 mL 0    venlafaxine (EFFEXOR XR) 37.5 MG 24 hr capsule Take 1 capsule (37.5 mg) by mouth daily 90 capsule 0     Allergies   Allergen Reactions    Dog Epithelium (Canis Lupus Familiaris) Itching    Dogs     Lactose Diarrhea    Nsaids      Bariatric surgery     Recent Labs   Lab Test 24  1637 23  1446 10/01/22  1011 22  1049 22  0520 22  1616 22  1616 21  0832 10/15/19  0630 10/07/19  1152 19  1130 19  1531 18  0952   A1C 5.5  --   --   --   --   --   --   --   --   --   --  5.3  --    LDL  --   --  109*  --   --   --   --   --   --   --   --   --  98   HDL  --   --  38*  --   --   --   --   --   --   --   --   --  24*   TRIG  --   --  136  --  88  --   --   --   --   --   --   --  119   ALT  --   --   --  39* 177*  --  59* 21   "--  79*  --  44 44   CR  --  0.86  --   --  0.67   < > 0.69 0.70  --  0.68   < > 0.88 0.80   GFRESTIMATED  --  84  --   --  >90   < > >90 >90  --  >90   < > 82 >90   GFRESTBLACK  --   --   --   --   --   --   --  >90  --  >90   < > >90 >90   POTASSIUM  --  4.4  --   --  3.7   < > 4.2 3.7   < > 4.1   < > 3.7 3.9   TSH  --   --  1.48  --   --   --   --   --   --   --   --   --   --     < > = values in this interval not displayed.           Objective    Exam  /76 (BP Location: Right arm, Patient Position: Sitting, Cuff Size: Adult Large)   Pulse 66   Temp 97.8  F (36.6  C) (Oral)   Resp 17   Ht 1.702 m (5' 7\")   Wt 104.8 kg (231 lb)   SpO2 98%   BMI 36.18 kg/m     Estimated body mass index is 36.18 kg/m  as calculated from the following:    Height as of this encounter: 1.702 m (5' 7\").    Weight as of this encounter: 104.8 kg (231 lb).    Physical Exam  GENERAL: alert and no distress  EYES: Eyes grossly normal to inspection, PERRL and conjunctivae and sclerae normal  HENT: ear canals and TM's normal, nose and mouth without ulcers or lesions  NECK: no adenopathy, no asymmetry, masses, or scars  RESP: lungs clear to auscultation - no rales, rhonchi or wheezes  BREAST: Palpable mass in right breast at 6'oclock position, benign cyst per US 11/2023.  Normal left breast  CV: regular rate and rhythm, normal S1 S2, no S3 or S4, no murmur, click or rub, no peripheral edema  ABDOMEN: soft, nontender, no hepatosplenomegaly, no masses and bowel sounds normal   (female): normal female external genitalia, normal urethral meatus, normal vaginal mucosa, normal cervix, IUD strings visualized  MS: no gross musculoskeletal defects noted, no edema  SKIN: no suspicious lesions or rashes  NEURO: Normal strength and tone, mentation intact and speech normal  PSYCH: mentation appears normal, affect normal/bright        Signed Electronically by: Christy De La Garza MD    Answers submitted by the patient for this visit:  Patient " Health Questionnaire (Submitted on 4/23/2024)  If you checked off any problems, how difficult have these problems made it for you to do your work, take care of things at home, or get along with other people?: Not difficult at all  PHQ9 TOTAL SCORE: 2  HENRI-7 (Submitted on 4/23/2024)  HENRI 7 TOTAL SCORE: 3

## 2024-04-24 LAB
ANION GAP SERPL CALCULATED.3IONS-SCNC: 12 MMOL/L (ref 7–15)
BUN SERPL-MCNC: 11.9 MG/DL (ref 6–20)
CALCIUM SERPL-MCNC: 9.7 MG/DL (ref 8.6–10)
CHLORIDE SERPL-SCNC: 102 MMOL/L (ref 98–107)
CHOLEST SERPL-MCNC: 179 MG/DL
CREAT SERPL-MCNC: 0.73 MG/DL (ref 0.51–0.95)
DEPRECATED HCO3 PLAS-SCNC: 23 MMOL/L (ref 22–29)
EGFRCR SERPLBLD CKD-EPI 2021: >90 ML/MIN/1.73M2
FASTING STATUS PATIENT QL REPORTED: YES
GLUCOSE SERPL-MCNC: 85 MG/DL (ref 70–99)
HDLC SERPL-MCNC: 40 MG/DL
LDLC SERPL CALC-MCNC: 116 MG/DL
NONHDLC SERPL-MCNC: 139 MG/DL
POTASSIUM SERPL-SCNC: 3.9 MMOL/L (ref 3.4–5.3)
SODIUM SERPL-SCNC: 137 MMOL/L (ref 135–145)
TRIGL SERPL-MCNC: 117 MG/DL

## 2024-04-28 LAB
BKR LAB AP GYN ADEQUACY: NORMAL
BKR LAB AP GYN INTERPRETATION: NORMAL
BKR LAB AP HPV REFLEX: NORMAL
BKR LAB AP PREVIOUS ABNORMAL: NORMAL
PATH REPORT.COMMENTS IMP SPEC: NORMAL
PATH REPORT.COMMENTS IMP SPEC: NORMAL
PATH REPORT.RELEVANT HX SPEC: NORMAL

## 2024-04-29 LAB
HUMAN PAPILLOMA VIRUS 16 DNA: NEGATIVE
HUMAN PAPILLOMA VIRUS 18 DNA: NEGATIVE
HUMAN PAPILLOMA VIRUS FINAL DIAGNOSIS: NORMAL
HUMAN PAPILLOMA VIRUS OTHER HR: NEGATIVE

## 2024-04-29 NOTE — TELEPHONE ENCOUNTER
Patient Quality Outreach    Patient is due for the following:   Cervical Cancer Screening - PAP Needed  Physical Preventive Adult Physical  There are no preventive care reminders to display for this patient.    Next Steps:   No follow up needed at this time.    Type of outreach:    Chart review performed, no outreach needed.      Questions for provider review:    None           Davy García, SHWETHA

## 2024-05-08 DIAGNOSIS — E66.812 CLASS 2 OBESITY DUE TO EXCESS CALORIES WITHOUT SERIOUS COMORBIDITY WITH BODY MASS INDEX (BMI) OF 36.0 TO 36.9 IN ADULT: ICD-10-CM

## 2024-05-08 DIAGNOSIS — E66.09 CLASS 2 OBESITY DUE TO EXCESS CALORIES WITHOUT SERIOUS COMORBIDITY WITH BODY MASS INDEX (BMI) OF 36.0 TO 36.9 IN ADULT: ICD-10-CM

## 2024-05-09 ENCOUNTER — MYC MEDICAL ADVICE (OUTPATIENT)
Dept: FAMILY MEDICINE | Facility: CLINIC | Age: 49
End: 2024-05-09
Payer: COMMERCIAL

## 2024-05-09 DIAGNOSIS — E66.812 CLASS 2 OBESITY DUE TO EXCESS CALORIES WITHOUT SERIOUS COMORBIDITY WITH BODY MASS INDEX (BMI) OF 36.0 TO 36.9 IN ADULT: Primary | ICD-10-CM

## 2024-05-09 DIAGNOSIS — E66.09 CLASS 2 OBESITY DUE TO EXCESS CALORIES WITHOUT SERIOUS COMORBIDITY WITH BODY MASS INDEX (BMI) OF 36.0 TO 36.9 IN ADULT: Primary | ICD-10-CM

## 2024-05-09 RX ORDER — SEMAGLUTIDE 0.25 MG/.5ML
INJECTION, SOLUTION SUBCUTANEOUS
Qty: 2 ML | Refills: 0 | OUTPATIENT
Start: 2024-05-09

## 2024-05-16 NOTE — TELEPHONE ENCOUNTER
Dr. Janet De La Garza- see [employer] message below.  T'd up.  Please review and advise.  Wanda Lira RN

## 2024-06-11 ENCOUNTER — APPOINTMENT (OUTPATIENT)
Dept: CT IMAGING | Facility: CLINIC | Age: 49
End: 2024-06-11
Attending: EMERGENCY MEDICINE
Payer: COMMERCIAL

## 2024-06-11 ENCOUNTER — APPOINTMENT (OUTPATIENT)
Dept: GENERAL RADIOLOGY | Facility: CLINIC | Age: 49
End: 2024-06-11
Attending: STUDENT IN AN ORGANIZED HEALTH CARE EDUCATION/TRAINING PROGRAM
Payer: COMMERCIAL

## 2024-06-11 ENCOUNTER — HOSPITAL ENCOUNTER (OUTPATIENT)
Facility: CLINIC | Age: 49
Setting detail: OBSERVATION
Discharge: HOME | End: 2024-06-11
Attending: EMERGENCY MEDICINE | Admitting: STUDENT IN AN ORGANIZED HEALTH CARE EDUCATION/TRAINING PROGRAM
Payer: COMMERCIAL

## 2024-06-11 ENCOUNTER — ANESTHESIA (OUTPATIENT)
Dept: SURGERY | Facility: CLINIC | Age: 49
End: 2024-06-11
Payer: COMMERCIAL

## 2024-06-11 ENCOUNTER — ANESTHESIA EVENT (OUTPATIENT)
Dept: SURGERY | Facility: CLINIC | Age: 49
End: 2024-06-11
Payer: COMMERCIAL

## 2024-06-11 VITALS
TEMPERATURE: 97.4 F | OXYGEN SATURATION: 96 % | SYSTOLIC BLOOD PRESSURE: 129 MMHG | HEART RATE: 92 BPM | HEIGHT: 67 IN | WEIGHT: 223.77 LBS | RESPIRATION RATE: 18 BRPM | BODY MASS INDEX: 35.12 KG/M2 | DIASTOLIC BLOOD PRESSURE: 83 MMHG

## 2024-06-11 DIAGNOSIS — N20.0 KIDNEY STONE: ICD-10-CM

## 2024-06-11 DIAGNOSIS — R11.2 NAUSEA AND VOMITING, UNSPECIFIED VOMITING TYPE: ICD-10-CM

## 2024-06-11 DIAGNOSIS — N20.1 OBSTRUCTION OF RIGHT URETEROPELVIC JUNCTION (UPJ) DUE TO STONE: Primary | ICD-10-CM

## 2024-06-11 DIAGNOSIS — N20.0 RIGHT KIDNEY STONE: ICD-10-CM

## 2024-06-11 LAB
ALBUMIN UR-MCNC: 30 MG/DL
ANION GAP SERPL CALCULATED.3IONS-SCNC: 10 MMOL/L (ref 7–15)
ANION GAP SERPL CALCULATED.3IONS-SCNC: 14 MMOL/L (ref 7–15)
APPEARANCE UR: CLEAR
BASOPHILS # BLD AUTO: 0.1 10E3/UL (ref 0–0.2)
BASOPHILS NFR BLD AUTO: 1 %
BILIRUB UR QL STRIP: NEGATIVE
BUN SERPL-MCNC: 10.2 MG/DL (ref 6–20)
BUN SERPL-MCNC: 10.5 MG/DL (ref 6–20)
CALCIUM SERPL-MCNC: 8.9 MG/DL (ref 8.6–10)
CALCIUM SERPL-MCNC: 9.8 MG/DL (ref 8.6–10)
CHLORIDE SERPL-SCNC: 103 MMOL/L (ref 98–107)
CHLORIDE SERPL-SCNC: 107 MMOL/L (ref 98–107)
COLOR UR AUTO: ABNORMAL
CREAT SERPL-MCNC: 0.73 MG/DL (ref 0.51–0.95)
CREAT SERPL-MCNC: 0.75 MG/DL (ref 0.51–0.95)
DEPRECATED HCO3 PLAS-SCNC: 22 MMOL/L (ref 22–29)
DEPRECATED HCO3 PLAS-SCNC: 23 MMOL/L (ref 22–29)
EGFRCR SERPLBLD CKD-EPI 2021: >90 ML/MIN/1.73M2
EGFRCR SERPLBLD CKD-EPI 2021: >90 ML/MIN/1.73M2
EOSINOPHIL # BLD AUTO: 0.4 10E3/UL (ref 0–0.7)
EOSINOPHIL NFR BLD AUTO: 4 %
ERYTHROCYTE [DISTWIDTH] IN BLOOD BY AUTOMATED COUNT: 12.6 % (ref 10–15)
GLUCOSE SERPL-MCNC: 87 MG/DL (ref 70–99)
GLUCOSE SERPL-MCNC: 95 MG/DL (ref 70–99)
GLUCOSE UR STRIP-MCNC: NEGATIVE MG/DL
HCG UR QL: NEGATIVE
HCT VFR BLD AUTO: 44.4 % (ref 35–47)
HGB BLD-MCNC: 15 G/DL (ref 11.7–15.7)
HGB UR QL STRIP: ABNORMAL
HOLD SPECIMEN: NORMAL
IMM GRANULOCYTES # BLD: 0 10E3/UL
IMM GRANULOCYTES NFR BLD: 0 %
KETONES UR STRIP-MCNC: ABNORMAL MG/DL
LEUKOCYTE ESTERASE UR QL STRIP: NEGATIVE
LYMPHOCYTES # BLD AUTO: 3 10E3/UL (ref 0.8–5.3)
LYMPHOCYTES NFR BLD AUTO: 31 %
MCH RBC QN AUTO: 29.9 PG (ref 26.5–33)
MCHC RBC AUTO-ENTMCNC: 33.8 G/DL (ref 31.5–36.5)
MCV RBC AUTO: 88 FL (ref 78–100)
MONOCYTES # BLD AUTO: 0.6 10E3/UL (ref 0–1.3)
MONOCYTES NFR BLD AUTO: 6 %
MUCOUS THREADS #/AREA URNS LPF: PRESENT /LPF
NEUTROPHILS # BLD AUTO: 5.5 10E3/UL (ref 1.6–8.3)
NEUTROPHILS NFR BLD AUTO: 58 %
NITRATE UR QL: NEGATIVE
NRBC # BLD AUTO: 0 10E3/UL
NRBC BLD AUTO-RTO: 0 /100
PH UR STRIP: 5.5 [PH] (ref 5–7)
PLATELET # BLD AUTO: 363 10E3/UL (ref 150–450)
POTASSIUM SERPL-SCNC: 4 MMOL/L (ref 3.4–5.3)
POTASSIUM SERPL-SCNC: 4 MMOL/L (ref 3.4–5.3)
RBC # BLD AUTO: 5.02 10E6/UL (ref 3.8–5.2)
RBC URINE: >182 /HPF
SODIUM SERPL-SCNC: 139 MMOL/L (ref 135–145)
SODIUM SERPL-SCNC: 140 MMOL/L (ref 135–145)
SP GR UR STRIP: 1.02 (ref 1–1.03)
SQUAMOUS EPITHELIAL: <1 /HPF
UROBILINOGEN UR STRIP-MCNC: NORMAL MG/DL
WBC # BLD AUTO: 9.6 10E3/UL (ref 4–11)
WBC URINE: 5 /HPF

## 2024-06-11 PROCEDURE — 99207 PR APP CREDIT; MD BILLING SHARED VISIT: CPT | Performed by: INTERNAL MEDICINE

## 2024-06-11 PROCEDURE — 250N000009 HC RX 250: Performed by: NURSE ANESTHETIST, CERTIFIED REGISTERED

## 2024-06-11 PROCEDURE — 74176 CT ABD & PELVIS W/O CONTRAST: CPT

## 2024-06-11 PROCEDURE — 81025 URINE PREGNANCY TEST: CPT | Performed by: EMERGENCY MEDICINE

## 2024-06-11 PROCEDURE — 250N000011 HC RX IP 250 OP 636: Mod: JZ | Performed by: INTERNAL MEDICINE

## 2024-06-11 PROCEDURE — 36415 COLL VENOUS BLD VENIPUNCTURE: CPT | Performed by: EMERGENCY MEDICINE

## 2024-06-11 PROCEDURE — 250N000009 HC RX 250: Performed by: STUDENT IN AN ORGANIZED HEALTH CARE EDUCATION/TRAINING PROGRAM

## 2024-06-11 PROCEDURE — 96375 TX/PRO/DX INJ NEW DRUG ADDON: CPT

## 2024-06-11 PROCEDURE — 81001 URINALYSIS AUTO W/SCOPE: CPT | Performed by: EMERGENCY MEDICINE

## 2024-06-11 PROCEDURE — 370N000017 HC ANESTHESIA TECHNICAL FEE, PER MIN: Performed by: STUDENT IN AN ORGANIZED HEALTH CARE EDUCATION/TRAINING PROGRAM

## 2024-06-11 PROCEDURE — 250N000013 HC RX MED GY IP 250 OP 250 PS 637: Performed by: INTERNAL MEDICINE

## 2024-06-11 PROCEDURE — 85025 COMPLETE CBC W/AUTO DIFF WBC: CPT | Performed by: EMERGENCY MEDICINE

## 2024-06-11 PROCEDURE — C1769 GUIDE WIRE: HCPCS | Performed by: STUDENT IN AN ORGANIZED HEALTH CARE EDUCATION/TRAINING PROGRAM

## 2024-06-11 PROCEDURE — 74420 UROGRAPHY RTRGR +-KUB: CPT | Mod: 26 | Performed by: STUDENT IN AN ORGANIZED HEALTH CARE EDUCATION/TRAINING PROGRAM

## 2024-06-11 PROCEDURE — 272N000001 HC OR GENERAL SUPPLY STERILE: Performed by: STUDENT IN AN ORGANIZED HEALTH CARE EDUCATION/TRAINING PROGRAM

## 2024-06-11 PROCEDURE — 258N000003 HC RX IP 258 OP 636: Mod: JZ | Performed by: ANESTHESIOLOGY

## 2024-06-11 PROCEDURE — C1758 CATHETER, URETERAL: HCPCS | Performed by: STUDENT IN AN ORGANIZED HEALTH CARE EDUCATION/TRAINING PROGRAM

## 2024-06-11 PROCEDURE — 360N000083 HC SURGERY LEVEL 3 W/ FLUORO, PER MIN: Performed by: STUDENT IN AN ORGANIZED HEALTH CARE EDUCATION/TRAINING PROGRAM

## 2024-06-11 PROCEDURE — 80048 BASIC METABOLIC PNL TOTAL CA: CPT | Performed by: EMERGENCY MEDICINE

## 2024-06-11 PROCEDURE — 99235 HOSP IP/OBS SAME DATE MOD 70: CPT | Performed by: INTERNAL MEDICINE

## 2024-06-11 PROCEDURE — 99285 EMERGENCY DEPT VISIT HI MDM: CPT | Mod: 25

## 2024-06-11 PROCEDURE — 250N000013 HC RX MED GY IP 250 OP 250 PS 637: Performed by: EMERGENCY MEDICINE

## 2024-06-11 PROCEDURE — 96374 THER/PROPH/DIAG INJ IV PUSH: CPT

## 2024-06-11 PROCEDURE — 36415 COLL VENOUS BLD VENIPUNCTURE: CPT | Performed by: INTERNAL MEDICINE

## 2024-06-11 PROCEDURE — C2617 STENT, NON-COR, TEM W/O DEL: HCPCS | Performed by: STUDENT IN AN ORGANIZED HEALTH CARE EDUCATION/TRAINING PROGRAM

## 2024-06-11 PROCEDURE — 258N000003 HC RX IP 258 OP 636: Mod: JZ | Performed by: EMERGENCY MEDICINE

## 2024-06-11 PROCEDURE — 255N000002 HC RX 255 OP 636: Mod: JZ | Performed by: STUDENT IN AN ORGANIZED HEALTH CARE EDUCATION/TRAINING PROGRAM

## 2024-06-11 PROCEDURE — 258N000003 HC RX IP 258 OP 636: Mod: JZ | Performed by: INTERNAL MEDICINE

## 2024-06-11 PROCEDURE — 258N000001 HC RX 258: Performed by: STUDENT IN AN ORGANIZED HEALTH CARE EDUCATION/TRAINING PROGRAM

## 2024-06-11 PROCEDURE — 258N000003 HC RX IP 258 OP 636: Mod: JZ | Performed by: NURSE ANESTHETIST, CERTIFIED REGISTERED

## 2024-06-11 PROCEDURE — 250N000011 HC RX IP 250 OP 636: Performed by: EMERGENCY MEDICINE

## 2024-06-11 PROCEDURE — 52332 CYSTOSCOPY AND TREATMENT: CPT | Mod: RT | Performed by: STUDENT IN AN ORGANIZED HEALTH CARE EDUCATION/TRAINING PROGRAM

## 2024-06-11 PROCEDURE — 99204 OFFICE O/P NEW MOD 45 MIN: CPT | Mod: 25 | Performed by: STUDENT IN AN ORGANIZED HEALTH CARE EDUCATION/TRAINING PROGRAM

## 2024-06-11 PROCEDURE — 999N000141 HC STATISTIC PRE-PROCEDURE NURSING ASSESSMENT: Performed by: STUDENT IN AN ORGANIZED HEALTH CARE EDUCATION/TRAINING PROGRAM

## 2024-06-11 PROCEDURE — 999N000179 XR SURGERY CARM FLUORO LESS THAN 5 MIN W STILLS: Mod: TC

## 2024-06-11 PROCEDURE — 710N000009 HC RECOVERY PHASE 1, LEVEL 1, PER MIN: Performed by: STUDENT IN AN ORGANIZED HEALTH CARE EDUCATION/TRAINING PROGRAM

## 2024-06-11 PROCEDURE — 250N000011 HC RX IP 250 OP 636: Mod: JZ | Performed by: NURSE ANESTHETIST, CERTIFIED REGISTERED

## 2024-06-11 PROCEDURE — G0378 HOSPITAL OBSERVATION PER HR: HCPCS

## 2024-06-11 PROCEDURE — 250N000011 HC RX IP 250 OP 636: Performed by: STUDENT IN AN ORGANIZED HEALTH CARE EDUCATION/TRAINING PROGRAM

## 2024-06-11 PROCEDURE — 80048 BASIC METABOLIC PNL TOTAL CA: CPT | Mod: 91 | Performed by: INTERNAL MEDICINE

## 2024-06-11 PROCEDURE — 710N000012 HC RECOVERY PHASE 2, PER MINUTE: Performed by: STUDENT IN AN ORGANIZED HEALTH CARE EDUCATION/TRAINING PROGRAM

## 2024-06-11 PROCEDURE — 96375 TX/PRO/DX INJ NEW DRUG ADDON: CPT | Mod: 59

## 2024-06-11 PROCEDURE — 250N000025 HC SEVOFLURANE, PER MIN: Performed by: STUDENT IN AN ORGANIZED HEALTH CARE EDUCATION/TRAINING PROGRAM

## 2024-06-11 DEVICE — URETERAL STENT
Type: IMPLANTABLE DEVICE | Site: ABDOMEN | Status: NON-FUNCTIONAL
Brand: POLARIS™ ULTRA
Removed: 2024-06-21

## 2024-06-11 RX ORDER — PROPOFOL 10 MG/ML
INJECTION, EMULSION INTRAVENOUS PRN
Status: DISCONTINUED | OUTPATIENT
Start: 2024-06-11 | End: 2024-06-11

## 2024-06-11 RX ORDER — DEXAMETHASONE SODIUM PHOSPHATE 4 MG/ML
4 INJECTION, SOLUTION INTRA-ARTICULAR; INTRALESIONAL; INTRAMUSCULAR; INTRAVENOUS; SOFT TISSUE
Status: DISCONTINUED | OUTPATIENT
Start: 2024-06-11 | End: 2024-06-11 | Stop reason: HOSPADM

## 2024-06-11 RX ORDER — ONDANSETRON 4 MG/1
4 TABLET, ORALLY DISINTEGRATING ORAL EVERY 30 MIN PRN
Status: CANCELLED | OUTPATIENT
Start: 2024-06-11

## 2024-06-11 RX ORDER — NALOXONE HYDROCHLORIDE 0.4 MG/ML
0.1 INJECTION, SOLUTION INTRAMUSCULAR; INTRAVENOUS; SUBCUTANEOUS
Status: DISCONTINUED | OUTPATIENT
Start: 2024-06-11 | End: 2024-06-11 | Stop reason: HOSPADM

## 2024-06-11 RX ORDER — MORPHINE SULFATE 4 MG/ML
4 INJECTION, SOLUTION INTRAMUSCULAR; INTRAVENOUS
Status: DISCONTINUED | OUTPATIENT
Start: 2024-06-11 | End: 2024-06-11

## 2024-06-11 RX ORDER — CEFAZOLIN SODIUM/WATER 2 G/20 ML
2 SYRINGE (ML) INTRAVENOUS SEE ADMIN INSTRUCTIONS
Status: DISCONTINUED | OUTPATIENT
Start: 2024-06-11 | End: 2024-06-11 | Stop reason: HOSPADM

## 2024-06-11 RX ORDER — SODIUM CHLORIDE, SODIUM LACTATE, POTASSIUM CHLORIDE, CALCIUM CHLORIDE 600; 310; 30; 20 MG/100ML; MG/100ML; MG/100ML; MG/100ML
INJECTION, SOLUTION INTRAVENOUS CONTINUOUS
Status: DISCONTINUED | OUTPATIENT
Start: 2024-06-11 | End: 2024-06-11 | Stop reason: HOSPADM

## 2024-06-11 RX ORDER — NALOXONE HYDROCHLORIDE 0.4 MG/ML
0.2 INJECTION, SOLUTION INTRAMUSCULAR; INTRAVENOUS; SUBCUTANEOUS
Status: DISCONTINUED | OUTPATIENT
Start: 2024-06-11 | End: 2024-06-11 | Stop reason: HOSPADM

## 2024-06-11 RX ORDER — FLUTICASONE PROPIONATE 50 MCG
2 SPRAY, SUSPENSION (ML) NASAL DAILY PRN
Status: DISCONTINUED | OUTPATIENT
Start: 2024-06-11 | End: 2024-06-11 | Stop reason: HOSPADM

## 2024-06-11 RX ORDER — AMOXICILLIN 250 MG
2 CAPSULE ORAL 2 TIMES DAILY PRN
Status: DISCONTINUED | OUTPATIENT
Start: 2024-06-11 | End: 2024-06-11 | Stop reason: HOSPADM

## 2024-06-11 RX ORDER — NALOXONE HYDROCHLORIDE 0.4 MG/ML
0.1 INJECTION, SOLUTION INTRAMUSCULAR; INTRAVENOUS; SUBCUTANEOUS
Status: CANCELLED | OUTPATIENT
Start: 2024-06-11

## 2024-06-11 RX ORDER — TAMSULOSIN HYDROCHLORIDE 0.4 MG/1
0.4 CAPSULE ORAL ONCE
Status: COMPLETED | OUTPATIENT
Start: 2024-06-11 | End: 2024-06-11

## 2024-06-11 RX ORDER — HYDROMORPHONE HCL IN WATER/PF 6 MG/30 ML
0.4 PATIENT CONTROLLED ANALGESIA SYRINGE INTRAVENOUS EVERY 4 HOURS PRN
Status: DISCONTINUED | OUTPATIENT
Start: 2024-06-11 | End: 2024-06-11

## 2024-06-11 RX ORDER — AMOXICILLIN 250 MG
1 CAPSULE ORAL 2 TIMES DAILY PRN
Status: DISCONTINUED | OUTPATIENT
Start: 2024-06-11 | End: 2024-06-11 | Stop reason: HOSPADM

## 2024-06-11 RX ORDER — CEFAZOLIN SODIUM/WATER 2 G/20 ML
2 SYRINGE (ML) INTRAVENOUS
Status: DISCONTINUED | OUTPATIENT
Start: 2024-06-11 | End: 2024-06-11 | Stop reason: HOSPADM

## 2024-06-11 RX ORDER — DIPHENHYDRAMINE HCL 25 MG
50 CAPSULE ORAL EVERY 6 HOURS PRN
Status: DISCONTINUED | OUTPATIENT
Start: 2024-06-11 | End: 2024-06-11 | Stop reason: HOSPADM

## 2024-06-11 RX ORDER — ONDANSETRON 2 MG/ML
4 INJECTION INTRAMUSCULAR; INTRAVENOUS EVERY 30 MIN PRN
Status: DISCONTINUED | OUTPATIENT
Start: 2024-06-11 | End: 2024-06-11 | Stop reason: HOSPADM

## 2024-06-11 RX ORDER — OXYCODONE HYDROCHLORIDE 5 MG/1
10 TABLET ORAL ONCE
Status: COMPLETED | OUTPATIENT
Start: 2024-06-11 | End: 2024-06-11

## 2024-06-11 RX ORDER — NALOXONE HYDROCHLORIDE 0.4 MG/ML
0.4 INJECTION, SOLUTION INTRAMUSCULAR; INTRAVENOUS; SUBCUTANEOUS
Status: DISCONTINUED | OUTPATIENT
Start: 2024-06-11 | End: 2024-06-11 | Stop reason: HOSPADM

## 2024-06-11 RX ORDER — POLYETHYLENE GLYCOL 3350 17 G/17G
17 POWDER, FOR SOLUTION ORAL DAILY
Status: DISCONTINUED | OUTPATIENT
Start: 2024-06-11 | End: 2024-06-11 | Stop reason: HOSPADM

## 2024-06-11 RX ORDER — DOCUSATE SODIUM 100 MG/1
100 CAPSULE, LIQUID FILLED ORAL 2 TIMES DAILY
Qty: 30 CAPSULE | Refills: 0 | Status: SHIPPED | OUTPATIENT
Start: 2024-06-11 | End: 2024-07-23

## 2024-06-11 RX ORDER — HYDROMORPHONE HYDROCHLORIDE 1 MG/ML
0.5 INJECTION, SOLUTION INTRAMUSCULAR; INTRAVENOUS; SUBCUTANEOUS EVERY 4 HOURS PRN
Status: DISCONTINUED | OUTPATIENT
Start: 2024-06-11 | End: 2024-06-11 | Stop reason: HOSPADM

## 2024-06-11 RX ORDER — HYDROMORPHONE HCL IN WATER/PF 6 MG/30 ML
0.4 PATIENT CONTROLLED ANALGESIA SYRINGE INTRAVENOUS EVERY 5 MIN PRN
Status: DISCONTINUED | OUTPATIENT
Start: 2024-06-11 | End: 2024-06-11 | Stop reason: HOSPADM

## 2024-06-11 RX ORDER — ONDANSETRON 2 MG/ML
4 INJECTION INTRAMUSCULAR; INTRAVENOUS
Status: DISCONTINUED | OUTPATIENT
Start: 2024-06-11 | End: 2024-06-11 | Stop reason: HOSPADM

## 2024-06-11 RX ORDER — OXYCODONE HYDROCHLORIDE 5 MG/1
10 TABLET ORAL
Status: CANCELLED | OUTPATIENT
Start: 2024-06-11

## 2024-06-11 RX ORDER — ONDANSETRON 4 MG/1
4 TABLET, ORALLY DISINTEGRATING ORAL EVERY 6 HOURS PRN
Status: DISCONTINUED | OUTPATIENT
Start: 2024-06-11 | End: 2024-06-11 | Stop reason: HOSPADM

## 2024-06-11 RX ORDER — LANOLIN ALCOHOL/MO/W.PET/CERES
1000 CREAM (GRAM) TOPICAL DAILY
COMMUNITY

## 2024-06-11 RX ORDER — LIDOCAINE HYDROCHLORIDE 20 MG/ML
INJECTION, SOLUTION INFILTRATION; PERINEURAL PRN
Status: DISCONTINUED | OUTPATIENT
Start: 2024-06-11 | End: 2024-06-11

## 2024-06-11 RX ORDER — SODIUM CHLORIDE, SODIUM LACTATE, POTASSIUM CHLORIDE, CALCIUM CHLORIDE 600; 310; 30; 20 MG/100ML; MG/100ML; MG/100ML; MG/100ML
INJECTION, SOLUTION INTRAVENOUS CONTINUOUS PRN
Status: DISCONTINUED | OUTPATIENT
Start: 2024-06-11 | End: 2024-06-11

## 2024-06-11 RX ORDER — FENTANYL CITRATE 50 UG/ML
50 INJECTION, SOLUTION INTRAMUSCULAR; INTRAVENOUS EVERY 5 MIN PRN
Status: DISCONTINUED | OUTPATIENT
Start: 2024-06-11 | End: 2024-06-11 | Stop reason: HOSPADM

## 2024-06-11 RX ORDER — TAMSULOSIN HYDROCHLORIDE 0.4 MG/1
0.4 CAPSULE ORAL DAILY
Qty: 30 CAPSULE | Refills: 1 | Status: SHIPPED | OUTPATIENT
Start: 2024-06-11 | End: 2024-07-23

## 2024-06-11 RX ORDER — ACETAMINOPHEN 500 MG
1000 TABLET ORAL EVERY 6 HOURS PRN
Qty: 100 TABLET | Refills: 0 | Status: SHIPPED | OUTPATIENT
Start: 2024-06-11

## 2024-06-11 RX ORDER — ACETAMINOPHEN 325 MG/1
650 TABLET ORAL EVERY 4 HOURS PRN
Status: DISCONTINUED | OUTPATIENT
Start: 2024-06-11 | End: 2024-06-11 | Stop reason: HOSPADM

## 2024-06-11 RX ORDER — DEXAMETHASONE SODIUM PHOSPHATE 4 MG/ML
INJECTION, SOLUTION INTRA-ARTICULAR; INTRALESIONAL; INTRAMUSCULAR; INTRAVENOUS; SOFT TISSUE PRN
Status: DISCONTINUED | OUTPATIENT
Start: 2024-06-11 | End: 2024-06-11

## 2024-06-11 RX ORDER — FENTANYL CITRATE 50 UG/ML
INJECTION, SOLUTION INTRAMUSCULAR; INTRAVENOUS PRN
Status: DISCONTINUED | OUTPATIENT
Start: 2024-06-11 | End: 2024-06-11

## 2024-06-11 RX ORDER — ONDANSETRON 2 MG/ML
INJECTION INTRAMUSCULAR; INTRAVENOUS PRN
Status: DISCONTINUED | OUTPATIENT
Start: 2024-06-11 | End: 2024-06-11

## 2024-06-11 RX ORDER — OXYCODONE HYDROCHLORIDE 5 MG/1
5 TABLET ORAL
Status: CANCELLED | OUTPATIENT
Start: 2024-06-11

## 2024-06-11 RX ORDER — OXYCODONE HYDROCHLORIDE 5 MG/1
5 TABLET ORAL EVERY 6 HOURS PRN
Qty: 6 TABLET | Refills: 0 | Status: SHIPPED | OUTPATIENT
Start: 2024-06-11 | End: 2024-06-14

## 2024-06-11 RX ORDER — FENTANYL CITRATE 50 UG/ML
25 INJECTION, SOLUTION INTRAMUSCULAR; INTRAVENOUS EVERY 5 MIN PRN
Status: DISCONTINUED | OUTPATIENT
Start: 2024-06-11 | End: 2024-06-11 | Stop reason: HOSPADM

## 2024-06-11 RX ORDER — ONDANSETRON 4 MG/1
4 TABLET, ORALLY DISINTEGRATING ORAL EVERY 30 MIN PRN
Status: DISCONTINUED | OUTPATIENT
Start: 2024-06-11 | End: 2024-06-11 | Stop reason: HOSPADM

## 2024-06-11 RX ORDER — ONDANSETRON 2 MG/ML
4 INJECTION INTRAMUSCULAR; INTRAVENOUS EVERY 30 MIN PRN
Status: CANCELLED | OUTPATIENT
Start: 2024-06-11

## 2024-06-11 RX ORDER — DEXAMETHASONE SODIUM PHOSPHATE 4 MG/ML
4 INJECTION, SOLUTION INTRA-ARTICULAR; INTRALESIONAL; INTRAMUSCULAR; INTRAVENOUS; SOFT TISSUE
Status: CANCELLED | OUTPATIENT
Start: 2024-06-11

## 2024-06-11 RX ORDER — ONDANSETRON 4 MG/1
4 TABLET, ORALLY DISINTEGRATING ORAL ONCE
Status: COMPLETED | OUTPATIENT
Start: 2024-06-11 | End: 2024-06-11

## 2024-06-11 RX ORDER — HYDROMORPHONE HCL IN WATER/PF 6 MG/30 ML
0.2 PATIENT CONTROLLED ANALGESIA SYRINGE INTRAVENOUS EVERY 5 MIN PRN
Status: DISCONTINUED | OUTPATIENT
Start: 2024-06-11 | End: 2024-06-11 | Stop reason: HOSPADM

## 2024-06-11 RX ORDER — HYDROMORPHONE HYDROCHLORIDE 2 MG/1
2 TABLET ORAL EVERY 4 HOURS PRN
Status: DISCONTINUED | OUTPATIENT
Start: 2024-06-11 | End: 2024-06-11 | Stop reason: HOSPADM

## 2024-06-11 RX ORDER — ONDANSETRON 2 MG/ML
4 INJECTION INTRAMUSCULAR; INTRAVENOUS EVERY 6 HOURS PRN
Status: DISCONTINUED | OUTPATIENT
Start: 2024-06-11 | End: 2024-06-11 | Stop reason: HOSPADM

## 2024-06-11 RX ORDER — LIDOCAINE 40 MG/G
CREAM TOPICAL
Status: DISCONTINUED | OUTPATIENT
Start: 2024-06-11 | End: 2024-06-11 | Stop reason: HOSPADM

## 2024-06-11 RX ORDER — ACETAMINOPHEN 650 MG/1
650 SUPPOSITORY RECTAL EVERY 4 HOURS PRN
Status: DISCONTINUED | OUTPATIENT
Start: 2024-06-11 | End: 2024-06-11 | Stop reason: HOSPADM

## 2024-06-11 RX ORDER — SODIUM CHLORIDE, SODIUM LACTATE, POTASSIUM CHLORIDE, CALCIUM CHLORIDE 600; 310; 30; 20 MG/100ML; MG/100ML; MG/100ML; MG/100ML
INJECTION, SOLUTION INTRAVENOUS CONTINUOUS
Status: ACTIVE | OUTPATIENT
Start: 2024-06-11 | End: 2024-06-11

## 2024-06-11 RX ADMIN — ONDANSETRON 4 MG: 2 INJECTION INTRAMUSCULAR; INTRAVENOUS at 15:38

## 2024-06-11 RX ADMIN — SODIUM CHLORIDE, POTASSIUM CHLORIDE, SODIUM LACTATE AND CALCIUM CHLORIDE: 600; 310; 30; 20 INJECTION, SOLUTION INTRAVENOUS at 13:32

## 2024-06-11 RX ADMIN — DEXAMETHASONE SODIUM PHOSPHATE 8 MG: 4 INJECTION, SOLUTION INTRA-ARTICULAR; INTRALESIONAL; INTRAMUSCULAR; INTRAVENOUS; SOFT TISSUE at 15:38

## 2024-06-11 RX ADMIN — OXYCODONE HYDROCHLORIDE 10 MG: 5 TABLET ORAL at 02:27

## 2024-06-11 RX ADMIN — HYDROMORPHONE HYDROCHLORIDE 0.5 MG: 1 INJECTION, SOLUTION INTRAMUSCULAR; INTRAVENOUS; SUBCUTANEOUS at 03:56

## 2024-06-11 RX ADMIN — PROCHLORPERAZINE EDISYLATE 10 MG: 5 INJECTION INTRAMUSCULAR; INTRAVENOUS at 04:54

## 2024-06-11 RX ADMIN — LIDOCAINE HYDROCHLORIDE 50 MG: 20 INJECTION, SOLUTION INFILTRATION; PERINEURAL at 15:38

## 2024-06-11 RX ADMIN — SODIUM CHLORIDE, POTASSIUM CHLORIDE, SODIUM LACTATE AND CALCIUM CHLORIDE: 600; 310; 30; 20 INJECTION, SOLUTION INTRAVENOUS at 15:27

## 2024-06-11 RX ADMIN — TAMSULOSIN HYDROCHLORIDE 0.4 MG: 0.4 CAPSULE ORAL at 02:36

## 2024-06-11 RX ADMIN — PROPOFOL 160 MG: 10 INJECTION, EMULSION INTRAVENOUS at 15:38

## 2024-06-11 RX ADMIN — FENTANYL CITRATE 100 MCG: 50 INJECTION INTRAMUSCULAR; INTRAVENOUS at 15:38

## 2024-06-11 RX ADMIN — Medication 2 G: at 15:37

## 2024-06-11 RX ADMIN — SODIUM CHLORIDE, POTASSIUM CHLORIDE, SODIUM LACTATE AND CALCIUM CHLORIDE: 600; 310; 30; 20 INJECTION, SOLUTION INTRAVENOUS at 04:01

## 2024-06-11 RX ADMIN — ONDANSETRON 4 MG: 4 TABLET, ORALLY DISINTEGRATING ORAL at 00:14

## 2024-06-11 RX ADMIN — POLYETHYLENE GLYCOL 3350 17 G: 17 POWDER, FOR SOLUTION ORAL at 08:07

## 2024-06-11 RX ADMIN — SODIUM CHLORIDE 1000 ML: 9 INJECTION, SOLUTION INTRAVENOUS at 02:08

## 2024-06-11 RX ADMIN — ONDANSETRON 4 MG: 2 INJECTION INTRAMUSCULAR; INTRAVENOUS at 02:08

## 2024-06-11 RX ADMIN — MIDAZOLAM 2 MG: 1 INJECTION INTRAMUSCULAR; INTRAVENOUS at 15:35

## 2024-06-11 ASSESSMENT — ACTIVITIES OF DAILY LIVING (ADL)
ADLS_ACUITY_SCORE: 20
ADLS_ACUITY_SCORE: 37
ADLS_ACUITY_SCORE: 37
ADLS_ACUITY_SCORE: 20
ADLS_ACUITY_SCORE: 37
ADLS_ACUITY_SCORE: 20
ADLS_ACUITY_SCORE: 20

## 2024-06-11 ASSESSMENT — COLUMBIA-SUICIDE SEVERITY RATING SCALE - C-SSRS
1. IN THE PAST MONTH, HAVE YOU WISHED YOU WERE DEAD OR WISHED YOU COULD GO TO SLEEP AND NOT WAKE UP?: NO
2. HAVE YOU ACTUALLY HAD ANY THOUGHTS OF KILLING YOURSELF IN THE PAST MONTH?: NO
6. HAVE YOU EVER DONE ANYTHING, STARTED TO DO ANYTHING, OR PREPARED TO DO ANYTHING TO END YOUR LIFE?: NO

## 2024-06-11 NOTE — PROGRESS NOTES
Dr. Gray okay to go home and put in discharge order.    Lindy Delacruz RN on 6/11/2024 at 4:35 PM

## 2024-06-11 NOTE — CONSULTS
SP[facility]IT[facility] HE[facility] - Consult Note  Peds (Adult Patient)    Referral Source/ Reason for Visit: Staff consult for emotional support.    Summary and Recommendations -     Patient says she just concluded the academic year as a  before experiencing pain and other symptoms related to a kidney stone that preceded her hospital admission.  After a procedure later today; she hopes to be discharged to home.      Plan: Patient does not need [facility] to call her [facility].  No spiritual needs.  [facility] remains available.    Rev. ENRIKE Davis.  Staff     [facility] available  for emergent requests/ referrals, either by paging the on-call  or by entering an ASAP/STAT consult in [employer], which will also page the on-call .      Assessment    Saw pt Zee Kyle regarding staff consult for emotional support.    Patient/Family Understanding of Illness and Goals of Care - Patient said she is at  due to complications from a kidney stone.    Distress and Loss - She shared that her parents  in their early seventies and mid-sixties.    Strengths, Coping and Resources - She named her  as a steadfast person in her life.  She is very proud of her children.  She looks forward to supporting her ten year old daughter, a dancer, in her various competitions this summer.    Meaning, Beliefs and Spirituality - Patient is Orthodox.  She is a member of [employer] in [place].

## 2024-06-11 NOTE — PLAN OF CARE
"Goal Outcome Evaluation:      Plan of Care Reviewed With: patient    Overall Patient Progress: no changeOverall Patient Progress: no change       Vital Signs: WNL. Afebrile.   Pain/Comfort: patient rating pain as a 5/10. Patient stating no additional interventions needed at this time. Patient encouraged to call RN if patient starts having increased pain. Patient stated an understanding.   Assessment: PIV site c/d/I with IVF infusing per MAR.   Diet: Patient NPO with ice chips.   Output: patient voided in ED - no voids since admission. Patient reminded to void in container in bathroom to strain urine. Patient stated an understanding. Emesis x1 - Compazine given x1 per MAR.   Activity/Ambulation: patient up independently in room. Patient encouraged to call RN if patient starts having increased pain. Patient stated an understanding.   Social: patient calm and cooperative.   Plan: Pain control. Monitor VS. Maintain PIV. IVF. Maintain NPO status. Monitor output - strain urine. Will continue to monitor and will notify service with any questions or concerns.     Problem: Adult Inpatient Plan of Care  Goal: Plan of Care Review  Description: The Plan of Care Review/Shift note should be completed every shift.  The Outcome Evaluation is a brief statement about your assessment that the patient is improving, declining, or no change.  This information will be displayed automatically on your shift  note.  6/11/2024 0551 by Génesis Crump RN  Outcome: Progressing  Flowsheets (Taken 6/11/2024 0551)  Plan of Care Reviewed With: patient  Overall Patient Progress: no change  6/11/2024 0450 by Génesis Crump RN  Outcome: Progressing  Flowsheets (Taken 6/11/2024 0450)  Plan of Care Reviewed With: patient  Overall Patient Progress: improving  Goal: Patient-Specific Goal (Individualized)  Description: You can add care plan individualizations to a care plan. Examples of Individualization might be:  \"Parent requests to be called " "daily at 9am for status\", \"I have a hard time hearing out of my right ear\", or \"Do not touch me to wake me up as it startles  me\".  6/11/2024 0551 by Génesis Crump RN  Outcome: Progressing  6/11/2024 0450 by Génesis Crump RN  Outcome: Progressing  Goal: Absence of Hospital-Acquired Illness or Injury  6/11/2024 0551 by Génesis Crump RN  Outcome: Progressing  6/11/2024 0450 by Génesis Crump RN  Outcome: Progressing  Intervention: Identify and Manage Fall Risk  Recent Flowsheet Documentation  Taken 6/11/2024 0420 by Génesis Crump RN  Safety Promotion/Fall Prevention:   activity supervised   clutter free environment maintained   nonskid shoes/slippers when out of bed  Intervention: Prevent Skin Injury  Recent Flowsheet Documentation  Taken 6/11/2024 0420 by Génesis Crump RN  Body Position: position changed independently  Skin Protection: adhesive use limited  Device Skin Pressure Protection: tubing/devices free from skin contact  Intervention: Prevent Infection  Recent Flowsheet Documentation  Taken 6/11/2024 0420 by Génesis Crump RN  Infection Prevention:   rest/sleep promoted   single patient room provided   hand hygiene promoted  Goal: Optimal Comfort and Wellbeing  6/11/2024 0551 by Génesis Crump RN  Outcome: Progressing  6/11/2024 0450 by Génesis Crump RN  Outcome: Progressing  Intervention: Monitor Pain and Promote Comfort  Recent Flowsheet Documentation  Taken 6/11/2024 0420 by Génesis Crump RN  Pain Management Interventions:   declines   care clustered  Goal: Readiness for Transition of Care  6/11/2024 0551 by Génesis Crump RN  Outcome: Progressing  6/11/2024 0450 by Génesis Crump RN  Outcome: Progressing  Intervention: Mutually Develop Transition Plan  Recent Flowsheet Documentation  Taken 6/11/2024 0420 by Génesis Crump RN  Equipment Currently Used at Home: none     Problem: Pain Acute  Goal: Optimal Pain Control and Function  6/11/2024 0551 by " Génesis Crump, RN  Outcome: Progressing  6/11/2024 0450 by Génesis Crump, RN  Outcome: Progressing  Intervention: Develop Pain Management Plan  Recent Flowsheet Documentation  Taken 6/11/2024 0420 by Génesis Crump RN  Pain Management Interventions:   declines   care clustered  Intervention: Prevent or Manage Pain  Recent Flowsheet Documentation  Taken 6/11/2024 0420 by Génesis Crump RN  Medication Review/Management: medications reviewed

## 2024-06-11 NOTE — ED PROVIDER NOTES
History     Chief Complaint:  Flank pain        HPI   Zee Kyle is a 48 year old female who presents with right flank pain.  Patient notes that she had sudden onset right flank pain is approximately 70 this evening associated with several episodes of nausea and vomiting.  She states the pain is approximately 6 out of 10 at this point, with occasional radiation to the left, but no associated abdominal pain, dysuria, frequency, or hematuria.  She does note slightly darker urine recently, but she believes related to using Wegovy.        Independent Historian:   None    Review of External Notes:   Reviewed 4/23/2024 office visit    ROS:  Review of Systems    Allergies:  Dog Epithelium (Canis Lupus Familiaris)  Dogs  Lactose  Nsaids     Medications:    Biotin w/ Vitamins C & E (HAIR SKIN & NAILS GUMMIES PO)  childrens multivitamin w/iron (FLINTSTONES COMPLETE) chewable tablet  colestipol (COLESTID) 1 g tablet  fluticasone (FLONASE) 50 MCG/ACT nasal spray  levocetirizine (XYZAL) 5 MG tablet  levonorgestrel (MIRENA) 20 MCG/24HR IUD  lisinopril (ZESTRIL) 5 MG tablet  Semaglutide-Weight Management (WEGOVY) 0.25 MG/0.5ML pen  Semaglutide-Weight Management (WEGOVY) 0.5 MG/0.5ML pen  venlafaxine (EFFEXOR XR) 37.5 MG 24 hr capsule        Past History:    Past Medical History:   Diagnosis Date    Abdominal pain, epigastric 7/11/2022    Acute pancreatitis without infection or necrosis, unspecified pancreatitis type 7/11/2022    Anxiety Ongoing since 18    Depressive disorder on going since 18    Earache or other ear, nose, or throat complaint 10    Esophageal reflux     Herpes simplex without mention of complication     Hypertension     Hypothyroidism 8/7/2012    Intertrigo 7/18/2018    Presbyacusis     Sleep apnea     STD (sexually transmitted disease)     Urinary incontinence          Physical Exam     Patient Vitals for the past 24 hrs:  Vitals:    06/11/24 0004   BP: (!) 142/92   Pulse: 88   Resp: 18   Temp: 98.4  " F (36.9  C)   TempSrc: Temporal   SpO2: 97%   Weight: 101.8 kg (224 lb 6.9 oz)   Height: 1.702 m (5' 7\")         Physical Exam  Constitutional: Alert, attentive  HENT:    Nose: Nose normal.   Eyes: EOM are normal.   CV: regular rate and rhythm; no murmurs, rubs or gallups  Chest: Effort normal and breath sounds normal.   GI:  There is no tenderness. No distension. Normal bowel sounds  MSK: Normal range of motion.   Neurological: Alert, attentive  Skin: Skin is warm and dry.      Emergency Department Course       Results for orders placed or performed during the hospital encounter of 06/11/24   Abd/pelvis CT no contrast - Stone Protocol     Status: None    Narrative    EXAM: CT ABDOMEN PELVIS W/O CONTRAST  LOCATION: [facility]  DATE: 6/11/2024    INDICATION: flank pain, hematuria  COMPARISON: CT from 7/7/2023.  TECHNIQUE: CT scan of the abdomen and pelvis was performed without IV contrast. Multiplanar reformats were obtained. Dose reduction techniques were used.  CONTRAST: None.    FINDINGS:   LOWER CHEST: Small hiatal hernia.    HEPATOBILIARY: Absent gallbladder. Normal noncontrast appearance of the liver.    PANCREAS: Normal.    SPLEEN: Normal.    ADRENAL GLANDS: Normal.    KIDNEYS/BLADDER: Mildly increased density of the bilateral medullary pyramids. No hydronephrosis on the left. There is a 7 mm right lower pole intrarenal calculus. There is moderate right hydronephrosis and dilatation of the extrarenal pelvis secondary   to a 4 x 3 x 4 mm stone at the ureteropelvic junction. Normal bladder.    BOWEL: Normal appendix. No bowel obstruction. A few distal colonic diverticula.    LYMPH NODES: Normal.    VASCULATURE: Mild atherosclerotic vascular calcification.    PELVIC ORGANS: Intrauterine contraceptive device in good position.    MUSCULOSKELETAL: Normal.      Impression    IMPRESSION:   1.  Moderate right hydronephrosis secondary to a 4 mm stone at the ureteropelvic junction.    2.  7 mm " right lower pole intrarenal stone. Mildly increased density throughout the bilateral medullary pyramids could reflect a component of underlying medullary nephrocalcinosis.     UA with Microscopic reflex to Culture     Status: Abnormal    Specimen: Urine, Midstream   Result Value Ref Range    Color Urine Light Yellow Colorless, Straw, Light Yellow, Yellow    Appearance Urine Clear Clear    Glucose Urine Negative Negative mg/dL    Bilirubin Urine Negative Negative    Ketones Urine Trace (A) Negative mg/dL    Specific Gravity Urine 1.025 1.003 - 1.035    Blood Urine Large (A) Negative    pH Urine 5.5 5.0 - 7.0    Protein Albumin Urine 30 (A) Negative mg/dL    Urobilinogen Urine Normal Normal, 2.0 mg/dL    Nitrite Urine Negative Negative    Leukocyte Esterase Urine Negative Negative    Mucus Urine Present (A) None Seen /LPF    RBC Urine >182 (H) <=2 /HPF    WBC Urine 5 <=5 /HPF    Squamous Epithelials Urine <1 <=1 /HPF    Narrative    Urine Culture not indicated   Basic metabolic panel     Status: Normal   Result Value Ref Range    Sodium 139 135 - 145 mmol/L    Potassium 4.0 3.4 - 5.3 mmol/L    Chloride 103 98 - 107 mmol/L    Carbon Dioxide (CO2) 22 22 - 29 mmol/L    Anion Gap 14 7 - 15 mmol/L    Urea Nitrogen 10.5 6.0 - 20.0 mg/dL    Creatinine 0.75 0.51 - 0.95 mg/dL    GFR Estimate >90 >60 mL/min/1.73m2    Calcium 9.8 8.6 - 10.0 mg/dL    Glucose 95 70 - 99 mg/dL   [place] Draw     Status: None    Narrative    The following orders were created for panel order [place] Draw.  Procedure                               Abnormality         Status                     ---------                               -----------         ------                     Extra Blue Top Tube[[id number]]                              Final result                 Please view results for these tests on the individual orders.   HCG qualitative urine     Status: Normal   Result Value Ref Range    hCG Urine Qualitative Negative Negative   CBC with  platelets and differential     Status: None   Result Value Ref Range    WBC Count 9.6 4.0 - 11.0 10e3/uL    RBC Count 5.02 3.80 - 5.20 10e6/uL    Hemoglobin 15.0 11.7 - 15.7 g/dL    Hematocrit 44.4 35.0 - 47.0 %    MCV 88 78 - 100 fL    MCH 29.9 26.5 - 33.0 pg    MCHC 33.8 31.5 - 36.5 g/dL    RDW 12.6 10.0 - 15.0 %    Platelet Count 363 150 - 450 10e3/uL    % Neutrophils 58 %    % Lymphocytes 31 %    % Monocytes 6 %    % Eosinophils 4 %    % Basophils 1 %    % Immature Granulocytes 0 %    NRBCs per 100 WBC 0 <1 /100    Absolute Neutrophils 5.5 1.6 - 8.3 10e3/uL    Absolute Lymphocytes 3.0 0.8 - 5.3 10e3/uL    Absolute Monocytes 0.6 0.0 - 1.3 10e3/uL    Absolute Eosinophils 0.4 0.0 - 0.7 10e3/uL    Absolute Basophils 0.1 0.0 - 0.2 10e3/uL    Absolute Immature Granulocytes 0.0 <=0.4 10e3/uL    Absolute NRBCs 0.0 10e3/uL   Extra Blue Top Tube     Status: None   Result Value Ref Range    Hold Specimen JI    CBC with Platelets & Differential     Status: None    Narrative    The following orders were created for panel order CBC with Platelets & Differential.  Procedure                               Abnormality         Status                     ---------                               -----------         ------                     CBC with platelets and d...[[id number]]                      Final result                 Please view results for these tests on the individual orders.       Emergency Department Course & Assessments:             Interventions:  Medications   ondansetron (ZOFRAN) injection 4 mg (4 mg Intravenous $Given 6/11/24 0208)   morphine (PF) injection 4 mg (has no administration in time range)   sodium chloride 0.9% BOLUS 1,000 mL (has no administration in time range)   ondansetron (ZOFRAN ODT) ODT tab 4 mg (4 mg Oral $Given 6/11/24 0014)   sodium chloride 0.9% BOLUS 1,000 mL (1,000 mLs Intravenous $New Bag 6/11/24 0208)   oxyCODONE (ROXICODONE) tablet 10 mg (10 mg Oral $Given 6/11/24 5403)   tamsulosin  (FLOMAX) capsule 0.4 mg (0.4 mg Oral $Given 6/11/24 4635)            Consultations/Discussion of Management or Tests:  Spoke with the hospital service, Dr. Herbert, regarding admission to the observation unit.       Disposition:  The patient was admitted to the hospital.     Impression & Plan        Medical Decision Making:  This is a pleasant 48-year-old female with history as per above who presents for evaluation of right flank pain.  She is found to have a moderate-sized ureteral stone at the UPJ.  Unfortunately, she is having persistent and increasing abdominal pain, as well as nausea and vomiting, preventing discharge.  She is nontoxic, afebrile, there is no evidence of UTI on UA.  She will be placed in the observation unit with plan to continue supportive cares and possible urology consult if symptoms or not improved by the morning.        Diagnosis:  Visit Diagnosis, Associated Orders, and Comments     ICD-10-CM    1. Kidney stone  N20.0       2. Nausea and vomiting, unspecified vomiting type  R11.2                Ming Kirby MD  06/11/24 2887

## 2024-06-11 NOTE — ANESTHESIA PROCEDURE NOTES
Airway    Staff -        Performed By: CRNAIndications and Patient Condition       Indications for airway management: dakota-procedural and airway protection       Induction type:intravenous       Mask difficulty assessment: 1 - vent by mask    Final Airway Details       Final airway type: supraglottic airway    Supraglottic Airway Details        Type: LMA       Brand: I-Gel       LMA size: 4    Post intubation assessment        Placement verified by: capnometry and equal breath sounds        Number of attempts at approach: 1       Number of other approaches attempted: 0       Secured with: commercial tube soto       Ease of procedure: easy       Dentition: Intact

## 2024-06-11 NOTE — OP NOTE
[facility]  Operative Note    Pre-operative diagnosis: Obstruction of right ureteropelvic junction (UPJ) due to stone [N20.1]  Right kidney stone [N20.0]   Post-operative diagnosis Obstruction of right ureteropelvic junction (UPJ) due to stone [N20.1]  Right kidney stone [N20.0]   Procedure: Procedure(s):  CYSTOSCOPY, WITH RIGHT RETROGRADE PYELOGRAM AND RIGHT URETERAL STENT INSERTION  FLUOROSCOPIC INTERPRETATION <1 HOUR PHYSICIAN TIME   Surgeon: Alvin Rodrigues MD   Assistants(s): NONE   Anesthesia: General    Estimated blood loss: None    Total IV fluids: (See anesthesia record)   Blood transfusion: No transfusion was given during surgery   Total urine output: Not measured   Drains: Right ureteral stent   Specimens: * No specimens in log *     Implants: Implant Name Type Inv. Item Serial No.  Lot No. LRB No. Used Action   STENT URETERAL POLARIS ULTRA [id number] - [id number] Stent STENT URETERAL POLARIS ULTRA [id number]  Airu [id number] Right 1 Implanted          Findings:   Severe right hydronephrosis consistent with ureteropelvic junction obstruction  Stent placed with return of clear effluent.   Complications: None.   Condition: Stable               Description of procedure:  49 yo F with no prior h/o nephrolithiasis presenting with right flank pain with associated nausea and chills. Has obstructing right UPJ stone and additional right lower pole stone. Recommend cystoscopy and right ureteral stent placement today followed by interval definitive stone treatment (discussed ureteroscopy). Risks of stent placement including stent pain and irritation, infection, hematuria discussed. Informed consent obtained    The patient was brought to operating room #14.  Preop antibiotics were given prior to the procedure.  General anesthesia was induced, she was placed in dorsolithotomy position, prepped and draped in standard sterile fashion.  Timeouts  performed.    A 22 Nigerien Storz cystoscope was assembled and passed through the urethra into the bladder.  Bilateral single orthotopic ureteral orifices were identified.  There were no bladder stones and no bladder lesions.  The right ureteral orifice was cannulated with a 5 Nigerien tiger tail catheter.  A gentle retrograde pyelogram showed severe right hydronephrosis consistent with ureteropelvic junction obstruction.  A wire was passed up the renal pelvis under fluoroscopic guidance and the tiger tail was removed.  A stent was then placed in the usual fashion under cystoscopic and fluoroscopic guidance with good curls noted proximally and distally.  Bladder was drained and scope was removed.  Patient was cleaned up, woke from anesthesia and brought to PACU in stable condition.    - plan to discharge home  - return in the next 1-2 weeks for ureteroscopic management    Alvin Rodrigues MD   [facility]  [phone] clinic phone

## 2024-06-11 NOTE — H&P
[facility]       Hospitalist History & Physical     Assessment & Plan     ASSESSMENT    48F with history of hypertension and obesity BMI 35 s/p gastric sleeve currently on Wegovy presents with right flank pain and found to have right 4mm stone in ureteropelvic junction with moderate hydronephrosis and uncontrolled pain. Workup and treatment per below.    PLAN    Obstructive Right-Sided Kidney Stone w/ Moderate Hydronephrosis  -Presents with right flank pain and N/V and found to have right 4mm stone in ureteropelvic junction with moderate hydronephrosis  -Pain control trialed in ED but ongoing pain issues so admitted for further care  -No evidence of UTI or MILLI  PLAN  -Pain regimen in place  -LR@100ml/hr  -NPO for urology consultation    Obesity BMI 35  -S/p gastric sleeve currently on Wegovy    Essential Hypertension  -Home medications once confirmed    DVT Prophy  -SCDs    Disposition  -Observation unit      Chema Herbert MD    History of Present Illness     Zee Kyle is a 48 year old with history of hypertension and obesity BMI 35 s/p gastric sleeve currently on Wegovy presents with right flank pain.  Patient was in her normal state of health until this evening when she started experiencing nausea, vomiting, and right-sided flank pain.  Pain radiates across lower abdomen. Subjective fevers and chills. Denies dysuria or hematuria. Initially thought symptoms were due to the Wegovy medication she is currently taking for weight loss, however, symptoms got progressively worse so patient came to the ED. In the ED, VSS.  Labs unrevealing.  UA with large blood but otherwise WNL.  CT abdomen pelvis with evidence of 4 mm right-sided obstructive stone in the ureteropelvic junction with moderate hydronephrosis.  Pain control trialed in the ED but ongoing severe pain issues so admitted to medicine.    Review of Systems     A Comprehensive greater than 10 system review of systems was carried out.   Pertinent positives and negatives are noted above.  Otherwise negative for contributory information.     Past Medical History     Past Medical History:   Diagnosis Date    Abdominal pain, epigastric 7/11/2022    Acute pancreatitis without infection or necrosis, unspecified pancreatitis type 7/11/2022    Anxiety Ongoing since 18    Depressive disorder on going since 18    Earache or other ear, nose, or throat complaint 10    Tonsillectomy 7/02    Esophageal reflux     Had stopped once I got my sleep apnea mask 5/18    Herpes simplex without mention of complication     Hypertension     While Pregnant    Hypothyroidism 8/7/2012    Intertrigo 7/18/2018    Presbyacusis     Presbycusis    Sleep apnea     STD (sexually transmitted disease)     Herpes    Urinary incontinence     Bladder sling surgery 7/17     Medications     Current Outpatient Medications   Medication Sig Dispense Refill    Biotin w/ Vitamins C & E (HAIR SKIN & NAILS GUMMIES PO) Take 2 chew tab by mouth daily      childrens multivitamin w/iron (FLINTSTONES COMPLETE) chewable tablet Take 2 chew tab by mouth daily      colestipol (COLESTID) 1 g tablet Take 1 g by mouth daily      fluticasone (FLONASE) 50 MCG/ACT nasal spray Spray 2 sprays into both nostrils daily      levocetirizine (XYZAL) 5 MG tablet Take 10 mg by mouth daily      levonorgestrel (MIRENA) 20 MCG/24HR IUD 1 each (20 mcg) by Intrauterine route continuous      lisinopril (ZESTRIL) 5 MG tablet Take 1 tablet (5 mg) by mouth daily 90 tablet 0    Semaglutide-Weight Management (WEGOVY) 0.25 MG/0.5ML pen Inject 0.25 mg Subcutaneous once a week 2 mL 0    Semaglutide-Weight Management (WEGOVY) 0.5 MG/0.5ML pen Inject 0.5 mg Subcutaneous once a week 2 mL 0    venlafaxine (EFFEXOR XR) 37.5 MG 24 hr capsule Take 1 capsule (37.5 mg) by mouth daily 90 capsule 0      Past Surgical History     Past Surgical History:   Procedure Laterality Date    ENT SURGERY      GENITOURINARY SURGERY  2017    bladder sling  "   LAPAROSCOPIC CHOLECYSTECTOMY WITH CHOLANGIOGRAMS N/A 2022    Procedure: LAPAROSCOPIC CHOLECYSTECTOMY WITH CHOLANGIOGRAM;  Surgeon: Era Villela MD;  Location: [facility] OR    LAPAROSCOPIC GASTRIC SLEEVE N/A 10/15/2019    Procedure: LAPAROSCOPIC GASTRIC SLEEVE;  Surgeon: Cesar Lee MD;  Location: [facility] OR    PE TUBES      TONSILLECTOMY      TYMPANOPLASTY, RT/LT      Tympanoplasty RT/LT     Family History     Family History   Problem Relation Age of Onset    Peripheral Vascular Disease Mother     Mental Illness Mother     Hypertension Father          08    Obesity Father     Gout Father     Cerebrovascular Disease Maternal Grandfather     Dementia Paternal Grandmother     Coronary Artery Disease Paternal Grandfather     Breast Cancer Maternal Grandmother      Allergies     Allergies   Allergen Reactions    Dog Epithelium (Canis Lupus Familiaris) Itching    Dogs     Lactose Diarrhea    Nsaids      Bariatric surgery     Social History     Social History     Tobacco Use    Smoking status: Former     Current packs/day: 0.00     Average packs/day: 1 pack/day for 10.0 years (10.0 ttl pk-yrs)     Types: Cigarettes     Start date: 1996     Quit date: 2/10/2003     Years since quittin.3    Smokeless tobacco: Never    Tobacco comments:     Glad to be done   Substance Use Topics    Alcohol use: Yes     Comment: occas     Physical Exam   Blood pressure (!) 142/92, pulse 88, temperature 98.4  F (36.9  C), temperature source Temporal, resp. rate 18, height 1.702 m (5' 7\"), weight 101.8 kg (224 lb 6.9 oz), SpO2 97%, not currently breastfeeding.    General: Ill appearing, cooperative with exam, in NAD.  HEENT: Atraumatic. No erythema in posterior pharynx.  Lymph: No cervical or inguinal lymphadenopathy.  Cardiac: RRR. No murmurs.  Lungs: CTAB. Nl WOB.  Abd: Non-tender. No rebound or gaurding. Nl bowel sounds.  Ext: No edema. 2+ pulses.  Skin: No rashes, abrasions, or contusions.  Psych: A&Ox3. Nl " affect.  Neuro: 5/5 strength. Sensation intact.    Labs & Imaging     Reviewed and Pertinent results discussed in assessment and plan.

## 2024-06-11 NOTE — PHARMACY-ADMISSION MEDICATION HISTORY
Pharmacist Admission Medication History    Admission medication history is complete. The information provided in this note is only as accurate as the sources available at the time of the update.    Information Source(s): Patient and CareEverywhere/SureScripts via in-person    Pertinent Information:     Changes made to PTA medication list:  Added: Vitamin B-12, Vitamin D, Psyllium  Deleted: lower dose of semaflutide  Changed: None    Allergies reviewed with patient and updates made in EHR: yes    Medication History Completed By: Gretel Stoner [facility] 6/11/2024 10:36 AM    PTA Med List   Medication Sig Last Dose    Biotin w/ Vitamins C & E (HAIR SKIN & NAILS GUMMIES PO) Take 2 chew tab by mouth daily 6/10/2024    childrens multivitamin w/iron (FLINTSTONES COMPLETE) chewable tablet Take 2 chew tab by mouth daily 6/10/2024    colestipol (COLESTID) 1 g tablet Take 1 g by mouth daily 6/10/2024    cyanocobalamin (VITAMIN B-12) 1000 MCG tablet Take 1,000 mcg by mouth daily 6/10/2024    fluticasone (FLONASE) 50 MCG/ACT nasal spray Spray 2 sprays into both nostrils daily as needed Past Month    levocetirizine (XYZAL) 5 MG tablet Take 10 mg by mouth daily 6/10/2024    levonorgestrel (MIRENA) 20 MCG/24HR IUD 1 each (20 mcg) by Intrauterine route continuous     lisinopril (ZESTRIL) 5 MG tablet Take 1 tablet (5 mg) by mouth daily 6/10/2024    psyllium (METAMUCIL/KONSYL) 0.52 g capsule Take 1 capsule by mouth daily 6/10/2024    Semaglutide-Weight Management (WEGOVY) 0.5 MG/0.5ML pen Inject 0.5 mg Subcutaneous once a week 6/5/2024    venlafaxine (EFFEXOR XR) 37.5 MG 24 hr capsule Take 1 capsule (37.5 mg) by mouth daily 6/10/2024    Vitamin D3 (CHOLECALCIFEROL) 125 MCG (5000 UT) tablet Take 125 mcg by mouth daily 6/10/2024

## 2024-06-11 NOTE — ANESTHESIA CARE TRANSFER NOTE
Patient: Zee Kyle    Procedure: Procedure(s):  CYSTOSCOPY, WITH RIGHT RETROGRADE PYELOGRAM AND RIGHT URETERAL STENT INSERTION       Diagnosis: Obstruction of right ureteropelvic junction (UPJ) due to stone [N20.1]  Right kidney stone [N20.0]  Diagnosis Additional Information: No value filed.    Anesthesia Type:   General     Note:    Oropharynx: spontaneously breathing  Level of Consciousness: awake  Oxygen Supplementation: face mask    Independent Airway: airway patency satisfactory and stable  Dentition: dentition unchanged  Vital Signs Stable: post-procedure vital signs reviewed and stable  Report to RN Given: handoff report given  Patient transferred to: PACU    Handoff Report: Identifed the Patient, Identified the Reponsible Provider, Reviewed the pertinent medical history, Discussed the surgical course, Reviewed Intra-OP anesthesia mangement and issues during anesthesia, Set expectations for post-procedure period and Allowed opportunity for questions and acknowledgement of understanding      Vitals:  Vitals Value Taken Time   BP     Temp     Pulse 82 06/11/24 1602   Resp 11 06/11/24 1602   SpO2 100 % 06/11/24 1602   Vitals shown include unfiled device data.    Electronically Signed By: GARRETT Cortez CRNA  June 11, 2024  4:04 PM

## 2024-06-11 NOTE — CONSULTS
[facility] Consultation    Zee Kyle MRN# [id number]   Age: 48 year old YOB: 1975     Date of Admission: 6/11/2024    Reason for consult: Right kidney stone with hydronephrosis       Requesting physician: Fer Light MD        Level of consult: One-time consult to assist in determining a diagnosis and to recommend an appropriate treatment plan           Assessment and Plan:   Assessment:   47 yo F with no prior h/o nephrolithiasis presenting with right flank pain with associated nausea and chills. Otherwise no overt signs of infection. Given proximal location of stone, recommend cystoscopy and stent placement today followed by interval ureteroscopic management of the stone.      Plan:   To OR for cysto stent: completed  Home today  Will schedule ureteroscopy in next few weeks    Alvin Rodrigues MD   [facility]  [phone] clinic phone               Chief Complaint:   Right kidney stone     History is obtained from the patient         History of Present Illness:   This patient is a 47 yo F with no prior h/o nephrolithiasis presenting with right flank pain with associated nausea and chills. Has obstructing right UPJ stone and additional right lower pole stone on imaging. Currently no chills or fever. Pain under good control after pain meds. No dysuria.         Past Medical History:     Past Medical History:   Diagnosis Date    Abdominal pain, epigastric 07/11/2022    Acute pancreatitis without infection or necrosis, unspecified pancreatitis type 07/11/2022    Anxiety Ongoing since 18    Depressive disorder on going since 18    Earache or other ear, nose, or throat complaint 2010    Tonsillectomy 7/02    Esophageal reflux     Had stopped once I got my sleep apnea mask 5/18    Herpes simplex without mention of complication     Hypertension     While Pregnant    Hypothyroidism 08/07/2012    Intertrigo 07/18/2018    PONV (postoperative nausea and  vomiting)     Presbyacusis     Presbycusis    Sleep apnea     STD (sexually transmitted disease)     Herpes    Urinary incontinence     Bladder sling surgery              Past Surgical History:   I have reviewed this patient's past surgical history  Past Surgical History:   Procedure Laterality Date    ENT SURGERY      GENITOURINARY SURGERY  2017    bladder sling    LAPAROSCOPIC CHOLECYSTECTOMY WITH CHOLANGIOGRAMS N/A 2022    Procedure: LAPAROSCOPIC CHOLECYSTECTOMY WITH CHOLANGIOGRAM;  Surgeon: Era Villela MD;  Location: [facility] OR    LAPAROSCOPIC GASTRIC SLEEVE N/A 10/15/2019    Procedure: LAPAROSCOPIC GASTRIC SLEEVE;  Surgeon: Cesar Lee MD;  Location: [facility] OR    PE TUBES      TONSILLECTOMY      TYMPANOPLASTY, RT/LT      Tympanoplasty RT/LT             Social History:   I have reviewed this patient's social history  Social History     Tobacco Use    Smoking status: Former     Current packs/day: 0.00     Average packs/day: 1 pack/day for 10.0 years (10.0 ttl pk-yrs)     Types: Cigarettes     Start date: 1996     Quit date: 2/10/2003     Years since quittin.3    Smokeless tobacco: Never    Tobacco comments:     Glad to be done   Substance Use Topics    Alcohol use: Yes     Comment: occas             Family History:     Family History   Problem Relation Age of Onset    Peripheral Vascular Disease Mother     Mental Illness Mother     Hypertension Father          08    Obesity Father     Gout Father     Cerebrovascular Disease Maternal Grandfather     Dementia Paternal Grandmother     Coronary Artery Disease Paternal Grandfather     Breast Cancer Maternal Grandmother      Family history not discussed          Immunizations:     Immunization History   Administered Date(s) Administered    COVID-19 12+ () (Pfizer) 2024    COVID-19 Bivalent 12+ (Pfizer) 2022    COVID-19 MONOVALENT 12+ (Pfizer) 2021, 2021, 2021    Hepatitis B, Adult 2023,  04/23/2024    Influenza (IIV3) PF 11/17/2011, 11/13/2012    Influenza Vaccine (Flucelvax Quadrivalent) 11/06/2020    Influenza Vaccine >6 months,quad, PF 10/14/2013, 12/28/2015, 11/09/2017, 12/26/2018, 10/07/2019, 12/22/2021, 09/20/2022    TDAP (Adacel,Boostrix) 12/06/2012, 11/10/2022    TDAP Vaccine (Adacel) 02/10/2011    Td (Adult), Adsorbed 12/06/2012    Tdap (Adult) Unspecified Formulation 12/06/2012             Allergies:     Allergies   Allergen Reactions    Dog Epithelium (Canis Lupus Familiaris) Itching    Dogs     Lactose Diarrhea    Nsaids      Bariatric surgery             Medications:     Current Facility-Administered Medications   Medication Dose Route Frequency Provider Last Rate Last Admin    [Auto Hold] acetaminophen (TYLENOL) tablet 650 mg  650 mg Oral Q4H PRN Fer Light MD        Or    [Auto Hold] acetaminophen (TYLENOL) Suppository 650 mg  650 mg Rectal Q4H PRN Fer Light MD        dexAMETHasone (DECADRON) injection 4 mg  4 mg Intravenous Once PRN James Tilley MD        [Auto Hold] diphenhydrAMINE (BENADRYL) capsule 50 mg  50 mg Oral Q6H PRN Fer Light MD        fentaNYL (PF) (SUBLIMAZE) injection 25 mcg  25 mcg Intravenous Q5 Min PRN James Tilley MD        fentaNYL (PF) (SUBLIMAZE) injection 50 mcg  50 mcg Intravenous Q5 Min PRN James Tilley MD        [Auto Hold] fluticasone (FLONASE) 50 MCG/ACT spray 2 spray  2 spray Both Nostrils Daily PRN Roderick Gray DO        HYDROmorphone (DILAUDID) injection 0.2 mg  0.2 mg Intravenous Q5 Min PRN James Tilley MD        HYDROmorphone (DILAUDID) injection 0.4 mg  0.4 mg Intravenous Q5 Min PRN James Tilley MD        [Auto Hold] HYDROmorphone (DILAUDID) tablet 2 mg  2 mg Oral Q4H PRN Fer Light MD        [Auto Hold] HYDROmorphone (PF) (DILAUDID) injection 0.5 mg  0.5 mg Intravenous Q4H PRN Maritoinosej-Fer Trevino MD   0.5 mg at 06/11/24  0356    lactated ringers infusion   Intravenous Continuous James Tilley MD        naloxone (NARCAN) injection 0.1 mg  0.1 mg Intravenous Q2 Min PRN James Tilley MD        [Auto Hold] naloxone (NARCAN) injection 0.2 mg  0.2 mg Intravenous Q2 Min PRN Fer Light MD        Or    [Auto Hold] naloxone (NARCAN) injection 0.4 mg  0.4 mg Intravenous Q2 Min PRN Fer Light MD        Or    [Auto Hold] naloxone (NARCAN) injection 0.2 mg  0.2 mg Intramuscular Q2 Min PRN Fer Light MD        Or    [Auto Hold] naloxone (NARCAN) injection 0.4 mg  0.4 mg Intramuscular Q2 Min PRN Fer Light MD        [Auto Hold] ondansetron (ZOFRAN ODT) ODT tab 4 mg  4 mg Oral Q6H PRN Fer Light MD        Or    [Auto Hold] ondansetron (ZOFRAN) injection 4 mg  4 mg Intravenous Q6H PRN Fer Light MD        ondansetron (ZOFRAN ODT) ODT tab 4 mg  4 mg Oral Q30 Min PRN James Tilley MD        Or    ondansetron (ZOFRAN) injection 4 mg  4 mg Intravenous Q30 Min PRN James Tilley MD        [Auto Hold] ondansetron (ZOFRAN) injection 4 mg  4 mg Intravenous Q15 Min PRN Ming Kirby MD   4 mg at 06/11/24 0208    [Auto Hold] polyethylene glycol (MIRALAX) Packet 17 g  17 g Oral Daily Fer Light MD   17 g at 06/11/24 0807    prochlorperazine (COMPAZINE) injection 5 mg  5 mg Intravenous Q6H PRN James Tilley MD        [Auto Hold] senna-docusate (SENOKOT-S/PERICOLACE) 8.6-50 MG per tablet 1 tablet  1 tablet Oral BID PRN Fer Light MD        Or    [Auto Hold] senna-docusate (SENOKOT-S/PERICOLACE) 8.6-50 MG per tablet 2 tablet  2 tablet Oral BID PRN Fer Light MD                 Review of Systems:   The Review of Systems is negative other than noted in the HPI          Physical Exam:   Vitals were reviewed  Temp: 97.4  F (36.3  C) Temp src: Temporal BP: 130/74  Pulse: 81   Resp: (!) 8 SpO2: 100 % O2 Device: Simple face mask Oxygen Delivery: 10 LPM  Constitutional:   Awake, alert   Eyes:   Eyes open   ENT:   Nc/at   Neck:   No neck mass   Hematologic / Lymphatic:   No bleed/bruise   Back:   No pack pain   Lungs:   Nonlabored breathing on room air   Cardiovascular:   Extrem wwp   Abdomen:   Abd soft nt   Chest / Breast:   Non distended   Genitounirinary:   deferred   Musculoskeletal:   No joint redness or swelling   Neurologic:   ALICEA   Neuropsychiatric:   Normal mood and affect   Skin:   No skin rash             Data:   All laboratory and imaging data in the past 24 hours reviewed  Results for orders placed or performed during the hospital encounter of 06/11/24 (from the past 24 hour(s))   CBC with Platelets & Differential    Narrative    The following orders were created for panel order CBC with Platelets & Differential.  Procedure                               Abnormality         Status                     ---------                               -----------         ------                     CBC with platelets and d...[[id number]]                      Final result                 Please view results for these tests on the individual orders.   Basic metabolic panel   Result Value Ref Range    Sodium 139 135 - 145 mmol/L    Potassium 4.0 3.4 - 5.3 mmol/L    Chloride 103 98 - 107 mmol/L    Carbon Dioxide (CO2) 22 22 - 29 mmol/L    Anion Gap 14 7 - 15 mmol/L    Urea Nitrogen 10.5 6.0 - 20.0 mg/dL    Creatinine 0.75 0.51 - 0.95 mg/dL    GFR Estimate >90 >60 mL/min/1.73m2    Calcium 9.8 8.6 - 10.0 mg/dL    Glucose 95 70 - 99 mg/dL   [place] Draw    Narrative    The following orders were created for panel order [place] Draw.  Procedure                               Abnormality         Status                     ---------                               -----------         ------                     Extra Blue Top Tube[[id number]]                              Final result                  Please view results for these tests on the individual orders.   CBC with platelets and differential   Result Value Ref Range    WBC Count 9.6 4.0 - 11.0 10e3/uL    RBC Count 5.02 3.80 - 5.20 10e6/uL    Hemoglobin 15.0 11.7 - 15.7 g/dL    Hematocrit 44.4 35.0 - 47.0 %    MCV 88 78 - 100 fL    MCH 29.9 26.5 - 33.0 pg    MCHC 33.8 31.5 - 36.5 g/dL    RDW 12.6 10.0 - 15.0 %    Platelet Count 363 150 - 450 10e3/uL    % Neutrophils 58 %    % Lymphocytes 31 %    % Monocytes 6 %    % Eosinophils 4 %    % Basophils 1 %    % Immature Granulocytes 0 %    NRBCs per 100 WBC 0 <1 /100    Absolute Neutrophils 5.5 1.6 - 8.3 10e3/uL    Absolute Lymphocytes 3.0 0.8 - 5.3 10e3/uL    Absolute Monocytes 0.6 0.0 - 1.3 10e3/uL    Absolute Eosinophils 0.4 0.0 - 0.7 10e3/uL    Absolute Basophils 0.1 0.0 - 0.2 10e3/uL    Absolute Immature Granulocytes 0.0 <=0.4 10e3/uL    Absolute NRBCs 0.0 10e3/uL   Extra Blue Top Tube   Result Value Ref Range    Hold Specimen JIC    UA with Microscopic reflex to Culture    Specimen: Urine, Midstream   Result Value Ref Range    Color Urine Light Yellow Colorless, Straw, Light Yellow, Yellow    Appearance Urine Clear Clear    Glucose Urine Negative Negative mg/dL    Bilirubin Urine Negative Negative    Ketones Urine Trace (A) Negative mg/dL    Specific Gravity Urine 1.025 1.003 - 1.035    Blood Urine Large (A) Negative    pH Urine 5.5 5.0 - 7.0    Protein Albumin Urine 30 (A) Negative mg/dL    Urobilinogen Urine Normal Normal, 2.0 mg/dL    Nitrite Urine Negative Negative    Leukocyte Esterase Urine Negative Negative    Mucus Urine Present (A) None Seen /LPF    RBC Urine >182 (H) <=2 /HPF    WBC Urine 5 <=5 /HPF    Squamous Epithelials Urine <1 <=1 /HPF    Narrative    Urine Culture not indicated   HCG qualitative urine   Result Value Ref Range    hCG Urine Qualitative Negative Negative   Abd/pelvis CT no contrast - Stone Protocol    Narrative    EXAM: CT ABDOMEN PELVIS W/O CONTRAST  LOCATION: [facility]  DATE: 6/11/2024    INDICATION: flank pain, hematuria  COMPARISON: CT from 7/7/2023.  TECHNIQUE: CT scan of the abdomen and pelvis was performed without IV contrast. Multiplanar reformats were obtained. Dose reduction techniques were used.  CONTRAST: None.    FINDINGS:   LOWER CHEST: Small hiatal hernia.    HEPATOBILIARY: Absent gallbladder. Normal noncontrast appearance of the liver.    PANCREAS: Normal.    SPLEEN: Normal.    ADRENAL GLANDS: Normal.    KIDNEYS/BLADDER: Mildly increased density of the bilateral medullary pyramids. No hydronephrosis on the left. There is a 7 mm right lower pole intrarenal calculus. There is moderate right hydronephrosis and dilatation of the extrarenal pelvis secondary   to a 4 x 3 x 4 mm stone at the ureteropelvic junction. Normal bladder.    BOWEL: Normal appendix. No bowel obstruction. A few distal colonic diverticula.    LYMPH NODES: Normal.    VASCULATURE: Mild atherosclerotic vascular calcification.    PELVIC ORGANS: Intrauterine contraceptive device in good position.    MUSCULOSKELETAL: Normal.      Impression    IMPRESSION:   1.  Moderate right hydronephrosis secondary to a 4 mm stone at the ureteropelvic junction.    2.  7 mm right lower pole intrarenal stone. Mildly increased density throughout the bilateral medullary pyramids could reflect a component of underlying medullary nephrocalcinosis.     Basic metabolic panel   Result Value Ref Range    Sodium 140 135 - 145 mmol/L    Potassium 4.0 3.4 - 5.3 mmol/L    Chloride 107 98 - 107 mmol/L    Carbon Dioxide (CO2) 23 22 - 29 mmol/L    Anion Gap 10 7 - 15 mmol/L    Urea Nitrogen 10.2 6.0 - 20.0 mg/dL    Creatinine 0.73 0.51 - 0.95 mg/dL    GFR Estimate >90 >60 mL/min/1.73m2    Calcium 8.9 8.6 - 10.0 mg/dL    Glucose 87 70 - 99 mg/dL   XR Surgery FIFI L/T 5 Min Fluoro w Stills    Narrative    This exam was marked as non-reportable because it will not be read by a   radiologist or a [facility]  non-radiologist provider.           All imaging studies reviewed by me.     Attestation:  I have reviewed today's vital signs, notes, medications, labs and imaging.  Amount of time performed on this consult: 45 minutes.    Alvin Rodrigues MD

## 2024-06-11 NOTE — ED NOTES
"[facility]  ED Nurse Handoff Report    ED Chief complaint: Flank Pain  . ED Diagnosis:   Final diagnoses:   Kidney stone   Nausea and vomiting, unspecified vomiting type       Allergies:   Allergies   Allergen Reactions    Dog Epithelium (Canis Lupus Familiaris) Itching    Dogs     Lactose Diarrhea    Nsaids      Bariatric surgery       Code Status: Full Code    Activity level - Baseline/Home:  independent.  Activity Level - Current:   standby.   Lift room needed: No.   Bariatric: No   Needed: No   Isolation: No.   Infection: Not Applicable.     Respiratory status: Room air    Vital Signs (within 30 minutes):   Vitals:    06/11/24 0004   BP: (!) 142/92   Pulse: 88   Resp: 18   Temp: 98.4  F (36.9  C)   TempSrc: Temporal   SpO2: 97%   Weight: 101.8 kg (224 lb 6.9 oz)   Height: 1.702 m (5' 7\")       Cardiac Rhythm:  ,      Pain level:    Patient confused: No.   Patient Falls Risk: nonskid shoes/slippers when out of bed, patient and family education, activity supervised, and room door open.   Elimination Status: Has voided     Patient Report - Initial Complaint: Flank Pain.   Focused Assessment: Pt is a 48 year old female who presents with right flank pain.  Patient notes that she had sudden onset right flank pain is approximately 70 this evening associated with several episodes of nausea and vomiting.  She states the pain is approximately 6 out of 10 at this point, with occasional radiation to the left, but no associated abdominal pain, dysuria, frequency, or hematuria.  She does note slightly darker urine recently, but she believes related to using Wegovy.      Abnormal Results:   Labs Ordered and Resulted from Time of ED Arrival to Time of ED Departure   ROUTINE UA WITH MICROSCOPIC REFLEX TO CULTURE - Abnormal       Result Value    Color Urine Light Yellow      Appearance Urine Clear      Glucose Urine Negative      Bilirubin Urine Negative      Ketones Urine Trace (*)     Specific " Gravity Urine 1.025      Blood Urine Large (*)     pH Urine 5.5      Protein Albumin Urine 30 (*)     Urobilinogen Urine Normal      Nitrite Urine Negative      Leukocyte Esterase Urine Negative      Mucus Urine Present (*)     RBC Urine >182 (*)     WBC Urine 5      Squamous Epithelials Urine <1     BASIC METABOLIC PANEL - Normal    Sodium 139      Potassium 4.0      Chloride 103      Carbon Dioxide (CO2) 22      Anion Gap 14      Urea Nitrogen 10.5      Creatinine 0.75      GFR Estimate >90      Calcium 9.8      Glucose 95     HCG QUALITATIVE URINE - Normal    hCG Urine Qualitative Negative     CBC WITH PLATELETS AND DIFFERENTIAL    WBC Count 9.6      RBC Count 5.02      Hemoglobin 15.0      Hematocrit 44.4      MCV 88      MCH 29.9      MCHC 33.8      RDW 12.6      Platelet Count 363      % Neutrophils 58      % Lymphocytes 31      % Monocytes 6      % Eosinophils 4      % Basophils 1      % Immature Granulocytes 0      NRBCs per 100 WBC 0      Absolute Neutrophils 5.5      Absolute Lymphocytes 3.0      Absolute Monocytes 0.6      Absolute Eosinophils 0.4      Absolute Basophils 0.1      Absolute Immature Granulocytes 0.0      Absolute NRBCs 0.0          Abd/pelvis CT no contrast - Stone Protocol   Final Result   IMPRESSION:    1.  Moderate right hydronephrosis secondary to a 4 mm stone at the ureteropelvic junction.      2.  7 mm right lower pole intrarenal stone. Mildly increased density throughout the bilateral medullary pyramids could reflect a component of underlying medullary nephrocalcinosis.             Treatments provided: See MAR  Family Comments: None  OBS brochure/video discussed/provided to patient:  Yes  ED Medications:   Medications   ondansetron (ZOFRAN) injection 4 mg (4 mg Intravenous $Given 6/11/24 0204)   sodium chloride 0.9% BOLUS 1,000 mL (has no administration in time range)   senna-docusate (SENOKOT-S/PERICOLACE) 8.6-50 MG per tablet 1 tablet (has no administration in time range)     Or    senna-docusate (SENOKOT-S/PERICOLACE) 8.6-50 MG per tablet 2 tablet (has no administration in time range)   ondansetron (ZOFRAN ODT) ODT tab 4 mg (has no administration in time range)     Or   ondansetron (ZOFRAN) injection 4 mg (has no administration in time range)   acetaminophen (TYLENOL) tablet 650 mg (has no administration in time range)     Or   acetaminophen (TYLENOL) Suppository 650 mg (has no administration in time range)   HYDROmorphone (DILAUDID) injection 0.4 mg (has no administration in time range)   polyethylene glycol (MIRALAX) Packet 17 g (has no administration in time range)   HYDROmorphone (DILAUDID) tablet 2 mg (has no administration in time range)   lactated ringers infusion (has no administration in time range)   ondansetron (ZOFRAN ODT) ODT tab 4 mg (4 mg Oral $Given 6/11/24 0014)   sodium chloride 0.9% BOLUS 1,000 mL (1,000 mLs Intravenous $New Bag 6/11/24 0208)   oxyCODONE (ROXICODONE) tablet 10 mg (10 mg Oral $Given 6/11/24 0227)   tamsulosin (FLOMAX) capsule 0.4 mg (0.4 mg Oral $Given 6/11/24 0236)       Drips infusing:  Yes  For the majority of the shift this patient was Green.   Interventions performed were N/A.    Sepsis treatment initiated: No    Cares/treatment/interventions/medications to be completed following ED care: None    ED Nurse Name: Jazmine Posey RN  3:22 AM    RECEIVING UNIT ED HANDOFF REVIEW    Above ED Nurse Handoff Report was reviewed: Yes  Reviewed by: Génesis Crump RN on June 11, 2024 at 4:08 AM   I Karen called the ED to inform them the note was read: Yes

## 2024-06-11 NOTE — PROGRESS NOTES
Patient seen and examined.  Chart reviewed.  Please see admission history and physical by Dr. Herbert from earlier today for details.

## 2024-06-11 NOTE — ED TRIAGE NOTES
Pt presents to the ED with acute pain in right flank starting at 7pm. Pt states she vomited and has chills since the pain started.

## 2024-06-11 NOTE — DISCHARGE SUMMARY
"[facility]  Hospitalist Discharge Summary      Date of Admission:  6/11/2024  Date of Discharge:  6/11/2024  Discharging Provider: Roderick Gray DO  Discharge Service: Hospitalist Service    Discharge Diagnoses   Ureterolithiasis.  Moderate right hydronephrosis.  Hypertension.  Obesity.    Clinically Significant Risk Factors     # Obesity: Estimated body mass index is 35.05 kg/m  as calculated from the following:    Height as of this encounter: 1.702 m (5' 7\").    Weight as of this encounter: 101.5 kg (223 lb 12.3 oz).       Follow-ups Needed After Discharge   Follow-up Appointments     Follow-up and recommended labs and tests       Follow-up with urology within 2 weeks.            Discharge Disposition   Discharged to home  Condition at discharge: Stable    Hospital Course   Ms. Kyle is a 48F with history of hypertension and obesity BMI 35 s/p gastric sleeve currently on Wegovy presents with right flank pain and found to have right 4mm stone in ureteropelvic junction with moderate hydronephrosis and uncontrolled pain.  She was placed in the hospital for further evaluation and care.  Started on continuous IV fluids.  Seen in consultation by urology.  Did go to the operating room later in the day on 6/11 for cystoscopy with right ureteral stent placement.  Was able to discharge home from the postanesthesia care unit after her procedure.  Should follow-up with urology within 2 weeks.    Consultations This Hospital Stay   UROLOGY IP CONSULT  [facility] SERVICES IP CONSULT    Code Status   Full Code    Time Spent on this Encounter   I spent 25 minutes with Ms. Kyle and working on discharge on 6/11/2024.       Roderick Gray DO  [facility] PREOP/POSTOP  [address] [facility] [address]  Phone: [phone]  Fax: [phone]  ______________________________________________________________________    Physical Exam   Vital Signs: Temp: 97.1  F " (36.2  C) Temp src: Temporal BP: 126/77 Pulse: 83   Resp: 15 SpO2: 96 % O2 Device: None (Room air) Oxygen Delivery: 10 LPM  Weight: 223 lbs 12.27 oz  Gen:  NAD, A&Ox3.  Eyes:  PERRL, sclera anicteric.  OP:  MMM, no lesions.  Neck:  Supple.  CV:  Regular, no murmurs.  Lung:  CTA b/l, normal effort.  Ab:  +BS, soft.  Skin:  Warm, dry to touch.  No rash.  Ext:  No pitting edema LE b/l.         Primary Care Physician   Christy Gonzales [place]    Discharge Orders      Reason for your hospital stay    Ureterolithiasis (kidney stone).     Follow-up and recommended labs and tests     Follow-up with urology within 2 weeks.     Activity    Your activity upon discharge: activity as tolerated     Diet    Follow this diet upon discharge: Regular       Discharge Medications   Current Discharge Medication List        START taking these medications    Details   acetaminophen (TYLENOL) 500 MG tablet Take 2 tablets (1,000 mg) by mouth every 6 hours as needed for mild pain  Qty: 100 tablet, Refills: 0    Associated Diagnoses: Obstruction of right ureteropelvic junction (UPJ) due to stone      docusate sodium (COLACE) 100 MG capsule Take 1 capsule (100 mg) by mouth 2 times daily  Qty: 30 capsule, Refills: 0    Associated Diagnoses: Obstruction of right ureteropelvic junction (UPJ) due to stone      oxyCODONE (ROXICODONE) 5 MG tablet Take 1 tablet (5 mg) by mouth every 6 hours as needed for severe pain  Qty: 6 tablet, Refills: 0    Associated Diagnoses: Obstruction of right ureteropelvic junction (UPJ) due to stone      tamsulosin (FLOMAX) 0.4 MG capsule Take 1 capsule (0.4 mg) by mouth daily While the stent is in place, for stent pain and irritation  Qty: 30 capsule, Refills: 1    Associated Diagnoses: Obstruction of right ureteropelvic junction (UPJ) due to stone           CONTINUE these medications which have NOT CHANGED    Details   Biotin w/ Vitamins C & E (HAIR SKIN & NAILS GUMMIES PO) Take 2 chew tab by mouth daily      childrens  multivitamin w/iron (FLINTSTONES COMPLETE) chewable tablet Take 2 chew tab by mouth daily      colestipol (COLESTID) 1 g tablet Take 1 g by mouth daily      cyanocobalamin (VITAMIN B-12) 1000 MCG tablet Take 1,000 mcg by mouth daily      fluticasone (FLONASE) 50 MCG/ACT nasal spray Spray 2 sprays into both nostrils daily as needed      levocetirizine (XYZAL) 5 MG tablet Take 10 mg by mouth daily      levonorgestrel (MIRENA) 20 MCG/24HR IUD 1 each (20 mcg) by Intrauterine route continuous    Associated Diagnoses: Encounter for insertion of intrauterine contraceptive device      lisinopril (ZESTRIL) 5 MG tablet Take 1 tablet (5 mg) by mouth daily  Qty: 90 tablet, Refills: 0    Comments: Hold on file, patient will contact when needed  Associated Diagnoses: Benign essential hypertension      psyllium (METAMUCIL/KONSYL) 0.52 g capsule Take 1 capsule by mouth daily      Semaglutide-Weight Management (WEGOVY) 0.5 MG/0.5ML pen Inject 0.5 mg Subcutaneous once a week  Qty: 2 mL, Refills: 0    Associated Diagnoses: Class 2 obesity due to excess calories without serious comorbidity with body mass index (BMI) of 36.0 to 36.9 in adult      venlafaxine (EFFEXOR XR) 37.5 MG 24 hr capsule Take 1 capsule (37.5 mg) by mouth daily  Qty: 90 capsule, Refills: 0    Associated Diagnoses: Major depressive disorder, recurrent episode, moderate (H); Anxiety      Vitamin D3 (CHOLECALCIFEROL) 125 MCG (5000 UT) tablet Take 125 mcg by mouth daily           Allergies   Allergies   Allergen Reactions    Dog Epithelium (Canis Lupus Familiaris) Itching    Dogs     Lactose Diarrhea    Nsaids      Bariatric surgery

## 2024-06-11 NOTE — ANESTHESIA POSTPROCEDURE EVALUATION
Patient: Zee Kyle    Procedure: Procedure(s):  CYSTOSCOPY, WITH RIGHT RETROGRADE PYELOGRAM AND RIGHT URETERAL STENT INSERTION       Anesthesia Type:  General    Note:  Disposition: Outpatient   Postop Pain Control: Uneventful            Sign Out: Well controlled pain   PONV: No   Neuro/Psych: Uneventful            Sign Out: Acceptable/Baseline neuro status   Airway/Respiratory: Uneventful            Sign Out: Acceptable/Baseline resp. status   CV/Hemodynamics: Uneventful            Sign Out: Acceptable CV status   Other NRE: NONE   DID A NON-ROUTINE EVENT OCCUR? No           Last vitals:  Vitals Value Taken Time   /77 06/11/24 1619   Temp 97.1  F (36.2  C) 06/11/24 1620   Pulse 91 06/11/24 1623   Resp 10 06/11/24 1623   SpO2 96 % 06/11/24 1632   Vitals shown include unfiled device data.    Electronically Signed By: Carmelo Low MD  June 11, 2024  4:40 PM   What Type Of Note Output Would You Prefer (Optional)?: Bullet Format What Is The Reason For Today's Visit?: Full Body Skin Examination What Is The Reason For Today's Visit? (Being Monitored For X): the development of new lesions

## 2024-06-11 NOTE — PLAN OF CARE
"[facility]    ED Boarding Nurse Handoff Addendum Report:    Date/time: 6/11/2024, 4:14 AM    Activity Level:  not oob yet    Fall Risk: No    Active Infusions: LR    Current Meds Due: See MAR    Current care needs: Pain gt, IV fluids    Oxygen requirements (liters/min and/or FiO2): N/A    Respiratory status: Room air    Vital signs (within last 30 minutes):    Vitals:    06/11/24 0004 06/11/24 0342   BP: (!) 142/92 114/56   BP Location:  Left arm   Patient Position:  Right side   Cuff Size:  Adult Regular   Pulse: 88 74   Resp: 18 18   Temp: 98.4  F (36.9  C) 98.1  F (36.7  C)   TempSrc: Temporal Oral   SpO2: 97% 96%   Weight: 101.8 kg (224 lb 6.9 oz)    Height: 1.702 m (5' 7\")        Focused assessment within last 30 minutes:    Pt is alert and oriented x 4. PRN Dilaudid administered to manage pain. Pt denies any shortness of breath and on room air.    IV LR infusing at 100ml/hr.     NPO maintained. Ongoing monitoring.    Plan: Urology consult.    /56 (BP Location: Left arm, Patient Position: Right side, Cuff Size: Adult Regular)   Pulse 74   Temp 98.1  F (36.7  C) (Oral)   Resp 18   Ht 1.702 m (5' 7\")   Wt 101.8 kg (224 lb 6.9 oz)   SpO2 96%   BMI 35.15 kg/m       ED Boarding Nurse name: Lynn Garcia RN                         "

## 2024-06-11 NOTE — DISCHARGE INSTRUCTIONS
POSTOPERATIVE INSTRUCTIONS    Diagnosis-------------------------------   Right ureteral stone, right ureteropelvic junction obstruction    Procedure-------------------------------  Procedure(s) (LRB):  CYSTOSCOPY, WITH RIGHT RETROGRADE PYELOGRAM AND RIGHT URETERAL STENT INSERTION (Right)      Findings--------------------------------  Severe right hydronephrosis consistent with ureteropelvic junction obstruction  Stent placed with return of clear effluent.    Home-going instructions-----------------         Activity Limitation:     - No driving or operating heavy machinery until 24 hours after anesthesia    FOLLOW THESE INSTRUCTIONS AS INDICATED BELOW:  - Observe operative area for signs of excessive bleeding.  - You may shower.  - Increase fluid intake to promote clear urine.  - Resume usual diet as tolerated    What to expect while recovering-----------  - You may experience some intermittent bleeding that makes your urine pink or cherry colored. This is normal.  - However, if you are unable to urinate, passing large amount of clots, have amaya blood in your urine, or have a temperature >101 degrees, call the urology nurse on call, or present to your nearest emergency department.  - You are encouraged to walk daily, and have no activity restrictions.   - A URETERAL STENT has been placed that allows urine to flow unobstructed from your kidney into your bladder.  The stent has a curl in the kidney and a curl in the bladder.  The curl in the bladder can cause some urgency and frequency of urination as well as some mild blood in the urine.  The curl in the kidney can cause some mild flank discomfort.  This may be more noticeable when you urinate.  A URETERAL STENT is meant to be left in temporarily.  It must be removed or changed no later than 3 months after its insertion.  If it's not removed it can result in stone overgrowth on the stent that can cause pain, infection, and can be very difficult to remove.       Discharge Medications/instructions:   - Flomax (tamsulosin) to be taken daily for stent pain and irritation  - Take Tylenol 1000mg every 6 hours for pain  - Take oxycodone as needed for pain not controlled by tylenol      Questions/concerns------------------------  [facility]: [phone]    Future appointments  We will schedule you for a ureteroscopy in the next few weeks to complete treatment of your stone.    Alvin Rodrigues MD

## 2024-06-11 NOTE — PROVIDER NOTIFICATION
Pt completed a bolus a short time ago. An order is in for another bolus of NS along with an order for continuous LR. Can you please clarify the new bolus order, do you want it given? Thank you kindly.     Addendum: Continuous LR only per MD.

## 2024-06-11 NOTE — PROGRESS NOTES
UROLOGY    Patient seen and examined. Formal consult not to follow    49 yo F with no prior h/o nephrolithiasis presenting with right flank pain with associated nausea and chills. Has obstructing right UPJ stone and additional right lower pole stone. Recommend cystoscopy and right ureteral stent placement today followed by interval definitive stone treatment (discussed ureteroscopy). Risks of stent placement including stent pain and irritation, infection, hematuria discussed    - npo  - to OR this afternoon  - possible home afterwards tonight if doing well    Alvin Rodrigues MD   [facility] Urology  [phone] clinic phone

## 2024-06-11 NOTE — ANESTHESIA PREPROCEDURE EVALUATION
Anesthesia Pre-Procedure Evaluation    Patient: Zee Kyle   MRN: [id number] : 1975        Procedure : Procedure(s):  CYSTOSCOPY, WITH RIGHT RETROGRADE PYELOGRAM AND RIGHT URETERAL STENT INSERTION          Past Medical History:   Diagnosis Date    Abdominal pain, epigastric 2022    Acute pancreatitis without infection or necrosis, unspecified pancreatitis type 2022    Anxiety Ongoing since     Depressive disorder on going since     Earache or other ear, nose, or throat complaint     Tonsillectomy     Esophageal reflux     Had stopped once I got my sleep apnea mask     Herpes simplex without mention of complication     Hypertension     While Pregnant    Hypothyroidism 2012    Intertrigo 2018    PONV (postoperative nausea and vomiting)     Presbyacusis     Presbycusis    Sleep apnea     STD (sexually transmitted disease)     Herpes    Urinary incontinence     Bladder sling surgery       Past Surgical History:   Procedure Laterality Date    ENT SURGERY      GENITOURINARY SURGERY      bladder sling    LAPAROSCOPIC CHOLECYSTECTOMY WITH CHOLANGIOGRAMS N/A 2022    Procedure: LAPAROSCOPIC CHOLECYSTECTOMY WITH CHOLANGIOGRAM;  Surgeon: Era Villela MD;  Location: [facility] OR    LAPAROSCOPIC GASTRIC SLEEVE N/A 10/15/2019    Procedure: LAPAROSCOPIC GASTRIC SLEEVE;  Surgeon: Cesar Lee MD;  Location: [facility] OR    PE TUBES      TONSILLECTOMY      TYMPANOPLASTY, RT/LT      Tympanoplasty RT/LT      Allergies   Allergen Reactions    Dog Epithelium (Canis Lupus Familiaris) Itching    Dogs     Lactose Diarrhea    Nsaids      Bariatric surgery      Social History     Tobacco Use    Smoking status: Former     Current packs/day: 0.00     Average packs/day: 1 pack/day for 10.0 years (10.0 ttl pk-yrs)     Types: Cigarettes     Start date: 1996     Quit date: 2/10/2003     Years since quittin.3    Smokeless tobacco: Never    Tobacco comments:     Glad to be  "done   Substance Use Topics    Alcohol use: Yes     Comment: occas      Wt Readings from Last 1 Encounters:   06/11/24 101.5 kg (223 lb 12.3 oz)        Anesthesia Evaluation   Pt has had prior anesthetic. Type: General.    No history of anesthetic complications       ROS/MED HX  ENT/Pulmonary:     (+) sleep apnea, uses CPAP,                                      Neurologic:  - neg neurologic ROS     Cardiovascular:     (+)  hypertension- -   -  - -                                      METS/Exercise Tolerance:     Hematologic:  - neg hematologic  ROS     Musculoskeletal:  - neg musculoskeletal ROS     GI/Hepatic:     (+) GERD, Asymptomatic on medication,                  Renal/Genitourinary:     (+) renal disease,      Nephrolithiasis ,       Endo:     (+)          thyroid problem, hypothyroidism,    Obesity,       Psychiatric/Substance Use:  - neg psychiatric ROS     Infectious Disease:       Malignancy:       Other:            Physical Exam    Airway        Mallampati: II   TM distance: > 3 FB   Neck ROM: full   Mouth opening: > 3 cm    Respiratory Devices and Support         Dental       (+) Minor Abnormalities - some fillings, tiny chips      Cardiovascular   cardiovascular exam normal          Pulmonary   pulmonary exam normal                OUTSIDE LABS:  CBC:   Lab Results   Component Value Date    WBC 9.6 06/11/2024    WBC 7.9 06/20/2023    HGB 15.0 06/11/2024    HGB 14.5 06/20/2023    HCT 44.4 06/11/2024    HCT 43.7 06/20/2023     06/11/2024     06/20/2023     BMP:   Lab Results   Component Value Date     06/11/2024     06/11/2024    POTASSIUM 4.0 06/11/2024    POTASSIUM 4.0 06/11/2024    CHLORIDE 107 06/11/2024    CHLORIDE 103 06/11/2024    CO2 23 06/11/2024    CO2 22 06/11/2024    BUN 10.2 06/11/2024    BUN 10.5 06/11/2024    CR 0.73 06/11/2024    CR 0.75 06/11/2024    GLC 87 06/11/2024    GLC 95 06/11/2024     COAGS: No results found for: \"PTT\", \"INR\", \"FIBR\"  POC:   Lab Results " "  Component Value Date    BGM 86 10/17/2019    HCG Negative 06/11/2024    HCGS Negative 07/11/2022     HEPATIC:   Lab Results   Component Value Date    ALBUMIN 4.4 07/19/2022    PROTTOTAL 7.5 07/19/2022    ALT 39 (H) 07/19/2022    AST 25 07/19/2022    ALKPHOS 59 07/19/2022    BILITOTAL 0.2 07/19/2022     OTHER:   Lab Results   Component Value Date    LACT 1.4 07/11/2022    A1C 5.5 04/23/2024    HOLLY 8.9 06/11/2024    MAG 2.0 07/12/2022    LIPASE 48 07/19/2022    TSH 1.48 10/01/2022    CRP <2.9 12/22/2021    SED 6 12/22/2021       Anesthesia Plan    ASA Status:  2       Anesthesia Type: General.     - Airway: LMA   Induction: Intravenous.   Maintenance: Balanced.        Consents    Anesthesia Plan(s) and associated risks, benefits, and realistic alternatives discussed. Questions answered and patient/representative(s) expressed understanding.     - Discussed:     - Discussed with:  Patient      - Extended Intubation/Ventilatory Support Discussed: No.      - Patient is DNR/DNI Status: No     Use of blood products discussed: No .     Postoperative Care    Pain management: IV analgesics, Oral pain medications, Multi-modal analgesia.   PONV prophylaxis: Ondansetron (or other 5HT-3), Dexamethasone or Solumedrol     Comments:               James Tilley MD    I have reviewed the pertinent notes and labs in the chart from the past 30 days and (re)examined the patient.  Any updates or changes from those notes are reflected in this note.              # Obesity: Estimated body mass index is 35.05 kg/m  as calculated from the following:    Height as of this encounter: 1.702 m (5' 7\").    Weight as of this encounter: 101.5 kg (223 lb 12.3 oz).      "

## 2024-06-12 ENCOUNTER — PATIENT OUTREACH (OUTPATIENT)
Dept: FAMILY MEDICINE | Facility: CLINIC | Age: 49
End: 2024-06-12
Payer: COMMERCIAL

## 2024-06-12 ENCOUNTER — PREP FOR PROCEDURE (OUTPATIENT)
Dept: UROLOGY | Facility: CLINIC | Age: 49
End: 2024-06-12
Payer: COMMERCIAL

## 2024-06-12 DIAGNOSIS — E66.09 CLASS 2 OBESITY DUE TO EXCESS CALORIES WITHOUT SERIOUS COMORBIDITY WITH BODY MASS INDEX (BMI) OF 36.0 TO 36.9 IN ADULT: ICD-10-CM

## 2024-06-12 DIAGNOSIS — N20.0 RIGHT KIDNEY STONE: ICD-10-CM

## 2024-06-12 DIAGNOSIS — N20.1 OBSTRUCTION OF RIGHT URETEROPELVIC JUNCTION (UPJ) DUE TO STONE: Primary | ICD-10-CM

## 2024-06-12 DIAGNOSIS — E66.812 CLASS 2 OBESITY DUE TO EXCESS CALORIES WITHOUT SERIOUS COMORBIDITY WITH BODY MASS INDEX (BMI) OF 36.0 TO 36.9 IN ADULT: ICD-10-CM

## 2024-06-12 NOTE — TELEPHONE ENCOUNTER
"Dr. Janet De La Garza-    Pt is requesting a refill of wegovy 0.5mg. She states she is tolerating it well with no notable side effects. She would like to continue on current dose for now.     Pended below.           Transitions of Care Outreach  Chief Complaint   Patient presents with    Hospital F/U       Most Recent Admission Date: 6/11/2024   Most Recent Admission Diagnosis: Kidney stone - N20.0  Nausea and vomiting, unspecified vomiting type - R11.2     Most Recent Discharge Date: 6/11/2024   Most Recent Discharge Diagnosis: Kidney stone - N20.0  Nausea and vomiting, unspecified vomiting type - R11.2  Obstruction of right ureteropelvic junction (UPJ) due to stone - N20.1  Right kidney stone - N20.0     Transitions of Care Assessment    Discharge Assessment  How are you doing now that you are home?: Some pain currently. \"Doing okay.\"  Do you know how to contact your clinic care team if you have future questions or changes to your health status? : Yes  Does the patient have their discharge instructions? : Yes  Does the patient have questions regarding their discharge instructions? : No  Were you started on any new medications or were there changes to any of your previous medications? : Yes  Does the patient have all of their medications?: Yes  Do you have questions regarding any of your medications? : Yes (see comment) (Needing a refill of wegovy 0.5 mg)  Do you have all of your needed medical supplies or equipment (DME)?  (i.e. oxygen tank, CPAP, cane, etc.): Yes    Follow up Plan     Discharge Follow-Up  Discharge follow up appointment scheduled in alignment with recommended follow up timeframe or Transitions of Risk Category? (Low = within 30 days; Moderate= within 14 days; High= within 7 days): Yes  Discharge Follow Up Appointment Date: 06/17/24  Discharge Follow Up Appointment Scheduled with?: Primary Care Provider    Future Appointments   Date Time Provider Department Center   6/17/2024 10:00 AM Candelario Kelly APRN " CNP [facility]   7/23/2024 10:00 AM Coming Christy De La Garza MD [facility]       Outpatient Plan as outlined on AVS reviewed with patient.    For any urgent concerns, please contact our 24 hour nurse triage line: [phone] ([phone])       Routed to PCP    Casi Bahena RN

## 2024-06-17 ENCOUNTER — OFFICE VISIT (OUTPATIENT)
Dept: FAMILY MEDICINE | Facility: CLINIC | Age: 49
End: 2024-06-17
Payer: COMMERCIAL

## 2024-06-17 VITALS
BODY MASS INDEX: 34.26 KG/M2 | WEIGHT: 218.3 LBS | SYSTOLIC BLOOD PRESSURE: 116 MMHG | DIASTOLIC BLOOD PRESSURE: 79 MMHG | RESPIRATION RATE: 14 BRPM | HEIGHT: 67 IN | OXYGEN SATURATION: 97 % | HEART RATE: 80 BPM | TEMPERATURE: 98.2 F

## 2024-06-17 DIAGNOSIS — Z09 HOSPITAL DISCHARGE FOLLOW-UP: Primary | ICD-10-CM

## 2024-06-17 DIAGNOSIS — N20.0 KIDNEY STONE: ICD-10-CM

## 2024-06-17 PROCEDURE — 99212 OFFICE O/P EST SF 10 MIN: CPT

## 2024-06-17 NOTE — PROGRESS NOTES
Assessment & Plan     (Z09) Hospital discharge follow-up  (primary encounter diagnosis)  (N20.0) Kidney stone  Comment: Improved since prior to admission.  Patient still having some discomfort however, patient has visit this Friday to have stone removed.  This will be done in the hospital.  Patient continues to have some hematuria, no amaya red blood.  Discussed that if this worsens that she should present to the ED for evaluation.  At this point, no labs necessary. stable.  Follow-up with appointment/procedure this Friday and postop follow-up appointment on 6/28/2024. Patient fully understands and is agreeable with plan of care, at this point patient will follow up as needed unless acute concerns arise in the meantime.    MED REC REQUIRED  Post Medication Reconciliation Status: discharge medications reconciled, continue medications without change    Follow up at appointments scheduled.    Mirna Schultz is a 48 year old, presenting for the following health issues:  Hospital F/U (6/11/24 )      6/17/2024     9:46 AM   Additional Questions   Roomed by Tamika   Accompanied by Self     HPI     Hospital Follow-up Visit:    Hospital/Nursing Home/IP Rehab Facility: [facility]  Date of Admission: 6/11/2024  Date of Discharge: 6/11/2024  Reason(s) for Admission: kidney Stones  Was the patient in the ICU or did the patient experience delirium during hospitalization?  No  Do you have any other stressors you would like to discuss with your provider? No    Problems taking medications regularly:  None  Medication changes since discharge: None  Problems adhering to non-medication therapy:  None    Summary of hospitalization:  [facility] discharge summary reviewed  Diagnostic Tests/Treatments reviewed.  Follow up needed: none  Other Healthcare Providers Involved in Patient s Care:         Surgical follow-up appointment - 6/21/2024  Update since discharge: improved.       -Patient still  "continues to feel uncomfortable, manageable  -Urine still having Hematuria- pink tinged  -Patient has upcoming visit this Friday to have stone removed, is scheduled with urology on the 28th for follow-up  -Denies fever, chills, myalgias    Plan of care communicated with patient             Review of Systems  Constitutional, HEENT, cardiovascular, pulmonary, gi and gu systems are negative, except as otherwise noted.      Objective    /79 (BP Location: Right arm, Patient Position: Sitting, Cuff Size: Adult Large)   Pulse 80   Temp 98.2  F (36.8  C) (Oral)   Resp 14   Ht 1.702 m (5' 7\")   Wt 99 kg (218 lb 4.8 oz)   SpO2 97%   BMI 34.19 kg/m    Body mass index is 34.19 kg/m .  Physical Exam  Vitals and nursing note reviewed.   Constitutional:       General: She is not in acute distress.     Appearance: Normal appearance. She is not ill-appearing.   Cardiovascular:      Rate and Rhythm: Normal rate and regular rhythm.      Heart sounds: No murmur heard.     No friction rub. No gallop.   Pulmonary:      Effort: Pulmonary effort is normal. No respiratory distress.      Breath sounds: No wheezing, rhonchi or rales.   Abdominal:      General: Bowel sounds are normal.      Palpations: Abdomen is soft.      Tenderness: There is abdominal tenderness in the right lower quadrant, suprapubic area and left lower quadrant. There is no right CVA tenderness or left CVA tenderness.   Skin:     General: Skin is warm and dry.   Neurological:      Mental Status: She is alert.   Psychiatric:         Mood and Affect: Mood normal.         Behavior: Behavior normal. Behavior is cooperative.         Thought Content: Thought content normal.         Judgment: Judgment normal.        Admission on 06/11/2024, Discharged on 06/11/2024   Component Date Value Ref Range Status    Color Urine 06/11/2024 Light Yellow  Colorless, Straw, Light Yellow, Yellow Final    Appearance Urine 06/11/2024 Clear  Clear Final    Glucose Urine 06/11/2024 " Negative  Negative mg/dL Final    Bilirubin Urine 06/11/2024 Negative  Negative Final    Ketones Urine 06/11/2024 Trace (A)  Negative mg/dL Final    Specific Gravity Urine 06/11/2024 1.025  1.003 - 1.035 Final    Blood Urine 06/11/2024 Large (A)  Negative Final    pH Urine 06/11/2024 5.5  5.0 - 7.0 Final    Protein Albumin Urine 06/11/2024 30 (A)  Negative mg/dL Final    Urobilinogen Urine 06/11/2024 Normal  Normal, 2.0 mg/dL Final    Nitrite Urine 06/11/2024 Negative  Negative Final    Leukocyte Esterase Urine 06/11/2024 Negative  Negative Final    Mucus Urine 06/11/2024 Present (A)  None Seen /LPF Final    RBC Urine 06/11/2024 >182 (H)  <=2 /HPF Final    WBC Urine 06/11/2024 5  <=5 /HPF Final    Squamous Epithelials Urine 06/11/2024 <1  <=1 /HPF Final    Sodium 06/11/2024 139  135 - 145 mmol/L Final    Reference intervals for this test were updated on 09/26/2023 to more accurately reflect our healthy population. There may be differences in the flagging of prior results with similar values performed with this method. Interpretation of those prior results can be made in the context of the updated reference intervals.     Potassium 06/11/2024 4.0  3.4 - 5.3 mmol/L Final    Chloride 06/11/2024 103  98 - 107 mmol/L Final    Carbon Dioxide (CO2) 06/11/2024 22  22 - 29 mmol/L Final    Anion Gap 06/11/2024 14  7 - 15 mmol/L Final    Urea Nitrogen 06/11/2024 10.5  6.0 - 20.0 mg/dL Final    Creatinine 06/11/2024 0.75  0.51 - 0.95 mg/dL Final    GFR Estimate 06/11/2024 >90  >60 mL/min/1.73m2 Final    Calcium 06/11/2024 9.8  8.6 - 10.0 mg/dL Final    Glucose 06/11/2024 95  70 - 99 mg/dL Final    hCG Urine Qualitative 06/11/2024 Negative  Negative Final    This test is for screening purposes.  Results should be interpreted along with the clinical picture.  Confirmation testing is available if warranted by ordering [id number], HCG Quantitative Pregnancy.    WBC Count 06/11/2024 9.6  4.0 - 11.0 10e3/uL Final    RBC Count 06/11/2024  5.02  3.80 - 5.20 10e6/uL Final    Hemoglobin 06/11/2024 15.0  11.7 - 15.7 g/dL Final    Hematocrit 06/11/2024 44.4  35.0 - 47.0 % Final    MCV 06/11/2024 88  78 - 100 fL Final    MCH 06/11/2024 29.9  26.5 - 33.0 pg Final    MCHC 06/11/2024 33.8  31.5 - 36.5 g/dL Final    RDW 06/11/2024 12.6  10.0 - 15.0 % Final    Platelet Count 06/11/2024 363  150 - 450 10e3/uL Final    % Neutrophils 06/11/2024 58  % Final    % Lymphocytes 06/11/2024 31  % Final    % Monocytes 06/11/2024 6  % Final    % Eosinophils 06/11/2024 4  % Final    % Basophils 06/11/2024 1  % Final    % Immature Granulocytes 06/11/2024 0  % Final    NRBCs per 100 WBC 06/11/2024 0  <1 /100 Final    Absolute Neutrophils 06/11/2024 5.5  1.6 - 8.3 10e3/uL Final    Absolute Lymphocytes 06/11/2024 3.0  0.8 - 5.3 10e3/uL Final    Absolute Monocytes 06/11/2024 0.6  0.0 - 1.3 10e3/uL Final    Absolute Eosinophils 06/11/2024 0.4  0.0 - 0.7 10e3/uL Final    Absolute Basophils 06/11/2024 0.1  0.0 - 0.2 10e3/uL Final    Absolute Immature Granulocytes 06/11/2024 0.0  <=0.4 10e3/uL Final    Absolute NRBCs 06/11/2024 0.0  10e3/uL Final    Hold Specimen 06/11/2024 JI   Final    Sodium 06/11/2024 140  135 - 145 mmol/L Final    Reference intervals for this test were updated on 09/26/2023 to more accurately reflect our healthy population. There may be differences in the flagging of prior results with similar values performed with this method. Interpretation of those prior results can be made in the context of the updated reference intervals.     Potassium 06/11/2024 4.0  3.4 - 5.3 mmol/L Final    Chloride 06/11/2024 107  98 - 107 mmol/L Final    Carbon Dioxide (CO2) 06/11/2024 23  22 - 29 mmol/L Final    Anion Gap 06/11/2024 10  7 - 15 mmol/L Final    Urea Nitrogen 06/11/2024 10.2  6.0 - 20.0 mg/dL Final    Creatinine 06/11/2024 0.73  0.51 - 0.95 mg/dL Final    GFR Estimate 06/11/2024 >90  >60 mL/min/1.73m2 Final    Calcium 06/11/2024 8.9  8.6 - 10.0 mg/dL Final    Glucose  06/11/2024 87  70 - 99 mg/dL Final     XR Surgery FIFI L/T 5 Min Fluoro w Stills    Result Date: 6/11/2024  This exam was marked as non-reportable because it will not be read by a radiologist or a [place] non-radiologist provider.     Abd/pelvis CT no contrast - Stone Protocol    Result Date: 6/11/2024  EXAM: CT ABDOMEN PELVIS W/O CONTRAST LOCATION: [facility] DATE: 6/11/2024 INDICATION: flank pain, hematuria COMPARISON: CT from 7/7/2023. TECHNIQUE: CT scan of the abdomen and pelvis was performed without IV contrast. Multiplanar reformats were obtained. Dose reduction techniques were used. CONTRAST: None. FINDINGS: LOWER CHEST: Small hiatal hernia. HEPATOBILIARY: Absent gallbladder. Normal noncontrast appearance of the liver. PANCREAS: Normal. SPLEEN: Normal. ADRENAL GLANDS: Normal. KIDNEYS/BLADDER: Mildly increased density of the bilateral medullary pyramids. No hydronephrosis on the left. There is a 7 mm right lower pole intrarenal calculus. There is moderate right hydronephrosis and dilatation of the extrarenal pelvis secondary to a 4 x 3 x 4 mm stone at the ureteropelvic junction. Normal bladder. BOWEL: Normal appendix. No bowel obstruction. A few distal colonic diverticula. LYMPH NODES: Normal. VASCULATURE: Mild atherosclerotic vascular calcification. PELVIC ORGANS: Intrauterine contraceptive device in good position. MUSCULOSKELETAL: Normal.     IMPRESSION: 1.  Moderate right hydronephrosis secondary to a 4 mm stone at the ureteropelvic junction. 2.  7 mm right lower pole intrarenal stone. Mildly increased density throughout the bilateral medullary pyramids could reflect a component of underlying medullary nephrocalcinosis.          Signed Electronically by: GARRETT Cowan CNP

## 2024-06-21 ENCOUNTER — HOSPITAL ENCOUNTER (OUTPATIENT)
Facility: CLINIC | Age: 49
Discharge: HOME | End: 2024-06-21
Attending: STUDENT IN AN ORGANIZED HEALTH CARE EDUCATION/TRAINING PROGRAM | Admitting: STUDENT IN AN ORGANIZED HEALTH CARE EDUCATION/TRAINING PROGRAM
Payer: COMMERCIAL

## 2024-06-21 ENCOUNTER — ANESTHESIA EVENT (OUTPATIENT)
Dept: SURGERY | Facility: CLINIC | Age: 49
End: 2024-06-21
Payer: COMMERCIAL

## 2024-06-21 ENCOUNTER — APPOINTMENT (OUTPATIENT)
Dept: GENERAL RADIOLOGY | Facility: CLINIC | Age: 49
End: 2024-06-21
Attending: STUDENT IN AN ORGANIZED HEALTH CARE EDUCATION/TRAINING PROGRAM
Payer: COMMERCIAL

## 2024-06-21 ENCOUNTER — ANESTHESIA (OUTPATIENT)
Dept: SURGERY | Facility: CLINIC | Age: 49
End: 2024-06-21
Payer: COMMERCIAL

## 2024-06-21 VITALS
RESPIRATION RATE: 14 BRPM | BODY MASS INDEX: 34.04 KG/M2 | WEIGHT: 216.9 LBS | HEART RATE: 80 BPM | TEMPERATURE: 97.5 F | HEIGHT: 67 IN | DIASTOLIC BLOOD PRESSURE: 78 MMHG | OXYGEN SATURATION: 97 % | SYSTOLIC BLOOD PRESSURE: 127 MMHG

## 2024-06-21 DIAGNOSIS — N20.0 KIDNEY STONE: Primary | ICD-10-CM

## 2024-06-21 PROCEDURE — 250N000009 HC RX 250: Performed by: STUDENT IN AN ORGANIZED HEALTH CARE EDUCATION/TRAINING PROGRAM

## 2024-06-21 PROCEDURE — 250N000025 HC SEVOFLURANE, PER MIN: Performed by: STUDENT IN AN ORGANIZED HEALTH CARE EDUCATION/TRAINING PROGRAM

## 2024-06-21 PROCEDURE — 999N000179 XR SURGERY CARM FLUORO LESS THAN 5 MIN W STILLS

## 2024-06-21 PROCEDURE — 250N000009 HC RX 250: Performed by: NURSE ANESTHETIST, CERTIFIED REGISTERED

## 2024-06-21 PROCEDURE — C1758 CATHETER, URETERAL: HCPCS | Performed by: STUDENT IN AN ORGANIZED HEALTH CARE EDUCATION/TRAINING PROGRAM

## 2024-06-21 PROCEDURE — 74420 UROGRAPHY RTRGR +-KUB: CPT | Mod: 26 | Performed by: STUDENT IN AN ORGANIZED HEALTH CARE EDUCATION/TRAINING PROGRAM

## 2024-06-21 PROCEDURE — C2617 STENT, NON-COR, TEM W/O DEL: HCPCS | Performed by: STUDENT IN AN ORGANIZED HEALTH CARE EDUCATION/TRAINING PROGRAM

## 2024-06-21 PROCEDURE — 250N000011 HC RX IP 250 OP 636: Performed by: STUDENT IN AN ORGANIZED HEALTH CARE EDUCATION/TRAINING PROGRAM

## 2024-06-21 PROCEDURE — 258N000001 HC RX 258: Performed by: STUDENT IN AN ORGANIZED HEALTH CARE EDUCATION/TRAINING PROGRAM

## 2024-06-21 PROCEDURE — 258N000003 HC RX IP 258 OP 636: Mod: JZ | Performed by: ANESTHESIOLOGY

## 2024-06-21 PROCEDURE — 999N000141 HC STATISTIC PRE-PROCEDURE NURSING ASSESSMENT: Performed by: STUDENT IN AN ORGANIZED HEALTH CARE EDUCATION/TRAINING PROGRAM

## 2024-06-21 PROCEDURE — 52356 CYSTO/URETERO W/LITHOTRIPSY: CPT | Mod: RT | Performed by: STUDENT IN AN ORGANIZED HEALTH CARE EDUCATION/TRAINING PROGRAM

## 2024-06-21 PROCEDURE — 255N000002 HC RX 255 OP 636: Mod: JZ | Performed by: STUDENT IN AN ORGANIZED HEALTH CARE EDUCATION/TRAINING PROGRAM

## 2024-06-21 PROCEDURE — 360N000084 HC SURGERY LEVEL 4 W/ FLUORO, PER MIN: Performed by: STUDENT IN AN ORGANIZED HEALTH CARE EDUCATION/TRAINING PROGRAM

## 2024-06-21 PROCEDURE — 250N000011 HC RX IP 250 OP 636: Performed by: NURSE ANESTHETIST, CERTIFIED REGISTERED

## 2024-06-21 PROCEDURE — 370N000017 HC ANESTHESIA TECHNICAL FEE, PER MIN: Performed by: STUDENT IN AN ORGANIZED HEALTH CARE EDUCATION/TRAINING PROGRAM

## 2024-06-21 PROCEDURE — 710N000012 HC RECOVERY PHASE 2, PER MINUTE: Performed by: STUDENT IN AN ORGANIZED HEALTH CARE EDUCATION/TRAINING PROGRAM

## 2024-06-21 PROCEDURE — 272N000001 HC OR GENERAL SUPPLY STERILE: Performed by: STUDENT IN AN ORGANIZED HEALTH CARE EDUCATION/TRAINING PROGRAM

## 2024-06-21 PROCEDURE — 258N000003 HC RX IP 258 OP 636: Mod: JZ | Performed by: NURSE ANESTHETIST, CERTIFIED REGISTERED

## 2024-06-21 PROCEDURE — C1894 INTRO/SHEATH, NON-LASER: HCPCS | Performed by: STUDENT IN AN ORGANIZED HEALTH CARE EDUCATION/TRAINING PROGRAM

## 2024-06-21 PROCEDURE — 250N000011 HC RX IP 250 OP 636: Mod: JZ | Performed by: NURSE ANESTHETIST, CERTIFIED REGISTERED

## 2024-06-21 PROCEDURE — C1769 GUIDE WIRE: HCPCS | Performed by: STUDENT IN AN ORGANIZED HEALTH CARE EDUCATION/TRAINING PROGRAM

## 2024-06-21 PROCEDURE — 710N000009 HC RECOVERY PHASE 1, LEVEL 1, PER MIN: Performed by: STUDENT IN AN ORGANIZED HEALTH CARE EDUCATION/TRAINING PROGRAM

## 2024-06-21 PROCEDURE — 82365 CALCULUS SPECTROSCOPY: CPT | Performed by: STUDENT IN AN ORGANIZED HEALTH CARE EDUCATION/TRAINING PROGRAM

## 2024-06-21 DEVICE — URETERAL STENT
Type: IMPLANTABLE DEVICE | Site: ABDOMEN | Status: FUNCTIONAL
Brand: POLARIS™ ULTRA

## 2024-06-21 RX ORDER — SODIUM CHLORIDE, SODIUM LACTATE, POTASSIUM CHLORIDE, CALCIUM CHLORIDE 600; 310; 30; 20 MG/100ML; MG/100ML; MG/100ML; MG/100ML
INJECTION, SOLUTION INTRAVENOUS CONTINUOUS
Status: DISCONTINUED | OUTPATIENT
Start: 2024-06-21 | End: 2024-06-21 | Stop reason: HOSPADM

## 2024-06-21 RX ORDER — NALOXONE HYDROCHLORIDE 0.4 MG/ML
0.1 INJECTION, SOLUTION INTRAMUSCULAR; INTRAVENOUS; SUBCUTANEOUS
Status: DISCONTINUED | OUTPATIENT
Start: 2024-06-21 | End: 2024-06-21 | Stop reason: HOSPADM

## 2024-06-21 RX ORDER — ONDANSETRON 2 MG/ML
4 INJECTION INTRAMUSCULAR; INTRAVENOUS EVERY 30 MIN PRN
Status: DISCONTINUED | OUTPATIENT
Start: 2024-06-21 | End: 2024-06-21 | Stop reason: HOSPADM

## 2024-06-21 RX ORDER — CEFAZOLIN SODIUM/WATER 2 G/20 ML
2 SYRINGE (ML) INTRAVENOUS SEE ADMIN INSTRUCTIONS
Status: DISCONTINUED | OUTPATIENT
Start: 2024-06-21 | End: 2024-06-21 | Stop reason: HOSPADM

## 2024-06-21 RX ORDER — EPHEDRINE SULFATE 50 MG/ML
INJECTION, SOLUTION INTRAMUSCULAR; INTRAVENOUS; SUBCUTANEOUS PRN
Status: DISCONTINUED | OUTPATIENT
Start: 2024-06-21 | End: 2024-06-21

## 2024-06-21 RX ORDER — LIDOCAINE 40 MG/G
CREAM TOPICAL
Status: DISCONTINUED | OUTPATIENT
Start: 2024-06-21 | End: 2024-06-21 | Stop reason: HOSPADM

## 2024-06-21 RX ORDER — OXYCODONE HYDROCHLORIDE 5 MG/1
5 TABLET ORAL EVERY 6 HOURS PRN
Qty: 6 TABLET | Refills: 0 | Status: SHIPPED | OUTPATIENT
Start: 2024-06-21 | End: 2024-06-24

## 2024-06-21 RX ORDER — FENTANYL CITRATE 50 UG/ML
INJECTION, SOLUTION INTRAMUSCULAR; INTRAVENOUS PRN
Status: DISCONTINUED | OUTPATIENT
Start: 2024-06-21 | End: 2024-06-21

## 2024-06-21 RX ORDER — DEXAMETHASONE SODIUM PHOSPHATE 4 MG/ML
4 INJECTION, SOLUTION INTRA-ARTICULAR; INTRALESIONAL; INTRAMUSCULAR; INTRAVENOUS; SOFT TISSUE
Status: DISCONTINUED | OUTPATIENT
Start: 2024-06-21 | End: 2024-06-21 | Stop reason: HOSPADM

## 2024-06-21 RX ORDER — HYDROMORPHONE HCL IN WATER/PF 6 MG/30 ML
0.4 PATIENT CONTROLLED ANALGESIA SYRINGE INTRAVENOUS EVERY 5 MIN PRN
Status: DISCONTINUED | OUTPATIENT
Start: 2024-06-21 | End: 2024-06-21 | Stop reason: HOSPADM

## 2024-06-21 RX ORDER — FENTANYL CITRATE 50 UG/ML
50 INJECTION, SOLUTION INTRAMUSCULAR; INTRAVENOUS EVERY 5 MIN PRN
Status: DISCONTINUED | OUTPATIENT
Start: 2024-06-21 | End: 2024-06-21 | Stop reason: HOSPADM

## 2024-06-21 RX ORDER — ONDANSETRON 4 MG/1
4 TABLET, ORALLY DISINTEGRATING ORAL EVERY 30 MIN PRN
Status: DISCONTINUED | OUTPATIENT
Start: 2024-06-21 | End: 2024-06-21 | Stop reason: HOSPADM

## 2024-06-21 RX ORDER — FENTANYL CITRATE 50 UG/ML
25 INJECTION, SOLUTION INTRAMUSCULAR; INTRAVENOUS EVERY 5 MIN PRN
Status: DISCONTINUED | OUTPATIENT
Start: 2024-06-21 | End: 2024-06-21 | Stop reason: HOSPADM

## 2024-06-21 RX ORDER — LIDOCAINE HYDROCHLORIDE 20 MG/ML
INJECTION, SOLUTION INFILTRATION; PERINEURAL PRN
Status: DISCONTINUED | OUTPATIENT
Start: 2024-06-21 | End: 2024-06-21

## 2024-06-21 RX ORDER — PROPOFOL 10 MG/ML
INJECTION, EMULSION INTRAVENOUS PRN
Status: DISCONTINUED | OUTPATIENT
Start: 2024-06-21 | End: 2024-06-21

## 2024-06-21 RX ORDER — ONDANSETRON 2 MG/ML
INJECTION INTRAMUSCULAR; INTRAVENOUS PRN
Status: DISCONTINUED | OUTPATIENT
Start: 2024-06-21 | End: 2024-06-21

## 2024-06-21 RX ORDER — OXYCODONE HYDROCHLORIDE 5 MG/1
5 TABLET ORAL
Status: DISCONTINUED | OUTPATIENT
Start: 2024-06-21 | End: 2024-06-21 | Stop reason: HOSPADM

## 2024-06-21 RX ORDER — ACETAMINOPHEN 325 MG/1
975 TABLET ORAL ONCE
Status: DISCONTINUED | OUTPATIENT
Start: 2024-06-21 | End: 2024-06-21 | Stop reason: HOSPADM

## 2024-06-21 RX ORDER — LABETALOL HYDROCHLORIDE 5 MG/ML
10 INJECTION, SOLUTION INTRAVENOUS EVERY 5 MIN PRN
Status: DISCONTINUED | OUTPATIENT
Start: 2024-06-21 | End: 2024-06-21 | Stop reason: HOSPADM

## 2024-06-21 RX ORDER — CIPROFLOXACIN 500 MG/1
500 TABLET, FILM COATED ORAL ONCE
Qty: 1 TABLET | Refills: 0 | Status: SHIPPED | OUTPATIENT
Start: 2024-06-21 | End: 2024-06-21

## 2024-06-21 RX ORDER — DEXAMETHASONE SODIUM PHOSPHATE 4 MG/ML
INJECTION, SOLUTION INTRA-ARTICULAR; INTRALESIONAL; INTRAMUSCULAR; INTRAVENOUS; SOFT TISSUE PRN
Status: DISCONTINUED | OUTPATIENT
Start: 2024-06-21 | End: 2024-06-21

## 2024-06-21 RX ORDER — OXYCODONE HYDROCHLORIDE 5 MG/1
10 TABLET ORAL
Status: DISCONTINUED | OUTPATIENT
Start: 2024-06-21 | End: 2024-06-21 | Stop reason: HOSPADM

## 2024-06-21 RX ORDER — HYDROMORPHONE HCL IN WATER/PF 6 MG/30 ML
0.2 PATIENT CONTROLLED ANALGESIA SYRINGE INTRAVENOUS EVERY 5 MIN PRN
Status: DISCONTINUED | OUTPATIENT
Start: 2024-06-21 | End: 2024-06-21 | Stop reason: HOSPADM

## 2024-06-21 RX ORDER — CEFAZOLIN SODIUM/WATER 2 G/20 ML
2 SYRINGE (ML) INTRAVENOUS
Status: COMPLETED | OUTPATIENT
Start: 2024-06-21 | End: 2024-06-21

## 2024-06-21 RX ADMIN — Medication 5 MG: at 10:58

## 2024-06-21 RX ADMIN — DEXAMETHASONE SODIUM PHOSPHATE 4 MG: 4 INJECTION, SOLUTION INTRA-ARTICULAR; INTRALESIONAL; INTRAMUSCULAR; INTRAVENOUS; SOFT TISSUE at 10:50

## 2024-06-21 RX ADMIN — PROPOFOL 180 MG: 10 INJECTION, EMULSION INTRAVENOUS at 10:50

## 2024-06-21 RX ADMIN — LIDOCAINE HYDROCHLORIDE 50 MG: 20 INJECTION, SOLUTION INFILTRATION; PERINEURAL at 10:50

## 2024-06-21 RX ADMIN — Medication 2 G: at 10:57

## 2024-06-21 RX ADMIN — Medication 5 MG: at 11:01

## 2024-06-21 RX ADMIN — ONDANSETRON 4 MG: 2 INJECTION INTRAMUSCULAR; INTRAVENOUS at 11:30

## 2024-06-21 RX ADMIN — PHENYLEPHRINE HYDROCHLORIDE 100 MCG: 10 INJECTION INTRAVENOUS at 11:01

## 2024-06-21 RX ADMIN — MIDAZOLAM 2 MG: 1 INJECTION INTRAMUSCULAR; INTRAVENOUS at 10:40

## 2024-06-21 RX ADMIN — SODIUM CHLORIDE, POTASSIUM CHLORIDE, SODIUM LACTATE AND CALCIUM CHLORIDE: 600; 310; 30; 20 INJECTION, SOLUTION INTRAVENOUS at 09:40

## 2024-06-21 RX ADMIN — FENTANYL CITRATE 100 MCG: 50 INJECTION INTRAMUSCULAR; INTRAVENOUS at 10:50

## 2024-06-21 ASSESSMENT — ACTIVITIES OF DAILY LIVING (ADL)
ADLS_ACUITY_SCORE: 34
ADLS_ACUITY_SCORE: 34
ADLS_ACUITY_SCORE: 35
ADLS_ACUITY_SCORE: 31
ADLS_ACUITY_SCORE: 35

## 2024-06-21 NOTE — OP NOTE
[facility]  Operative Note    Pre-operative diagnosis: Obstruction of right ureteropelvic junction (UPJ) due to stone [N20.1]  Right kidney stone [N20.0]   Post-operative diagnosis Obstruction of right ureteropelvic junction (UPJ) due to stone [N20.1]  Right kidney stone [N20.0]   Procedure: Procedure(s):  cystoscopy, right ureteroscopy with laser lithotripsy, right ureteroscopy with stone basketing, right retrograde pyelogram, right ureteral stent exchange  Fluoroscopic interpretation 1 hour physician time   Surgeon: Alvin Rodrigues MD   Assistants(s): none   Anesthesia: General    Estimated blood loss: Minimal    Total IV fluids: (See anesthesia record)   Blood transfusion: No transfusion was given during surgery   Total urine output: Not measured   Drains: Right ureteral stent   Specimens: ID Type Source Tests Collected by Time Destination   A : right kidney stone Calculus/Stone Kidney, Right STONE ANALYSIS Alvin Rodrigues MD 6/21/2024 11:24 AM         Implants: Implant Name Type Inv. Item Serial No.  Lot No. LRB No. Used Action   STENT URETERAL POLARIS ULTRA [id number] - [id number] Stent STENT URETERAL POLARIS ULTRA [id number]  [employer] [id number] Right 1 Explanted   STENT URETERAL POLARIS ULTRA [id number] - [id number] Stent STENT URETERAL POLARIS ULTRA [id number]  [employer] [id number] Right 1 Implanted          Findings:   Right proximal ureteral stone fragment embedded in wall of ureter, remainder of stone had been fragmented by stent placement and retropulsed into kidney    7 mm lower pole stone, laser fragmented and basketed    Remainder of stone burden dusted    95% stone free     Complications: None.   Condition: Stable               Description of procedure:  48-year-old female with right proximal ureteral stone and right lower pole kidney stone status post cystoscopy and stent placement 6/11/2024 who returns  today for definitive ureteroscopic management of the stones.  Risks including need for secondary procedure, ureteral injury, incomplete stone clearance, infection, hematuria were discussed.  Informed consent was obtained.    The patient was brought to operating room #14.  Preop antibiotics were given prior to the procedure.  General anesthesia was induced, she was placed in dorsolithotomy position, prepped and draped in standard sterile fashion.  A timeout was performed.    A 22 Tunisian Stortz cystoscope was inserted through the urethra and into the bladder.  The right ureteral stent was grasped with a stent grasper and brought out the urethral meatus.  The stent was cannulated with a 0.035 inch stiff shaft Glidewire which was passed up to the renal pelvis under fluoroscopic guidance and the existing stent was removed intact and discarded.  The wire was clipped to the drapes as a safety wire.  A StorTradeBlock semirigid ureteroscope was assembled and passed through the urethra, into the bladder and up the right ureter.  The ureter was widely patent all way up to the proximal ureter where a small stone fragment was seen embedded into the wall of the ureter.  The stone fragment was basketed out.  I passed the scope all the way up through the ureteropelvic junction which was widely patent.  Presumably the remainder of the proximal ureteral stone had fragmented with passage of the stent and retropulsed back up into the kidney.  A gentle retrograde pyelogram showed moderate to severe right hydronephrosis.  A second wire was passed through the scope up into the renal pelvis and the scope was backloaded off the wire.  Over the working wire a [place] navigator 11/13 Tunisian by 36 cm ureteral access sheath was passed up to the proximal ureter under fluoroscopic guidance and the obturator and wire were removed.  An Olympus digital flexible ureteroscope was then passed through the access sheath up to the renal pelvis.  Complete  pyeloscopy revealed numerous stone fragments within interpolar and upper pole calyces consistent with retropulsed fragment of proximal ureteral stone as well as a single 7 mm stone within the lower pole calyx.  The lower pole stone was laser fragmented using the Olympus Soltive thulium fiber laser.  Large stone fragments were basketed out.  Remainder of the stone burden within the kidney was then dusted with the laser into fragments smaller than 1 mm.  Estimate 95% stone free.  Repeat retrograde pyelogram was performed provide a landing zone for the stent.  Pullout ureteroscopy revealed the ureter to be in good condition with no tears or lacerations.  The ureteroscope and ureteral access sheath removed.  A stent was then placed in the usual fashion under cystoscopic and fluoroscopic guidance with good curls noted proximally and distally. Bladder was drained and scope removed. Patient was cleaned up, awoken from anesthesia and brought to PACU in stable condition. She will be discharged home    Alvin Rodrigues MD   [facility]  [phone] clinic phone

## 2024-06-21 NOTE — ANESTHESIA PROCEDURE NOTES
Airway       Patient location during procedure: OR       Procedure Start/Stop Times: 6/21/2024 10:52 AM  Staff -        CRNA: Georgie Klein APRN CRNA       Performed By: CRNA  Consent for Airway        Urgency: elective  Indications and Patient Condition       Indications for airway management: dakota-procedural       Induction type:intravenous       Mask difficulty assessment: 1 - vent by mask    Final Airway Details       Final airway type: supraglottic airway    Supraglottic Airway Details        Type: LMA       Brand: I-Gel       LMA size: 4    Post intubation assessment        Placement verified by: capnometry, equal breath sounds and chest rise        Number of attempts at approach: 1       Number of other approaches attempted: 0       Secured with: commercial tube soto       Ease of procedure: easy       Dentition: Intact and Unchanged    Medication(s) Administered   Medication Administration Time: 6/21/2024 10:52 AM

## 2024-06-21 NOTE — ANESTHESIA CARE TRANSFER NOTE
Patient: Zee Kyle    Procedure: Procedure(s):  cystoscopy, right ureteroscopy with laser lithotripsy, right ureteroscopy with stone basketing, right retrograde pyelogram, right ureteral stent exchange       Diagnosis: Obstruction of right ureteropelvic junction (UPJ) due to stone [N20.1]  Right kidney stone [N20.0]  Diagnosis Additional Information: No value filed.    Anesthesia Type:   General     Note:    Oropharynx: oropharynx clear of all foreign objects and spontaneously breathing  Level of Consciousness: drowsy and awake  Oxygen Supplementation: face mask  Level of Supplemental Oxygen (L/min / FiO2): 8  Independent Airway: airway patency satisfactory and stable  Dentition: dentition unchanged  Vital Signs Stable: post-procedure vital signs reviewed and stable  Report to RN Given: handoff report given  Patient transferred to: PACU    Handoff Report: Identifed the Patient, Identified the Reponsible Provider, Reviewed the pertinent medical history, Discussed the surgical course, Reviewed Intra-OP anesthesia mangement and issues during anesthesia, Set expectations for post-procedure period and Allowed opportunity for questions and acknowledgement of understanding      Vitals:  Vitals Value Taken Time   /70 06/21/24 1143   Temp 97.7  F (36.5  C) 06/21/24 1139   Pulse 80 06/21/24 1143   Resp 14 06/21/24 1143   SpO2 100 % 06/21/24 1143   Vitals shown include unfiled device data.    Electronically Signed By: GARRETT GUZMÁN CRNA  June 21, 2024  11:44 AM

## 2024-06-21 NOTE — ANESTHESIA PREPROCEDURE EVALUATION
Anesthesia Pre-Procedure Evaluation    Patient: Zee Kyle   MRN: [id number] : 1975        Procedure : Procedure(s):  cystoscopy, right ureteroscopy with laser lithotripsy, right ureteroscopy with stone basketing, right retrograde pyelogram, right ureteral stent exchange          Past Medical History:   Diagnosis Date    Abdominal pain, epigastric 2022    Acute pancreatitis without infection or necrosis, unspecified pancreatitis type 2022    Anxiety Ongoing since     Depressive disorder on going since     Earache or other ear, nose, or throat complaint     Tonsillectomy     Esophageal reflux     Had stopped once I got my sleep apnea mask     Herpes simplex without mention of complication     Hypertension     While Pregnant    Hypothyroidism 2012    Intertrigo 2018    PONV (postoperative nausea and vomiting)     Presbyacusis     Presbycusis    Sleep apnea     STD (sexually transmitted disease)     Herpes    Urinary incontinence     Bladder sling surgery       Past Surgical History:   Procedure Laterality Date    CYSTOSCOPY, RETROGRADES, INSERT STENT URETER(S), COMBINED Right 2024    Procedure: Cystoscopy, right retrograde pyelogram, right ureteral stent insertion, fluoroscopic interpretation <1 hour physician time;  Surgeon: Alvin Rodrigues MD;  Location:  OR    ENT SURGERY      GENITOURINARY SURGERY      bladder sling    LAPAROSCOPIC CHOLECYSTECTOMY WITH CHOLANGIOGRAMS N/A 2022    Procedure: LAPAROSCOPIC CHOLECYSTECTOMY WITH CHOLANGIOGRAM;  Surgeon: Era Villela MD;  Location:  OR    LAPAROSCOPIC GASTRIC SLEEVE N/A 10/15/2019    Procedure: LAPAROSCOPIC GASTRIC SLEEVE;  Surgeon: Cesar Lee MD;  Location: [facility] OR    PE TUBES      TONSILLECTOMY      TYMPANOPLASTY, RT/LT      Tympanoplasty RT/LT      Allergies   Allergen Reactions    Dog Epithelium (Canis Lupus Familiaris) Itching    Dogs     Lactose Diarrhea    Nsaids       Bariatric surgery      Social History     Tobacco Use    Smoking status: Former     Current packs/day: 0.00     Average packs/day: 1 pack/day for 10.0 years (10.0 ttl pk-yrs)     Types: Cigarettes     Start date: 1996     Quit date: 2/10/2003     Years since quittin.3    Smokeless tobacco: Never    Tobacco comments:     Glad to be done   Substance Use Topics    Alcohol use: Not Currently     Comment: occas      Wt Readings from Last 1 Encounters:   24 98.4 kg (216 lb 14.4 oz)        Anesthesia Evaluation   Pt has had prior anesthetic. Type: General.    No history of anesthetic complications       ROS/MED HX  ENT/Pulmonary:     (+) sleep apnea,                                       Neurologic:  - neg neurologic ROS     Cardiovascular:     (+)  hypertension- -   -  - -                                      METS/Exercise Tolerance: >4 METS    Hematologic:  - neg hematologic  ROS     Musculoskeletal:  - neg musculoskeletal ROS     GI/Hepatic:     (+) GERD, Asymptomatic on medication,                  Renal/Genitourinary:     (+)       Nephrolithiasis ,       Endo:     (+)               Obesity,       Psychiatric/Substance Use:     (+) psychiatric history anxiety and depression       Infectious Disease:  - neg infectious disease ROS     Malignancy:  - neg malignancy ROS     Other:  - neg other ROS          Physical Exam    Airway        Mallampati: II   TM distance: > 3 FB   Neck ROM: full   Mouth opening: > 3 cm    Respiratory Devices and Support         Dental       (+) Completely normal teeth      Cardiovascular   cardiovascular exam normal       Rhythm and rate: regular and normal     Pulmonary   pulmonary exam normal        breath sounds clear to auscultation           OUTSIDE LABS:  CBC:   Lab Results   Component Value Date    WBC 9.6 2024    WBC 7.9 2023    HGB 15.0 2024    HGB 14.5 2023    HCT 44.4 2024    HCT 43.7 2023     2024     2023  "    BMP:   Lab Results   Component Value Date     06/11/2024     06/11/2024    POTASSIUM 4.0 06/11/2024    POTASSIUM 4.0 06/11/2024    CHLORIDE 107 06/11/2024    CHLORIDE 103 06/11/2024    CO2 23 06/11/2024    CO2 22 06/11/2024    BUN 10.2 06/11/2024    BUN 10.5 06/11/2024    CR 0.73 06/11/2024    CR 0.75 06/11/2024    GLC 87 06/11/2024    GLC 95 06/11/2024     COAGS: No results found for: \"PTT\", \"INR\", \"FIBR\"  POC:   Lab Results   Component Value Date    BGM 86 10/17/2019    HCG Negative 06/11/2024    HCGS Negative 07/11/2022     HEPATIC:   Lab Results   Component Value Date    ALBUMIN 4.4 07/19/2022    PROTTOTAL 7.5 07/19/2022    ALT 39 (H) 07/19/2022    AST 25 07/19/2022    ALKPHOS 59 07/19/2022    BILITOTAL 0.2 07/19/2022     OTHER:   Lab Results   Component Value Date    LACT 1.4 07/11/2022    A1C 5.5 04/23/2024    HOLLY 8.9 06/11/2024    MAG 2.0 07/12/2022    LIPASE 48 07/19/2022    TSH 1.48 10/01/2022    CRP <2.9 12/22/2021    SED 6 12/22/2021       Anesthesia Plan    ASA Status:  2    NPO Status:  NPO Appropriate    Anesthesia Type: General.     - Airway: LMA   Induction: Intravenous.   Maintenance: Inhalation.        Consents    Anesthesia Plan(s) and associated risks, benefits, and realistic alternatives discussed. Questions answered and patient/representative(s) expressed understanding.     - Discussed: Risks, Benefits and Alternatives for the PROCEDURE were discussed     - Discussed with:  Patient       Use of blood products discussed: Yes.     - Discussed with: Patient.     - Consented: consented to blood products            Reason for refusal: other.     Postoperative Care    Pain management: IV analgesics, Oral pain medications.   PONV prophylaxis: Ondansetron (or other 5HT-3), Dexamethasone or Solumedrol     Comments:    Other Comments: Last dose Wegovy 10 days ago, no symptoms of nausea or vomiting today.            Vandana Beck MD    I have reviewed the pertinent notes and labs in the " "chart from the past 30 days and (re)examined the patient.  Any updates or changes from those notes are reflected in this note.              # Obesity: Estimated body mass index is 33.96 kg/m  as calculated from the following:    Height as of this encounter: 1.702 m (5' 7.01\").    Weight as of this encounter: 98.4 kg (216 lb 14.4 oz).      "

## 2024-06-21 NOTE — DISCHARGE INSTRUCTIONS
POSTOPERATIVE INSTRUCTIONS    Diagnosis-------------------------------   Right kidney stone    Procedure-------------------------------  Procedure(s) (LRB):  cystoscopy, right ureteroscopy with laser lithotripsy, right ureteroscopy with stone basketing, right retrograde pyelogram, right ureteral stent exchange (Right)      Findings--------------------------------  Right proximal ureteral stone fragment embedded in wall of ureter, remainder of stone had been fragmented by stent placement and retropulsed into kidney     7 mm lower pole stone, laser fragmented and basketed     Remainder of stone burden dusted     95% stone free    Home-going instructions-----------------         Activity Limitation:     - No driving or operating heavy machinery while on narcotic pain medication.     FOLLOW THESE INSTRUCTIONS AS INDICATED BELOW:  - Observe operative area for signs of excessive bleeding.  - You may shower.  - Increase fluid intake to promote clear urine.  - Resume usual diet as tolerated    What to expect while recovering-----------  - You may experience some intermittent bleeding that makes your urine pink or cherry colored. This is normal.  - However, if you are unable to urinate, passing large amount of clots, have amaya blood in your urine, or have a temperature >101 degrees, call the urology nurse on call, or present to your nearest emergency department.  - You are encouraged to walk daily, and have no activity restrictions.   - A URETERAL STENT has been placed that allows urine to flow unobstructed from your kidney into your bladder.  The stent has a curl in the kidney and a curl in the bladder.  The curl in the bladder can cause some urgency and frequency of urination as well as some mild blood in the urine.  The curl in the kidney can cause some mild flank discomfort.  This may be more noticeable when you urinate.  A URETERAL STENT is meant to be left in temporarily.  It must be removed or changed no later  than 3 months after its insertion.  If it's not removed it can result in stone overgrowth on the stent that can cause pain, infection, and can be very difficult to remove.        Discharge Medications/instructions:   - Flomax (tamsulosin) to be taken daily until stent is removed  - Antibiotics (antibiotic) are to be taken with you to your scheduled return visit for stent removal  - Take Tylenol 1000mg every 6 hours for pain  - Take Ibuprofen 600mg every 6 hours as needed for additional pain control (alternate with the Tylenol so you take one or the other every 3 hours)  - Take Oxycodone 5mg every 6 hours only for break through pain  - Take Colace while taking Oxycodone to prevent constipation      Questions/concerns------------------------  [facility]: [phone]    Future appointments  Follow up as scheduled for cystoscopy and stent removal in the office      Alvin Rodrigues MD     CYSTOSCOPY DISCHARGE INSTRUCTIONS  [facility]  [address]   [phone]    YOU MAY GO BACK TO YOUR NORMAL DIET AND ACTIVITY, UNLESS YOUR DOCTOR TELLS YOU NOT TO.    FOR THE NEXT TWO DAYS, YOU MAY NOTICE:    SOME BLOOD IN YOUR URINE.  SOME BURNING WHEN YOU URINATE.  AN URGE TO URINATE MORE OFTEN.  BLADDER SPASMS.    THESE ARE NORMAL AFTER THE PROCEDURE.  THEY SHOULD GO AWAY AFTER A DAY OR TWO.  TO RELIEVE THESE PROBLEMS:     DRINK 6 TO 8 LARGE GLASSES OF WATER EACH DAY (INCLUDES DRINKS AT MEALS).  THIS WILL HELP CLEAR THE URINE.    TAKE WARM BATHS TO RELIEVE PAIN AND BLADDER SPASMS.  DO NOT ADD ANYTHING TO THE BATH WATER.    YOUR DOCTOR MAY PRESCRIBE PAIN MEDICINE.  YOU MAY ALSO TAKE TYLENOL (ACETAMINOPHEN) FOR PAIN.    CALL YOUR SURGEON IF YOU HAVE:    A FEVER OVER 101 DEGREES.  CHECK YOUR TEMPERATURE UNDER YOUR TONGUE.    CHILLS.    FAILURE TO URINATE (NO URINE COMES OUT WHEN YOU TRY TO USE THE TOILET).  TRY SOAKING IN A BATHTUB FULL OF WARM WATER.  IF STILL NO URINE,  CALL YOUR DOCTOR.    A LOT OF BLOOD IN THE URINE, OR BLOOD CLOTS LARGER THAN A NICKEL.      PAIN IN THE BACK OR BELLY AREA (ABDOMEN).    PAIN OR SPASMS THAT ARE NOT RELIEVED BY WARM TUB BATHS AND PAIN MEDICINE.      SEVERE PAIN, BURNING OR OTHER PROBLEMS WHILE PASSING URINE.    PAIN THAT GETS WORSE AFTER TWO DAYS.         Maximum acetaminophen (Tylenol) dose from all sources should not exceed 4 grams (4000 mg) per day.  You last had 1,000 mg at your home this morning around 6am ; do not take Tylenol products again until after 12pm if needed.      DR. LADI JEWELL M.D.                    CLINIC PHONE NUMBER:  [phone]  [facility]

## 2024-06-21 NOTE — ANESTHESIA POSTPROCEDURE EVALUATION
Patient: Zee Kyle    Procedure: Procedure(s):  cystoscopy, right ureteroscopy with laser lithotripsy, right ureteroscopy with stone basketing, right retrograde pyelogram, right ureteral stent exchange       Anesthesia Type:  General    Note:  Disposition: Outpatient   Postop Pain Control: Uneventful            Sign Out: Well controlled pain   PONV: No   Neuro/Psych: Uneventful            Sign Out: Acceptable/Baseline neuro status   Airway/Respiratory: Uneventful            Sign Out: Acceptable/Baseline resp. status   CV/Hemodynamics: Uneventful            Sign Out: Acceptable CV status; No obvious hypovolemia; No obvious fluid overload   Other NRE: NONE   DID A NON-ROUTINE EVENT OCCUR? No           Last vitals:  Vitals Value Taken Time   /73 06/21/24 1155   Temp 97.7  F (36.5  C) 06/21/24 1139   Pulse 79 06/21/24 1200   Resp 15 06/21/24 1200   SpO2 97 % 06/21/24 1200   Vitals shown include unfiled device data.    Electronically Signed By: Vandana Beck MD  June 21, 2024  12:01 PM

## 2024-06-26 LAB
APPEARANCE STONE: NORMAL
COMPN STONE: NORMAL
SPECIMEN WT: 55 MG

## 2024-06-28 ENCOUNTER — OFFICE VISIT (OUTPATIENT)
Dept: UROLOGY | Facility: CLINIC | Age: 49
End: 2024-06-28
Payer: COMMERCIAL

## 2024-06-28 VITALS
SYSTOLIC BLOOD PRESSURE: 110 MMHG | WEIGHT: 216 LBS | HEIGHT: 67 IN | DIASTOLIC BLOOD PRESSURE: 72 MMHG | BODY MASS INDEX: 33.9 KG/M2

## 2024-06-28 DIAGNOSIS — N20.0 CALCIUM OXALATE STONES: ICD-10-CM

## 2024-06-28 DIAGNOSIS — N20.1 OBSTRUCTION OF RIGHT URETEROPELVIC JUNCTION (UPJ) DUE TO STONE: Primary | ICD-10-CM

## 2024-06-28 PROCEDURE — 52310 CYSTOSCOPY AND TREATMENT: CPT | Performed by: STUDENT IN AN ORGANIZED HEALTH CARE EDUCATION/TRAINING PROGRAM

## 2024-06-28 PROCEDURE — 99213 OFFICE O/P EST LOW 20 MIN: CPT | Mod: 25 | Performed by: STUDENT IN AN ORGANIZED HEALTH CARE EDUCATION/TRAINING PROGRAM

## 2024-06-28 ASSESSMENT — PAIN SCALES - GENERAL: PAINLEVEL: MILD PAIN (2)

## 2024-06-28 NOTE — PROGRESS NOTES
"CHIEF COMPLAINT   Zee Kyle who is a 48 year old female w/ h/o gastric sleeve returns today for follow-up of right UPJ stone s/p stent 6/11/2024 followed by ureteroscopy 6/21/2024.      HPI   Zee Kyle is a 48 year old female w/ h/o gastric sleeve returns today for follow-up of right UPJ stone s/p stent 6/11/2024 followed by ureteroscopy 6/21/2024.      First stone episode  Stent has been bothersome as expected    PHYSICAL EXAM  Patient is a 48 year old  female   Vitals: Blood pressure 110/72, height 1.702 m (5' 7\"), weight 98 kg (216 lb), not currently breastfeeding.  Body mass index is 33.83 kg/m .  General Appearance Adult:   Alert, no acute distress, oriented  HENT: throat/mouth:normal, good dentition  Lungs: no respiratory distress, or pursed lip breathing  Heart: No obvious jugular venous distension present  Abdomen: nondistended  Musculoskeltal: extremities normal, no peripheral edema  Skin: no suspicious lesions or rashes  Neuro: Alert, oriented, speech and mentation normal  Psych: affect and mood normal  Gait: Normal  : normal female external genitalia    Stone analysis 6/21/2024  Stone Composition See Note    Comment: Calculi composed primarily of:  90% calcium oxalate dihydrate, and  10% calcium phosphate (hydroxy- and carbonate- apatite).       PRE-PROCEDURE DIAGNOSIS: right ureteral stone    POST-PROCEDURE DIAGNOSIS: right ureteral stone    PROCEDURE: Cystoscopy with right ureteral stent removal      DESCRIPTION OF PROCEDURE: After informed consent was obtained, the patient was brought to the procedure room where she was placed in the lithotomy position with all pressure points well padded.  The vulva was prepped and draped in sterile fashion. A flexible cystoscope was introduced through a well-lubricated urethra. The stent was visualized, grasped with a flexible grasper and removed intact and discarded  The flexible cystoscope was removed and the findings were described to the " patient.       ASSESSMENT and PLAN  48 year old female w/ h/o gastric sleeve returns today for follow-up of right UPJ stone s/p stent 6/11/2024 followed by ureteroscopy 6/21/2024.      Caox stones, discussed prevention with her and her     Drink enough water to make 2-2.5L/day of urine  2300 mg - 3500 mg a day in sodium  Eat less animal protein (upper limit is 1.3 g/kg/day so ~127 g a day)  Eat less oxalate    Patient is working with a dietician given history of gastric sleeve, certainly a good idea    Offered metabolic workup to see if she is at risk for forming more stones and she agrees    - return 2-3 months with renal ultrasound, litholink x2 prior, with VINCENT    Alvin Rodrigues MD   [facility]  [facility] Phone: [phone]

## 2024-06-28 NOTE — LETTER
"6/28/2024       RE: Zee Kyle  [id number]  [facility] [address]     Dear Colleague,    Thank you for referring your patient, Zee Kyle, to the [facility] [place] at [facility]. Please see a copy of my visit note below.    CHIEF COMPLAINT   Zee Kyle who is a 48 year old female w/ h/o gastric sleeve returns today for follow-up of right UPJ stone s/p stent 6/11/2024 followed by ureteroscopy 6/21/2024.      HPI   Zee Kyle is a 48 year old female w/ h/o gastric sleeve returns today for follow-up of right UPJ stone s/p stent 6/11/2024 followed by ureteroscopy 6/21/2024.      First stone episode  Stent has been bothersome as expected    PHYSICAL EXAM  Patient is a 48 year old  female   Vitals: Blood pressure 110/72, height 1.702 m (5' 7\"), weight 98 kg (216 lb), not currently breastfeeding.  Body mass index is 33.83 kg/m .  General Appearance Adult:   Alert, no acute distress, oriented  HENT: throat/mouth:normal, good dentition  Lungs: no respiratory distress, or pursed lip breathing  Heart: No obvious jugular venous distension present  Abdomen: nondistended  Musculoskeltal: extremities normal, no peripheral edema  Skin: no suspicious lesions or rashes  Neuro: Alert, oriented, speech and mentation normal  Psych: affect and mood normal  Gait: Normal  : normal female external genitalia    Stone analysis 6/21/2024  Stone Composition See Note    Comment: Calculi composed primarily of:  90% calcium oxalate dihydrate, and  10% calcium phosphate (hydroxy- and carbonate- apatite).       PRE-PROCEDURE DIAGNOSIS: right ureteral stone    POST-PROCEDURE DIAGNOSIS: right ureteral stone    PROCEDURE: Cystoscopy with right ureteral stent removal      DESCRIPTION OF PROCEDURE: After informed consent was obtained, the patient was brought to the procedure room where she was placed in the lithotomy position with all " pressure points well padded.  The vulva was prepped and draped in sterile fashion. A flexible cystoscope was introduced through a well-lubricated urethra. The stent was visualized, grasped with a flexible grasper and removed intact and discarded  The flexible cystoscope was removed and the findings were described to the patient.       ASSESSMENT and PLAN  48 year old female w/ h/o gastric sleeve returns today for follow-up of right UPJ stone s/p stent 6/11/2024 followed by ureteroscopy 6/21/2024.      Caox stones, discussed prevention with her and her     Drink enough water to make 2-2.5L/day of urine  2300 mg - 3500 mg a day in sodium  Eat less animal protein (upper limit is 1.3 g/kg/day so ~127 g a day)  Eat less oxalate    Patient is working with a dietician given history of gastric sleeve, certainly a good idea    Offered metabolic workup to see if she is at risk for forming more stones and she agrees    - return 2-3 months with renal ultrasound, litholink x2 prior, with VINCENT    Alvin Rodrigues MD   [facility]  [facility] Phone: [phone]

## 2024-06-28 NOTE — NURSING NOTE
Chief Complaint   Patient presents with    Kidney Stone(s) Followup     Stent removal      Prior to the start of the procedure and with procedural staff participation, I verbally confirmed the patient s identity using two indicators, relevant allergies, that the procedure was appropriate and matched the consent or emergent situation, and that the correct equipment/implants were available. Immediately prior to starting the procedure I conducted the Time Out with the procedural staff and re-confirmed the patient s name, procedure, and site/side. I have wiped the patient off with the povidone-Iodine solution, draped them,  used Lidocaine hydrochloride jelly, and instilled sterile water into the bladder. (The Joint Commission universal protocol was followed.)  Yes    Sedation (Moderate or Deep): None     Nicky Main CMA

## 2024-06-28 NOTE — PATIENT INSTRUCTIONS
"Drink enough water to make 2-2.5L/day of urine  2300 mg - 3500 mg a day in sodium  Eat less animal protein (upper limit is 1.3 g/kg/day so ~127 g a day)  Eat less oxalate      AFTER YOUR CYSTOSCOPY  ?  ?  You have just completed a cystoscopy, or \"cysto\", which allowed your physician to learn more about your bladder (or to remove a stent placed after surgery). We suggest that you continue to avoid caffeine, fruit juice, and alcohol for the next 24 hours, however, you are encouraged to return to your normal activities.  ?  ?  A few things that are considered normal after your cystoscopy:  ?  * small amount of bleeding (or spotting) that clears within the next 24 hours  ?  * slight burning sensation with urination  ?  * sensation of needing to void (urinate) more frequently  ?  * the feeling of \"air\" in your urine  ?  * mild discomfort that is relieved with Tylenol    * bladder spasms  ?  ?  ?  Please contact our office promptly if you:  ?  * develop a fever above 101 degrees  ?  * are unable to urinate  ?  * develop bright red blood that does not stop  ?  * experience severe pain or swelling  ?  ?  ?  And of course, please contact our office with any concerns or questions [phone].  ?   "

## 2024-07-09 ENCOUNTER — TRANSFERRED RECORDS (OUTPATIENT)
Dept: HEALTH INFORMATION MANAGEMENT | Facility: CLINIC | Age: 49
End: 2024-07-09
Payer: COMMERCIAL

## 2024-07-22 ASSESSMENT — ANXIETY QUESTIONNAIRES
8. IF YOU CHECKED OFF ANY PROBLEMS, HOW DIFFICULT HAVE THESE MADE IT FOR YOU TO DO YOUR WORK, TAKE CARE OF THINGS AT HOME, OR GET ALONG WITH OTHER PEOPLE?: NOT DIFFICULT AT ALL
5. BEING SO RESTLESS THAT IT IS HARD TO SIT STILL: NOT AT ALL
GAD7 TOTAL SCORE: 2
7. FEELING AFRAID AS IF SOMETHING AWFUL MIGHT HAPPEN: NOT AT ALL
7. FEELING AFRAID AS IF SOMETHING AWFUL MIGHT HAPPEN: NOT AT ALL
IF YOU CHECKED OFF ANY PROBLEMS ON THIS QUESTIONNAIRE, HOW DIFFICULT HAVE THESE PROBLEMS MADE IT FOR YOU TO DO YOUR WORK, TAKE CARE OF THINGS AT HOME, OR GET ALONG WITH OTHER PEOPLE: NOT DIFFICULT AT ALL
1. FEELING NERVOUS, ANXIOUS, OR ON EDGE: SEVERAL DAYS
2. NOT BEING ABLE TO STOP OR CONTROL WORRYING: SEVERAL DAYS
6. BECOMING EASILY ANNOYED OR IRRITABLE: NOT AT ALL
4. TROUBLE RELAXING: NOT AT ALL
3. WORRYING TOO MUCH ABOUT DIFFERENT THINGS: NOT AT ALL
GAD7 TOTAL SCORE: 2

## 2024-07-23 ENCOUNTER — VIRTUAL VISIT (OUTPATIENT)
Dept: FAMILY MEDICINE | Facility: CLINIC | Age: 49
End: 2024-07-23
Payer: COMMERCIAL

## 2024-07-23 DIAGNOSIS — F41.9 ANXIETY: ICD-10-CM

## 2024-07-23 DIAGNOSIS — F33.1 MAJOR DEPRESSIVE DISORDER, RECURRENT EPISODE, MODERATE (H): ICD-10-CM

## 2024-07-23 DIAGNOSIS — I10 BENIGN ESSENTIAL HYPERTENSION: ICD-10-CM

## 2024-07-23 DIAGNOSIS — E66.811 CLASS 1 OBESITY DUE TO EXCESS CALORIES WITH SERIOUS COMORBIDITY AND BODY MASS INDEX (BMI) OF 33.0 TO 33.9 IN ADULT: ICD-10-CM

## 2024-07-23 DIAGNOSIS — E66.09 CLASS 1 OBESITY DUE TO EXCESS CALORIES WITH SERIOUS COMORBIDITY AND BODY MASS INDEX (BMI) OF 33.0 TO 33.9 IN ADULT: ICD-10-CM

## 2024-07-23 PROBLEM — E66.01 MORBID OBESITY (H): Status: RESOLVED | Noted: 2023-03-20 | Resolved: 2024-07-23

## 2024-07-23 PROCEDURE — 99442 PR PHYSICIAN TELEPHONE EVALUATION 11-20 MIN: CPT | Mod: 93 | Performed by: FAMILY MEDICINE

## 2024-07-23 RX ORDER — VENLAFAXINE HYDROCHLORIDE 37.5 MG/1
37.5 CAPSULE, EXTENDED RELEASE ORAL DAILY
Qty: 90 CAPSULE | Refills: 1 | Status: SHIPPED | OUTPATIENT
Start: 2024-07-23

## 2024-07-23 RX ORDER — LISINOPRIL 5 MG/1
5 TABLET ORAL DAILY
Qty: 90 TABLET | Refills: 2 | Status: SHIPPED | OUTPATIENT
Start: 2024-07-23

## 2024-07-23 ASSESSMENT — PATIENT HEALTH QUESTIONNAIRE - PHQ9: SUM OF ALL RESPONSES TO PHQ QUESTIONS 1-9: 1

## 2024-07-23 NOTE — PROGRESS NOTES
Marion is a 48 year old who is being evaluated via a billable telephone visit.    What phone number would you like to be contacted at? [phone]   How would you like to obtain your AVS? Unique  Originating Location (pt. Location): Home    Distant Location (provider location):  On-site    Assessment & Plan     Class 1 obesity due to excess calories with serious comorbidity and body mass index (BMI) of 33.0 to 33.9 in adult  Doing well, will increase to 1 mg weekly for one month and have her update after 4th dose.  Will increase to 1.7 mg weekly if doing well, or can continue at 1 mg dose if desired.  Follow up in 3 months or sooner as needed.  Continue counseling and dietitian visits as directed.  - Semaglutide-Weight Management (WEGOVY) 1 MG/0.5ML pen; Inject 1 mg subcutaneously once a week    Major depressive disorder, recurrent episode, moderate (H)  Controlled, continue current medication, follow up in 6 months or sooner as needed.  - venlafaxine (EFFEXOR XR) 37.5 MG 24 hr capsule; Take 1 capsule (37.5 mg) by mouth daily    Anxiety  As above  - venlafaxine (EFFEXOR XR) 37.5 MG 24 hr capsule; Take 1 capsule (37.5 mg) by mouth daily    Benign essential hypertension  Controlled, continue current regimen.  - lisinopril (ZESTRIL) 5 MG tablet; Take 1 tablet (5 mg) by mouth daily    The longitudinal plan of care for the diagnosis(es)/condition(s) as documented were addressed during this visit. Due to the added complexity in care, I will continue to support Marion in the subsequent management and with ongoing continuity of care.        See Patient Instructions    Subjective   Marion is a 48 year old, presenting for the following health issues:  Recheck Medication and weight follow up        7/23/2024     9:42 AM   Additional Questions   Roomed by Veronica Echeverria     History of Present Illness       Mental Health Follow-up:  Patient presents to follow-up on Depression & Anxiety.Patient's depression since last visit has  been:  Good  The patient is not having other symptoms associated with depression.  Patient's anxiety since last visit has been:  Good  The patient is not having other symptoms associated with anxiety.  Any significant life events: health concerns  Patient is not feeling anxious or having panic attacks.  Patient has no concerns about alcohol or drug use.    Reason for visit:  I recently was prescribed wegovy and Dr. Janet De La Garza wanted to do a 3 month follow up to see how it is going.  So far so good!  I was on the .25 dose for the first cycle and then 0.5 for the last two cycles.  I would like to increase for the next cycle.    She eats 0-1 servings of fruits and vegetables daily.She consumes 0 sweetened beverage(s) daily.She exercises with enough effort to increase her heart rate 9 or less minutes per day.  She exercises with enough effort to increase her heart rate 3 or less days per week. She is missing 1 dose(s) of medications per week.  She is not taking prescribed medications regularly due to remembering to take.      Medication Followup of Wegovy  Taking Medication as prescribed: yes  Side Effects:  None  Medication Helping Symptoms:  yes    She feels Wegovy is a miracle drug, food is just food now.  She does not have the endorphin release she did before with food that triggered desire to eat.  She sees a dietitian and counselor, her dietitian has her on a prescribed meal plan because she had no desire to eat.    She does not have have a scale at home because of her eating disorder, June 29th at her last visit she was 215 lbs.  Her clothes fit different and she is feeling well.  She only was on the scale because of her stones.    Pt reported she delayed 1 doze during Kidney stone issue in June.  She took her last dose on Saturday.  She has noted cravings increasing lately and would like to increase the dose.    Needs refills on her Venlafaxine.  Mood symptoms are okay overall.  She sees a psychologist.         "3/20/2023     2:00 PM 4/23/2024    11:56 AM 7/22/2024     8:16 PM   HENRI-7 SCORE   Total Score 6 (mild anxiety) 3 (minimal anxiety) 2 (minimal anxiety)   Total Score 6 3 2         10/4/2023     9:07 AM 4/23/2024    11:56 AM 7/23/2024    10:08 AM   PHQ   PHQ-9 Total Score 2 2 1   Q9: Thoughts of better off dead/self-harm past 2 weeks Not at all Not at all Not at all          Current Outpatient Medications   Medication Sig Dispense Refill    Biotin w/ Vitamins C & E (HAIR SKIN & NAILS GUMMIES PO) Take 2 chew tab by mouth daily      childrens multivitamin w/iron (FLINTSTONES COMPLETE) chewable tablet Take 2 chew tab by mouth daily      colestipol (COLESTID) 1 g tablet Take 1 g by mouth daily      cyanocobalamin (VITAMIN B-12) 1000 MCG tablet Take 1,000 mcg by mouth daily      fluticasone (FLONASE) 50 MCG/ACT nasal spray Spray 2 sprays into both nostrils daily as needed      levocetirizine (XYZAL) 5 MG tablet Take 10 mg by mouth daily      levonorgestrel (MIRENA) 20 MCG/24HR IUD 1 each (20 mcg) by Intrauterine route continuous      lisinopril (ZESTRIL) 5 MG tablet Take 1 tablet (5 mg) by mouth daily 90 tablet 2    Semaglutide-Weight Management (WEGOVY) 1 MG/0.5ML pen Inject 1 mg subcutaneously once a week 2 mL 0    venlafaxine (EFFEXOR XR) 37.5 MG 24 hr capsule Take 1 capsule (37.5 mg) by mouth daily 90 capsule 1    Vitamin D3 (CHOLECALCIFEROL) 125 MCG (5000 UT) tablet Take 125 mcg by mouth daily      acetaminophen (TYLENOL) 500 MG tablet Take 2 tablets (1,000 mg) by mouth every 6 hours as needed for mild pain 100 tablet 0             Objective    Vitals - Patient Reported  Weight (Patient Reported): 97.5 kg (215 lb)  Height (Patient Reported): 170.2 cm (5' 7\")  BMI (Based on Pt Reported Ht/Wt): 33.67  Pain Score: No Pain (0)        Physical Exam   General: Alert and no distress //Respiratory: No audible wheeze, cough, or shortness of breath // Psychiatric:  Appropriate affect, tone, and pace of words      Labs reviewed " in chart      Phone call duration: 12 minutes  Signed Electronically by: Christy De La Garza MD

## 2024-07-25 ENCOUNTER — TRANSFERRED RECORDS (OUTPATIENT)
Dept: HEALTH INFORMATION MANAGEMENT | Facility: CLINIC | Age: 49
End: 2024-07-25

## 2024-08-19 ENCOUNTER — MYC MEDICAL ADVICE (OUTPATIENT)
Dept: FAMILY MEDICINE | Facility: CLINIC | Age: 49
End: 2024-08-19
Payer: COMMERCIAL

## 2024-08-19 ENCOUNTER — ANCILLARY PROCEDURE (OUTPATIENT)
Dept: ULTRASOUND IMAGING | Facility: CLINIC | Age: 49
End: 2024-08-19
Attending: STUDENT IN AN ORGANIZED HEALTH CARE EDUCATION/TRAINING PROGRAM
Payer: COMMERCIAL

## 2024-08-19 DIAGNOSIS — N20.1 OBSTRUCTION OF RIGHT URETEROPELVIC JUNCTION (UPJ) DUE TO STONE: ICD-10-CM

## 2024-08-19 DIAGNOSIS — R19.5 LOOSE STOOLS: Primary | ICD-10-CM

## 2024-08-19 DIAGNOSIS — E66.09 CLASS 1 OBESITY DUE TO EXCESS CALORIES WITH SERIOUS COMORBIDITY AND BODY MASS INDEX (BMI) OF 33.0 TO 33.9 IN ADULT: Primary | ICD-10-CM

## 2024-08-19 DIAGNOSIS — E66.811 CLASS 1 OBESITY DUE TO EXCESS CALORIES WITH SERIOUS COMORBIDITY AND BODY MASS INDEX (BMI) OF 33.0 TO 33.9 IN ADULT: Primary | ICD-10-CM

## 2024-08-19 PROCEDURE — 76770 US EXAM ABDO BACK WALL COMP: CPT

## 2024-08-19 NOTE — TELEPHONE ENCOUNTER
Dr. Janet De La Garza   Please see my chart update on wegovy - RN pended next dose please review prior to signing     Thank you   Umm Graves, Registered Nurse  [facility]

## 2024-08-20 ENCOUNTER — VIRTUAL VISIT (OUTPATIENT)
Dept: UROLOGY | Facility: CLINIC | Age: 49
End: 2024-08-20
Payer: COMMERCIAL

## 2024-08-20 DIAGNOSIS — N20.0 NEPHROLITHIASIS: Primary | ICD-10-CM

## 2024-08-20 PROCEDURE — 99213 OFFICE O/P EST LOW 20 MIN: CPT | Mod: 95 | Performed by: NURSE PRACTITIONER

## 2024-08-20 NOTE — NURSING NOTE
Current patient location: [id number]  [facility] [address]    Is the patient currently in the state of [place]? YES    Visit mode:VIDEO    If the visit is dropped, the patient can be reconnected by: VIDEO VISIT: Text to cell phone:   Telephone Information:   Mobile [phone]       Will anyone else be joining the visit? NO  (If patient encounters technical issues they should call [phone]    How would you like to obtain your AVS? MyChart    Are changes needed to the allergy or medication list? No, Pt stated no changes to allergies, and Pt stated no med changes    Are refills needed on medications prescribed by this physician? NO    Rooming Documentation:  Not applicable      Reason for visit: DERECK BROOKS

## 2024-08-20 NOTE — PROGRESS NOTES
Virtual Visit Details  Type of service:  Video Visit   Originating Location (pt. Location): Home  Distant Location (provider location):  On-site  Platform used for Video Visit: [facility]      Urology Virtual Visit    Name: Zee Kyle    MRN: [id number]   YOB: 1975               Chief Complaint:   Nephrolithiasis         Impression and Plan:   Impression / Plan:   Zee Kyle is a 48 year old female with Hx R UPJ stone s/p URS 6/2024 here today to discuss stone prevention.       -During counseling for this visit, we covered the natural history of kidney stones.  -Provided with low oxalate diet.  -Sipping fluids throughout the day to decrease risk of stone formation discussed.  -I mentioned that Crystal Light Lemonade is an excellent source of citrate. Discussed citrate inhibits stone formation by complexing with calcium in the urine, preventing growth and agglomeration of crystals.  -Mentioned avoiding highly processed foods high in sodium.  -Consumption of dietary calcium encouraged.   -Avoidance of Vitamin C supplements discussed.     -XR KUB in 1 yr to monitor stone burden.    Thank you for the opportunity to participate in the care of Zee Kyle.     GARRETT Rouse, CNP   [facility] - Department of Urology  [phone]          History of Present Illness:   Zee Kyle is a 48 year old female with h/o gastric sleeve, R UPJ stone s/p stent 6/11/2024 followed by ureteroscopy 6/21/2024, this was her first stone episode. Here today to follow-up on renal US and litholink. Litholink reveals low urine volume and very high sliding scale CaOx. Mildly increased density throughout the bilateral medullary pyramids on recent CT could reflect a component of underlying medullary nephrocalcinosis.     History is obtained from patient & EMR    Pertinent imaging reviewed:  US RENAL COMPLETE NON-VASCULAR  LOCATION: [facility]  DATE: 08/19/2024           Past Medical History:     Past Medical History:   Diagnosis Date    Abdominal pain, epigastric 07/11/2022    Acute pancreatitis without infection or necrosis, unspecified pancreatitis type 07/11/2022    Anxiety Ongoing since 18    Depressive disorder on going since 18    Earache or other ear, nose, or throat complaint 2010    Tonsillectomy 7/02    Esophageal reflux     Had stopped once I got my sleep apnea mask 5/18    Herpes simplex without mention of complication     Hypertension     While Pregnant    Hypothyroidism 08/07/2012    Intertrigo 07/18/2018    PONV (postoperative nausea and vomiting)     Presbyacusis     Presbycusis    Sleep apnea     STD (sexually transmitted disease)     Herpes    Urinary incontinence     Bladder sling surgery 7/17          Past Surgical History:     Past Surgical History:   Procedure Laterality Date    COMBINED CYSTOSCOPY, RETROGRADES, URETEROSCOPY, LASER HOLMIUM LITHOTRIPSY URETER(S), INSERT STENT Right 06/21/2024    Procedure: Cystoscopy, right ureteroscopy with laser lithotripsy, right ureteroscopy with stone basketing, right retrograde pyelogram, right ureteral stent exchange, fluoroscopic interpretation 1 hour physician time;  Surgeon: Alvin Rodrigues MD;  Location: [facility] OR    CYSTOSCOPY      CYSTOSCOPY, RETROGRADES, INSERT STENT URETER(S), COMBINED Right 06/11/2024    Procedure: Cystoscopy, right retrograde pyelogram, right ureteral stent insertion, fluoroscopic interpretation <1 hour physician time;  Surgeon: Alvin Rodrigues MD;  Location:  OR    ENT SURGERY      GENITOURINARY SURGERY  2017    bladder sling    LAPAROSCOPIC CHOLECYSTECTOMY WITH CHOLANGIOGRAMS N/A 07/12/2022    Procedure: LAPAROSCOPIC CHOLECYSTECTOMY WITH CHOLANGIOGRAM;  Surgeon: Era Villela MD;  Location:  OR    LAPAROSCOPIC GASTRIC SLEEVE N/A 10/15/2019    Procedure: LAPAROSCOPIC GASTRIC SLEEVE;  Surgeon: Cesar Lee MD;  Location: [facility] OR    PE TUBES      TONSILLECTOMY      TYMPANOPLASTY,  RT/LT      Tympanoplasty RT/LT          Social History:     Social History     Tobacco Use    Smoking status: Former     Current packs/day: 0.00     Average packs/day: 1 pack/day for 10.0 years (10.0 ttl pk-yrs)     Types: Cigarettes     Start date: 1996     Quit date: 2/10/2003     Years since quittin.5    Smokeless tobacco: Never    Tobacco comments:     Glad to be done   Substance Use Topics    Alcohol use: Not Currently     Comment: occas          Family History:     Family History   Problem Relation Age of Onset    Peripheral Vascular Disease Mother     Mental Illness Mother     Hypertension Father          08    Obesity Father     Gout Father     Cerebrovascular Disease Maternal Grandfather     Dementia Paternal Grandmother     Coronary Artery Disease Paternal Grandfather     Breast Cancer Maternal Grandmother           Allergies:     Allergies   Allergen Reactions    Dog Epithelium (Canis Lupus Familiaris) Itching    Dogs     Lactose Diarrhea    Nsaids      Bariatric surgery          Medications:     Current Outpatient Medications   Medication Sig Dispense Refill    acetaminophen (TYLENOL) 500 MG tablet Take 2 tablets (1,000 mg) by mouth every 6 hours as needed for mild pain 100 tablet 0    Biotin w/ Vitamins C & E (HAIR SKIN & NAILS GUMMIES PO) Take 2 chew tab by mouth daily      childrens multivitamin w/iron (FLINTSTONES COMPLETE) chewable tablet Take 2 chew tab by mouth daily      colestipol (COLESTID) 1 g tablet Take 1 g by mouth daily      cyanocobalamin (VITAMIN B-12) 1000 MCG tablet Take 1,000 mcg by mouth daily      fluticasone (FLONASE) 50 MCG/ACT nasal spray Spray 2 sprays into both nostrils daily as needed      levocetirizine (XYZAL) 5 MG tablet Take 10 mg by mouth daily      levonorgestrel (MIRENA) 20 MCG/24HR IUD 1 each (20 mcg) by Intrauterine route continuous      lisinopril (ZESTRIL) 5 MG tablet Take 1 tablet (5 mg) by mouth daily 90 tablet 2    Semaglutide-Weight Management  "(WEGOVY) 1 MG/0.5ML pen Inject 1 mg subcutaneously once a week 2 mL 0    venlafaxine (EFFEXOR XR) 37.5 MG 24 hr capsule Take 1 capsule (37.5 mg) by mouth daily 90 capsule 1    Vitamin D3 (CHOLECALCIFEROL) 125 MCG (5000 UT) tablet Take 125 mcg by mouth daily       No current facility-administered medications for this visit.          Review of Systems:    ROS: See HPI for pertinent details.  Remainder of 10-point ROS negative.         Physical Exam:   VS:  T: Data Unavailable    HR: Data Unavailable    BP: Data Unavailable    RR: Data Unavailable     GEN:  Alert.  NAD.    HEENT:  Sclerae anicteric.    CV:  No obvious jugular venous distension  LUNGS: No respiratory distress, breathing comfortably wo accessory muscle use.  SKIN:  No visible rash  NEURO:  CN grossly intact.          Laboratory Data:   No results found for: \"PSA\"  Lab Results   Component Value Date    CR 0.73 06/11/2024    CR 0.75 06/11/2024    CR 0.73 04/23/2024    CR 0.86 06/20/2023    CR 0.67 07/12/2022    CR 0.70 04/05/2021    CR 0.68 10/07/2019    CR 0.72 08/16/2019    CR 0.88 05/09/2019    CR 0.80 12/26/2018     Lab Results   Component Value Date    GFRESTIMATED >90 06/11/2024    GFRESTIMATED >90 06/11/2024    GFRESTIMATED >90 04/23/2024    GFRESTIMATED 84 06/20/2023    GFRESTIMATED >90 07/12/2022    GFRESTIMATED >90 04/05/2021    GFRESTIMATED >90 10/07/2019    GFRESTIMATED >90 08/16/2019    GFRESTIMATED 82 05/09/2019    GFRESTIMATED >90 12/26/2018          "

## 2024-08-20 NOTE — PATIENT INSTRUCTIONS
OXALATE DIET    Beverages to avoid:  Draft beer  Ovaltine  Cocoa    Recommended beverages:  Coffee  Beer (bottle)  Carbonated soda  Distilled alcohol  Lemonade  Wine: red, maria elena, white  Buttermilk, Whole, lowfat or skim milk  Yogurt with allowed  fruits  Soy, almond and rice  Milk    Vegetables to avoid:  Beets**  greens  Collards  Kale  Leeks  Mustard greens  Okra  Parsley**  Sweet potato**  Rutabagas  Spinach**  Swiss chard**  Watercress**    Recommended vegetables:  Asparagus  Broccoli  Carrots  Corn: sweet, white  Cucumber, peeled  Green peas, canned  Lettuce  Lima beans  Parsnips  Tomato, 1 small, juice  Turnips  Avocado  Ross sprouts  Cauliflower  Cabbage  Mushrooms  Onions  Peas, green  White potato  Radish    Recommended Meat / Meat Substitutes:  Eggs  Cheese  Beef, lamb or pork  Poultry  Fish and shellfish  Sardines    Miscellaneous foods to avoid:  Nuts**  Peanuts, almonds,  pecans, cashews  Chocolate**,  Cocoa,**  Vegetable soup,  Marmalade  Peanut butter    Miscellaneous recommend foods:  Caro  Mayonnaise  Salad dressing  Vegetable oils  Butter, margarine  Coconut  Jelly or preserves (made with allowed  fruits)  Lemon, lime juice  Salt, pepper  Cornbread  Sponge cake  Spaghetti, canned in tomato sauce  Rice  Quinoa  All bread    Fruits to avoid:  Currants, red  Dewberries  Grapes, purple  Gooseberries  Lemon peel**  Lime peel**  Orange peel**  Rhubarb**    Recommended fruits:  Apple  Apricots  Cherries, red, sour  Cranberry juice  Grape juice  Orange, fruit and juice  Peaches  Pears  Pineapple, plum, purple  Prunes  Apple juice  Banana  Cherries, david  Mangos  Melons, cantaloupe, cassava honeydew,  watermelon  Nectarines  Peaches  Pineapple juice  Plums, green or yellow    ** = very high in oxalate         A diet high in salt (sodium) causes calcium to build in your urine. Too much calcium in your urine can lead to new stones. Avoiding highly processed foods like fast food is the best way to reduce  sodium intake. You can use as much table salt as you like.    Calcium is a nutrient that is found in dairy products, such as yogurt, milk and cheese. You need to eat calcium so that it can bind with oxalate in the stomach and intestines before it moves to the kidneys. Eating foods with calcium is a good way for oxalates to leave the body and not form stones. The best way to get calcium into your body is through the foods you eat.     Avoid vitamin C supplements.    Drink enough fluids. The number one thing you can do is to drink enough fluids, like water. Your urine should ideally be a light straw color. Drinking enough fluids will dilute your urine and make it harder for chemicals to form stones.

## 2024-08-20 NOTE — LETTER
8/20/2024       RE: Zee Kyle  [address]  [place] [address]     Dear Colleague,    Thank you for referring your patient, Zee Kyle, to the [facility] [place] at [facility]. Please see a copy of my visit note below.    Virtual Visit Details  Type of service:  Video Visit   Originating Location (pt. Location): Home  Distant Location (provider location):  On-site  Platform used for Video Visit: [facility]      Urology Virtual Visit    Name: Zee Kyle    MRN: [id number]   YOB: 1975               Chief Complaint:   Nephrolithiasis         Impression and Plan:   Impression / Plan:   Zee Kyle is a 48 year old female with Hx R UPJ stone s/p URS 6/2024 here today to discuss stone prevention.       -During counseling for this visit, we covered the natural history of kidney stones.  -Provided with low oxalate diet.  -Sipping fluids throughout the day to decrease risk of stone formation discussed.  -I mentioned that Crystal Light Lemonade is an excellent source of citrate. Discussed citrate inhibits stone formation by complexing with calcium in the urine, preventing growth and agglomeration of crystals.  -Mentioned avoiding highly processed foods high in sodium.  -Consumption of dietary calcium encouraged.   -Avoidance of Vitamin C supplements discussed.     -XR KUB in 1 yr to monitor stone burden.    Thank you for the opportunity to participate in the care of Zee Kyle.     GARRETT Rouse, CNP  [facility] - Department of Urology  [phone]          History of Present Illness:   Zee Kyle is a 48 year old female with h/o gastric sleeve, R UPJ stone s/p stent 6/11/2024 followed by ureteroscopy 6/21/2024, this was her first stone episode. Here today to follow-up on renal US and litholink. Litholink reveals low urine volume and very high sliding scale CaOx. Mildly  increased density throughout the bilateral medullary pyramids on recent CT could reflect a component of underlying medullary nephrocalcinosis.     History is obtained from patient & EMR    Pertinent imaging reviewed:  US RENAL COMPLETE NON-VASCULAR  LOCATION: [facility]  DATE: 08/19/2024          Past Medical History:     Past Medical History:   Diagnosis Date     Abdominal pain, epigastric 07/11/2022     Acute pancreatitis without infection or necrosis, unspecified pancreatitis type 07/11/2022     Anxiety Ongoing since 18     Depressive disorder on going since 18     Earache or other ear, nose, or throat complaint 2010    Tonsillectomy 7/02     Esophageal reflux     Had stopped once I got my sleep apnea mask 5/18     Herpes simplex without mention of complication      Hypertension     While Pregnant     Hypothyroidism 08/07/2012     Intertrigo 07/18/2018     PONV (postoperative nausea and vomiting)      Presbyacusis     Presbycusis     Sleep apnea      STD (sexually transmitted disease)     Herpes     Urinary incontinence     Bladder sling surgery 7/17          Past Surgical History:     Past Surgical History:   Procedure Laterality Date     COMBINED CYSTOSCOPY, RETROGRADES, URETEROSCOPY, LASER HOLMIUM LITHOTRIPSY URETER(S), INSERT STENT Right 06/21/2024    Procedure: Cystoscopy, right ureteroscopy with laser lithotripsy, right ureteroscopy with stone basketing, right retrograde pyelogram, right ureteral stent exchange, fluoroscopic interpretation 1 hour physician time;  Surgeon: Alvin Rodrigues MD;  Location: [facility] OR     CYSTOSCOPY       CYSTOSCOPY, RETROGRADES, INSERT STENT URETER(S), COMBINED Right 06/11/2024    Procedure: Cystoscopy, right retrograde pyelogram, right ureteral stent insertion, fluoroscopic interpretation <1 hour physician time;  Surgeon: Alvin Rodrigues MD;  Location:  OR     ENT SURGERY       GENITOURINARY SURGERY  2017    bladder sling     LAPAROSCOPIC  CHOLECYSTECTOMY WITH CHOLANGIOGRAMS N/A 2022    Procedure: LAPAROSCOPIC CHOLECYSTECTOMY WITH CHOLANGIOGRAM;  Surgeon: Era Villela MD;  Location: [facility] OR     LAPAROSCOPIC GASTRIC SLEEVE N/A 10/15/2019    Procedure: LAPAROSCOPIC GASTRIC SLEEVE;  Surgeon: Cesar Lee MD;  Location: [facility] OR     PE TUBES       TONSILLECTOMY       TYMPANOPLASTY, RT/LT      Tympanoplasty RT/LT          Social History:     Social History     Tobacco Use     Smoking status: Former     Current packs/day: 0.00     Average packs/day: 1 pack/day for 10.0 years (10.0 ttl pk-yrs)     Types: Cigarettes     Start date: 1996     Quit date: 2/10/2003     Years since quittin.5     Smokeless tobacco: Never     Tobacco comments:     Glad to be done   Substance Use Topics     Alcohol use: Not Currently     Comment: occas          Family History:     Family History   Problem Relation Age of Onset     Peripheral Vascular Disease Mother      Mental Illness Mother      Hypertension Father          08     Obesity Father      Gout Father      Cerebrovascular Disease Maternal Grandfather      Dementia Paternal Grandmother      Coronary Artery Disease Paternal Grandfather      Breast Cancer Maternal Grandmother           Allergies:     Allergies   Allergen Reactions     Dog Epithelium (Canis Lupus Familiaris) Itching     Dogs      Lactose Diarrhea     Nsaids      Bariatric surgery          Medications:     Current Outpatient Medications   Medication Sig Dispense Refill     acetaminophen (TYLENOL) 500 MG tablet Take 2 tablets (1,000 mg) by mouth every 6 hours as needed for mild pain 100 tablet 0     Biotin w/ Vitamins C & E (HAIR SKIN & NAILS GUMMIES PO) Take 2 chew tab by mouth daily       childrens multivitamin w/iron (FLINTSTONES COMPLETE) chewable tablet Take 2 chew tab by mouth daily       colestipol (COLESTID) 1 g tablet Take 1 g by mouth daily       cyanocobalamin (VITAMIN B-12) 1000 MCG tablet Take 1,000 mcg by mouth daily  "      fluticasone (FLONASE) 50 MCG/ACT nasal spray Spray 2 sprays into both nostrils daily as needed       levocetirizine (XYZAL) 5 MG tablet Take 10 mg by mouth daily       levonorgestrel (MIRENA) 20 MCG/24HR IUD 1 each (20 mcg) by Intrauterine route continuous       lisinopril (ZESTRIL) 5 MG tablet Take 1 tablet (5 mg) by mouth daily 90 tablet 2     Semaglutide-Weight Management (WEGOVY) 1 MG/0.5ML pen Inject 1 mg subcutaneously once a week 2 mL 0     venlafaxine (EFFEXOR XR) 37.5 MG 24 hr capsule Take 1 capsule (37.5 mg) by mouth daily 90 capsule 1     Vitamin D3 (CHOLECALCIFEROL) 125 MCG (5000 UT) tablet Take 125 mcg by mouth daily       No current facility-administered medications for this visit.          Review of Systems:    ROS: See HPI for pertinent details.  Remainder of 10-point ROS negative.         Physical Exam:   VS:  T: Data Unavailable    HR: Data Unavailable    BP: Data Unavailable    RR: Data Unavailable     GEN:  Alert.  NAD.    HEENT:  Sclerae anicteric.    CV:  No obvious jugular venous distension  LUNGS: No respiratory distress, breathing comfortably wo accessory muscle use.  SKIN:  No visible rash  NEURO:  CN grossly intact.          Laboratory Data:   No results found for: \"PSA\"  Lab Results   Component Value Date    CR 0.73 06/11/2024    CR 0.75 06/11/2024    CR 0.73 04/23/2024    CR 0.86 06/20/2023    CR 0.67 07/12/2022    CR 0.70 04/05/2021    CR 0.68 10/07/2019    CR 0.72 08/16/2019    CR 0.88 05/09/2019    CR 0.80 12/26/2018     Lab Results   Component Value Date    GFRESTIMATED >90 06/11/2024    GFRESTIMATED >90 06/11/2024    GFRESTIMATED >90 04/23/2024    GFRESTIMATED 84 06/20/2023    GFRESTIMATED >90 07/12/2022    GFRESTIMATED >90 04/05/2021    GFRESTIMATED >90 10/07/2019    GFRESTIMATED >90 08/16/2019    GFRESTIMATED 82 05/09/2019    GFRESTIMATED >90 12/26/2018              Again, thank you for allowing me to participate in the care of your patient.      Sincerely,    Sarahi" Slick Daugherty, GARRETT CNP

## 2024-08-20 NOTE — TELEPHONE ENCOUNTER
Dr. Janet De La Garza   Please see my chart     Patient asking for you to fill rx colestipol   Previously filled by [facility]   RN instructed patient to follow up with [facility] for refill however they are asking patient to schedule follow up with them and suggested that she reach out to you     Do you want to fill?     Have patient follow up with original prescriber?     Thank you   Umm Graves, Registered Nurse  [facility]

## 2024-08-22 RX ORDER — MONTELUKAST SODIUM 4 MG/1
1 TABLET, CHEWABLE ORAL DAILY
Qty: 90 TABLET | Refills: 3 | Status: SHIPPED | OUTPATIENT
Start: 2024-08-22

## 2024-08-22 NOTE — TELEPHONE ENCOUNTER
April Coming MD Frederic, please see Mychart. Pt responds, informing 1mg tablets.    Routing to PCP to review and advise.     Corrine MORRISSEY RN

## 2024-09-18 ENCOUNTER — MYC REFILL (OUTPATIENT)
Dept: FAMILY MEDICINE | Facility: CLINIC | Age: 49
End: 2024-09-18
Payer: COMMERCIAL

## 2024-09-18 DIAGNOSIS — E66.09 CLASS 1 OBESITY DUE TO EXCESS CALORIES WITH SERIOUS COMORBIDITY AND BODY MASS INDEX (BMI) OF 33.0 TO 33.9 IN ADULT: ICD-10-CM

## 2024-09-18 DIAGNOSIS — E66.811 CLASS 1 OBESITY DUE TO EXCESS CALORIES WITH SERIOUS COMORBIDITY AND BODY MASS INDEX (BMI) OF 33.0 TO 33.9 IN ADULT: ICD-10-CM

## 2024-09-26 ENCOUNTER — HOSPITAL ENCOUNTER (OUTPATIENT)
Dept: MAMMOGRAPHY | Facility: CLINIC | Age: 49
Discharge: HOME | End: 2024-09-26
Attending: FAMILY MEDICINE | Admitting: FAMILY MEDICINE
Payer: COMMERCIAL

## 2024-09-26 DIAGNOSIS — Z12.31 VISIT FOR SCREENING MAMMOGRAM: ICD-10-CM

## 2024-09-26 PROCEDURE — 77063 BREAST TOMOSYNTHESIS BI: CPT

## 2024-10-20 ENCOUNTER — MYC REFILL (OUTPATIENT)
Dept: FAMILY MEDICINE | Facility: CLINIC | Age: 49
End: 2024-10-20
Payer: COMMERCIAL

## 2024-10-20 DIAGNOSIS — E66.811 CLASS 1 OBESITY DUE TO EXCESS CALORIES WITH SERIOUS COMORBIDITY AND BODY MASS INDEX (BMI) OF 33.0 TO 33.9 IN ADULT: ICD-10-CM

## 2024-10-20 DIAGNOSIS — E66.09 CLASS 1 OBESITY DUE TO EXCESS CALORIES WITH SERIOUS COMORBIDITY AND BODY MASS INDEX (BMI) OF 33.0 TO 33.9 IN ADULT: ICD-10-CM

## 2024-10-23 ENCOUNTER — OFFICE VISIT (OUTPATIENT)
Dept: FAMILY MEDICINE | Facility: CLINIC | Age: 49
End: 2024-10-23
Attending: FAMILY MEDICINE
Payer: COMMERCIAL

## 2024-10-23 VITALS
TEMPERATURE: 97.6 F | DIASTOLIC BLOOD PRESSURE: 73 MMHG | HEART RATE: 87 BPM | SYSTOLIC BLOOD PRESSURE: 117 MMHG | OXYGEN SATURATION: 98 % | BODY MASS INDEX: 31.22 KG/M2 | RESPIRATION RATE: 16 BRPM | WEIGHT: 198.9 LBS | HEIGHT: 67 IN

## 2024-10-23 DIAGNOSIS — E66.09 CLASS 1 OBESITY DUE TO EXCESS CALORIES WITH SERIOUS COMORBIDITY AND BODY MASS INDEX (BMI) OF 33.0 TO 33.9 IN ADULT: Primary | ICD-10-CM

## 2024-10-23 DIAGNOSIS — F41.9 ANXIETY: ICD-10-CM

## 2024-10-23 DIAGNOSIS — F33.1 MAJOR DEPRESSIVE DISORDER, RECURRENT EPISODE, MODERATE (H): ICD-10-CM

## 2024-10-23 DIAGNOSIS — E03.9 HYPOTHYROIDISM, UNSPECIFIED TYPE: ICD-10-CM

## 2024-10-23 DIAGNOSIS — E66.811 CLASS 1 OBESITY DUE TO EXCESS CALORIES WITH SERIOUS COMORBIDITY AND BODY MASS INDEX (BMI) OF 33.0 TO 33.9 IN ADULT: Primary | ICD-10-CM

## 2024-10-23 DIAGNOSIS — I10 BENIGN ESSENTIAL HYPERTENSION: ICD-10-CM

## 2024-10-23 PROBLEM — K63.5 POLYP OF COLON: Status: ACTIVE | Noted: 2023-02-10

## 2024-10-23 PROBLEM — K52.9 NONINFECTIOUS GASTROENTERITIS: Status: ACTIVE | Noted: 2023-06-09

## 2024-10-23 PROBLEM — K52.9 CHRONIC DIARRHEA: Status: ACTIVE | Noted: 2023-02-14

## 2024-10-23 PROBLEM — D12.3 BENIGN NEOPLASM OF TRANSVERSE COLON: Status: ACTIVE | Noted: 2023-02-14

## 2024-10-23 PROBLEM — K92.1 MELENA: Status: ACTIVE | Noted: 2023-02-14

## 2024-10-23 PROBLEM — N75.0 BARTHOLIN CYST: Status: ACTIVE | Noted: 2024-04-08

## 2024-10-23 PROBLEM — D12.8 BENIGN NEOPLASM OF RECTUM: Status: ACTIVE | Noted: 2023-02-14

## 2024-10-23 PROCEDURE — 99214 OFFICE O/P EST MOD 30 MIN: CPT | Performed by: FAMILY MEDICINE

## 2024-10-23 PROCEDURE — G2211 COMPLEX E/M VISIT ADD ON: HCPCS | Performed by: FAMILY MEDICINE

## 2024-10-23 PROCEDURE — 36415 COLL VENOUS BLD VENIPUNCTURE: CPT | Performed by: FAMILY MEDICINE

## 2024-10-23 PROCEDURE — 80048 BASIC METABOLIC PNL TOTAL CA: CPT | Performed by: FAMILY MEDICINE

## 2024-10-23 PROCEDURE — 84443 ASSAY THYROID STIM HORMONE: CPT | Performed by: FAMILY MEDICINE

## 2024-10-23 RX ORDER — LISINOPRIL 5 MG/1
5 TABLET ORAL DAILY
Qty: 90 TABLET | Refills: 2 | Status: CANCELLED | OUTPATIENT
Start: 2024-10-23

## 2024-10-23 RX ORDER — VENLAFAXINE HYDROCHLORIDE 37.5 MG/1
37.5 CAPSULE, EXTENDED RELEASE ORAL DAILY
Qty: 90 CAPSULE | Refills: 1 | Status: CANCELLED | OUTPATIENT
Start: 2024-10-23

## 2024-10-23 NOTE — PROGRESS NOTES
"  Assessment & Plan     Class 1 obesity due to excess calories with serious comorbidity and body mass index (BMI) of 33.0 to 33.9 in adult  Patient will continue the 1.7 mg Wegovy at this time.  She will let us know if she desires an increase in dose.  Follow up in 3 months or sooner as needed.  - Basic metabolic panel  (Ca, Cl, CO2, Creat, Gluc, K, Na, BUN); Future  - Basic metabolic panel  (Ca, Cl, CO2, Creat, Gluc, K, Na, BUN)    Benign essential hypertension  Controlled, continue current medication, refill when due.    Major depressive disorder, recurrent episode, moderate (H)  Anxiety  Controlled, continue current medication.    Hypothyroidism, unspecified type  Due for labs, recommendations pending results.  - TSH with free T4 reflex; Future  - TSH with free T4 reflex    The longitudinal plan of care for the diagnosis(es)/condition(s) as documented were addressed during this visit. Due to the added complexity in care, I will continue to support Marion in the subsequent management and with ongoing continuity of care.      BMI  Estimated body mass index is 31.15 kg/m  as calculated from the following:    Height as of this encounter: 1.702 m (5' 7\").    Weight as of this encounter: 90.2 kg (198 lb 14.4 oz).             Subjective   Marion is a 48 year old, presenting for the following health issues:  Weight Loss        10/23/2024     3:09 PM   Additional Questions   Roomed by Bobbi ZAFAR MA     History of Present Illness       Reason for visit:  6 month follow up from taking weight loss medication.    She eats 0-1 servings of fruits and vegetables daily.She consumes 1 sweetened beverage(s) daily.She exercises with enough effort to increase her heart rate 9 or less minutes per day.  She exercises with enough effort to increase her heart rate 3 or less days per week. She is missing 1 dose(s) of medications per week.  She is not taking prescribed medications regularly due to remembering to take.     Medication Followup " "of Wegovy   Taking Medication as prescribed: yes  Side Effects:  None  Medication Helping Symptoms:  yes    She is not craving foods at the end of the week.  She is meeting with her therapist regularly.  Is doing prescribed eating, making herself eat at regular times otherwise she might not eat regularly.  She has non-scale victories.        Objective    /73 (BP Location: Right arm, Patient Position: Sitting, Cuff Size: Adult Large)   Pulse 87   Temp 97.6  F (36.4  C) (Temporal)   Resp 16   Ht 1.702 m (5' 7\")   Wt 90.2 kg (198 lb 14.4 oz)   SpO2 98%   BMI 31.15 kg/m    Body mass index is 31.15 kg/m .  Physical Exam   GENERAL: alert and no distress  PSYCH: mentation appears normal, affect normal/bright        Signed Electronically by: Christy De La Garza MD    "

## 2024-10-24 LAB
ANION GAP SERPL CALCULATED.3IONS-SCNC: 11 MMOL/L (ref 7–15)
BUN SERPL-MCNC: 13.1 MG/DL (ref 6–20)
CALCIUM SERPL-MCNC: 9.6 MG/DL (ref 8.8–10.4)
CHLORIDE SERPL-SCNC: 106 MMOL/L (ref 98–107)
CREAT SERPL-MCNC: 0.84 MG/DL (ref 0.51–0.95)
EGFRCR SERPLBLD CKD-EPI 2021: 86 ML/MIN/1.73M2
GLUCOSE SERPL-MCNC: 83 MG/DL (ref 70–99)
HCO3 SERPL-SCNC: 21 MMOL/L (ref 22–29)
POTASSIUM SERPL-SCNC: 4.1 MMOL/L (ref 3.4–5.3)
SODIUM SERPL-SCNC: 138 MMOL/L (ref 135–145)
TSH SERPL DL<=0.005 MIU/L-ACNC: 1.8 UIU/ML (ref 0.3–4.2)

## 2024-12-19 ENCOUNTER — MYC REFILL (OUTPATIENT)
Dept: FAMILY MEDICINE | Facility: CLINIC | Age: 49
End: 2024-12-19
Payer: COMMERCIAL

## 2024-12-19 DIAGNOSIS — E66.09 CLASS 1 OBESITY DUE TO EXCESS CALORIES WITH SERIOUS COMORBIDITY AND BODY MASS INDEX (BMI) OF 33.0 TO 33.9 IN ADULT: ICD-10-CM

## 2024-12-19 DIAGNOSIS — E66.811 CLASS 1 OBESITY DUE TO EXCESS CALORIES WITH SERIOUS COMORBIDITY AND BODY MASS INDEX (BMI) OF 33.0 TO 33.9 IN ADULT: ICD-10-CM

## 2025-02-06 ENCOUNTER — OFFICE VISIT (OUTPATIENT)
Dept: FAMILY MEDICINE | Facility: CLINIC | Age: 50
End: 2025-02-06
Payer: COMMERCIAL

## 2025-02-06 VITALS
RESPIRATION RATE: 17 BRPM | BODY MASS INDEX: 28.56 KG/M2 | WEIGHT: 182 LBS | OXYGEN SATURATION: 100 % | SYSTOLIC BLOOD PRESSURE: 108 MMHG | HEART RATE: 72 BPM | TEMPERATURE: 97.9 F | DIASTOLIC BLOOD PRESSURE: 57 MMHG | HEIGHT: 67 IN

## 2025-02-06 DIAGNOSIS — I10 BENIGN ESSENTIAL HYPERTENSION: ICD-10-CM

## 2025-02-06 DIAGNOSIS — E66.09 CLASS 1 OBESITY DUE TO EXCESS CALORIES WITH SERIOUS COMORBIDITY AND BODY MASS INDEX (BMI) OF 34.0 TO 34.9 IN ADULT: Primary | ICD-10-CM

## 2025-02-06 DIAGNOSIS — E66.811 CLASS 1 OBESITY DUE TO EXCESS CALORIES WITH SERIOUS COMORBIDITY AND BODY MASS INDEX (BMI) OF 34.0 TO 34.9 IN ADULT: Primary | ICD-10-CM

## 2025-02-06 DIAGNOSIS — F33.1 MAJOR DEPRESSIVE DISORDER, RECURRENT EPISODE, MODERATE (H): ICD-10-CM

## 2025-02-06 DIAGNOSIS — F41.9 ANXIETY: ICD-10-CM

## 2025-02-06 RX ORDER — VENLAFAXINE HYDROCHLORIDE 37.5 MG/1
37.5 CAPSULE, EXTENDED RELEASE ORAL DAILY
Qty: 90 CAPSULE | Refills: 1 | Status: SHIPPED | OUTPATIENT
Start: 2025-02-06

## 2025-02-06 NOTE — PROGRESS NOTES
"  Assessment & Plan     Class 1 obesity due to excess calories with serious comorbidity with body mass index (BMI) of 34.0 to 34.9 in adult  Patient is doing very well and is happy with her current progress and weight.  She would like to continue on the Wegovy 2.4 mg for now.  Continue to work on diet and lifestyle.  Follow up in 6 months or sooner as needed.    Benign essential hypertension  Pt spot checked blood pressure at home at was low in the 90s/50s, not symptomatic. Started about 1 - 1.5 weeks ago, taking only half a pill (2.5 mg) of lisinopril blood pressure still well maintained possibly due to recent weight loss. Following dietician every other week, does not appear malnourish, not GI symptoms, eating and hydrating well. Decision to completely stop lisinopril today and will monitor at home. Will restart 2.5 mg if blood pressure goes over 130/85.   - Home Blood Pressure Monitor Order for DME - ONLY FOR DME    Major depressive disorder, recurrent episode, moderate (H)  Anxiety  Well managed. Have very positive feedback with mood, anxiety, and overall body image since weight loss. Following therapist once a month. Decision to keep taking venlafaxine and will reassess in the future for possible tapering.   - venlafaxine (EFFEXOR XR) 37.5 MG 24 hr capsule; Take 1 capsule (37.5 mg) by mouth daily.      The longitudinal plan of care for the diagnosis(es)/condition(s) as documented were addressed during this visit. Due to the added complexity in care, I will continue to support Marion in the subsequent management and with ongoing continuity of care.    BMI  Estimated body mass index is 28.51 kg/m  as calculated from the following:    Height as of this encounter: 1.702 m (5' 7\").    Weight as of this encounter: 82.6 kg (182 lb).   Weight management plan: Discussed healthy diet and exercise guidelines      See Patient Instructions    Subjective   Marion is a 49 year old, presenting for the following health " issues:  Weight Check        2/6/2025     4:39 PM   Additional Questions   Roomed by Davy BEAVERS CMA     History of Present Illness       Reason for visit:  Weight Check    She eats 0-1 servings of fruits and vegetables daily.She consumes 2 sweetened beverage(s) daily.She exercises with enough effort to increase her heart rate 9 or less minutes per day.  She exercises with enough effort to increase her heart rate 3 or less days per week. She is missing 1 dose(s) of medications per week.     Doing great with weight loss with the new meds. Emotionally feeling better. Less worrying about food and weight. Better body image. Eating and hydrating well. Seeing dietician every other week and therapist once a month.     Wondering what is the next plan.     Dietician asking maybe check bone density? as they've all decided to not look at the weight number. Annual physical in April 2025. Given history of gastric bypass.    HTN - taking 1/2 pill now for about 1- 1.5 week. Spot checks BP 90s/50s, no dizziness or lightheadedness.    Last BMP 10/23/24?    Medication Followup of Semaglutide-Weight Management (WEGOVY)     Taking Medication as prescribed: yes  Side Effects:  None  Medication Helping Symptoms:  yes    Wegovy back in April 2024, she is doing well.    Current Outpatient Medications   Medication Sig Dispense Refill    Biotin w/ Vitamins C & E (HAIR SKIN & NAILS GUMMIES PO) Take 2 chew tab by mouth daily      childrens multivitamin w/iron (FLINTSTONES COMPLETE) chewable tablet Take 2 chew tab by mouth daily      colestipol (COLESTID) 1 g tablet Take 1 tablet (1 g) by mouth daily. 90 tablet 3    cyanocobalamin (VITAMIN B-12) 1000 MCG tablet Take 1,000 mcg by mouth daily      fluticasone (FLONASE) 50 MCG/ACT nasal spray Spray 2 sprays into both nostrils daily as needed      levocetirizine (XYZAL) 5 MG tablet Take 10 mg by mouth daily      levonorgestrel (MIRENA) 20 MCG/24HR IUD 1 each (20 mcg) by Intrauterine route continuous    "   lisinopril (ZESTRIL) 5 MG tablet Take 1 tablet (5 mg) by mouth daily 90 tablet 2    Semaglutide-Weight Management (WEGOVY) 2.4 MG/0.75ML pen Inject 2.4 mg subcutaneously once a week. 3 mL 3    venlafaxine (EFFEXOR XR) 37.5 MG 24 hr capsule Take 1 capsule (37.5 mg) by mouth daily. 90 capsule 1    Vitamin D3 (CHOLECALCIFEROL) 125 MCG (5000 UT) tablet Take 125 mcg by mouth daily             Objective    /57 (BP Location: Right arm, Patient Position: Sitting, Cuff Size: Adult Large)   Pulse 72   Temp 97.9  F (36.6  C) (Oral)   Resp 17   Ht 1.702 m (5' 7\")   Wt 82.6 kg (182 lb)   SpO2 100%   BMI 28.51 kg/m    Body mass index is 28.51 kg/m .  Physical Exam   GENERAL: alert and no distress  PSYCH: mentation appears normal, affect normal/bright    Office Visit on 10/23/2024   Component Date Value Ref Range Status    Sodium 10/23/2024 138  135 - 145 mmol/L Final    Potassium 10/23/2024 4.1  3.4 - 5.3 mmol/L Final    Chloride 10/23/2024 106  98 - 107 mmol/L Final    Carbon Dioxide (CO2) 10/23/2024 21 (L)  22 - 29 mmol/L Final    Anion Gap 10/23/2024 11  7 - 15 mmol/L Final    Urea Nitrogen 10/23/2024 13.1  6.0 - 20.0 mg/dL Final    Creatinine 10/23/2024 0.84  0.51 - 0.95 mg/dL Final    GFR Estimate 10/23/2024 86  >60 mL/min/1.73m2 Final    eGFR calculated using 2021 CKD-EPI equation.    Calcium 10/23/2024 9.6  8.8 - 10.4 mg/dL Final    Reference intervals for this test were updated on 7/16/2024 to reflect our healthy population more accurately. There may be differences in the flagging of prior results with similar values performed with this method. Those prior results can be interpreted in the context of the updated reference intervals.    Glucose 10/23/2024 83  70 - 99 mg/dL Final    TSH 10/23/2024 1.80  0.30 - 4.20 uIU/mL Final           Signed Electronically by: Christy De La Garza MD    "

## 2025-04-21 SDOH — HEALTH STABILITY: PHYSICAL HEALTH: ON AVERAGE, HOW MANY DAYS PER WEEK DO YOU ENGAGE IN MODERATE TO STRENUOUS EXERCISE (LIKE A BRISK WALK)?: 0 DAYS

## 2025-04-21 SDOH — HEALTH STABILITY: PHYSICAL HEALTH: ON AVERAGE, HOW MANY MINUTES DO YOU ENGAGE IN EXERCISE AT THIS LEVEL?: 0 MIN

## 2025-04-21 ASSESSMENT — SOCIAL DETERMINANTS OF HEALTH (SDOH): HOW OFTEN DO YOU GET TOGETHER WITH FRIENDS OR RELATIVES?: ONCE A WEEK

## 2025-04-23 ENCOUNTER — OFFICE VISIT (OUTPATIENT)
Dept: FAMILY MEDICINE | Facility: CLINIC | Age: 50
End: 2025-04-23
Attending: FAMILY MEDICINE
Payer: COMMERCIAL

## 2025-04-23 VITALS
OXYGEN SATURATION: 98 % | WEIGHT: 174 LBS | TEMPERATURE: 97.7 F | HEIGHT: 67 IN | RESPIRATION RATE: 18 BRPM | SYSTOLIC BLOOD PRESSURE: 122 MMHG | HEART RATE: 85 BPM | DIASTOLIC BLOOD PRESSURE: 79 MMHG | BODY MASS INDEX: 27.31 KG/M2

## 2025-04-23 DIAGNOSIS — Z98.84 S/P LAPAROSCOPIC SLEEVE GASTRECTOMY: ICD-10-CM

## 2025-04-23 DIAGNOSIS — E66.811 CLASS 1 OBESITY DUE TO EXCESS CALORIES WITH SERIOUS COMORBIDITY AND BODY MASS INDEX (BMI) OF 33.0 TO 33.9 IN ADULT: ICD-10-CM

## 2025-04-23 DIAGNOSIS — E66.09 CLASS 1 OBESITY DUE TO EXCESS CALORIES WITH SERIOUS COMORBIDITY AND BODY MASS INDEX (BMI) OF 33.0 TO 33.9 IN ADULT: ICD-10-CM

## 2025-04-23 DIAGNOSIS — F50.9 EATING DISORDER, UNSPECIFIED TYPE: ICD-10-CM

## 2025-04-23 DIAGNOSIS — R19.5 LOOSE STOOLS: ICD-10-CM

## 2025-04-23 DIAGNOSIS — Z00.00 ROUTINE GENERAL MEDICAL EXAMINATION AT A HEALTH CARE FACILITY: Primary | ICD-10-CM

## 2025-04-23 DIAGNOSIS — K91.2 POSTSURGICAL MALABSORPTION: ICD-10-CM

## 2025-04-23 DIAGNOSIS — I10 BENIGN ESSENTIAL HYPERTENSION: ICD-10-CM

## 2025-04-23 PROBLEM — E66.3 OVERWEIGHT (BMI 25.0-29.9): Status: ACTIVE | Noted: 2022-09-20

## 2025-04-23 PROBLEM — K92.1 MELENA: Status: RESOLVED | Noted: 2023-02-14 | Resolved: 2025-04-23

## 2025-04-23 PROBLEM — Z01.818 PREOP GENERAL PHYSICAL EXAM: Status: RESOLVED | Noted: 2023-06-20 | Resolved: 2025-04-23

## 2025-04-23 PROBLEM — G47.33 OSA (OBSTRUCTIVE SLEEP APNEA): Status: ACTIVE | Noted: 2018-07-18

## 2025-04-23 PROBLEM — R11.2 NAUSEA AND VOMITING, UNSPECIFIED VOMITING TYPE: Status: RESOLVED | Noted: 2024-06-11 | Resolved: 2025-04-23

## 2025-04-23 PROBLEM — K52.9 NONINFECTIOUS GASTROENTERITIS: Status: RESOLVED | Noted: 2023-06-09 | Resolved: 2025-04-23

## 2025-04-23 PROCEDURE — 99213 OFFICE O/P EST LOW 20 MIN: CPT | Mod: 25 | Performed by: FAMILY MEDICINE

## 2025-04-23 PROCEDURE — 99396 PREV VISIT EST AGE 40-64: CPT | Performed by: FAMILY MEDICINE

## 2025-04-23 PROCEDURE — 3074F SYST BP LT 130 MM HG: CPT | Performed by: FAMILY MEDICINE

## 2025-04-23 PROCEDURE — G2211 COMPLEX E/M VISIT ADD ON: HCPCS | Performed by: FAMILY MEDICINE

## 2025-04-23 PROCEDURE — 3078F DIAST BP <80 MM HG: CPT | Performed by: FAMILY MEDICINE

## 2025-04-23 RX ORDER — COLESTIPOL HYDROCHLORIDE 1 G/1
1 TABLET ORAL DAILY
Qty: 90 TABLET | Refills: 3 | Status: SHIPPED | OUTPATIENT
Start: 2025-04-23

## 2025-04-23 ASSESSMENT — PATIENT HEALTH QUESTIONNAIRE - PHQ9
SUM OF ALL RESPONSES TO PHQ QUESTIONS 1-9: 3
SUM OF ALL RESPONSES TO PHQ QUESTIONS 1-9: 3
10. IF YOU CHECKED OFF ANY PROBLEMS, HOW DIFFICULT HAVE THESE PROBLEMS MADE IT FOR YOU TO DO YOUR WORK, TAKE CARE OF THINGS AT HOME, OR GET ALONG WITH OTHER PEOPLE: NOT DIFFICULT AT ALL

## 2025-04-23 NOTE — PROGRESS NOTES
Preventive Care Visit  [facility]  April ANGELICA De La Garza MD, Family Medicine  Apr 23, 2025      Assessment & Plan     Routine general medical examination at a health care facility  Exam completed today, routine health maintenance items updated as able.  Labs ordered.  Follow up one year or sooner as needed.  Recommend Hep B, Influenza, and COVID vaccines, declines today.    Benign essential hypertension  Controlled, continue Lisinopril 2.5 mg daily. Refill when due.    Class 1 obesity due to excess calories with serious comorbidity and body mass index (BMI) of 33.0 to 33.9 in adult  Current BMI is 27.3. Patient is doing well, continue Wegovy 2.4 mg weekly.  - Semaglutide-Weight Management (WEGOVY) 2.4 MG/0.75ML pen; Inject 2.4 mg subcutaneously once a week.    Loose stools  Working well, continue.  - colestipol (COLESTID) 1 g tablet; Take 1 tablet (1 g) by mouth daily.    Postsurgical malabsorption  S/P laparoscopic sleeve gastrectomy  Eating disorder, unspecified type  Recommend patient check with insurance coverage before proceeding.  Orders in place, recommendations pending results.  - DX Bone Density; Future    The longitudinal plan of care for the diagnosis(es)/condition(s) as documented were addressed during this visit. Due to the added complexity in care, I will continue to support Marion in the subsequent management and with ongoing continuity of care.    Counseling  Appropriate preventive services were addressed with this patient via screening, questionnaire, or discussion as appropriate for fall prevention, nutrition, physical activity, Tobacco-use cessation, social engagement, weight loss and cognition.  Checklist reviewing preventive services available has been given to the patient.  Reviewed patient's diet, addressing concerns and/or questions.   She is at risk for psychosocial distress and has been provided with information to reduce risk.       Follow-up   Return in about 1  year (around 4/23/2026) for Preventative Care Visit, With fasting labs.     Follow-up Visit   Expected date:  Apr 23, 2026 (Approximate)      Follow Up Appointment Details:     Follow-up with whom?: PCP    Follow-Up for what?: Adult Preventive    How?: In Person                 Subjective   Marion is a 49 year old, presenting for the following:  Physical        4/23/2025     3:53 PM   Additional Questions   Roomed by floridalma RICHARDS     Here for wellness.    Stopped taking lisinopril 5 mg, now taking 2.5 mg daily.    Wegovy 2.4 mg going well still.    Was recommended to get DEXA because of gastric sleeve history, weight loss, etc.      Advance Care Planning    Discussed advance care planning with patient; informed AVS has link to [facility].        4/21/2025   General Health   How would you rate your overall physical health? Good   Feel stress (tense, anxious, or unable to sleep) To some extent   (!) STRESS CONCERN      4/21/2025   Nutrition   Three or more servings of calcium each day? (!) NO   Diet: Other   If other, please elaborate: Being on Wegovey I have been working with a dietitian to have prescribed eating.  Even if I am not hungry I still have to eat 3 square meals.   How many servings of fruit and vegetables per day? (!) 0-1   How many sweetened beverages each day? (!) 2         4/21/2025   Exercise   Days per week of moderate/strenous exercise 0 days   Average minutes spent exercising at this level 0 min   (!) EXERCISE CONCERN        4/21/2025   Social Factors   Frequency of gathering with friends or relatives Once a week   Worry food won't last until get money to buy more No   Food not last or not have enough money for food? No   Do you have housing? (Housing is defined as stable permanent housing and does not include staying outside in a car, in a tent, in an abandoned building, in an overnight shelter, or couch-surfing.) Yes   Are you worried about losing your housing? No   Lack of  transportation? No   Unable to get utilities (heat,electricity)? No         2025   Dental   Dentist two times every year? Yes       Today's PHQ-9 Score:       2025     3:45 PM   PHQ-9 SCORE   PHQ-9 Total Score MyChart 3 (Minimal depression)   PHQ-9 Total Score 3        Patient-reported         2025   Substance Use   Alcohol more than 3/day or more than 7/wk No   Do you use any other substances recreationally? No     Social History     Tobacco Use    Smoking status: Former     Current packs/day: 0.00     Average packs/day: 1 pack/day for 10.0 years (10.0 ttl pk-yrs)     Types: Cigarettes     Start date: 1996     Quit date: 2/10/2003     Years since quittin.2    Smokeless tobacco: Never    Tobacco comments:     Glad to be done   Vaping Use    Vaping status: Never Used   Substance Use Topics    Alcohol use: Not Currently     Comment: occas    Drug use: No           2024   LAST FHS-7 RESULTS   1st degree relative breast or ovarian cancer No   Any relative bilateral breast cancer No   Any male have breast cancer No   Any ONE woman have BOTH breast AND ovarian cancer No   Any woman with breast cancer before 50yrs No   2 or more relatives with breast AND/OR ovarian cancer No   2 or more relatives with breast AND/OR bowel cancer No        Mammogram Screening - Mammogram every 1-2 years updated in Health Maintenance based on mutual decision making        2025   STI Screening   New sexual partner(s) since last STI/HIV test? No     History of abnormal Pap smear: No - age 30-64 HPV with reflex Pap every 5 years recommended        Latest Ref Rng & Units 2024     3:43 PM 2018    11:45 AM 2018     9:40 AM   PAP / HPV   PAP  Negative for Intraepithelial Lesion or Malignancy (NILM)      PAP (Historical)   NIL     HPV 16 DNA Negative Negative   Negative    HPV 18 DNA Negative Negative   Negative    Other HR HPV Negative Negative   Negative      ASCVD Risk   The 10-year  ASCVD risk score (Sheryl GONZALEZ, et al., 2019) is: 1.6%    Values used to calculate the score:      Age: 49 years      Sex: Female      Is Non- : No      Diabetic: No      Tobacco smoker: No      Systolic Blood Pressure: 122 mmHg      Is BP treated: Yes      HDL Cholesterol: 40 mg/dL      Total Cholesterol: 179 mg/dL        4/21/2025   Contraception/Family Planning   Questions about contraception or family planning No        Reviewed and updated as needed this visit by Provider     Meds                Past Medical History:   Diagnosis Date    Abdominal pain, epigastric 07/11/2022    Acute pancreatitis without infection or necrosis, unspecified pancreatitis type 07/11/2022    Anxiety Ongoing since 18    Depressive disorder on going since 18    Earache or other ear, nose, or throat complaint 2010    Tonsillectomy 7/02    Esophageal reflux     Had stopped once I got my sleep apnea mask 5/18    Herpes simplex without mention of complication     Hypertension     While Pregnant    Hypothyroidism 08/07/2012    Intertrigo 07/18/2018    PONV (postoperative nausea and vomiting)     Presbyacusis     Presbycusis    Sleep apnea     STD (sexually transmitted disease)     Herpes    Urinary incontinence     Bladder sling surgery 7/17     Past Surgical History:   Procedure Laterality Date    COMBINED CYSTOSCOPY, RETROGRADES, URETEROSCOPY, LASER HOLMIUM LITHOTRIPSY URETER(S), INSERT STENT Right 06/21/2024    Procedure: Cystoscopy, right ureteroscopy with laser lithotripsy, right ureteroscopy with stone basketing, right retrograde pyelogram, right ureteral stent exchange, fluoroscopic interpretation 1 hour physician time;  Surgeon: Alvin Rodrigues MD;  Location: [facility] OR    CYSTOSCOPY      CYSTOSCOPY, RETROGRADES, INSERT STENT URETER(S), COMBINED Right 06/11/2024    Procedure: Cystoscopy, right retrograde pyelogram, right ureteral stent insertion, fluoroscopic interpretation <1 hour physician time;  Surgeon:  Alvin Rodrigues MD;  Location: [facility] OR    ENT SURGERY      GENITOURINARY SURGERY  2017    bladder sling    LAPAROSCOPIC CHOLECYSTECTOMY WITH CHOLANGIOGRAMS N/A 07/12/2022    Procedure: LAPAROSCOPIC CHOLECYSTECTOMY WITH CHOLANGIOGRAM;  Surgeon: Era Villela MD;  Location: [facility] OR    LAPAROSCOPIC GASTRIC SLEEVE N/A 10/15/2019    Procedure: LAPAROSCOPIC GASTRIC SLEEVE;  Surgeon: Cesar Lee MD;  Location: [facility] OR    PE TUBES      TONSILLECTOMY      TYMPANOPLASTY, RT/LT      Tympanoplasty RT/LT     Lab work is in process  Labs reviewed in EPIC  BP Readings from Last 3 Encounters:   04/23/25 122/79   02/06/25 108/57   10/23/24 117/73    Wt Readings from Last 3 Encounters:   04/23/25 78.9 kg (174 lb)   02/06/25 82.6 kg (182 lb)   10/23/24 90.2 kg (198 lb 14.4 oz)                  Patient Active Problem List   Diagnosis    Presbycusis    Hypothyroidism    Major depressive disorder, recurrent episode, moderate (H)    Anxiety    CLARK (obstructive sleep apnea)    Low HDL (under 40)    Benign essential hypertension    Bloody stool    Class 1 obesity due to excess calories without serious comorbidity with body mass index (BMI) of 34.0 to 34.9 in adult    Disordered eating    S/P laparoscopic sleeve gastrectomy    S/P cholecystectomy    Postsurgical malabsorption    Loose stools    Polyp of duodenum    Preop general physical exam    Kidney stone    Nausea and vomiting, unspecified vomiting type    Bartholin cyst    Benign neoplasm of rectum    Benign neoplasm of transverse colon    Chronic diarrhea    Melena    Noninfectious gastroenteritis    Polyp of colon     Past Surgical History:   Procedure Laterality Date    COMBINED CYSTOSCOPY, RETROGRADES, URETEROSCOPY, LASER HOLMIUM LITHOTRIPSY URETER(S), INSERT STENT Right 06/21/2024    Procedure: Cystoscopy, right ureteroscopy with laser lithotripsy, right ureteroscopy with stone basketing, right retrograde pyelogram, right ureteral stent exchange, fluoroscopic interpretation  1 hour physician time;  Surgeon: Alvin Rodrigues MD;  Location: [facility] OR    CYSTOSCOPY      CYSTOSCOPY, RETROGRADES, INSERT STENT URETER(S), COMBINED Right 2024    Procedure: Cystoscopy, right retrograde pyelogram, right ureteral stent insertion, fluoroscopic interpretation <1 hour physician time;  Surgeon: Alvin Rodrigues MD;  Location: [facility] OR    ENT SURGERY      GENITOURINARY SURGERY  2017    bladder sling    LAPAROSCOPIC CHOLECYSTECTOMY WITH CHOLANGIOGRAMS N/A 2022    Procedure: LAPAROSCOPIC CHOLECYSTECTOMY WITH CHOLANGIOGRAM;  Surgeon: Era Villela MD;  Location: [facility]    LAPAROSCOPIC GASTRIC SLEEVE N/A 10/15/2019    Procedure: LAPAROSCOPIC GASTRIC SLEEVE;  Surgeon: Cesar Lee MD;  Location: [facility]    PE TUBES      TONSILLECTOMY      TYMPANOPLASTY, RT/LT      Tympanoplasty RT/LT       Social History     Tobacco Use    Smoking status: Former     Current packs/day: 0.00     Average packs/day: 1 pack/day for 10.0 years (10.0 ttl pk-yrs)     Types: Cigarettes     Start date: 1996     Quit date: 2/10/2003     Years since quittin.2    Smokeless tobacco: Never    Tobacco comments:     Glad to be done   Substance Use Topics    Alcohol use: Not Currently     Comment: occas     Family History   Problem Relation Age of Onset    Peripheral Vascular Disease Mother     Mental Illness Mother     Hypertension Father          08    Obesity Father     Gout Father     Cerebrovascular Disease Maternal Grandfather     Dementia Paternal Grandmother     Coronary Artery Disease Paternal Grandfather     Breast Cancer Maternal Grandmother          Current Outpatient Medications   Medication Sig Dispense Refill    Biotin w/ Vitamins C & E (HAIR SKIN & NAILS GUMMIES PO) Take 2 chew tab by mouth daily      childrens multivitamin w/iron (FLINTSTONES COMPLETE) chewable tablet Take 2 chew tab by mouth daily      colestipol (COLESTID) 1 g tablet Take 1 tablet (1 g) by mouth daily. 90 tablet 3     cyanocobalamin (VITAMIN B-12) 1000 MCG tablet Take 1,000 mcg by mouth daily      fluticasone (FLONASE) 50 MCG/ACT nasal spray Spray 2 sprays into both nostrils daily as needed      levocetirizine (XYZAL) 5 MG tablet Take 10 mg by mouth daily      levonorgestrel (MIRENA) 20 MCG/24HR IUD 1 each (20 mcg) by Intrauterine route continuous      lisinopril (ZESTRIL) 5 MG tablet Take 1 tablet (5 mg) by mouth daily 90 tablet 2    Semaglutide-Weight Management (WEGOVY) 2.4 MG/0.75ML pen Inject 2.4 mg subcutaneously once a week. 9 mL 3    venlafaxine (EFFEXOR XR) 37.5 MG 24 hr capsule Take 1 capsule (37.5 mg) by mouth daily. 90 capsule 1    Vitamin D3 (CHOLECALCIFEROL) 125 MCG (5000 UT) tablet Take 125 mcg by mouth daily       Allergies   Allergen Reactions    Dog Epithelium (Canis Lupus Familiaris) Itching    Dogs     Lactose Diarrhea    Nsaids      Bariatric surgery     Recent Labs   Lab Test 10/23/24  1606 06/11/24  0739 06/11/24  0011 04/23/24  1637 06/20/23  1446 10/01/22  1011 07/19/22  1049 07/12/22  0520 07/11/22  1616 07/11/22  1616 04/05/21  0832 10/15/19  0630 10/07/19  1152 08/16/19  1130 05/09/19  1531 12/26/18  0952   A1C  --   --   --  5.5  --   --   --   --   --   --   --   --   --   --  5.3  --    LDL  --   --   --  116*  --  109*  --   --   --   --   --   --   --   --   --  98   HDL  --   --   --  40*  --  38*  --   --   --   --   --   --   --   --   --  24*   TRIG  --   --   --  117  --  136  --  88  --   --   --   --   --   --   --  119   ALT  --   --   --   --   --   --  39* 177*  --  59* 21  --  79*  --  44 44   CR 0.84 0.73   < > 0.73   < >  --   --  0.67   < > 0.69 0.70  --  0.68   < > 0.88 0.80   GFRESTIMATED 86 >90   < > >90   < >  --   --  >90   < > >90 >90  --  >90   < > 82 >90   GFRESTBLACK  --   --   --   --   --   --   --   --   --   --  >90  --  >90   < > >90 >90   POTASSIUM 4.1 4.0   < > 3.9   < >  --   --  3.7   < > 4.2 3.7   < > 4.1   < > 3.7 3.9   TSH 1.80  --   --   --   --  1.48  --   --    "--   --   --   --   --   --   --   --     < > = values in this interval not displayed.             Objective    Exam  /79 (BP Location: Left arm, Patient Position: Sitting, Cuff Size: Adult Regular)   Pulse 85   Temp 97.7  F (36.5  C) (Oral)   Resp 18   Ht 1.702 m (5' 7\")   Wt 78.9 kg (174 lb)   SpO2 98%   BMI 27.25 kg/m     Estimated body mass index is 27.25 kg/m  as calculated from the following:    Height as of this encounter: 1.702 m (5' 7\").    Weight as of this encounter: 78.9 kg (174 lb).    Physical Exam  GENERAL: alert and no distress  EYES: Eyes grossly normal to inspection, PERRL and conjunctivae and sclerae normal  HENT: normal cephalic/atraumatic, right ear: occluded with wax and lower half of TM visible and pearly gray, left ear: normal: no effusions, no erythema, normal landmarks, oropharynx clear, and oral mucous membranes moist  NECK: no adenopathy, no asymmetry, masses, or scars  RESP: lungs clear to auscultation - no rales, rhonchi or wheezes  CV: regular rates and rhythm, normal S1 S2, no S3 or S4, no murmur, click or rub, and no peripheral edema  ABDOMEN: bowel sounds normal  MS: no gross musculoskeletal defects noted, no edema  SKIN: no suspicious lesions or rashes  NEURO: Normal strength and tone, mentation intact and speech normal  PSYCH: mentation appears normal, affect normal/bright        Signed Electronically by: Christy De La Garza MD    Answers submitted by the patient for this visit:  Patient Health Questionnaire (Submitted on 4/23/2025)  If you checked off any problems, how difficult have these problems made it for you to do your work, take care of things at home, or get along with other people?: Not difficult at all  PHQ9 TOTAL SCORE: 3    "

## 2025-04-23 NOTE — PATIENT INSTRUCTIONS
Recommended Immunizations:  Hepatitis B series (3)  COVID  Influenza        Patient Education   Preventive Care Advice   This is general advice given by our system to help you stay healthy. However, your care team may have specific advice just for you. Please talk to your care team about your preventive care needs.  Nutrition  Eat 5 or more servings of fruits and vegetables each day.  Try wheat bread, brown rice and whole grain pasta (instead of white bread, rice, and pasta).  Get enough calcium and vitamin D. Check the label on foods and aim for 100% of the RDA (recommended daily allowance).  Lifestyle  Exercise at least 150 minutes each week  (30 minutes a day, 5 days a week).  Do muscle strengthening activities 2 days a week. These help control your weight and prevent disease.  No smoking.  Wear sunscreen to prevent skin cancer.  Have a dental exam and cleaning every 6 months.  Yearly exams  See your health care team every year to talk about:  Any changes in your health.  Any medicines your care team has prescribed.  Preventive care, family planning, and ways to prevent chronic diseases.  Shots (vaccines)   HPV shots (up to age 26), if you've never had them before.  Hepatitis B shots (up to age 59), if you've never had them before.  COVID-19 shot: Get this shot when it's due.  Flu shot: Get a flu shot every year.  Tetanus shot: Get a tetanus shot every 10 years.  Pneumococcal, hepatitis A, and RSV shots: Ask your care team if you need these based on your risk.  Shingles shot (for age 50 and up)  General health tests  Diabetes screening:  Starting at age 35, Get screened for diabetes at least every 3 years.  If you are younger than age 35, ask your care team if you should be screened for diabetes.  Cholesterol test: At age 39, start having a cholesterol test every 5 years, or more often if advised.  Bone density scan (DEXA): At age 50, ask your care team if you should have this scan for osteoporosis (brittle  bones).  Hepatitis C: Get tested at least once in your life.  STIs (sexually transmitted infections)  Before age 24: Ask your care team if you should be screened for STIs.  After age 24: Get screened for STIs if you're at risk. You are at risk for STIs (including HIV) if:  You are sexually active with more than one person.  You don't use condoms every time.  You or a partner was diagnosed with a sexually transmitted infection.  If you are at risk for HIV, ask about PrEP medicine to prevent HIV.  Get tested for HIV at least once in your life, whether you are at risk for HIV or not.  Cancer screening tests  Cervical cancer screening: If you have a cervix, begin getting regular cervical cancer screening tests starting at age 21.  Breast cancer scan (mammogram): If you've ever had breasts, begin having regular mammograms starting at age 40. This is a scan to check for breast cancer.  Colon cancer screening: It is important to start screening for colon cancer at age 45.  Have a colonoscopy test every 10 years (or more often if you're at risk) Or, ask your provider about stool tests like a FIT test every year or Cologuard test every 3 years.  To learn more about your testing options, visit:   .  For help making a decision, visit:   https://bit.ly/js56910.  Prostate cancer screening test: If you have a prostate, ask your care team if a prostate cancer screening test (PSA) at age 55 is right for you.  Lung cancer screening: If you are a current or former smoker ages 50 to 80, ask your care team if ongoing lung cancer screenings are right for you.  For informational purposes only. Not to replace the advice of your health care provider. Copyright   2023 [employer]. All rights reserved. Clinically reviewed by the [facility]. [employer] [id number] - REV 01/24.  Learning About Stress  What is stress?     Stress is your body's response to a hard situation. Your body can have a physical,  emotional, or mental response. Stress is a fact of life for most people, and it affects everyone differently. What causes stress for you may not be stressful for someone else.  A lot of things can cause stress. You may feel stress when you go on a job interview, take a test, or run a race. This kind of short-term stress is normal and even useful. It can help you if you need to work hard or react quickly. For example, stress can help you finish an important job on time.  Long-term stress is caused by ongoing stressful situations or events. Examples of long-term stress include long-term health problems, ongoing problems at work, or conflicts in your family. Long-term stress can harm your health.  How does stress affect your health?  When you are stressed, your body responds as though you are in danger. It makes hormones that speed up your heart, make you breathe faster, and give you a burst of energy. This is called the fight-or-flight stress response. If the stress is over quickly, your body goes back to normal and no harm is done.  But if stress happens too often or lasts too long, it can have bad effects. Long-term stress can make you more likely to get sick, and it can make symptoms of some diseases worse. If you tense up when you are stressed, you may develop neck, shoulder, or low back pain. Stress is linked to high blood pressure and heart disease.  Stress also harms your emotional health. It can make you almonte, tense, or depressed. Your relationships may suffer, and you may not do well at work or school.  What can you do to manage stress?  You can try these things to help manage stress:   Do something active. Exercise or activity can help reduce stress. Walking is a great way to get started. Even everyday activities such as housecleaning or yard work can help.  Try yoga or shahid chi. These techniques combine exercise and meditation. You may need some training at first to learn them.  Do something you enjoy. For  "example, listen to music or go to a movie. Practice your hobby or do volunteer work.  Meditate. This can help you relax, because you are not worrying about what happened before or what may happen in the future.  Do guided imagery. Imagine yourself in any setting that helps you feel calm. You can use online videos, books, or a teacher to guide you.  Do breathing exercises. For example:  From a standing position, bend forward from the waist with your knees slightly bent. Let your arms dangle close to the floor.  Breathe in slowly and deeply as you return to a standing position. Roll up slowly and lift your head last.  Hold your breath for just a few seconds in the standing position.  Breathe out slowly and bend forward from the waist.  Let your feelings out. Talk, laugh, cry, and express anger when you need to. Talking with supportive friends or family, a counselor, or a sharron leader about your feelings is a healthy way to relieve stress. Avoid discussing your feelings with people who make you feel worse.  Write. It may help to write about things that are bothering you. This helps you find out how much stress you feel and what is causing it. When you know this, you can find better ways to cope.  What can you do to prevent stress?  You might try some of these things to help prevent stress:  Manage your time. This helps you find time to do the things you want and need to do.  Get enough sleep. Your body recovers from the stresses of the day while you are sleeping.  Get support. Your family, friends, and community can make a difference in how you experience stress.  Limit your news feed. Avoid or limit time on social media or news that may make you feel stressed.  Do something active. Exercise or activity can help reduce stress. Walking is a great way to get started.  Where can you learn more?  Go to https://www.healthwise.net/patiented  Enter N032 in the search box to learn more about \"Learning About Stress.\"  Current " as of: October 24, 2024  Content Version: 14.4    0670-8176 [email or web address].   Care instructions adapted under license by your healthcare professional. If you have questions about a medical condition or this instruction, always ask your healthcare professional. [email or web address] disclaims any warranty or liability for your use of this information.

## 2025-04-24 ENCOUNTER — PATIENT OUTREACH (OUTPATIENT)
Dept: CARE COORDINATION | Facility: CLINIC | Age: 50
End: 2025-04-24
Payer: COMMERCIAL

## (undated) DEVICE — SU VICRYL 0 UR-6 27" J603H

## (undated) DEVICE — TUBING IRRIG TUR Y TYPE 96" LF 6543-01

## (undated) DEVICE — COVER FOOTSWITCH W/CINCH 20X24" 923267

## (undated) DEVICE — LINEN FULL SHEET 5511

## (undated) DEVICE — ESU LIGASURE SEALER/DIVIDER RETRACT L-HOOK 37CM LF5637

## (undated) DEVICE — PREP SCRUB SOL EXIDINE 4% CHG 4OZ 29002-404

## (undated) DEVICE — SUCTION IRR STRYKERFLOW II W/TIP 250-070-520

## (undated) DEVICE — PAD CHUX UNDERPAD 30X36" P3036C

## (undated) DEVICE — SOL NACL 0.9% INJ 250ML BAG 2B1322Q

## (undated) DEVICE — GLOVE PROTEXIS BLUE W/NEU-THERA 7.5  2D73EB75

## (undated) DEVICE — LINEN POUCH DBL 5427

## (undated) DEVICE — BASKET NITINOL TIPLESS HALO  1.5FRX120CM 554120

## (undated) DEVICE — GLOVE PROTEXIS BLUE W/NEU-THERA 6.5  2D73EB65

## (undated) DEVICE — GLOVE BIOGEL PI MICRO INDICATOR UNDERGLOVE SZ 7.0 48970

## (undated) DEVICE — GASTRECTOMY SLEEVE STABILIZATION SYSTEM VISIGI 3D 36FR 5236B

## (undated) DEVICE — SOL NACL 0.9% IRRIG 3000ML BAG 2B7477

## (undated) DEVICE — ENDO TROCAR BLUNT TIP KII BALLOON 12X100MM C0R47

## (undated) DEVICE — ENDO TROCAR SLEEVE KII Z-THREADED 05X100MM CTS02

## (undated) DEVICE — LINEN TOWEL PACK X10 5473

## (undated) DEVICE — GUIDEWIRE URO STR STIFF .035"X150CM NITINOL 150NSS35

## (undated) DEVICE — GLOVE PROTEXIS W/NEU-THERA 6.5  2D73TE65

## (undated) DEVICE — CATH CHOLANGIOGRAM KUMAR CC-019

## (undated) DEVICE — SYR 30ML LL W/O NDL 302832

## (undated) DEVICE — Device

## (undated) DEVICE — SURGICEL HEMOSTAT 2X14" 1951

## (undated) DEVICE — LINEN HALF SHEET 5512

## (undated) DEVICE — ESU GROUND PAD UNIVERSAL W/O CORD

## (undated) DEVICE — SUCTION CANISTER MEDIVAC LINER 3000ML W/LID 65651-530

## (undated) DEVICE — CONNECTOR STOPCOCK 3 WAY MALE LL HI-FLO MX9311L

## (undated) DEVICE — TUBING C02 INSUFFLATION LAP FILTER HEATER 6198

## (undated) DEVICE — ESU GROUND PAD ADULT W/CORD E7507

## (undated) DEVICE — DRSG BANDAID 1X3" FABRIC CURITY LATEX FREE KC44101

## (undated) DEVICE — INSUFFLATION TUBING SET WITH GAS FILTER, HEATED

## (undated) DEVICE — SYR 50ML CATH TIP W/O NDL 309620

## (undated) DEVICE — GLOVE BIOGEL PI MICRO SZ 7.0 48570

## (undated) DEVICE — ENDO TROCAR SLEEVE KII Z-THREADED 12X100MM CTS22

## (undated) DEVICE — SYR 01ML 27GA 0.5" NDL TBC 309623

## (undated) DEVICE — SHEATH URETERAL ACCESS NAVIGATOR HD 11/13FRX36CM M0062502220

## (undated) DEVICE — ENDO TROCAR FIRST ENTRY KII FIOS Z-THRD 05X100MM CTF03

## (undated) DEVICE — ENDO TROCAR OPTICAL ACCESS KII Z-THRD 15X100MM C0R37

## (undated) DEVICE — RAD RX ISOVUE 300 (50ML) 61% IOPAMIDOL CHARGE PER ML

## (undated) DEVICE — CLIP APPLIER ENDO 5MM M/L LIGAMAX EL5ML

## (undated) DEVICE — ENDO SCOPE WARMER LF TM500

## (undated) DEVICE — PREP CHLORAPREP 26ML TINTED ORANGE  260815

## (undated) DEVICE — SU MONOCRYL 4-0 PS-2 18" UND Y496G

## (undated) DEVICE — DECANTER BAG 2002S

## (undated) DEVICE — ENDO POUCH UNIV RETRIEVAL SYSTEM INZII 10MM CD001

## (undated) DEVICE — BAG CLEAR TRASH 1.3M 39X33" P4040C

## (undated) DEVICE — APPLICATOR EVICEL RIGID TIP 35CM 3908

## (undated) DEVICE — SOL WATER IRRIG 1000ML BOTTLE 2F7114

## (undated) DEVICE — SU VICRYL 4-0 PS-2 18" UND J496H

## (undated) DEVICE — TUBING IV EXTENSION SET ANESTHESIA 34" MLL 2C6227

## (undated) DEVICE — PACK CYSTO CUSTOM RIDGES

## (undated) DEVICE — ESU ELEC BLADE 2.75" COATED/INSULATED E1455

## (undated) DEVICE — DRSG GAUZE 4X4" 3033

## (undated) DEVICE — CATH URETERAL FLEX TIP TIGERTAIL 06FRX70CM 139006

## (undated) DEVICE — LINEN TOWEL PACK X5 5464

## (undated) DEVICE — DRAPE LEGGINGS 8421

## (undated) DEVICE — BNDG ABDOMINAL BINDER 9X62-84" 79-89210

## (undated) DEVICE — DECANTER VIAL 2006S

## (undated) DEVICE — EVAC SYSTEM CLEAR FLOW SC082500

## (undated) DEVICE — ENDO TROCAR FIRST ENTRY KII FIOS Z-THRD 12X100MM CTF73

## (undated) DEVICE — GLOVE PROTEXIS MICRO 7.5  2D73PM75

## (undated) DEVICE — VALVE ENDOSCOPIC UROSEAL OD9- FR 1 HAND ADJUSTABLE Y PORT LA

## (undated) DEVICE — DRAPE C-ARM 60X42" 1013

## (undated) DEVICE — ESU CORD MONOPOLAR 10'  E0510

## (undated) DEVICE — SOL NACL 0.9% IRRIG 1000ML BOTTLE 2F7124

## (undated) DEVICE — PACK LAP CHOLE SLC15LCFSD

## (undated) DEVICE — ESU PENCIL W/HOLSTER E2350H

## (undated) DEVICE — SU VICRYL 0 TIE 12X18" J906G

## (undated) DEVICE — ESU HOLDER LAP INST DISP PURPLE LONG 330MM H-PRO-330

## (undated) DEVICE — STPL ENDO RELOAD SIG GIA REINFORCED 60MM X-TRA SIGTRS60AXT

## (undated) DEVICE — DRAPE BREAST/CHEST 29420

## (undated) RX ORDER — PROPOFOL 10 MG/ML
INJECTION, EMULSION INTRAVENOUS
Status: DISPENSED
Start: 2024-06-21

## (undated) RX ORDER — CEFAZOLIN SODIUM/WATER 2 G/20 ML
SYRINGE (ML) INTRAVENOUS
Status: DISPENSED
Start: 2024-06-21

## (undated) RX ORDER — ONDANSETRON 2 MG/ML
INJECTION INTRAMUSCULAR; INTRAVENOUS
Status: DISPENSED
Start: 2024-06-21

## (undated) RX ORDER — HEPARIN SODIUM 5000 [USP'U]/.5ML
INJECTION, SOLUTION INTRAVENOUS; SUBCUTANEOUS
Status: DISPENSED
Start: 2019-10-15

## (undated) RX ORDER — BUPIVACAINE HYDROCHLORIDE 2.5 MG/ML
INJECTION, SOLUTION EPIDURAL; INFILTRATION; INTRACAUDAL
Status: DISPENSED
Start: 2019-10-15

## (undated) RX ORDER — DEXAMETHASONE SODIUM PHOSPHATE 4 MG/ML
INJECTION, SOLUTION INTRA-ARTICULAR; INTRALESIONAL; INTRAMUSCULAR; INTRAVENOUS; SOFT TISSUE
Status: DISPENSED
Start: 2022-07-12

## (undated) RX ORDER — GLYCOPYRROLATE 0.2 MG/ML
INJECTION INTRAMUSCULAR; INTRAVENOUS
Status: DISPENSED
Start: 2022-07-12

## (undated) RX ORDER — DEXTROSE MONOHYDRATE 50 MG/ML
INJECTION, SOLUTION INTRAVENOUS
Status: DISPENSED
Start: 2019-10-15

## (undated) RX ORDER — SCOLOPAMINE TRANSDERMAL SYSTEM 1 MG/1
PATCH, EXTENDED RELEASE TRANSDERMAL
Status: DISPENSED
Start: 2022-07-12

## (undated) RX ORDER — ONDANSETRON 2 MG/ML
INJECTION INTRAMUSCULAR; INTRAVENOUS
Status: DISPENSED
Start: 2022-07-12

## (undated) RX ORDER — PROPOFOL 10 MG/ML
INJECTION, EMULSION INTRAVENOUS
Status: DISPENSED
Start: 2022-07-12

## (undated) RX ORDER — ONDANSETRON 2 MG/ML
INJECTION INTRAMUSCULAR; INTRAVENOUS
Status: DISPENSED
Start: 2019-10-15

## (undated) RX ORDER — FENTANYL CITRATE 50 UG/ML
INJECTION, SOLUTION INTRAMUSCULAR; INTRAVENOUS
Status: DISPENSED
Start: 2019-10-15

## (undated) RX ORDER — EPHEDRINE SULFATE 50 MG/ML
INJECTION, SOLUTION INTRAMUSCULAR; INTRAVENOUS; SUBCUTANEOUS
Status: DISPENSED
Start: 2024-06-21

## (undated) RX ORDER — EPINEPHRINE 1 MG/ML
INJECTION, SOLUTION INTRAMUSCULAR; SUBCUTANEOUS
Status: DISPENSED
Start: 2019-10-15

## (undated) RX ORDER — CEFAZOLIN SODIUM IN 0.9 % NACL 3 G/100 ML
INTRAVENOUS SOLUTION, PIGGYBACK (ML) INTRAVENOUS
Status: DISPENSED
Start: 2019-10-15

## (undated) RX ORDER — FENTANYL CITRATE 50 UG/ML
INJECTION, SOLUTION INTRAMUSCULAR; INTRAVENOUS
Status: DISPENSED
Start: 2024-06-11

## (undated) RX ORDER — PROPOFOL 10 MG/ML
INJECTION, EMULSION INTRAVENOUS
Status: DISPENSED
Start: 2019-10-15

## (undated) RX ORDER — LIDOCAINE HYDROCHLORIDE 10 MG/ML
INJECTION, SOLUTION INFILTRATION; PERINEURAL
Status: DISPENSED
Start: 2019-10-15

## (undated) RX ORDER — LIDOCAINE HYDROCHLORIDE 10 MG/ML
INJECTION, SOLUTION EPIDURAL; INFILTRATION; INTRACAUDAL; PERINEURAL
Status: DISPENSED
Start: 2024-06-21

## (undated) RX ORDER — LIDOCAINE HYDROCHLORIDE 20 MG/ML
INJECTION, SOLUTION EPIDURAL; INFILTRATION; INTRACAUDAL; PERINEURAL
Status: DISPENSED
Start: 2019-10-15

## (undated) RX ORDER — BUPIVACAINE HYDROCHLORIDE 5 MG/ML
INJECTION, SOLUTION EPIDURAL; INTRACAUDAL
Status: DISPENSED
Start: 2022-07-12

## (undated) RX ORDER — DEXAMETHASONE SODIUM PHOSPHATE 4 MG/ML
INJECTION, SOLUTION INTRA-ARTICULAR; INTRALESIONAL; INTRAMUSCULAR; INTRAVENOUS; SOFT TISSUE
Status: DISPENSED
Start: 2024-06-21

## (undated) RX ORDER — LIDOCAINE HYDROCHLORIDE 10 MG/ML
INJECTION, SOLUTION EPIDURAL; INFILTRATION; INTRACAUDAL; PERINEURAL
Status: DISPENSED
Start: 2022-07-12

## (undated) RX ORDER — FENTANYL CITRATE-0.9 % NACL/PF 10 MCG/ML
PLASTIC BAG, INJECTION (ML) INTRAVENOUS
Status: DISPENSED
Start: 2024-06-21

## (undated) RX ORDER — FENTANYL CITRATE 50 UG/ML
INJECTION, SOLUTION INTRAMUSCULAR; INTRAVENOUS
Status: DISPENSED
Start: 2022-07-12

## (undated) RX ORDER — DEXAMETHASONE SODIUM PHOSPHATE 4 MG/ML
INJECTION, SOLUTION INTRA-ARTICULAR; INTRALESIONAL; INTRAMUSCULAR; INTRAVENOUS; SOFT TISSUE
Status: DISPENSED
Start: 2019-10-15

## (undated) RX ORDER — FENTANYL CITRATE 50 UG/ML
INJECTION, SOLUTION INTRAMUSCULAR; INTRAVENOUS
Status: DISPENSED
Start: 2024-06-21

## (undated) RX ORDER — NEOSTIGMINE METHYLSULFATE 1 MG/ML
VIAL (ML) INJECTION
Status: DISPENSED
Start: 2022-07-12

## (undated) RX ORDER — LABETALOL HYDROCHLORIDE 5 MG/ML
INJECTION, SOLUTION INTRAVENOUS
Status: DISPENSED
Start: 2022-07-12

## (undated) RX ORDER — CEFAZOLIN SODIUM/WATER 2 G/20 ML
SYRINGE (ML) INTRAVENOUS
Status: DISPENSED
Start: 2024-06-11